# Patient Record
Sex: FEMALE | Race: BLACK OR AFRICAN AMERICAN | NOT HISPANIC OR LATINO | Employment: OTHER | URBAN - METROPOLITAN AREA
[De-identification: names, ages, dates, MRNs, and addresses within clinical notes are randomized per-mention and may not be internally consistent; named-entity substitution may affect disease eponyms.]

---

## 2017-03-03 ENCOUNTER — OFFICE VISIT (OUTPATIENT)
Dept: INTERNAL MEDICINE | Facility: CLINIC | Age: 72
End: 2017-03-03
Attending: FAMILY MEDICINE
Payer: MEDICARE

## 2017-03-03 ENCOUNTER — LAB VISIT (OUTPATIENT)
Dept: LAB | Facility: OTHER | Age: 72
End: 2017-03-03
Attending: FAMILY MEDICINE
Payer: MEDICARE

## 2017-03-03 VITALS
SYSTOLIC BLOOD PRESSURE: 140 MMHG | DIASTOLIC BLOOD PRESSURE: 92 MMHG | OXYGEN SATURATION: 97 % | HEART RATE: 81 BPM | WEIGHT: 160.25 LBS | HEIGHT: 63 IN | BODY MASS INDEX: 28.39 KG/M2

## 2017-03-03 DIAGNOSIS — G89.29 CHRONIC RIGHT HIP PAIN: ICD-10-CM

## 2017-03-03 DIAGNOSIS — E11.9 TYPE 2 DIABETES MELLITUS WITHOUT COMPLICATION, WITH LONG-TERM CURRENT USE OF INSULIN: ICD-10-CM

## 2017-03-03 DIAGNOSIS — Z79.4 TYPE 2 DIABETES MELLITUS WITHOUT COMPLICATION, WITH LONG-TERM CURRENT USE OF INSULIN: ICD-10-CM

## 2017-03-03 DIAGNOSIS — R05.9 COUGH: ICD-10-CM

## 2017-03-03 DIAGNOSIS — I10 HYPERTENSION, ESSENTIAL: ICD-10-CM

## 2017-03-03 DIAGNOSIS — Z00.00 PREVENTATIVE HEALTH CARE: Primary | ICD-10-CM

## 2017-03-03 DIAGNOSIS — M25.551 CHRONIC RIGHT HIP PAIN: ICD-10-CM

## 2017-03-03 DIAGNOSIS — Z00.00 PREVENTATIVE HEALTH CARE: ICD-10-CM

## 2017-03-03 DIAGNOSIS — Z12.11 SCREEN FOR COLON CANCER: ICD-10-CM

## 2017-03-03 LAB
ALBUMIN SERPL BCP-MCNC: 3.3 G/DL
ALP SERPL-CCNC: 79 U/L
ALT SERPL W/O P-5'-P-CCNC: 14 U/L
ANION GAP SERPL CALC-SCNC: 7 MMOL/L
AST SERPL-CCNC: 16 U/L
BASOPHILS # BLD AUTO: 0.02 K/UL
BASOPHILS NFR BLD: 0.5 %
BILIRUB SERPL-MCNC: 0.6 MG/DL
BUN SERPL-MCNC: 18 MG/DL
CALCIUM SERPL-MCNC: 9.2 MG/DL
CHLORIDE SERPL-SCNC: 110 MMOL/L
CHOLEST/HDLC SERPL: 3.1 {RATIO}
CO2 SERPL-SCNC: 23 MMOL/L
CREAT SERPL-MCNC: 0.9 MG/DL
DIFFERENTIAL METHOD: ABNORMAL
EOSINOPHIL # BLD AUTO: 0.3 K/UL
EOSINOPHIL NFR BLD: 8.6 %
ERYTHROCYTE [DISTWIDTH] IN BLOOD BY AUTOMATED COUNT: 14.5 %
EST. GFR  (AFRICAN AMERICAN): >60 ML/MIN/1.73 M^2
EST. GFR  (NON AFRICAN AMERICAN): >60 ML/MIN/1.73 M^2
GLUCOSE SERPL-MCNC: 169 MG/DL
HCT VFR BLD AUTO: 35.4 %
HDL/CHOLESTEROL RATIO: 31.9 %
HDLC SERPL-MCNC: 141 MG/DL
HDLC SERPL-MCNC: 45 MG/DL
HGB BLD-MCNC: 11.4 G/DL
LDLC SERPL CALC-MCNC: 80.8 MG/DL
LYMPHOCYTES # BLD AUTO: 1.5 K/UL
LYMPHOCYTES NFR BLD: 39 %
MCH RBC QN AUTO: 28.7 PG
MCHC RBC AUTO-ENTMCNC: 32.2 %
MCV RBC AUTO: 89 FL
MONOCYTES # BLD AUTO: 0.3 K/UL
MONOCYTES NFR BLD: 8.4 %
NEUTROPHILS # BLD AUTO: 1.6 K/UL
NEUTROPHILS NFR BLD: 43 %
NONHDLC SERPL-MCNC: 96 MG/DL
PLATELET # BLD AUTO: 206 K/UL
PMV BLD AUTO: 9.9 FL
POTASSIUM SERPL-SCNC: 4.9 MMOL/L
PROT SERPL-MCNC: 6.5 G/DL
RBC # BLD AUTO: 3.97 M/UL
SODIUM SERPL-SCNC: 140 MMOL/L
TRIGL SERPL-MCNC: 76 MG/DL
TSH SERPL DL<=0.005 MIU/L-ACNC: 0.49 UIU/ML
WBC # BLD AUTO: 3.82 K/UL

## 2017-03-03 PROCEDURE — 80061 LIPID PANEL: CPT

## 2017-03-03 PROCEDURE — 84443 ASSAY THYROID STIM HORMONE: CPT

## 2017-03-03 PROCEDURE — 36415 COLL VENOUS BLD VENIPUNCTURE: CPT

## 2017-03-03 PROCEDURE — 3080F DIAST BP >= 90 MM HG: CPT | Mod: S$GLB,,, | Performed by: FAMILY MEDICINE

## 2017-03-03 PROCEDURE — 83036 HEMOGLOBIN GLYCOSYLATED A1C: CPT

## 2017-03-03 PROCEDURE — 80053 COMPREHEN METABOLIC PANEL: CPT

## 2017-03-03 PROCEDURE — 99999 PR PBB SHADOW E&M-NEW PATIENT-LVL III: CPT | Mod: PBBFAC,,, | Performed by: FAMILY MEDICINE

## 2017-03-03 PROCEDURE — 85025 COMPLETE CBC W/AUTO DIFF WBC: CPT

## 2017-03-03 PROCEDURE — 99387 INIT PM E/M NEW PAT 65+ YRS: CPT | Mod: S$GLB,,, | Performed by: FAMILY MEDICINE

## 2017-03-03 PROCEDURE — 3077F SYST BP >= 140 MM HG: CPT | Mod: S$GLB,,, | Performed by: FAMILY MEDICINE

## 2017-03-03 RX ORDER — INSULIN GLARGINE 100 [IU]/ML
INJECTION, SOLUTION SUBCUTANEOUS
COMMUNITY
End: 2019-12-13

## 2017-03-03 RX ORDER — LISINOPRIL 20 MG/1
20 TABLET ORAL DAILY
COMMUNITY
End: 2017-08-14 | Stop reason: SDUPTHER

## 2017-03-03 RX ORDER — BENZONATATE 200 MG/1
200 CAPSULE ORAL 2 TIMES DAILY PRN
Qty: 20 CAPSULE | Refills: 0 | Status: SHIPPED | OUTPATIENT
Start: 2017-03-03 | End: 2017-03-10

## 2017-03-03 RX ORDER — CARVEDILOL 12.5 MG/1
12.5 TABLET ORAL 2 TIMES DAILY WITH MEALS
COMMUNITY
End: 2017-08-14 | Stop reason: SDUPTHER

## 2017-03-03 RX ORDER — METFORMIN HYDROCHLORIDE 1000 MG/1
1000 TABLET, FILM COATED, EXTENDED RELEASE ORAL
COMMUNITY
End: 2017-08-14 | Stop reason: SDUPTHER

## 2017-03-03 RX ORDER — GLIPIZIDE 10 MG/1
10 TABLET ORAL
COMMUNITY
End: 2017-08-14 | Stop reason: SDUPTHER

## 2017-03-03 RX ORDER — HYDROCODONE BITARTRATE AND ACETAMINOPHEN 5; 325 MG/1; MG/1
TABLET ORAL
Refills: 0 | COMMUNITY
Start: 2016-12-16 | End: 2017-03-24

## 2017-03-03 NOTE — PROGRESS NOTES
"CHIEF COMPLAINT: Establish a primary care physician    HISTORY OF PRESENT ILLNESS: The patient is a 72 year-old BF.  The patient is  to another patient of mine.  She has no specific complaints today other than chronic right hip pain.  The patient wishes to establish a primary care physician.  The patient would also like to get some basic blood work done.    She has had a left knee replacement.    The patient has a history of diabetes.  Recent blood sugars have been less than 120.  There have been no episodes of hypoglycemia.    The patient has a history of stable hypertension on current medications.  Patient denies chest pain or shortness of breath today.    REVIEW OF SYSTEMS:  GENERAL: No fever, chills, fatigability or weight loss.  SKIN: No rashes, itching or changes in color or texture of skin.  HEAD: No headaches or recent head trauma.  EYES: Visual acuity fine. No photophobia, ocular pain or diplopia.  EARS: Denies ear pain, discharge or vertigo.  NOSE: No loss of smell, no epistaxis or postnasal drip.  MOUTH & THROAT: No hoarseness or change in voice. No excessive gum bleeding.  NODES: Denies swollen glands.  CHEST: Denies HULL, cyanosis, wheezing and sputum production.  CARDIOVASCULAR: Denies chest pain, PND, orthopnea or reduced exercise tolerance.  ABDOMEN: Appetite fine. No weight loss. Denies diarrhea, abdominal pain, hematemesis or blood in stool.  URINARY: No flank pain, dysuria or hematuria.  PERIPHERAL VASCULAR: No claudication or cyanosis.  MUSCULOSKELETAL: No joint stiffness or swelling. Denies back pain.Except as noted above.  NEUROLOGIC: No history of seizures, paralysis, alteration of gait or coordination.    SOCIAL HISTORY: The patient does not smoke.  The patient consumes alcohol socially.  The patient is happily  to another patient of mine.    PHYSICAL EXAMINATION:   Blood pressure (!) 140/92, pulse 81, height 5' 3" (1.6 m), weight 72.7 kg (160 lb 4.4 oz), SpO2 97 " %.    APPEARANCE: Well nourished, well developed, in no acute distress.    HEAD: Normocephalic, atraumatic.  EYES: PERRL. EOMI.  Conjunctivae without injection and  anicteric  EARS: TM's intact. Light reflex normal. No retraction or perforation.    NOSE: Mucosa pink. Airway clear.  MOUTH & THROAT: No tonsillar enlargement. No pharyngeal erythema or exudate. No stridor.  NECK: Supple.   NODES: No cervical, axillary or inguinal lymph node enlargement.  CHEST: Lungs clear to auscultation.  No retractions are noted.  No rales or rhonchi are present.  CARDIOVASCULAR: Normal S1, S2. No rubs, murmurs or gallops.  ABDOMEN: Bowel sounds normal. Not distended. Soft. No tenderness or masses.  No ascites is noted.  MUSCULOSKELETAL:  There is no clubbing, cyanosis, or edema of the extremities x4.  There is full range of motion of the lumbar spine.  There is full range of motion of the extremities x4.  There is no deformity noted.    NEUROLOGIC:       Normal speech development.      Hearing normal.      Normal gait.      Motor and sensory exams grossly normal.      DTR's normal.  PSYCHIATRIC: Patient is alert and oriented x3.  Thought processes are all normal.  There is no homicidality.  There is no suicidality.  There is no evidence of psychosis.    LABORATORY/RADIOLOGY:   Chart reviewed.  We will update blood work today.    ASSESSMENT:   Normal physical exam in an apparently healthy individual  Type 2 diabetes on insulin   Hypertension  Chronic right hip pain    PLAN:  We will follow-up blood work which we expect to be normal.  She needs a colonoscopy   We will refer her to orthopedics     Return to clinic in 3 months.

## 2017-03-03 NOTE — MR AVS SNAPSHOT
[facility]  [address]  [place] [address]  Phone: [phone]  Fax: [phone]                  Keely Pizarro   3/3/2017 11:20 AM   Office Visit    Description:  Female : 1945   Provider:  Albin Cazares MD   Department:  [facility]           Reason for Visit     Annual Exam     Shortness of Breath           Diagnoses this Visit        Comments    Preventative health care    -  Primary     Type 2 diabetes mellitus without complication, with long-term current use of insulin         Hypertension, essential         Chronic right hip pain         Cough         Screen for colon cancer                To Do List           Future Appointments        Provider Department Dept Phone    3/3/2017 12:30 PM LAB, [facility] [phone]    3/24/2017 9:00 AM Taco Basilio MD [facility] [phone]      Goals (5 Years of Data)     None       These Medications        Disp Refills Start End    benzonatate (TESSALON) 200 MG capsule 20 capsule 0 3/3/2017 3/10/2017    Take 1 capsule (200 mg total) by mouth 2 (two) times daily as needed for Cough. - Oral    Pharmacy: [facility] [address] [place] [address] [facility] [place] Ph #: [phone]         [facility]     [facility] Nurse Care Line -  Assistance  Registered nurses in the [facility] provide clinical advisement, health education, appointment booking, and other advisory services.  Call for this free service at [phone].             Medications           START taking these NEW medications        Refills    benzonatate (TESSALON) 200 MG capsule 0    Sig: Take 1 capsule (200 mg total) by mouth 2 (two) times daily as needed for Cough.    Class: Normal    Route: Oral           Verify that the below list of medications is an accurate representation of the medications you are currently  "taking.  If none reported, the list may be blank. If incorrect, please contact your healthcare provider. Carry this list with you in case of emergency.           Current Medications     carvedilol (COREG) 12.5 MG tablet Take 12.5 mg by mouth 2 (two) times daily with meals.    glipiZIDE (GLUCOTROL) 10 MG tablet Take 10 mg by mouth 2 (two) times daily before meals.    insulin glargine (LANTUS SOLOSTAR) 100 unit/mL (3 mL) InPn pen Inject into the skin. Pt is taking 32 units twice daily    lisinopril (PRINIVIL,ZESTRIL) 20 MG tablet Take 20 mg by mouth once daily.    metformin (GLUMETZA) 1000 MG (MOD) 24 hr tablet Take 1,000 mg by mouth daily with breakfast.    benzonatate (TESSALON) 200 MG capsule Take 1 capsule (200 mg total) by mouth 2 (two) times daily as needed for Cough.    hydrocodone-acetaminophen 5-325mg (NORCO) 5-325 mg per tablet TK 1 T PO Q 4 TO 6 H PRN P RELIEF           Clinical Reference Information           Your Vitals Were     BP Pulse Height Weight SpO2 BMI    140/92 81 5' 3" (1.6 m) 72.7 kg (160 lb 4.4 oz) 97% 28.39 kg/m2      Blood Pressure          Most Recent Value    BP  (!)  140/92      Allergies as of 3/3/2017     No Known Allergies      Immunizations Administered on Date of Encounter - 3/3/2017     None      Orders Placed During Today's Visit      Normal Orders This Visit    Ambulatory Referral to Orthopedics     Case request GI: COLONOSCOPY     Future Labs/Procedures Expected by Expires    CBC auto differential  3/3/2017 6/1/2017    Comprehensive metabolic panel  3/3/2017 5/2/2017    Hemoglobin A1c  3/3/2017 6/1/2017    Lipid panel  3/3/2017 5/2/2017    TSH  3/3/2017 6/1/2017      [facility] Sign-Up     Activating your [facility] account is as easy as 1-2-3!     1) Visit [email or web address], select Sign Up Now, enter this activation code and your date of birth, then select Next.  [email or web address]  Expires: 4/17/2017 12:21 PM      2) Create a username and password to use when you visit [facility] in " the future and select a security question in case you lose your password and select Next.    3) Enter your e-mail address and click Sign Up!    Additional Information  If you have questions, please e-mail [email or web address] or call [phone] to talk to our [facility] staff. Remember, [facility] is NOT to be used for urgent needs. For medical emergencies, dial 911.         Language Assistance Services     ATTENTION: Language assistance services are available, free of charge. Please call [phone].      ATENCIÓN: Si habla español, tiene a germain disposición servicios gratuitos de asistencia lingüística. Llame al [phone].     CHÚ Ý: N?u b?n nói Ti?ng Vi?t, có các d?ch v? h? tr? ngôn ng? mi?n phí dành cho b?n. G?i s? [phone].         [facility] complies with applicable Federal civil rights laws and does not discriminate on the basis of race, color, national origin, age, disability, or sex.

## 2017-03-06 LAB
ESTIMATED AVG GLUCOSE: 163 MG/DL
HBA1C MFR BLD HPLC: 7.3 %

## 2017-03-08 ENCOUNTER — TELEPHONE (OUTPATIENT)
Dept: INTERNAL MEDICINE | Facility: CLINIC | Age: 72
End: 2017-03-08

## 2017-03-08 ENCOUNTER — TELEPHONE (OUTPATIENT)
Dept: ENDOSCOPY | Facility: HOSPITAL | Age: 72
End: 2017-03-08

## 2017-03-08 DIAGNOSIS — Z12.11 SPECIAL SCREENING FOR MALIGNANT NEOPLASMS, COLON: Primary | ICD-10-CM

## 2017-03-08 RX ORDER — POLYETHYLENE GLYCOL 3350, SODIUM SULFATE ANHYDROUS, SODIUM BICARBONATE, SODIUM CHLORIDE, POTASSIUM CHLORIDE 236; 22.74; 6.74; 5.86; 2.97 G/4L; G/4L; G/4L; G/4L; G/4L
4 POWDER, FOR SOLUTION ORAL ONCE
Qty: 4000 ML | Refills: 0 | Status: SHIPPED | OUTPATIENT
Start: 2017-03-08 | End: 2017-03-08

## 2017-03-08 NOTE — TELEPHONE ENCOUNTER
----- Message from Albin Cazares MD sent at 3/7/2017 10:50 AM CST -----  Blood work looks very good.  A1c is 7.3.  No changes in medications.  Followup as scheduled in 6 months.

## 2017-03-10 DIAGNOSIS — Z12.31 OTHER SCREENING MAMMOGRAM: ICD-10-CM

## 2017-03-13 DIAGNOSIS — E11.9 TYPE 2 DIABETES MELLITUS WITHOUT COMPLICATION: ICD-10-CM

## 2017-03-24 ENCOUNTER — HOSPITAL ENCOUNTER (OUTPATIENT)
Dept: RADIOLOGY | Facility: HOSPITAL | Age: 72
Discharge: HOME | End: 2017-03-24
Attending: ORTHOPAEDIC SURGERY
Payer: MEDICARE

## 2017-03-24 ENCOUNTER — OFFICE VISIT (OUTPATIENT)
Dept: ORTHOPEDICS | Facility: CLINIC | Age: 72
End: 2017-03-24
Payer: MEDICARE

## 2017-03-24 VITALS — BODY MASS INDEX: 27.33 KG/M2 | WEIGHT: 160.06 LBS | HEIGHT: 64 IN

## 2017-03-24 DIAGNOSIS — M25.551 BILATERAL HIP PAIN: ICD-10-CM

## 2017-03-24 DIAGNOSIS — M54.50 ACUTE BILATERAL LOW BACK PAIN WITHOUT SCIATICA: Primary | ICD-10-CM

## 2017-03-24 DIAGNOSIS — M25.552 BILATERAL HIP PAIN: ICD-10-CM

## 2017-03-24 PROCEDURE — 1159F MED LIST DOCD IN RCRD: CPT | Mod: S$GLB,,, | Performed by: ORTHOPAEDIC SURGERY

## 2017-03-24 PROCEDURE — 1125F AMNT PAIN NOTED PAIN PRSNT: CPT | Mod: S$GLB,,, | Performed by: ORTHOPAEDIC SURGERY

## 2017-03-24 PROCEDURE — 1157F ADVNC CARE PLAN IN RCRD: CPT | Mod: S$GLB,,, | Performed by: ORTHOPAEDIC SURGERY

## 2017-03-24 PROCEDURE — 73521 X-RAY EXAM HIPS BI 2 VIEWS: CPT | Mod: 26,,, | Performed by: RADIOLOGY

## 2017-03-24 PROCEDURE — 72100 X-RAY EXAM L-S SPINE 2/3 VWS: CPT | Mod: TC

## 2017-03-24 PROCEDURE — 99999 PR PBB SHADOW E&M-EST. PATIENT-LVL III: CPT | Mod: PBBFAC,,, | Performed by: ORTHOPAEDIC SURGERY

## 2017-03-24 PROCEDURE — 1160F RVW MEDS BY RX/DR IN RCRD: CPT | Mod: S$GLB,,, | Performed by: ORTHOPAEDIC SURGERY

## 2017-03-24 PROCEDURE — 72100 X-RAY EXAM L-S SPINE 2/3 VWS: CPT | Mod: 26,,, | Performed by: RADIOLOGY

## 2017-03-24 PROCEDURE — 73521 X-RAY EXAM HIPS BI 2 VIEWS: CPT | Mod: TC

## 2017-03-24 PROCEDURE — 99203 OFFICE O/P NEW LOW 30 MIN: CPT | Mod: S$GLB,,, | Performed by: ORTHOPAEDIC SURGERY

## 2017-03-24 RX ORDER — NAPROXEN 500 MG/1
500 TABLET ORAL 2 TIMES DAILY WITH MEALS
Qty: 60 TABLET | Refills: 2 | Status: SHIPPED | OUTPATIENT
Start: 2017-03-24 | End: 2019-10-08

## 2017-03-24 RX ORDER — POLYETHYLENE GLYCOL-3350 AND ELECTROLYTES 236; 6.74; 5.86; 2.97; 22.74 G/274.31G; G/274.31G; G/274.31G; G/274.31G; G/274.31G
POWDER, FOR SOLUTION ORAL
COMMUNITY
Start: 2017-03-23 | End: 2017-03-24

## 2017-03-24 NOTE — PROGRESS NOTES
CC:bilateral low back pain      HPI:   Keely Pizarro is a pleasant 72 y.o. patient with h/o IDDM, L TKA who reports to clinic with R>L hip pain. Today the patient rates pain at a 6/10 on visual analog scale.  Left hip pain has been present for 1 year and worsening.  Denies trauma. Pain is located posterior.  Does not radiate.  Walking, standing, sitting makes pain worse.  Has not tried NSAIDs, PT, or injections for pain.     Affecting ADLs and exercising.        Review of Systems   Constitution: Negative. Negative for chills, fever and night sweats.   HENT: Negative for congestion and headaches.    Eyes: Negative for blurred vision, left vision loss and right vision loss.   Cardiovascular: Negative for chest pain and syncope.   Respiratory: Negative for cough and shortness of breath.    Endocrine: Negative for polydipsia, polyphagia and polyuria.   Hematologic/Lymphatic: Negative for bleeding problem. Does not bruise/bleed easily.   Skin: Negative for dry skin, itching and rash.   Musculoskeletal: Negative for falls. bilateral hip pain and  muscle weakness.   Gastrointestinal: Negative for abdominal pain and bowel incontinence.   Genitourinary: Negative for bladder incontinence and nocturia.   Neurological: Negative for disturbances in coordination, loss of balance and seizures.   Psychiatric/Behavioral: Negative for depression. The patient does not have insomnia.    Allergic/Immunologic: Negative for hives and persistent infections.     PAST MEDICAL HISTORY:   Past Medical History:   Diagnosis Date    CVA (cerebral vascular accident) 2011    Diabetes mellitus     Hypertension     Uterine cancer 2012     PAST SURGICAL HISTORY:   Past Surgical History:   Procedure Laterality Date    HYSTERECTOMY      TOTAL KNEE ARTHROPLASTY Left 2012     FAMILY HISTORY: History reviewed. No pertinent family history.  SOCIAL HISTORY:   Social History     Social History    Marital status:      Spouse name: N/A     "Number of children: N/A    Years of education: N/A     Occupational History    Not on file.     Social History Main Topics    Smoking status: Former Smoker     Types: Cigarettes    Smokeless tobacco: Not on file    Alcohol use No    Drug use: Not on file    Sexual activity: Yes     Partners: Male     Other Topics Concern    Not on file     Social History Narrative       MEDICATIONS:   Current Outpatient Prescriptions:     carvedilol (COREG) 12.5 MG tablet, Take 12.5 mg by mouth 2 (two) times daily with meals., Disp: , Rfl:     glipiZIDE (GLUCOTROL) 10 MG tablet, Take 10 mg by mouth 2 (two) times daily before meals., Disp: , Rfl:     insulin glargine (LANTUS SOLOSTAR) 100 unit/mL (3 mL) InPn pen, Inject into the skin. Pt is taking 32 units twice daily, Disp: , Rfl:     lisinopril (PRINIVIL,ZESTRIL) 20 MG tablet, Take 20 mg by mouth once daily., Disp: , Rfl:     metformin (GLUMETZA) 1000 MG (MOD) 24 hr tablet, Take 1,000 mg by mouth daily with breakfast., Disp: , Rfl:     naproxen (NAPROSYN) 500 MG tablet, Take 1 tablet (500 mg total) by mouth 2 (two) times daily with meals., Disp: 60 tablet, Rfl: 2    ranitidine (ZANTAC) 300 MG tablet, Take 1 tablet (300 mg total) by mouth every evening., Disp: 30 tablet, Rfl: 3  ALLERGIES: Review of patient's allergies indicates:  No Known Allergies    VITAL SIGNS: Ht 5' 4" (1.626 m)  Wt 72.6 kg (160 lb 0.9 oz)  BMI 27.47 kg/m2       PHYSICAL EXAM / HIP  PHYSICAL EXAMINATION  General:  The patient is alert and oriented x 3.  Mood is pleasant.  Observation of ears, eyes and nose reveal no gross abnormalities.  HEENT: NCAT, sclera nonicteric  Lungs: Respirations are equal and unlabored.    Bilateral HIP EXAMINATION     OBSERVATION / INSPECTION  Gait:   Nonantalgic   Alignment:  Neutral   Scars:   L knee vertical midline incision well healed  Muscle atrophy: None   Effusion:  None   Warmth:  None   Discoloration:   None   Leg lengths:   Equal   Pelvis:   Level "     TENDERNESS / CREPITUS (T/C):      T / C  Trochanteric bursa   - / -  Piriformis    - / -  SI joint    - / -  Psoas tendon   - / -  Rectus insertion  - / -  Adductor insertion  - / -  Pubic symphysis  - / -    ROM: (* = pain)    Flexion:    100 degrees  External rotation: 40 degrees *  Internal rotation: 30 degrees  Abduction:  45 degrees  Adduction:   20 degrees    SPECIAL TESTS:  Pain w/ forced internal rotation (FADIR): -  Pain w/ forced external rotation (JAY JAY): +  JAY JAY asymmetry:     -  Circumduction test:    -  Log roll:      Negative   Snapping hip (internal):   Negative   Sit-up pain:     Negative     EXTREMITY NEURO-VASCULAR EXAMINATION:   Sensation:  Grossly intact to light touch all dermatomal regions.   Motor Function:  Fully intact motor function at hip, knee, foot and ankle     Vascular status:  DP and PT pulses 2+, brisk capillary refill, symmetric.    Skin: intact, compartments soft.    OTHER FINDINGS:      XRAYS:  Xrays AP Pelvis, Lateral hip ordered and images reviewed by me and my interpretation is as follows:       No fracture, dislocation or other bony pathology     A/P  R low back pain pain    Plan:  Pt with tenderness at insertion of back extensors.  This is likely due to inflammation of the muscle insertions.  Discussed treatment with patient which includes strengthening with PT and ibuprofen scheduled.  Patient understands and agrees with plan.  Will f/u as needed.

## 2017-03-24 NOTE — LETTER
March 24, 2017      Albin Cazares MD  [address]  [facility] [address]  [facility] [address]           [facility]  [address]  [facility] [address]  Phone: [phone]          Patient: Keely Pizarro   MR Number: [id number]   YOB: 1945   Date of Visit: 3/24/2017       Dear Dr. Albin Cazares:    Thank you for referring Keely Pizarro to me for evaluation. Attached you will find relevant portions of my assessment and plan of care.    If you have questions, please do not hesitate to call me. I look forward to following Keely Pizarro along with you.    Sincerely,    Taco Basilio MD    Enclosure  CC:  No Recipients    If you would like to receive this communication electronically, please contact [email or web address] or [phone] to request more information on [employer] Link access.    For providers and/or their staff who would like to refer a patient to [facility], please contact us through our one-stop-shop provider referral line, [facility], at [phone].    If you feel you have received this communication in error or would no longer like to receive these types of communications, please e-mail [email or web address]

## 2017-03-31 ENCOUNTER — ANESTHESIA EVENT (OUTPATIENT)
Dept: ENDOSCOPY | Facility: HOSPITAL | Age: 72
End: 2017-03-31
Payer: MEDICARE

## 2017-03-31 ENCOUNTER — ANESTHESIA (OUTPATIENT)
Dept: ENDOSCOPY | Facility: HOSPITAL | Age: 72
End: 2017-03-31
Payer: MEDICARE

## 2017-03-31 ENCOUNTER — HOSPITAL ENCOUNTER (OUTPATIENT)
Facility: HOSPITAL | Age: 72
Discharge: HOME | End: 2017-03-31
Attending: COLON & RECTAL SURGERY | Admitting: COLON & RECTAL SURGERY
Payer: MEDICARE

## 2017-03-31 ENCOUNTER — SURGERY (OUTPATIENT)
Age: 72
End: 2017-03-31

## 2017-03-31 VITALS
HEART RATE: 76 BPM | TEMPERATURE: 99 F | RESPIRATION RATE: 23 BRPM | BODY MASS INDEX: 27.31 KG/M2 | SYSTOLIC BLOOD PRESSURE: 178 MMHG | OXYGEN SATURATION: 98 % | RESPIRATION RATE: 12 BRPM | DIASTOLIC BLOOD PRESSURE: 87 MMHG | WEIGHT: 160 LBS | HEIGHT: 64 IN

## 2017-03-31 DIAGNOSIS — Z12.11 SPECIAL SCREENING FOR MALIGNANT NEOPLASMS, COLON: ICD-10-CM

## 2017-03-31 LAB
GLUCOSE SERPL-MCNC: 149 MG/DL (ref 70–110)
POCT GLUCOSE: 149 MG/DL (ref 70–110)

## 2017-03-31 PROCEDURE — 63600175 PHARM REV CODE 636 W HCPCS: Performed by: NURSE ANESTHETIST, CERTIFIED REGISTERED

## 2017-03-31 PROCEDURE — D9220A PRA ANESTHESIA: Mod: PT,CRNA,, | Performed by: NURSE ANESTHETIST, CERTIFIED REGISTERED

## 2017-03-31 PROCEDURE — 25000003 PHARM REV CODE 250: Performed by: NURSE ANESTHETIST, CERTIFIED REGISTERED

## 2017-03-31 PROCEDURE — 37000008 HC ANESTHESIA 1ST 15 MINUTES: Performed by: COLON & RECTAL SURGERY

## 2017-03-31 PROCEDURE — 88305 TISSUE EXAM BY PATHOLOGIST: CPT | Performed by: PATHOLOGY

## 2017-03-31 PROCEDURE — 25000003 PHARM REV CODE 250: Performed by: COLON & RECTAL SURGERY

## 2017-03-31 PROCEDURE — 45380 COLONOSCOPY AND BIOPSY: CPT | Mod: PT,,, | Performed by: COLON & RECTAL SURGERY

## 2017-03-31 PROCEDURE — D9220A PRA ANESTHESIA: Mod: PT,ANES,, | Performed by: ANESTHESIOLOGY

## 2017-03-31 PROCEDURE — 45380 COLONOSCOPY AND BIOPSY: CPT | Performed by: COLON & RECTAL SURGERY

## 2017-03-31 PROCEDURE — 27201012 HC FORCEPS, HOT/COLD, DISP: Performed by: COLON & RECTAL SURGERY

## 2017-03-31 PROCEDURE — 37000009 HC ANESTHESIA EA ADD 15 MINS: Performed by: COLON & RECTAL SURGERY

## 2017-03-31 RX ORDER — PROPOFOL 10 MG/ML
VIAL (ML) INTRAVENOUS CONTINUOUS PRN
Status: DISCONTINUED | OUTPATIENT
Start: 2017-03-31 | End: 2017-03-31

## 2017-03-31 RX ORDER — DEXTROSE MONOHYDRATE AND SODIUM CHLORIDE 5; .45 G/100ML; G/100ML
INJECTION, SOLUTION INTRAVENOUS CONTINUOUS
Status: DISCONTINUED | OUTPATIENT
Start: 2017-03-31 | End: 2017-03-31 | Stop reason: HOSPADM

## 2017-03-31 RX ORDER — PROPOFOL 10 MG/ML
VIAL (ML) INTRAVENOUS
Status: DISCONTINUED | OUTPATIENT
Start: 2017-03-31 | End: 2017-03-31

## 2017-03-31 RX ORDER — LIDOCAINE HCL/PF 100 MG/5ML
SYRINGE (ML) INTRAVENOUS
Status: DISCONTINUED | OUTPATIENT
Start: 2017-03-31 | End: 2017-03-31

## 2017-03-31 RX ORDER — LABETALOL HYDROCHLORIDE 5 MG/ML
INJECTION, SOLUTION INTRAVENOUS
Status: DISCONTINUED | OUTPATIENT
Start: 2017-03-31 | End: 2017-03-31

## 2017-03-31 RX ADMIN — PROPOFOL 50 MG: 10 INJECTION, EMULSION INTRAVENOUS at 09:03

## 2017-03-31 RX ADMIN — DEXTROSE AND SODIUM CHLORIDE: 5; .45 INJECTION, SOLUTION INTRAVENOUS at 09:03

## 2017-03-31 RX ADMIN — LIDOCAINE HYDROCHLORIDE 50 MG: 20 INJECTION, SOLUTION INTRAVENOUS at 09:03

## 2017-03-31 RX ADMIN — PROPOFOL 150 MCG/KG/MIN: 10 INJECTION, EMULSION INTRAVENOUS at 09:03

## 2017-03-31 RX ADMIN — LABETALOL HYDROCHLORIDE 5 MG: 5 INJECTION, SOLUTION INTRAVENOUS at 09:03

## 2017-03-31 NOTE — ANESTHESIA PREPROCEDURE EVALUATION
03/31/2017  Keely Pizarro is a 72 y.o., female.    [facility] Anesthesia Evaluation    I have reviewed the Patient Summary Reports.    I have reviewed the Nursing Notes.   I have reviewed the Medications.     Review of Systems  Anesthesia Hx:  No problems with previous Anesthesia  History of prior surgery of interest to airway management or planning: Denies Family Hx of Anesthesia complications.   Denies Personal Hx of Anesthesia complications.   EENT/Dental:EENT/Dental Normal   Cardiovascular:   Exercise tolerance: good Hypertension Denies MI.    Denies Angina.    Pulmonary:  Pulmonary Normal    Hepatic/GI:  Hepatic/GI Normal  Denies GERD.    Neurological:   CVA, no residual symptoms CVA 2011 which pt states was around time of uterine cancer, no residual deficits. On ASA which she has held x 2-3 days   Endocrine:   Diabetes, type 2        Physical Exam  General:  Well nourished    Airway/Jaw/Neck:  Airway Findings: Mouth Opening: Normal Tongue: Normal  General Airway Assessment: Adult  Mallampati: II  TM Distance: Normal, at least 6 cm      Dental:  Dental Findings: In tact   Chest/Lungs:  Chest/Lungs Findings: Normal Respiratory Rate     Heart/Vascular:  Heart Findings: Rate: Normal        Mental Status:  Mental Status Findings:  Alert and Oriented         Anesthesia Plan  Type of Anesthesia, risks & benefits discussed:  Anesthesia Type:  general, MAC  Patient's Preference:   Intra-op Monitoring Plan:   Intra-op Monitoring Plan Comments:   Post Op Pain Control Plan:   Post Op Pain Control Plan Comments:   Induction:   IV  Beta Blocker:  Patient is not currently on a Beta-Blocker (No further documentation required).       Informed Consent: Patient understands risks and agrees with Anesthesia plan.  Questions answered. Anesthesia consent signed with patient.  ASA Score: 2     Day of Surgery Review of History &  Physical:    H&P update referred to the surgeon.         Ready For Surgery From Anesthesia Perspective.

## 2017-03-31 NOTE — DISCHARGE INSTRUCTIONS
Diverticulosis    Diverticulosis means that small pouches have formed in the wall of your large intestine (colon). Most often, this problem causes no symptoms and is common as people age. But the pouches in the colon are at risk of becoming infected. When this happens, the condition is called diverticulitis. Although most people with diverticulosis never develop diverticulitis, it is still not uncommon. Rectal bleeding can also occur and in less common situations, a type of colon inflammation called colitis.  While most people do not have symptoms, some people with diverticulosis may have:  · Abdominal cramps and pain  · Bloating  · Constipation  · Change in bowel habits  Causes  The exact cause of diverticulosis (and diverticulitis) has not been proved, but a few things are associated with the condition:  · Low-fiber diet  · Constipation  · Lack of exercise  Your healthcare provider will talk with you about how to manage your condition. Diet changes may be all that are needed to help control diverticulosis and prevent progression to diverticulitis. If you develop diverticulitis, you will likely need other treatments.  Home care  You may be told to take fiber supplements daily. Fiber adds bulk to the stool so that it passes through the colon more easily. Stool softeners may be recommended. You may also be given medications for pain relief. Be sure to take all medications as directed.  In the past, people were told to avoid corn, nuts, and seeds. This is no longer necessary.  Follow these guidelines when caring for yourself at home:  · Eat unprocessed foods that are high in fiber. Whole grains, fruits, and vegetables are good choices.  · Drink 6 to 8 glasses of water every day unless your healthcare provider has you limit how much fluid you should have.  · Watch for changes in your bowel movements. Tell your provider if you notice any changes.  · Begin an exercise program. Ask your provider how to get started.  Generally, walking is the best.  · Get plenty of rest and sleep.  Follow-up care  Follow up with your healthcare provider, or as advised. Regular visits may be needed to check on your health. Sometimes special procedures such as colonoscopy, are needed after an episode of diverticulitis or blooding. Be sure to keep all your appointments.  If a stool sample was taken, or cultures were done, you should be told if they are positive, or if your treatment needs to be changed. You can call as directed for the results.  If X-rays were done, a radiologist will look at them. You will be told if there is a change in your treatment.  If antibiotics were prescribed, be sure to finish them all.  When to seek medical advice  Call your healthcare provider right away if any of these occur:  · Fever of 100.4°F (38°C) or higher, or as directed by your healthcare provider  · Severe cramps in the lower left side of the abdomen or pain that is getting worse  · Tenderness in the lower left side of the abdomen or worsening pain throughout the abdomen  · Diarrhea or constipation that doesn't get better within 24 hours  · Nausea and vomiting  · Bleeding from the rectum  Call 911  Call emergency services if any of the following occur:  · Trouble breathing  · Confusion  · Very drowsy or trouble awakening  · Fainting or loss of consciousness  · Rapid heart rate  · Chest pain  Date Last Reviewed: 12/30/2015 © 2000-2016 [employer]. [address] [place] [address]. All rights reserved. This information is not intended as a substitute for professional medical care. Always follow your healthcare professional's instructions.        Understanding Colon and Rectal Polyps    The colon (also called the large intestine) is a muscular tube that forms the last part of the digestive tract. It absorbs water and stores food waste. The colon is about 4 to 6 feet long. The rectum is the last 6 inches of the colon. The colon and rectum  have a smooth lining composed of millions of cells. Changes in these cells can lead to growths in the colon that can become cancerous and should be removed. Multiple tests are available to screen for colon cancer, but the colonoscopy is the most recommended test. During colonoscopy, these polyps can be removed. How often you need this test depends on many things including your condition, your family history, symptoms, and what the findings were at the previous colonoscopy.   When the colon lining changes  Changes that happen in the cells that line the colon or rectum can lead to growths called polyps. Over a period of years, polyps can turn cancerous. Removing polyps early may prevent cancer from ever forming.  Polyps  Polyps are fleshy clumps of tissue that form on the lining of the colon or rectum. Small polyps are usually benign (not cancerous). However, over time, cells in a polyp can change and become cancerous. Certain types of polyps known as adenomatous polyps are premalignant. The risk for invasive cancer increases with the size of the polyp and certain cell and gene features. This means that they can become cancerous if they're not removed. Hyperplastic polyps are benign. They can grow quite large and not turn cancerous.   Cancer  Almost all colorectal cancers start when polyp cells begin growing abnormally. As a cancerous tumor grows, it may involve more and more of the colon or rectum. In time, cancer can also grow beyond the colon or rectum and spread to nearby organs or to glands called lymph nodes. The cells can also travel to other parts of the body. This is known as metastasis. The earlier a cancerous tumor is removed, the better the chance of preventing its spread.    Date Last Reviewed: 8/1/2016  © 9164-5464 [employer]. [address] [place] [address], [place] [address]. All rights reserved. This information is not intended as a substitute for professional medical care. Always follow your  healthcare professional's instructions.

## 2017-03-31 NOTE — ANESTHESIA RELEASE NOTE
Anesthesia Release from PACU Note    Patient name: Keely Pizarro    Procedure(s): Procedure(s) (LRB):  COLONOSCOPY (N/A)    Anesthesia type: general    Post pain: adequate analgesia    Post assessment: no apparent complications    Last vitals:   Vitals:    03/31/17 1030   BP: (!) 178/87   Pulse: 76   Resp: 12   Temp: 37.1 °C (98.7 °F)       Post vital signs: stable    Level of consciousness: alert     Nausea/Vomiting: no nausea/no vomiting    Complications: none    Airway Patency:  patent    Respiratory: unassisted    Cardiovascular: stable and blood pressure at baseline    Hydration: euvolemic

## 2017-03-31 NOTE — H&P
Endoscopy H&P    Procedure : Colonoscopy      asymptomatic screening exam, family history of colon cancer (brother 70) and personal history of colon polyps      Past Medical History:   Diagnosis Date    CVA (cerebral vascular accident) 2011    Diabetes mellitus     Hypertension     Uterine cancer 2012     Sedation Problems: NO  Family History   Problem Relation Age of Onset    Cancer Brother 67     colon     Fam Hx of Sedation Problems: NO  Social History     Social History    Marital status:      Spouse name: N/A    Number of children: N/A    Years of education: N/A     Occupational History    Not on file.     Social History Main Topics    Smoking status: Former Smoker     Types: Cigarettes    Smokeless tobacco: Not on file    Alcohol use No    Drug use: Not on file    Sexual activity: Yes     Partners: Male     Other Topics Concern    Not on file     Social History Narrative       Review of Systems - Negative     Respiratory ROS: no cough, shortness of breath, or wheezing  Cardiovascular ROS: no chest pain or dyspnea on exertion  Gastrointestinal ROS: no abdominal pain, change in bowel habits, or black or bloody stools  Musculoskeletal ROS: negative  Neurological ROS: no TIA or stroke symptoms        Physical Exam:  General: no distress  Head: normocephalic  Airway:  normal oropharynx, airway normal  Neck: supple, symmetrical, trachea midline  Lungs:  clear to auscultation bilaterally and normal respiratory effort  Heart: regular rate and rhythm, S1, S2 normal, no murmur, rub or gallop  Abdomen: soft, non-tender non-distented; bowel sounds normal; no masses,  no organomegaly  Extremities: no cyanosis or edema, or clubbing       Deep Sedation: Mallampati Score per anesthesia     SedationPlan :Gen     ASA : II

## 2017-03-31 NOTE — IP AVS SNAPSHOT
[facility]  [address]  [facility] [address]  Phone: [phone]           Patient Discharge Instructions   Our goal is to set you up for success. This packet includes information on your condition, medications, and your home care.  It will help you care for yourself to prevent having to return to the hospital.     Please ask your nurse if you have any questions.      There are many details to remember when preparing to leave the hospital. Here is what you will need to do:    1. Take your medicine. If you are prescribed medications, review your Medication List on the following pages. You may have new medications to  at the pharmacy and others that you'll need to stop taking. Review the instructions for how and when to take your medications. Talk with your doctor or nurses if you are unsure of what to do.     2. Go to your follow-up appointments. Specific follow-up information is listed in the following pages. Your may be contacted by a nurse or clinical provider about future appointments. Be sure we have all of the phone numbers to reach you. Please contact your provider's office if you are unable to make an appointment.     3. Watch for warning signs. Your doctor or nurse will give you detailed warning signs to watch for and when to call for assistance. These instructions may also include educational information about your condition. If you experience any of warning signs to your health, call your doctor.           [facility]  Unless otherwise directed by your provider, please   contact [facility], our nurse care line   that is available for 24/7 assistance.     [phone] (toll-free)     Registered nurses in the [facility]   provide: appointment scheduling, clinical advisement, health education, and other advisory services.                  ** Verify the list of medication(s) below is accurate and up to date. Carry this with you in case of  emergency. If your medications have changed, please notify your healthcare provider.             Medication List      CONTINUE taking these medications        Additional Info                      carvedilol 12.5 MG tablet   Commonly known as:  COREG   Refills:  0   Dose:  12.5 mg    Instructions:  Take 12.5 mg by mouth 2 (two) times daily with meals.     Begin Date    AM    Noon    PM    Bedtime       glipiZIDE 10 MG tablet   Commonly known as:  GLUCOTROL   Refills:  0   Dose:  10 mg    Instructions:  Take 10 mg by mouth 2 (two) times daily before meals.     Begin Date    AM    Noon    PM    Bedtime       insulin glargine 100 unit/mL (3 mL) Inpn pen   Commonly known as:  LANTUS SOLOSTAR   Refills:  0    Instructions:  Inject into the skin. Pt is taking 32 units twice daily     Begin Date    AM    Noon    PM    Bedtime       lisinopril 20 MG tablet   Commonly known as:  PRINIVIL,ZESTRIL   Refills:  0   Dose:  20 mg    Instructions:  Take 20 mg by mouth once daily.     Begin Date    AM    Noon    PM    Bedtime       metformin 1000 MG (MOD) 24 hr tablet   Commonly known as:  GLUMETZA   Refills:  0   Dose:  1000 mg    Instructions:  Take 1,000 mg by mouth daily with breakfast.     Begin Date    AM    Noon    PM    Bedtime       naproxen 500 MG tablet   Commonly known as:  NAPROSYN   Quantity:  60 tablet   Refills:  2   Dose:  500 mg    Instructions:  Take 1 tablet (500 mg total) by mouth 2 (two) times daily with meals.     Begin Date    AM    Noon    PM    Bedtime         STOP taking these medications     ranitidine 300 MG tablet   Commonly known as:  ZANTAC                  Please bring to all follow up appointments:    1. A copy of your discharge instructions.  2. All medicines you are currently taking in their original bottles.  3. Identification and insurance card.    Please arrive 15 minutes ahead of scheduled appointment time.    Please call 24 hours in advance if you must reschedule your appointment and/or  time.        Your Scheduled Appointments     Jun 23, 2017  9:00 AM CDT   Established Patient Visit with MD Tacho Lott - Orthoped[facility] ([facility] )    [address]  [facility] [address] [phone]                Discharge Instructions     Future Orders    Diet general     Questions:    Total calories:      Fat restriction, if any:      Protein restriction, if any:      Na restriction, if any:      Fluid restriction:      Additional restrictions:          Discharge Instructions         Diverticulosis    Diverticulosis means that small pouches have formed in the wall of your large intestine (colon). Most often, this problem causes no symptoms and is common as people age. But the pouches in the colon are at risk of becoming infected. When this happens, the condition is called diverticulitis. Although most people with diverticulosis never develop diverticulitis, it is still not uncommon. Rectal bleeding can also occur and in less common situations, a type of colon inflammation called colitis.  While most people do not have symptoms, some people with diverticulosis may have:  · Abdominal cramps and pain  · Bloating  · Constipation  · Change in bowel habits  Causes  The exact cause of diverticulosis (and diverticulitis) has not been proved, but a few things are associated with the condition:  · Low-fiber diet  · Constipation  · Lack of exercise  Your healthcare provider will talk with you about how to manage your condition. Diet changes may be all that are needed to help control diverticulosis and prevent progression to diverticulitis. If you develop diverticulitis, you will likely need other treatments.  Home care  You may be told to take fiber supplements daily. Fiber adds bulk to the stool so that it passes through the colon more easily. Stool softeners may be recommended. You may also be given medications for pain relief. Be sure to take all medications as directed.  In the  past, people were told to avoid corn, nuts, and seeds. This is no longer necessary.  Follow these guidelines when caring for yourself at home:  · Eat unprocessed foods that are high in fiber. Whole grains, fruits, and vegetables are good choices.  · Drink 6 to 8 glasses of water every day unless your healthcare provider has you limit how much fluid you should have.  · Watch for changes in your bowel movements. Tell your provider if you notice any changes.  · Begin an exercise program. Ask your provider how to get started. Generally, walking is the best.  · Get plenty of rest and sleep.  Follow-up care  Follow up with your healthcare provider, or as advised. Regular visits may be needed to check on your health. Sometimes special procedures such as colonoscopy, are needed after an episode of diverticulitis or blooding. Be sure to keep all your appointments.  If a stool sample was taken, or cultures were done, you should be told if they are positive, or if your treatment needs to be changed. You can call as directed for the results.  If X-rays were done, a radiologist will look at them. You will be told if there is a change in your treatment.  If antibiotics were prescribed, be sure to finish them all.  When to seek medical advice  Call your healthcare provider right away if any of these occur:  · Fever of 100.4°F (38°C) or higher, or as directed by your healthcare provider  · Severe cramps in the lower left side of the abdomen or pain that is getting worse  · Tenderness in the lower left side of the abdomen or worsening pain throughout the abdomen  · Diarrhea or constipation that doesn't get better within 24 hours  · Nausea and vomiting  · Bleeding from the rectum  Call 911  Call emergency services if any of the following occur:  · Trouble breathing  · Confusion  · Very drowsy or trouble awakening  · Fainting or loss of consciousness  · Rapid heart rate  · Chest pain  Date Last Reviewed: 12/30/2015  © 1488-5328 [employer]. [address] [place] [address], [place] [address]. All rights reserved. This information is not intended as a substitute for professional medical care. Always follow your healthcare professional's instructions.        Understanding Colon and Rectal Polyps    The colon (also called the large intestine) is a muscular tube that forms the last part of the digestive tract. It absorbs water and stores food waste. The colon is about 4 to 6 feet long. The rectum is the last 6 inches of the colon. The colon and rectum have a smooth lining composed of millions of cells. Changes in these cells can lead to growths in the colon that can become cancerous and should be removed. Multiple tests are available to screen for colon cancer, but the colonoscopy is the most recommended test. During colonoscopy, these polyps can be removed. How often you need this test depends on many things including your condition, your family history, symptoms, and what the findings were at the previous colonoscopy.   When the colon lining changes  Changes that happen in the cells that line the colon or rectum can lead to growths called polyps. Over a period of years, polyps can turn cancerous. Removing polyps early may prevent cancer from ever forming.  Polyps  Polyps are fleshy clumps of tissue that form on the lining of the colon or rectum. Small polyps are usually benign (not cancerous). However, over time, cells in a polyp can change and become cancerous. Certain types of polyps known as adenomatous polyps are premalignant. The risk for invasive cancer increases with the size of the polyp and certain cell and gene features. This means that they can become cancerous if they're not removed. Hyperplastic polyps are benign. They can grow quite large and not turn cancerous.   Cancer  Almost all colorectal cancers start when polyp cells begin growing abnormally. As a cancerous tumor grows, it may involve more and more of the colon or  "rectum. In time, cancer can also grow beyond the colon or rectum and spread to nearby organs or to glands called lymph nodes. The cells can also travel to other parts of the body. This is known as metastasis. The earlier a cancerous tumor is removed, the better the chance of preventing its spread.    Date Last Reviewed: 8/1/2016  © 2977-7204 [employer]. [address] [place] [address]. All rights reserved. This information is not intended as a substitute for professional medical care. Always follow your healthcare professional's instructions.            Admission Information     Date & Time Provider Department CSN    3/31/2017  9:04 AM Chip Pak MD [facility] [id number]      Care Providers     Provider Role Specialty Primary office phone    Chip Pak MD Attending Provider Colon and Rectal Surgery [phone]    Chip Pak MD Surgeon  Colon and Rectal Surgery [phone]      Your Vitals Were     BP Pulse Temp Resp Height Weight    179/91 72 98.7 °F (37.1 °C) 12 5' 4" (1.626 m) 72.6 kg (160 lb)    SpO2 BMI             98% 27.46 kg/m2         Recent Lab Values        3/3/2017                          12:32 PM           A1C 7.3 (H)           Comment for A1C at 12:32 PM on 3/3/2017:  According to ADA guidelines, hemoglobin A1C <7.0% represents  optimal control in non-pregnant diabetic patients.  Different  metrics may apply to specific populations.   Standards of Medical Care in Diabetes - 2016.  For the purpose of screening for the presence of diabetes:  <5.7%     Consistent with the absence of diabetes  5.7-6.4%  Consistent with increasing risk for diabetes   (prediabetes)  >or=6.5%  Consistent with diabetes  Currently no consensus exists for use of hemoglobin A1C  for diagnosis of diabetes for children.        Pending Labs     Order Current Status    Specimen to Pathology - Surgery Collected (03/31/17 1008)      Allergies as of 3/31/2017     No " Known Allergies      Advance Directives     An advance directive is a document which, in the event you are no longer able to make decisions for yourself, tells your healthcare team what kind of treatment you do or do not want to receive, or who you would like to make those decisions for you.  If you do not currently have an advance directive, [facility] encourages you to create one.  For more information call:  [phone] ([phone]), [phone] ([phone]),  or log on to [email or web address].        Smoking Cessation     If you would like to quit smoking:   You may be eligible for free services if you are a [place] resident and started smoking cigarettes before September 1, 1988.  Call the Smoking Cessation Trust (SCT) toll free at (728) 791-5627 or (900) 195-0451.   Call 7-083-QUIT-NOW if you do not meet the above criteria.   Contact us via email: [email or web address]   View our website for more information: [email or web address]        Language Assistance Services     ATTENTION: Language assistance services are available, free of charge. Please call [phone].      ATENCIÓN: Si habla español, tiene a germain disposición servicios gratuitos de asistencia lingüística. Llame al [phone].     CHÚ Ý: N?u b?n nói Ti?ng Vi?t, có các d?ch v? h? tr? ngôn ng? mi?n phí dành cho b?n. G?i s? [phone].        [facility] Sign-Up     Activating your [facility] account is as easy as 1-2-3!     1) Visit [email or web address], select Sign Up Now, enter this activation code and your date of birth, then select Next.  [email or web address]  Expires: 4/17/2017  1:21 PM      2) Create a username and password to use when you visit [facility] in the future and select a security question in case you lose your password and select Next.    3) Enter your e-mail address and click Sign Up!    Additional Information  If you have questions, please e-mail [email or web address] or call [phone] to talk to our [facility]  staff. Remember, [facility] is NOT to be used for urgent needs. For medical emergencies, dial 911.          [facility] complies with applicable Federal civil rights laws and does not discriminate on the basis of race, color, national origin, age, disability, or sex.

## 2017-03-31 NOTE — TRANSFER OF CARE
"Anesthesia Transfer of Care Note    Patient: Keely Pizarro    Procedure(s) Performed: Procedure(s) (LRB):  COLONOSCOPY (N/A)    Patient location: PACU    Anesthesia Type: general    Transport from OR: Transported from OR on room air with adequate spontaneous ventilation    Post pain: adequate analgesia    Post assessment: no apparent anesthetic complications    Post vital signs: stable    Level of consciousness: awake, alert and oriented    Nausea/Vomiting: no nausea/vomiting    Complications: none          Last vitals:   Visit Vitals    BP (!) 179/91    Pulse 72    Temp 37.1 °C (98.7 °F)    Resp 12    Ht 5' 4" (1.626 m)    Wt 72.6 kg (160 lb)    SpO2 98%    Breastfeeding No    BMI 27.46 kg/m2     "

## 2017-03-31 NOTE — ANESTHESIA POSTPROCEDURE EVALUATION
"Anesthesia Post Evaluation    Patient: Keely Pizarro    Procedure(s) Performed: Procedure(s) (LRB):  COLONOSCOPY (N/A)    Final Anesthesia Type: general  Patient location during evaluation: PACU  Patient participation: Yes- Able to Participate  Level of consciousness: awake and alert  Post-procedure vital signs: reviewed and stable  Pain management: adequate  Airway patency: patent  PONV status at discharge: No PONV  Anesthetic complications: no      Cardiovascular status: blood pressure returned to baseline  Respiratory status: unassisted, room air and spontaneous ventilation  Hydration status: euvolemic  Follow-up not needed.        Visit Vitals    BP (!) 178/87 (BP Location: Left arm, Patient Position: Sitting, BP Method: Automatic)    Pulse 76    Temp 37.1 °C (98.7 °F) (Oral)    Resp 12    Ht 5' 4" (1.626 m)    Wt 72.6 kg (160 lb)    SpO2 98%    Breastfeeding No    BMI 27.46 kg/m2       Pain/Kaushal Score: Pain Assessment Performed: Yes (3/31/2017 10:30 AM)  Presence of Pain: denies (3/31/2017 10:30 AM)  Pain Rating Prior to Med Admin: 0 (3/31/2017 10:30 AM)  Pain Rating Post Med Admin: 0 (3/31/2017 10:30 AM)  Kaushal Score: 10 (3/31/2017 10:30 AM)      "

## 2017-04-07 ENCOUNTER — TELEPHONE (OUTPATIENT)
Dept: ENDOSCOPY | Facility: HOSPITAL | Age: 72
End: 2017-04-07

## 2017-05-25 ENCOUNTER — OFFICE VISIT (OUTPATIENT)
Dept: INTERNAL MEDICINE | Facility: CLINIC | Age: 72
End: 2017-05-25
Attending: FAMILY MEDICINE
Payer: MEDICARE

## 2017-05-25 ENCOUNTER — TELEPHONE (OUTPATIENT)
Dept: INTERNAL MEDICINE | Facility: CLINIC | Age: 72
End: 2017-05-25

## 2017-05-25 ENCOUNTER — HOSPITAL ENCOUNTER (OUTPATIENT)
Dept: RADIOLOGY | Facility: OTHER | Age: 72
Discharge: HOME | End: 2017-05-25
Attending: FAMILY MEDICINE
Payer: MEDICARE

## 2017-05-25 VITALS
WEIGHT: 153 LBS | TEMPERATURE: 98 F | HEIGHT: 64 IN | BODY MASS INDEX: 26.12 KG/M2 | DIASTOLIC BLOOD PRESSURE: 50 MMHG | HEART RATE: 86 BPM | SYSTOLIC BLOOD PRESSURE: 100 MMHG

## 2017-05-25 DIAGNOSIS — R93.89 ABNORMAL CHEST X-RAY: ICD-10-CM

## 2017-05-25 DIAGNOSIS — E11.9 TYPE 2 DIABETES MELLITUS WITHOUT COMPLICATION, WITHOUT LONG-TERM CURRENT USE OF INSULIN: ICD-10-CM

## 2017-05-25 DIAGNOSIS — R63.4 UNINTENTIONAL WEIGHT LOSS OF 7.5% BODY WEIGHT OR LESS WITHIN 3 MONTHS: ICD-10-CM

## 2017-05-25 DIAGNOSIS — I10 HYPERTENSION, ESSENTIAL: ICD-10-CM

## 2017-05-25 DIAGNOSIS — R59.0 CERVICAL LYMPHADENOPATHY: ICD-10-CM

## 2017-05-25 DIAGNOSIS — J90 PLEURAL EFFUSION ON LEFT: Primary | ICD-10-CM

## 2017-05-25 PROCEDURE — 1125F AMNT PAIN NOTED PAIN PRSNT: CPT | Mod: S$GLB,,, | Performed by: FAMILY MEDICINE

## 2017-05-25 PROCEDURE — 99215 OFFICE O/P EST HI 40 MIN: CPT | Mod: S$GLB,,, | Performed by: FAMILY MEDICINE

## 2017-05-25 PROCEDURE — 71020 XR CHEST PA AND LATERAL: CPT | Mod: 26,,, | Performed by: RADIOLOGY

## 2017-05-25 PROCEDURE — 99999 PR PBB SHADOW E&M-EST. PATIENT-LVL IV: CPT | Mod: PBBFAC,,, | Performed by: FAMILY MEDICINE

## 2017-05-25 PROCEDURE — 1159F MED LIST DOCD IN RCRD: CPT | Mod: S$GLB,,, | Performed by: FAMILY MEDICINE

## 2017-05-25 PROCEDURE — 4010F ACE/ARB THERAPY RXD/TAKEN: CPT | Mod: S$GLB,,, | Performed by: FAMILY MEDICINE

## 2017-05-25 PROCEDURE — 3045F PR MOST RECENT HEMOGLOBIN A1C LEVEL 7.0-9.0%: CPT | Mod: S$GLB,,, | Performed by: FAMILY MEDICINE

## 2017-05-25 PROCEDURE — 71020 XR CHEST PA AND LATERAL: CPT | Mod: TC

## 2017-05-25 NOTE — MEDICAL/APP STUDENT
Subjective:       Patient ID: Keely Pizarro is a 72 y.o. female.    Chief Complaint: Headache; Facial Pain (right side/possible infection); and Lymphadenopathy    HPI Ms. Pizarro presents today for pain behind her R ear and painful/swollen sub-mandibular swelling. She had a few teeth pulled in December and says that she experienced these same symptoms after, but they went away. The swelling and pain came back about two weeks ago and has been progressively worsening. She admits to associated headaches. She admits to fevers at home, but is afebrile today.      Review of Systems   Constitutional: Positive for fever.   Musculoskeletal: Positive for neck pain.   Neurological: Positive for headaches.   Hematological: Positive for adenopathy.   Psychiatric/Behavioral: Negative.        Objective:      Physical Exam    Assessment:       No diagnosis found.    Plan:

## 2017-05-25 NOTE — PROGRESS NOTES
CHIEF COMPLAINT: Cervical lymphadenopathy and unintentional weight loss     HISTORY OF PRESENT ILLNESS: The patient is a 72 year-old BF.  The patient is  to another patient of mine.  She has about a 2 week history of bilateral submandibular lymphadenopathy.  She also has substantial right posterior auricular lymphadenopathy.  She's lost about 10 pounds over the past 3 months.  The patient denies hemoptysis or productive cough.  No known exposure to tuberculosis.  Recent A1c was quite good at 7.3.    She has had a left knee replacement.    The patient has a history of diabetes.  Recent blood sugars have been less than 120.  There have been no episodes of hypoglycemia.    The patient has a history of stable hypertension on current medications.  Patient denies chest pain or shortness of breath today.    REVIEW OF SYSTEMS:  GENERAL: No fever, chills, fatigability or weight loss.  SKIN: No rashes, itching or changes in color or texture of skin.  HEAD: No headaches or recent head trauma.  EYES: Visual acuity fine. No photophobia, ocular pain or diplopia.  EARS: Denies ear pain, discharge or vertigo.  NOSE: No loss of smell, no epistaxis or postnasal drip.  MOUTH & THROAT: No hoarseness or change in voice. No excessive gum bleeding.  NODES: Denies swollen glands.  CHEST: Denies HULL, cyanosis, wheezing and sputum production.  CARDIOVASCULAR: Denies chest pain, PND, orthopnea or reduced exercise tolerance.  ABDOMEN: Appetite fine. No weight loss. Denies diarrhea, abdominal pain, hematemesis or blood in stool.  URINARY: No flank pain, dysuria or hematuria.  PERIPHERAL VASCULAR: No claudication or cyanosis.  MUSCULOSKELETAL: No joint stiffness or swelling. Denies back pain.Except as noted above.  NEUROLOGIC: No history of seizures, paralysis, alteration of gait or coordination.    SOCIAL HISTORY: The patient does not smoke.  The patient consumes alcohol socially.  The patient is happily  to another patient of  "mine.    PHYSICAL EXAMINATION:   Blood pressure (!) 100/50, pulse 86, temperature 97.8 °F (36.6 °C), height 5' 4" (1.626 m), weight 69.4 kg (153 lb).    APPEARANCE: Well nourished, well developed, in no acute distress.    HEAD: Normocephalic, atraumatic.  EYES: PERRL. EOMI.  Conjunctivae without injection and  anicteric  EARS: TM's intact. Light reflex normal. No retraction or perforation.    NOSE: Mucosa pink. Airway clear.  MOUTH & THROAT: No tonsillar enlargement. No pharyngeal erythema or exudate. No stridor.  NECK: Supple.   NODES: There is a substantial amount of bilateral submandibular cervical and posterior reticular on the right lymphadenopathy.  No obvious axillary or inguinal lymph node enlargement.  CHEST: Lungs clear to auscultation.  No retractions are noted.  No rales or rhonchi are present.  CARDIOVASCULAR: Normal S1, S2. No rubs, murmurs or gallops.  ABDOMEN: Bowel sounds normal. Not distended. Soft. No tenderness or masses.  No ascites is noted.  MUSCULOSKELETAL:  There is no clubbing, cyanosis, or edema of the extremities x4.  There is full range of motion of the lumbar spine.  There is full range of motion of the extremities x4.  There is no deformity noted.    NEUROLOGIC:       Normal speech development.      Hearing normal.      Normal gait.      Motor and sensory exams grossly normal.      DTR's normal.  PSYCHIATRIC: Patient is alert and oriented x3.  Thought processes are all normal.  There is no homicidality.  There is no suicidality.  There is no evidence of psychosis.    LABORATORY/RADIOLOGY:   Chart reviewed.  We will update blood work today.    ASSESSMENT:   Recent acute onset submandibular and posterior auricular lymphadenopathy in the setting of markedly abnormal chest x-ray with large left-sided pleural effusion  Type 2 diabetes on insulin   Hypertension  Chronic right hip pain    PLAN:  We arranged for her to be seen by ENT for lymph node biopsy within 24 hours.  I am suspicious that " she has a malignant pulmonary mass in the left lower lobe which is both causing and obscured by the large pleural effusion.  She will need to be seen by pulmonary as soon as possible.  I all ready ordered a CT of the chest.  Overall I feel it is highly likely that all of the complaints listed today are due to a previously undiagnosed malignancy.  Her recent colonoscopy  was unremarkable     Return to clinic after evaluation by ENT and pulmonary.

## 2017-05-25 NOTE — TELEPHONE ENCOUNTER
----- Message from Albin Cazares MD sent at 5/25/2017  1:51 PM CDT -----  Blood work looks good.  Chest x-ray shows some fluid on the left side of the chest.  Pulmonary consult ordered.  Chest CT ordered.  Please send a copy of chest x-ray and blood work to Dr Andrade

## 2017-05-29 ENCOUNTER — HOSPITAL ENCOUNTER (OUTPATIENT)
Dept: RADIOLOGY | Facility: OTHER | Age: 72
Discharge: HOME | End: 2017-05-29
Attending: FAMILY MEDICINE
Payer: MEDICARE

## 2017-05-29 ENCOUNTER — TELEPHONE (OUTPATIENT)
Dept: INTERNAL MEDICINE | Facility: CLINIC | Age: 72
End: 2017-05-29

## 2017-05-29 DIAGNOSIS — R93.89 ABNORMAL CHEST X-RAY: ICD-10-CM

## 2017-05-29 DIAGNOSIS — R63.4 UNINTENTIONAL WEIGHT LOSS OF 7.5% BODY WEIGHT OR LESS WITHIN 3 MONTHS: ICD-10-CM

## 2017-05-29 DIAGNOSIS — R59.0 CERVICAL LYMPHADENOPATHY: ICD-10-CM

## 2017-05-29 DIAGNOSIS — J90 PLEURAL EFFUSION ON LEFT: ICD-10-CM

## 2017-05-29 PROCEDURE — 25500020 PHARM REV CODE 255: Performed by: FAMILY MEDICINE

## 2017-05-29 PROCEDURE — 71260 CT THORAX DX C+: CPT | Mod: TC

## 2017-05-29 PROCEDURE — 71260 CT THORAX DX C+: CPT | Mod: 26,,, | Performed by: RADIOLOGY

## 2017-05-29 RX ADMIN — IOHEXOL 75 ML: 350 INJECTION, SOLUTION INTRAVENOUS at 09:05

## 2017-05-29 NOTE — TELEPHONE ENCOUNTER
Per Dr. Cazares; we will wait to give pt results once Dr. Andrade send report from Friday visit. CF

## 2017-05-29 NOTE — TELEPHONE ENCOUNTER
----- Message from Albin Cazares MD sent at 5/29/2017 10:36 AM CDT -----  CT confirms fluid around lung and swollen nodes.  We need to find out what Dr Andrade did and any pathology results.  Dr Rubio do you need to see her sooner?

## 2017-06-15 ENCOUNTER — HOSPITAL ENCOUNTER (OUTPATIENT)
Dept: RADIOLOGY | Facility: HOSPITAL | Age: 72
Discharge: HOME | End: 2017-06-15
Attending: INTERNAL MEDICINE
Payer: MEDICARE

## 2017-06-15 ENCOUNTER — OFFICE VISIT (OUTPATIENT)
Dept: PULMONOLOGY | Facility: CLINIC | Age: 72
End: 2017-06-15
Payer: MEDICARE

## 2017-06-15 VITALS
HEART RATE: 89 BPM | WEIGHT: 153 LBS | HEIGHT: 64 IN | BODY MASS INDEX: 26.12 KG/M2 | OXYGEN SATURATION: 95 % | SYSTOLIC BLOOD PRESSURE: 160 MMHG | DIASTOLIC BLOOD PRESSURE: 100 MMHG

## 2017-06-15 DIAGNOSIS — J90 PLEURAL EFFUSION: ICD-10-CM

## 2017-06-15 DIAGNOSIS — R59.1 LYMPHADENOPATHY: ICD-10-CM

## 2017-06-15 DIAGNOSIS — J90 PLEURAL EFFUSION: Primary | ICD-10-CM

## 2017-06-15 PROCEDURE — 1126F AMNT PAIN NOTED NONE PRSNT: CPT | Mod: S$GLB,,, | Performed by: INTERNAL MEDICINE

## 2017-06-15 PROCEDURE — 99999 PR PBB SHADOW E&M-EST. PATIENT-LVL III: CPT | Mod: PBBFAC,,, | Performed by: INTERNAL MEDICINE

## 2017-06-15 PROCEDURE — 71020 XR CHEST PA AND LATERAL: CPT | Mod: TC

## 2017-06-15 PROCEDURE — 99204 OFFICE O/P NEW MOD 45 MIN: CPT | Mod: S$GLB,,, | Performed by: INTERNAL MEDICINE

## 2017-06-15 PROCEDURE — 71020 XR CHEST PA AND LATERAL: CPT | Mod: 26,,, | Performed by: RADIOLOGY

## 2017-06-15 PROCEDURE — 1159F MED LIST DOCD IN RCRD: CPT | Mod: S$GLB,,, | Performed by: INTERNAL MEDICINE

## 2017-06-15 NOTE — PROGRESS NOTES
Subjective:       Patient ID: Keely Pizarro is a 72 y.o. female.    Chief Complaint: Pleural Effusion    HPI   Keely Pizarro 72 y.o. female    has a past medical history of CVA (cerebral vascular accident) (2011); Diabetes mellitus; Hypertension; and Uterine cancer (2012).    has a past surgical history that includes Total knee arthroplasty (Left, 2012); Hysterectomy; and Colonoscopy (N/A, 3/31/2017).   reports that she has quit smoking. Her smoking use included Cigarettes. She does not have any smokeless tobacco history on file. She reports that she does not drink alcohol.  Referred by: Dr. Albin Chaves*  Who had concerns including Pleural Effusion.  The patient's last visit with me was on Visit date not found.      Had tooth pulled, and then lymph nodes enlarged- saw ENT, and started on abx  Dr Andrade- [phone]  Not feeling bad  No fever chills, ns, wt changes, nausea, vomiting, diarrhea, constipation, chest pain, tightness, pressure  +wt loss after tooth pulled  Saw [facility], 5/29 and abx for last couple of weeks  Done with abx- 5/26- started on Augmentin bid  No sob, no other issues  DM, htn, uterine ca  Quit tobacco 2010- 55years x .5 pack  No history of lung disease  Brother with asthma      Review of Systems   All other systems reviewed and are negative.      Objective:      Physical Exam   Constitutional: She is oriented to person, place, and time. She appears well-developed and well-nourished. She appears not cachectic. No distress. She is not obese.   HENT:   Head: Normocephalic.   Nose: Nose normal. No mucosal edema.   Mouth/Throat: Normal dentition. No oropharyngeal exudate.   Neck: Normal range of motion. Neck supple. No tracheal deviation present.   Cardiovascular: Normal rate, regular rhythm, normal heart sounds and intact distal pulses.  Exam reveals no gallop and no friction rub.    No murmur heard.  Pulmonary/Chest: Normal expansion, symmetric chest wall expansion and effort normal. No  stridor. She has decreased breath sounds. Chest wall is dull to percussion.   Abdominal: Soft. Bowel sounds are normal.   Musculoskeletal: Normal range of motion. She exhibits no edema, tenderness or deformity.   Lymphadenopathy: No supraclavicular adenopathy is present.     She has no cervical adenopathy.   Neurological: She is alert and oriented to person, place, and time. No cranial nerve deficit. Gait normal.   Skin: Skin is warm and dry. No rash noted. She is not diaphoretic. No cyanosis or erythema. No pallor. Nails show no clubbing.   Psychiatric: She has a normal mood and affect. Her behavior is normal. Judgment and thought content normal.     Personal Diagnostic Review    No flowsheet data found.      Assessment:       No diagnosis found.    Outpatient Encounter Prescriptions as of 6/15/2017   Medication Sig Dispense Refill    carvedilol (COREG) 12.5 MG tablet Take 12.5 mg by mouth 2 (two) times daily with meals.      glipiZIDE (GLUCOTROL) 10 MG tablet Take 10 mg by mouth 2 (two) times daily before meals.      insulin glargine (LANTUS SOLOSTAR) 100 unit/mL (3 mL) InPn pen Inject into the skin. Pt is taking 32 units twice daily      lisinopril (PRINIVIL,ZESTRIL) 20 MG tablet Take 20 mg by mouth once daily.      metformin (GLUMETZA) 1000 MG (MOD) 24 hr tablet Take 1,000 mg by mouth daily with breakfast.      naproxen (NAPROSYN) 500 MG tablet Take 1 tablet (500 mg total) by mouth 2 (two) times daily with meals. 60 tablet 2     No facility-administered encounter medications on file as of 6/15/2017.      No orders of the defined types were placed in this encounter.    Plan:           I personally reviewed the      1. CXR   2. CXR report   3. CT chest   4. CT chest report     Assessment:  Keely was seen today for pleural effusion.    Diagnoses and all orders for this visit:    Pleural effusion  -     X-Ray Chest PA And Lateral; Future        Plan:  Repeat cxr today, thoracentesis if effusion larger or  unchanged    No Follow-up on file.    There are no Patient Instructions on file for this visit.      There is no immunization history on file for this patient.

## 2017-06-15 NOTE — LETTER
Camille 15, 2017      Albin Cazares MD  [address]  [facility] [address]  [facility] [address]           [facility] - Pulmonary Services  [address]  [facility] [address]  Phone: [phone]          Patient: Keely Pizarro   MR Number: [id number]   YOB: 1945   Date of Visit: 6/15/2017       Dear Dr. Albin Cazares:    Thank you for referring Keely Pizarro to me for evaluation. Attached you will find relevant portions of my assessment and plan of care.    If you have questions, please do not hesitate to call me. I look forward to following Keely Pizarro along with you.    Sincerely,    Jamie Rubio MD    Enclosure  CC:  No Recipients    If you would like to receive this communication electronically, please contact [email or web address] or [phone] to request more information on [employer] Link access.    For providers and/or their staff who would like to refer a patient to [facility], please contact us through our one-stop-shop provider referral line, [facility], at [phone].    If you feel you have received this communication in error or would no longer like to receive these types of communications, please e-mail [email or web address]

## 2017-06-16 ENCOUNTER — HOSPITAL ENCOUNTER (OUTPATIENT)
Dept: RADIOLOGY | Facility: HOSPITAL | Age: 72
Discharge: HOME | End: 2017-06-16
Attending: INTERNAL MEDICINE
Payer: MEDICARE

## 2017-06-16 ENCOUNTER — OFFICE VISIT (OUTPATIENT)
Dept: PULMONOLOGY | Facility: CLINIC | Age: 72
End: 2017-06-16
Payer: MEDICARE

## 2017-06-16 VITALS
BODY MASS INDEX: 27.64 KG/M2 | DIASTOLIC BLOOD PRESSURE: 98 MMHG | HEIGHT: 63 IN | OXYGEN SATURATION: 96 % | SYSTOLIC BLOOD PRESSURE: 152 MMHG | WEIGHT: 156 LBS | HEART RATE: 95 BPM

## 2017-06-16 DIAGNOSIS — R07.9 CHEST PAIN, UNSPECIFIED TYPE: ICD-10-CM

## 2017-06-16 DIAGNOSIS — J90 PLEURAL EFFUSION: ICD-10-CM

## 2017-06-16 DIAGNOSIS — R59.1 LYMPHADENOPATHY: ICD-10-CM

## 2017-06-16 DIAGNOSIS — J90 PLEURAL EFFUSION: Primary | ICD-10-CM

## 2017-06-16 LAB
ALBUMIN FLD-MCNC: 2.3 G/DL
AMYLASE, BODY FLUID: 85 U/L
APPEARANCE FLD: NORMAL
BODY FLD TYPE: NORMAL
BODY FLUID SOURCE AMYLASE: NORMAL
BODY FLUID SOURCE, LDH: NORMAL
COLOR FLD: YELLOW
GLUCOSE FLD-MCNC: 156 MG/DL
GRAM STN SPEC: NORMAL
GRAM STN SPEC: NORMAL
LDH FLD L TO P-CCNC: 83 U/L
LYMPHOCYTES NFR FLD MANUAL: 86 %
MESOTHL CELL NFR FLD MANUAL: 2 %
MONOS+MACROS NFR FLD MANUAL: 8 %
NEUTROPHILS NFR FLD MANUAL: 4 %
PROT FLD-MCNC: 4 G/DL
SPECIMEN SOURCE: NORMAL
WBC # FLD: 1488 /CU MM

## 2017-06-16 PROCEDURE — 32556 INSERT CATH PLEURA W/O IMAGE: CPT | Mod: S$GLB,,, | Performed by: INTERNAL MEDICINE

## 2017-06-16 PROCEDURE — 71250 CT THORAX DX C-: CPT | Mod: 26,,, | Performed by: RADIOLOGY

## 2017-06-16 PROCEDURE — 83986 ASSAY PH BODY FLUID NOS: CPT

## 2017-06-16 PROCEDURE — 71250 CT THORAX DX C-: CPT | Mod: TC

## 2017-06-16 PROCEDURE — 83615 LACTATE (LD) (LDH) ENZYME: CPT

## 2017-06-16 PROCEDURE — 88184 FLOWCYTOMETRY/ TC 1 MARKER: CPT | Performed by: PATHOLOGY

## 2017-06-16 PROCEDURE — 84157 ASSAY OF PROTEIN OTHER: CPT

## 2017-06-16 PROCEDURE — 84311 SPECTROPHOTOMETRY: CPT

## 2017-06-16 PROCEDURE — 88112 CYTOPATH CELL ENHANCE TECH: CPT | Mod: 26,,, | Performed by: PATHOLOGY

## 2017-06-16 PROCEDURE — 82150 ASSAY OF AMYLASE: CPT

## 2017-06-16 PROCEDURE — 88189 FLOWCYTOMETRY/READ 16 & >: CPT | Mod: ,,, | Performed by: PATHOLOGY

## 2017-06-16 PROCEDURE — 87102 FUNGUS ISOLATION CULTURE: CPT

## 2017-06-16 PROCEDURE — 99213 OFFICE O/P EST LOW 20 MIN: CPT | Mod: 25,S$GLB,, | Performed by: INTERNAL MEDICINE

## 2017-06-16 PROCEDURE — 87116 MYCOBACTERIA CULTURE: CPT

## 2017-06-16 PROCEDURE — 88305 TISSUE EXAM BY PATHOLOGIST: CPT | Performed by: PATHOLOGY

## 2017-06-16 PROCEDURE — 87205 SMEAR GRAM STAIN: CPT

## 2017-06-16 PROCEDURE — 1126F AMNT PAIN NOTED NONE PRSNT: CPT | Mod: S$GLB,,, | Performed by: INTERNAL MEDICINE

## 2017-06-16 PROCEDURE — 88185 FLOWCYTOMETRY/TC ADD-ON: CPT | Performed by: PATHOLOGY

## 2017-06-16 PROCEDURE — 87070 CULTURE OTHR SPECIMN AEROBIC: CPT

## 2017-06-16 PROCEDURE — 99999 PR PBB SHADOW E&M-EST. PATIENT-LVL III: CPT | Mod: PBBFAC,,, | Performed by: INTERNAL MEDICINE

## 2017-06-16 PROCEDURE — 87015 SPECIMEN INFECT AGNT CONCNTJ: CPT

## 2017-06-16 PROCEDURE — 88305 TISSUE EXAM BY PATHOLOGIST: CPT | Mod: 26,,, | Performed by: PATHOLOGY

## 2017-06-16 PROCEDURE — 1159F MED LIST DOCD IN RCRD: CPT | Mod: S$GLB,,, | Performed by: INTERNAL MEDICINE

## 2017-06-16 PROCEDURE — 82042 OTHER SOURCE ALBUMIN QUAN EA: CPT

## 2017-06-16 PROCEDURE — 82945 GLUCOSE OTHER FLUID: CPT

## 2017-06-16 PROCEDURE — 89051 BODY FLUID CELL COUNT: CPT

## 2017-06-16 PROCEDURE — 84478 ASSAY OF TRIGLYCERIDES: CPT

## 2017-06-16 NOTE — PROGRESS NOTES
Subjective:       Patient ID: Keely Pizarro is a 72 y.o. female.    Chief Complaint: Pleural Effusion    HPI  Review of Systems    Objective:      Physical Exam  Personal Diagnostic Review    No flowsheet data found.      Assessment:       1. Pleural effusion        Outpatient Encounter Prescriptions as of 6/16/2017   Medication Sig Dispense Refill    carvedilol (COREG) 12.5 MG tablet Take 12.5 mg by mouth 2 (two) times daily with meals.      glipiZIDE (GLUCOTROL) 10 MG tablet Take 10 mg by mouth 2 (two) times daily before meals.      insulin glargine (LANTUS SOLOSTAR) 100 unit/mL (3 mL) InPn pen Inject into the skin. Pt is taking 32 units twice daily      lisinopril (PRINIVIL,ZESTRIL) 20 MG tablet Take 20 mg by mouth once daily.      metformin (GLUMETZA) 1000 MG (MOD) 24 hr tablet Take 1,000 mg by mouth daily with breakfast.      naproxen (NAPROSYN) 500 MG tablet Take 1 tablet (500 mg total) by mouth 2 (two) times daily with meals. 60 tablet 2     No facility-administered encounter medications on file as of 6/16/2017.      Orders Placed This Encounter   Procedures    Cholesterol, Body Fluid (Reference Lab) Pleural Fluid, Left     Standing Status:   Future     Standing Expiration Date:   8/15/2018     Order Specific Question:   Specimen Source     Answer:   Pleural Fluid, Left    CBC auto differential     Standing Status:   Future     Standing Expiration Date:   6/16/2018    Comprehensive metabolic panel     Standing Status:   Future     Standing Expiration Date:   6/16/2018    LACTATE DEHYDROGENASE     Standing Status:   Future     Standing Expiration Date:   8/15/2018     Plan:              [facility] - Pulmonary Services  Thoracentesis  Procedure Note    SUMMARY     Date of Procedure: 06/16/2017     Procedure: Thoracentesis    Indications: pleural effusion    Pre-Operative Diagnosis: pleural effusion    Post-Operative Diagnosis: same    Anesthesia: local    Technical Procedures Used:  thoracentesis    Description of the Findings of the Procedure:     Consent: Informed consent was obtained. Risks of the procedure were discussed including: infection, bleeding, pain, pneumothorax.    Under sterile conditions the patient was positioned. Chlorhexadine solution and sterile drapes were utilized. 10 cc 1% plain lidocaine was used to anesthetize between the rib space after localized under ultrasound. Fluid was obtained after catheter inserted without  difficulty and suction applied with minimal blood loss.  A dressing was applied to the wound and wound care instructions were provided.     1100 ml of cloudy pleural fluid was obtained. A sample was sent to Pathology for cytogenetics, flow, and cell counts, as well as for infection analysis.    Plan:    A follow up chest x-ray was ordered. No  Tylenol 650 mg. for pain.    Significant Surgical Tasks Conducted by the Assistant(s), if Applicable:    Complications: None; patient tolerated the procedure well.    Estimated Blood Loss (EBL): None    Attestation: I performed the procedure.

## 2017-06-18 LAB
CHOLEST FLD-MCNC: 66 MG/DL
SPECIMEN SOURCE: 518 MG/DL
SPECIMEN SOURCE: NORMAL
SPECIMEN SOURCE: NORMAL

## 2017-06-19 DIAGNOSIS — R59.1 LYMPHADENOPATHY: Primary | ICD-10-CM

## 2017-06-19 LAB
BACTERIA SPEC AEROBE CULT: NO GROWTH
PATH INTERP FLD-IMP: NORMAL

## 2017-06-20 ENCOUNTER — TELEPHONE (OUTPATIENT)
Dept: PULMONOLOGY | Facility: CLINIC | Age: 72
End: 2017-06-20

## 2017-06-20 LAB
PH FLD: NORMAL [PH]
SPECIMEN SOURCE: NORMAL

## 2017-06-20 NOTE — TELEPHONE ENCOUNTER
Spoke with Gay in the send out lab. She stated the PD had to be cancelled due to stability issues. Sample only stable for 24 hours.

## 2017-06-21 LAB
FLOW CYTOMETRY ANTIBODIES ANALYZED - FLUID: NORMAL
FLOW CYTOMETRY COMMENT - FLUID: NORMAL
FLOW CYTOMETRY INTERPRETATION - FLUID: NORMAL
FLUID TYPE: NORMAL

## 2017-06-23 ENCOUNTER — TELEPHONE (OUTPATIENT)
Dept: CRITICAL CARE MEDICINE | Facility: HOSPITAL | Age: 72
End: 2017-06-23

## 2017-06-23 NOTE — TELEPHONE ENCOUNTER
Called patient.  results reviewed. Questions answered.  Patient will come in on Tuesday am at 0830 for possible path to bx cervical ln

## 2017-06-27 ENCOUNTER — OFFICE VISIT (OUTPATIENT)
Dept: PULMONOLOGY | Facility: CLINIC | Age: 72
End: 2017-06-27
Payer: MEDICARE

## 2017-06-27 VITALS
SYSTOLIC BLOOD PRESSURE: 150 MMHG | DIASTOLIC BLOOD PRESSURE: 90 MMHG | HEART RATE: 91 BPM | BODY MASS INDEX: 27.11 KG/M2 | HEIGHT: 63 IN | WEIGHT: 153 LBS | OXYGEN SATURATION: 98 %

## 2017-06-27 DIAGNOSIS — J94.0 CHYLOTHORAX, UNSPECIFIED LATERALITY: ICD-10-CM

## 2017-06-27 DIAGNOSIS — J90 PLEURAL EFFUSION: ICD-10-CM

## 2017-06-27 DIAGNOSIS — R59.1 LYMPHADENOPATHY: Primary | ICD-10-CM

## 2017-06-27 PROCEDURE — 99999 PR PBB SHADOW E&M-EST. PATIENT-LVL III: CPT | Mod: PBBFAC,,, | Performed by: INTERNAL MEDICINE

## 2017-06-27 PROCEDURE — 10021 FNA BX W/O IMG GDN 1ST LES: CPT | Mod: ,,, | Performed by: PATHOLOGY

## 2017-06-27 PROCEDURE — 88189 FLOWCYTOMETRY/READ 16 & >: CPT | Mod: ,,, | Performed by: PATHOLOGY

## 2017-06-27 PROCEDURE — 88173 CYTOPATH EVAL FNA REPORT: CPT | Performed by: PATHOLOGY

## 2017-06-27 PROCEDURE — 88184 FLOWCYTOMETRY/ TC 1 MARKER: CPT | Performed by: PATHOLOGY

## 2017-06-27 PROCEDURE — 88185 FLOWCYTOMETRY/TC ADD-ON: CPT | Performed by: PATHOLOGY

## 2017-06-27 PROCEDURE — 99213 OFFICE O/P EST LOW 20 MIN: CPT | Mod: S$GLB,,, | Performed by: INTERNAL MEDICINE

## 2017-06-27 PROCEDURE — 1159F MED LIST DOCD IN RCRD: CPT | Mod: S$GLB,,, | Performed by: INTERNAL MEDICINE

## 2017-06-27 NOTE — PROCEDURES
The patient was examined and there was a palpable masses, right neck, 1.0-1.5 cm.    The patient was explained the nature of the procedure and risks, and a consent form was signed. At timeout was performed at 10am.    The skin was prepared with alcohol, and fine needle aspirate was performed. 3 passes were performed. Material was obtained with onsite evaluation; specimen also sent for flow cytometry. Results will follow in a separate report. The patient tolerated the procedure well.

## 2017-06-28 PROBLEM — J94.0 CHYLOTHORAX: Status: ACTIVE | Noted: 2017-06-16

## 2017-06-28 PROBLEM — R59.1 LYMPHADENOPATHY: Status: ACTIVE | Noted: 2017-06-28

## 2017-06-28 NOTE — PROGRESS NOTES
Subjective:       Patient ID: Keely Pizarro is a 72 y.o. female.    Chief Complaint: Pleural Effusion    HPI   Keely Pizarro 72 y.o. female    has a past medical history of CVA (cerebral vascular accident) (2011); Diabetes mellitus; Hypertension; and Uterine cancer (2012).    has a past surgical history that includes Total knee arthroplasty (Left, 2012); Hysterectomy; and Colonoscopy (N/A, 3/31/2017).   reports that she has quit smoking. Her smoking use included Cigarettes. She does not have any smokeless tobacco history on file. She reports that she does not drink alcohol.  Referred by: No ref. provider found  Who had concerns including Pleural Effusion.  The patient's last visit with me was on 6/16/2017.    Here for lab result followup and possible biopsy. Doing ok after thoracentesis  No fever chills, ns, wt changes, nausea, vomiting, diarrhea, constipation, chest pain, tightness, pressure  3 lbs further wt loss  Review of Systems    Objective:      Physical Exam  Personal Diagnostic Review    No flowsheet data found.      Assessment:       1. Lymphadenopathy    2. Pleural effusion    3. Chylothorax, unspecified laterality        Outpatient Encounter Prescriptions as of 6/27/2017   Medication Sig Dispense Refill    carvedilol (COREG) 12.5 MG tablet Take 12.5 mg by mouth 2 (two) times daily with meals.      glipiZIDE (GLUCOTROL) 10 MG tablet Take 10 mg by mouth 2 (two) times daily before meals.      insulin glargine (LANTUS SOLOSTAR) 100 unit/mL (3 mL) InPn pen Inject into the skin. Pt is taking 32 units twice daily      lisinopril (PRINIVIL,ZESTRIL) 20 MG tablet Take 20 mg by mouth once daily.      metformin (GLUMETZA) 1000 MG (MOD) 24 hr tablet Take 1,000 mg by mouth daily with breakfast.      naproxen (NAPROSYN) 500 MG tablet Take 1 tablet (500 mg total) by mouth 2 (two) times daily with meals. 60 tablet 2     No facility-administered encounter medications on file as of 6/27/2017.      Orders  Placed This Encounter   Procedures    Leukemia/Lymphoma Screen - Lymph Node Has a separate specimen been submitted and ordered for surgical pathology? Yes (FNA smear only)     Lymph node biopsy site:->right neck  Has a separate specimen been submitted and ordered for  surgical pathology?->Yes  FNA smear only     Order Specific Question:   Lymph node biopsy site:     Answer:   right neck     Order Specific Question:   Clinical Diagnosis     Answer:   Unknown     Order Specific Question:   Has a separate specimen been submitted and ordered for surgical pathology?     Answer:   Yes     Comments:   FNA smear only     Plan:           Plan     Pathology to biopsy cervical ln   Call patient with results.

## 2017-06-29 LAB
FLOW CYTOMETRY ANTIBODIES ANALYZED - LYMPH NODE: NORMAL
FLOW CYTOMETRY COMMENT - LYMPH NODE: NORMAL
FLOW CYTOMETRY INTERPRETATION - LYMPH NODE: NORMAL

## 2017-07-03 ENCOUNTER — TELEPHONE (OUTPATIENT)
Dept: PULMONOLOGY | Facility: CLINIC | Age: 72
End: 2017-07-03

## 2017-07-03 NOTE — TELEPHONE ENCOUNTER
----- Message from Rosa Chacon sent at 7/3/2017 10:18 AM CDT -----  Contact: self  [phone]  Ramiro  -  Pt calling to speak with the Dr in regards to her test , pt stated that she is feeling dizzy and tired'  Thanks,

## 2017-07-05 ENCOUNTER — TELEPHONE (OUTPATIENT)
Dept: PULMONOLOGY | Facility: CLINIC | Age: 72
End: 2017-07-05

## 2017-07-05 DIAGNOSIS — J94.0 CHYLOTHORAX, UNSPECIFIED LATERALITY: ICD-10-CM

## 2017-07-05 DIAGNOSIS — R59.1 LAD (LYMPHADENOPATHY): Primary | ICD-10-CM

## 2017-07-05 NOTE — TELEPHONE ENCOUNTER
----- Message from Phuong Novoa sent at 7/5/2017  2:50 PM CDT -----  Contact: Self  Pt is calling to speak with Staff regarding she has been feeling a little light headed and fatigued.  Pt says someone was supposed to call her with the test results, but no one has done so yet.  She is requesting Staff contact her.    She can be reached at [phone].    Thank you.

## 2017-07-05 NOTE — TELEPHONE ENCOUNTER
----- Message from Rosa Chacon sent at 7/5/2017 10:04 AM CDT -----  Contact: self  [phone]  Ramiro Reinoso Pt  Calling to speak with the Dr in regards to getting her test result  Thanks,

## 2017-07-05 NOTE — TELEPHONE ENCOUNTER
Spoke with pt and advised message has been forwarded to Dr Rubio. Pt can be reached at [phone]

## 2017-07-06 NOTE — TELEPHONE ENCOUNTER
Called patient to discuss results. Will refer to surgery for lymph node excision. Also, cxr to evaluate/follow up.

## 2017-07-12 ENCOUNTER — OFFICE VISIT (OUTPATIENT)
Dept: SURGERY | Facility: CLINIC | Age: 72
End: 2017-07-12
Payer: MEDICARE

## 2017-07-12 ENCOUNTER — HOSPITAL ENCOUNTER (OUTPATIENT)
Dept: RADIOLOGY | Facility: HOSPITAL | Age: 72
Discharge: HOME | End: 2017-07-12
Attending: INTERNAL MEDICINE
Payer: MEDICARE

## 2017-07-12 ENCOUNTER — HOSPITAL ENCOUNTER (OUTPATIENT)
Dept: CARDIOLOGY | Facility: CLINIC | Age: 72
Discharge: HOME | End: 2017-07-12
Payer: MEDICARE

## 2017-07-12 VITALS
DIASTOLIC BLOOD PRESSURE: 93 MMHG | TEMPERATURE: 98 F | WEIGHT: 150.56 LBS | HEART RATE: 80 BPM | BODY MASS INDEX: 26.68 KG/M2 | SYSTOLIC BLOOD PRESSURE: 147 MMHG | HEIGHT: 63 IN

## 2017-07-12 DIAGNOSIS — J94.0 CHYLOTHORAX, UNSPECIFIED LATERALITY: ICD-10-CM

## 2017-07-12 DIAGNOSIS — R59.1 LYMPHADENOPATHY: Primary | ICD-10-CM

## 2017-07-12 DIAGNOSIS — R59.1 LYMPHADENOPATHY: ICD-10-CM

## 2017-07-12 PROCEDURE — 1159F MED LIST DOCD IN RCRD: CPT | Mod: S$GLB,,, | Performed by: SURGERY

## 2017-07-12 PROCEDURE — 99999 PR PBB SHADOW E&M-EST. PATIENT-LVL IV: CPT | Mod: PBBFAC,,, | Performed by: SURGERY

## 2017-07-12 PROCEDURE — 99203 OFFICE O/P NEW LOW 30 MIN: CPT | Mod: S$GLB,,, | Performed by: SURGERY

## 2017-07-12 PROCEDURE — 93000 ELECTROCARDIOGRAM COMPLETE: CPT | Mod: S$GLB,,, | Performed by: INTERNAL MEDICINE

## 2017-07-12 PROCEDURE — 71020 XR CHEST PA AND LATERAL: CPT | Mod: 26,,, | Performed by: RADIOLOGY

## 2017-07-12 PROCEDURE — 1125F AMNT PAIN NOTED PAIN PRSNT: CPT | Mod: S$GLB,,, | Performed by: SURGERY

## 2017-07-12 NOTE — LETTER
July 17, 2017      Jamie Rubio MD  [address] [facility] [address]           [facility] - General Surgery  [address]  [facility] [address]  Phone: [phone]          Patient: Keely Pizarro   MR Number: [id number]   YOB: 1945   Date of Visit: 7/12/2017       Dear Dr. Jamie Rubio:    Thank you for referring Keely Pizarro to me for evaluation. Attached you will find relevant portions of my assessment and plan of care.    If you have questions, please do not hesitate to call me. I look forward to following Keely Pizarro along with you.    Sincerely,    Paul Cazares MD    Enclosure  CC:  No Recipients    If you would like to receive this communication electronically, please contact [email or web address] or [phone] to request more information on [employer] Link access.    For providers and/or their staff who would like to refer a patient to [facility], please contact us through our one-stop-shop provider referral line, [facility], at [phone].    If you feel you have received this communication in error or would no longer like to receive these types of communications, please e-mail [email or web address]

## 2017-07-13 ENCOUNTER — TELEPHONE (OUTPATIENT)
Dept: PULMONOLOGY | Facility: CLINIC | Age: 72
End: 2017-07-13

## 2017-07-13 NOTE — TELEPHONE ENCOUNTER
----- Message from Rosa Chacon sent at 7/13/2017 12:23 PM CDT -----  Contact: self  [phone]  Ramiro   -   Patient is returning the nurse phone call in regards to her test result  Thanks,

## 2017-07-13 NOTE — TELEPHONE ENCOUNTER
Patient called back to ask, if it would be ok for her to go to [place] on 7/20/17 or does she need to come in to get the fluid off of her lungs?

## 2017-07-17 ENCOUNTER — HOSPITAL ENCOUNTER (OUTPATIENT)
Dept: RADIOLOGY | Facility: HOSPITAL | Age: 72
Discharge: HOME | End: 2017-07-17
Attending: INTERNAL MEDICINE
Payer: MEDICARE

## 2017-07-17 ENCOUNTER — OFFICE VISIT (OUTPATIENT)
Dept: PULMONOLOGY | Facility: CLINIC | Age: 72
End: 2017-07-17
Payer: MEDICARE

## 2017-07-17 VITALS
WEIGHT: 153.88 LBS | OXYGEN SATURATION: 98 % | BODY MASS INDEX: 27.27 KG/M2 | HEART RATE: 96 BPM | DIASTOLIC BLOOD PRESSURE: 93 MMHG | HEIGHT: 63 IN | SYSTOLIC BLOOD PRESSURE: 160 MMHG

## 2017-07-17 DIAGNOSIS — J90 PLEURAL EFFUSION: Primary | ICD-10-CM

## 2017-07-17 DIAGNOSIS — J90 PLEURAL EFFUSION: ICD-10-CM

## 2017-07-17 PROCEDURE — 88305 TISSUE EXAM BY PATHOLOGIST: CPT | Mod: 26,,, | Performed by: PATHOLOGY

## 2017-07-17 PROCEDURE — 88112 CYTOPATH CELL ENHANCE TECH: CPT | Mod: 26,,, | Performed by: PATHOLOGY

## 2017-07-17 PROCEDURE — 71020 XR CHEST PA AND LATERAL: CPT | Mod: 26,,, | Performed by: RADIOLOGY

## 2017-07-17 PROCEDURE — 1159F MED LIST DOCD IN RCRD: CPT | Mod: S$GLB,,, | Performed by: INTERNAL MEDICINE

## 2017-07-17 PROCEDURE — 99999 PR PBB SHADOW E&M-EST. PATIENT-LVL III: CPT | Mod: PBBFAC,,, | Performed by: INTERNAL MEDICINE

## 2017-07-17 PROCEDURE — 99214 OFFICE O/P EST MOD 30 MIN: CPT | Mod: 25,S$GLB,, | Performed by: INTERNAL MEDICINE

## 2017-07-17 PROCEDURE — 32554 ASPIRATE PLEURA W/O IMAGING: CPT | Mod: GC,S$GLB,, | Performed by: INTERNAL MEDICINE

## 2017-07-17 PROCEDURE — 71020 XR CHEST PA AND LATERAL: CPT | Mod: TC

## 2017-07-17 NOTE — PROGRESS NOTES
[facility] - Pulmonary Services  Thoracentesis  Procedure Note    SUMMARY     Date of Procedure: 07/17/2017     Procedure: Thoracentesis    Indications: Diagnostic & therapeutic    Pre-Operative Diagnosis: exudative pleural effusion    Post-Operative Diagnosis: same    Anesthesia: local    Technical Procedures Used: none    Description of the Findings of the Procedure: 1.4L of opaque pleural fluid    Consent: Informed consent was obtained. Risks of the procedure were discussed including: infection, bleeding, pain, pneumothorax.    Under sterile conditions the patient was positioned. Chlorhexadine solution and sterile drapes were utilized. 10 cc 1% plain lidocaine was used to anesthetize between the rib space after localized under ultrasound. Fluid was obtained after catheter inserted without  difficulty and suction applied with minimal blood loss.  A dressing was applied to the wound and wound care instructions were provided.     1400 ml of cloudy pleural fluid was obtained. A sample was sent to Pathology for cytogenetics, flow, and cell counts, as well as for infection analysis.    Plan:  Pleural fluid sent for cytology and flow cytometry.  A follow up chest x-ray was ordered. Yes  Tylenol 650 mg. for pain.    Significant Surgical Tasks Conducted by the Assistant(s), if Applicable:    Complications: None; patient tolerated the procedure well.    Estimated Blood Loss (EBL): None      Dr. Youssef supervised the entire procedure.    Jazmin Wen 07/17/2017

## 2017-07-17 NOTE — PROGRESS NOTES
History & Physical    SUBJECTIVE:     History of Present Illness:  Patient is a 72 y.o. female presents with right cervical adenopathy. Onset of symptoms was gradual starting 4 months ago with gradually worsening course since that time. Patient denies fever or chills. Symptoms are aggravated by activity.     Chief Complaint   Patient presents with    Consult     lymph node       Review of patient's allergies indicates:   Allergen Reactions    Tomato (solanum lycopersicum) Itching       Current Outpatient Prescriptions   Medication Sig Dispense Refill    carvedilol (COREG) 12.5 MG tablet Take 12.5 mg by mouth 2 (two) times daily with meals.      glipiZIDE (GLUCOTROL) 10 MG tablet Take 10 mg by mouth 2 (two) times daily before meals.      insulin glargine (LANTUS SOLOSTAR) 100 unit/mL (3 mL) InPn pen Inject into the skin. Pt is taking 32 units twice daily      lisinopril (PRINIVIL,ZESTRIL) 20 MG tablet Take 20 mg by mouth once daily.      metformin (GLUMETZA) 1000 MG (MOD) 24 hr tablet Take 1,000 mg by mouth daily with breakfast.      naproxen (NAPROSYN) 500 MG tablet Take 1 tablet (500 mg total) by mouth 2 (two) times daily with meals. 60 tablet 2     No current facility-administered medications for this visit.        Past Medical History:   Diagnosis Date    CVA (cerebral vascular accident) 2011    Diabetes mellitus     Hypertension     Uterine cancer 2012     Past Surgical History:   Procedure Laterality Date    COLONOSCOPY N/A 3/31/2017    Procedure: COLONOSCOPY;  Surgeon: Chip Pak MD;  Location: [address]);  Service: Endoscopy;  Laterality: N/A;    HYSTERECTOMY      TOTAL KNEE ARTHROPLASTY Left 2012     Family History   Problem Relation Age of Onset    Cancer Brother 67     colon     Social History   Substance Use Topics    Smoking status: Former Smoker     Types: Cigarettes    Smokeless tobacco: Not on file    Alcohol use No        Review of Systems:           Review of  "Systems  Anesthesia Hx:  No problems with previous Anesthesia   Hematology/Oncology:  Hematology Normal   Oncology Normal     EENT/Dental:EENT/Dental Normal   Cardiovascular:   Orthopnea    Renal/:  Renal/ Normal     Hepatic/GI:  Hepatic/GI Normal    Musculoskeletal:  Musculoskeletal Normal    Neurological:  Neurology Normal    Dermatological:  Skin Normal        OBJECTIVE:     Vital Signs (Most Recent)  Temp: 97.7 °F (36.5 °C) (07/12/17 0926)  Pulse: 80 (07/12/17 0926)  BP: (!) 147/93 (07/12/17 0926)  5' 3" (1.6 m)  68.3 kg (150 lb 9.2 oz)     Physical Exam:    Physical Exam  General:  Well nourished    Airway/Jaw/Neck:  AIRWAY FINDINGS: Normal Jaw/Neck Findings: Neck Findings:  Palpable right cervical lymph nodes     Eyes/Ears/Nose:  EYES/EARS/NOSE FINDINGS: Normal   Dental:  DENTAL FINDINGS: Normal   Chest/Lungs:  Chest/Lungs Findings: Decreased Breathe Sounds Left, Decreased Breathe Sounds Right, Expiratory Wheezes, Mod.     Heart/Vascular:  Heart Findings: Normal      Skin:  Skin Findings:     Mental Status:  Mental Status Findings: Normal        Laboratory  Pathology: Reviewed  previous pleural fluid cytology and FNA of lymph node   All negative.    Diagnostic Results:  none    ASSESSMENT/PLAN:     lymphadenopathy    PLAN:Plan     Cervical lymph node biopsy       "

## 2017-07-18 LAB — FUNGUS SPEC CULT: NORMAL

## 2017-07-25 DIAGNOSIS — R59.1 LYMPHADENOPATHY: ICD-10-CM

## 2017-07-25 RX ORDER — SODIUM CHLORIDE 9 MG/ML
INJECTION, SOLUTION INTRAVENOUS CONTINUOUS
Status: CANCELLED | OUTPATIENT
Start: 2017-07-25

## 2017-07-31 ENCOUNTER — TELEPHONE (OUTPATIENT)
Dept: SURGERY | Facility: CLINIC | Age: 72
End: 2017-07-31

## 2017-07-31 ENCOUNTER — ANESTHESIA EVENT (OUTPATIENT)
Dept: SURGERY | Facility: HOSPITAL | Age: 72
End: 2017-07-31
Payer: MEDICARE

## 2017-07-31 NOTE — PRE-PROCEDURE INSTRUCTIONS
Preop instructions: NPO after midnight or 8 hours prior to procedure time, shower instructions, directions, leave all valuables at home, medication instructions for PM prior & am of procedure explained. Patient stated an understanding.    Patient denies any side effects or issues with anesthesia or sedation.    AM blood sugars 100-110

## 2017-07-31 NOTE — ANESTHESIA PREPROCEDURE EVALUATION
07/31/2017  Pre-operative evaluation for Procedure(s) (LRB):  BIOPSY-LYMPH NODE Right Cervical (Right)    Keely Pizarro is a 72 y.o. woman with a past medical history of uterine cancer, DM2, HTN and CVA presenting for the above procedure.    Wt Readings from Last 1 Encounters:   07/17/17 1257 69.8 kg (153 lb 14.1 oz)       Patient Active Problem List   Diagnosis    Special screening for malignant neoplasms, colon    Diabetes mellitus    Chest pain     Chylothorax    Lymphadenopathy       Past Surgical History:   Procedure Laterality Date    COLONOSCOPY N/A 3/31/2017    Procedure: COLONOSCOPY;  Surgeon: Chip Pak MD;  Location: [facility] ([address] [place] [address]);  Service: Endoscopy;  Laterality: N/A;    HYSTERECTOMY      TOTAL KNEE ARTHROPLASTY Left 2012       Vital Signs Range (Last 24H):         CBC: No results for input(s): WBC, RBC, HGB, HCT, PLT, MCV, MCH, MCHC in the last 72 hours.    CMP: No results for input(s): NA, K, CL, CO2, BUN, CREATININE, GLU, MG, PHOS, CALCIUM, ALBUMIN, PROT, ALKPHOS, ALT, AST, BILITOT in the last 72 hours.    Diagnostic Studies:  EKG:  Normal sinus rhythm  Normal ECG  No previous ECGs available  Confirmed by CHIP HERNADEZ MD ([id number]) on 7/12/2017 12:10:30 PM    Anesthesia Evaluation    I have reviewed the Patient Summary Reports.     I have reviewed the Medications.     Review of Systems  Anesthesia Hx:  History of prior surgery of interest to airway management or planning: Previous anesthesia: MAC Denies Family Hx of Anesthesia complications.   Denies Personal Hx of Anesthesia complications.   Social:  Former Smoker    Hematology/Oncology:         -- Anemia: --  Cancer in past history:  Oncology Comments: uterine     EENT/Dental:EENT/Dental Normal   Cardiovascular:   Hypertension ECG has been reviewed.    Pulmonary:   Shortness of breath Left pleural effusion    Renal/:  Renal/ Normal     Hepatic/GI:  Hepatic/GI Normal  Denies GERD.    Musculoskeletal:  Musculoskeletal Normal    Neurological:   CVA, no residual symptoms Denies Seizures.    Endocrine:   Diabetes, type 2, using insulin    Dermatological:  Skin Normal    Psych:  Psychiatric Normal           Physical Exam  General:  Well nourished    Airway/Jaw/Neck:  Airway Findings: Mouth Opening: Normal Tongue: Normal  General Airway Assessment: Adult  Mallampati: IV  Improves to II with phonation.  TM Distance: Normal, at least 6 cm       Chest/Lungs:  Chest/Lungs Findings: Decreased Breathe Sounds Left     Heart/Vascular:  Heart Findings: Normal       Mental Status:  Mental Status Findings:  Cooperative, Alert and Oriented         Anesthesia Plan  Type of Anesthesia, risks & benefits discussed:  Anesthesia Type:  MAC, general  Patient's Preference:   Intra-op Monitoring Plan: standard ASA monitors  Intra-op Monitoring Plan Comments:   Post Op Pain Control Plan: multimodal analgesia  Post Op Pain Control Plan Comments:   Induction:   IV  Beta Blocker:  Patient is on a Beta-Blocker and has received one dose within the past 24 hours (No further documentation required).       Informed Consent: Patient understands risks and agrees with Anesthesia plan.  Questions answered. Anesthesia consent signed with patient.  ASA Score: 3     Day of Surgery Review of History & Physical:            Ready For Surgery From Anesthesia Perspective.

## 2017-08-01 ENCOUNTER — ANESTHESIA (OUTPATIENT)
Dept: SURGERY | Facility: HOSPITAL | Age: 72
End: 2017-08-01
Payer: MEDICARE

## 2017-08-01 ENCOUNTER — HOSPITAL ENCOUNTER (OUTPATIENT)
Facility: HOSPITAL | Age: 72
Discharge: HOME | End: 2017-08-01
Attending: SURGERY | Admitting: SURGERY
Payer: MEDICARE

## 2017-08-01 VITALS
HEART RATE: 70 BPM | TEMPERATURE: 98 F | RESPIRATION RATE: 16 BRPM | BODY MASS INDEX: 27.11 KG/M2 | OXYGEN SATURATION: 99 % | SYSTOLIC BLOOD PRESSURE: 177 MMHG | HEIGHT: 63 IN | WEIGHT: 153 LBS | DIASTOLIC BLOOD PRESSURE: 97 MMHG

## 2017-08-01 DIAGNOSIS — R59.1 LYMPHADENOPATHY: Primary | ICD-10-CM

## 2017-08-01 LAB
POCT GLUCOSE: 178 MG/DL (ref 70–110)
POCT GLUCOSE: 215 MG/DL (ref 70–110)

## 2017-08-01 PROCEDURE — 36000706: Performed by: SURGERY

## 2017-08-01 PROCEDURE — 88341 IMHCHEM/IMCYTCHM EA ADD ANTB: CPT | Mod: 26,,, | Performed by: PATHOLOGY

## 2017-08-01 PROCEDURE — 63600175 PHARM REV CODE 636 W HCPCS: Performed by: PHYSICIAN ASSISTANT

## 2017-08-01 PROCEDURE — 37000008 HC ANESTHESIA 1ST 15 MINUTES: Performed by: SURGERY

## 2017-08-01 PROCEDURE — 25000003 PHARM REV CODE 250: Performed by: SURGERY

## 2017-08-01 PROCEDURE — 88184 FLOWCYTOMETRY/ TC 1 MARKER: CPT | Performed by: PATHOLOGY

## 2017-08-01 PROCEDURE — 25000003 PHARM REV CODE 250: Performed by: STUDENT IN AN ORGANIZED HEALTH CARE EDUCATION/TRAINING PROGRAM

## 2017-08-01 PROCEDURE — 25000003 PHARM REV CODE 250: Performed by: PHYSICIAN ASSISTANT

## 2017-08-01 PROCEDURE — 82962 GLUCOSE BLOOD TEST: CPT | Performed by: SURGERY

## 2017-08-01 PROCEDURE — 88185 FLOWCYTOMETRY/TC ADD-ON: CPT | Performed by: PATHOLOGY

## 2017-08-01 PROCEDURE — D9220A PRA ANESTHESIA: Mod: ,,, | Performed by: ANESTHESIOLOGY

## 2017-08-01 PROCEDURE — 36000707: Performed by: SURGERY

## 2017-08-01 PROCEDURE — 88342 IMHCHEM/IMCYTCHM 1ST ANTB: CPT | Mod: 26,59,, | Performed by: PATHOLOGY

## 2017-08-01 PROCEDURE — 63600175 PHARM REV CODE 636 W HCPCS: Performed by: STUDENT IN AN ORGANIZED HEALTH CARE EDUCATION/TRAINING PROGRAM

## 2017-08-01 PROCEDURE — 88189 FLOWCYTOMETRY/READ 16 & >: CPT | Mod: ,,, | Performed by: PATHOLOGY

## 2017-08-01 PROCEDURE — 71000044 HC DOSC ROUTINE RECOVERY FIRST HOUR: Performed by: SURGERY

## 2017-08-01 PROCEDURE — 37000009 HC ANESTHESIA EA ADD 15 MINS: Performed by: SURGERY

## 2017-08-01 PROCEDURE — 88305 TISSUE EXAM BY PATHOLOGIST: CPT | Performed by: PATHOLOGY

## 2017-08-01 PROCEDURE — S0020 INJECTION, BUPIVICAINE HYDRO: HCPCS | Performed by: SURGERY

## 2017-08-01 PROCEDURE — 38500 BIOPSY/REMOVAL LYMPH NODES: CPT | Mod: ,,, | Performed by: SURGERY

## 2017-08-01 RX ORDER — ONDANSETRON 2 MG/ML
4 INJECTION INTRAMUSCULAR; INTRAVENOUS DAILY PRN
Status: DISCONTINUED | OUTPATIENT
Start: 2017-08-01 | End: 2017-08-01 | Stop reason: HOSPADM

## 2017-08-01 RX ORDER — SODIUM CHLORIDE 0.9 % (FLUSH) 0.9 %
3 SYRINGE (ML) INJECTION
Status: DISCONTINUED | OUTPATIENT
Start: 2017-08-01 | End: 2017-08-01 | Stop reason: HOSPADM

## 2017-08-01 RX ORDER — SODIUM CHLORIDE 0.9 % (FLUSH) 0.9 %
3 SYRINGE (ML) INJECTION
Status: CANCELLED | OUTPATIENT
Start: 2017-08-01

## 2017-08-01 RX ORDER — OXYCODONE AND ACETAMINOPHEN 5; 325 MG/1; MG/1
1 TABLET ORAL EVERY 4 HOURS PRN
Status: DISCONTINUED | OUTPATIENT
Start: 2017-08-01 | End: 2017-08-01 | Stop reason: HOSPADM

## 2017-08-01 RX ORDER — PROPOFOL 10 MG/ML
VIAL (ML) INTRAVENOUS CONTINUOUS PRN
Status: DISCONTINUED | OUTPATIENT
Start: 2017-08-01 | End: 2017-08-01

## 2017-08-01 RX ORDER — LIDOCAINE HYDROCHLORIDE 10 MG/ML
1 INJECTION, SOLUTION EPIDURAL; INFILTRATION; INTRACAUDAL; PERINEURAL ONCE
Status: COMPLETED | OUTPATIENT
Start: 2017-08-01 | End: 2017-08-01

## 2017-08-01 RX ORDER — SODIUM CHLORIDE 9 MG/ML
INJECTION, SOLUTION INTRAVENOUS CONTINUOUS
Status: DISCONTINUED | OUTPATIENT
Start: 2017-08-01 | End: 2017-08-01 | Stop reason: HOSPADM

## 2017-08-01 RX ORDER — BUPIVACAINE HYDROCHLORIDE 5 MG/ML
INJECTION, SOLUTION EPIDURAL; INTRACAUDAL
Status: DISCONTINUED | OUTPATIENT
Start: 2017-08-01 | End: 2017-08-01 | Stop reason: HOSPADM

## 2017-08-01 RX ORDER — MIDAZOLAM HYDROCHLORIDE 1 MG/ML
INJECTION, SOLUTION INTRAMUSCULAR; INTRAVENOUS
Status: DISCONTINUED | OUTPATIENT
Start: 2017-08-01 | End: 2017-08-01

## 2017-08-01 RX ORDER — ACETAMINOPHEN 10 MG/ML
INJECTION, SOLUTION INTRAVENOUS
Status: DISCONTINUED | OUTPATIENT
Start: 2017-08-01 | End: 2017-08-01

## 2017-08-01 RX ORDER — PROPOFOL 10 MG/ML
VIAL (ML) INTRAVENOUS
Status: DISCONTINUED | OUTPATIENT
Start: 2017-08-01 | End: 2017-08-01

## 2017-08-01 RX ADMIN — ACETAMINOPHEN 1000 MG: 10 INJECTION, SOLUTION INTRAVENOUS at 07:08

## 2017-08-01 RX ADMIN — PROPOFOL 20 MG: 10 INJECTION, EMULSION INTRAVENOUS at 07:08

## 2017-08-01 RX ADMIN — MIDAZOLAM HYDROCHLORIDE 2 MG: 1 INJECTION, SOLUTION INTRAMUSCULAR; INTRAVENOUS at 06:08

## 2017-08-01 RX ADMIN — SODIUM CHLORIDE: 0.9 INJECTION, SOLUTION INTRAVENOUS at 06:08

## 2017-08-01 RX ADMIN — Medication 2 G: at 07:08

## 2017-08-01 RX ADMIN — LIDOCAINE HYDROCHLORIDE 0.2 MG: 10 INJECTION, SOLUTION EPIDURAL; INFILTRATION; INTRACAUDAL; PERINEURAL at 06:08

## 2017-08-01 RX ADMIN — PROPOFOL 150 MCG/KG/MIN: 10 INJECTION, EMULSION INTRAVENOUS at 07:08

## 2017-08-01 RX ADMIN — PROPOFOL 10 MG: 10 INJECTION, EMULSION INTRAVENOUS at 07:08

## 2017-08-01 RX ADMIN — OXYCODONE HYDROCHLORIDE AND ACETAMINOPHEN 1 TABLET: 5; 325 TABLET ORAL at 08:08

## 2017-08-01 NOTE — H&P (VIEW-ONLY)
History & Physical    SUBJECTIVE:     History of Present Illness:  Patient is a 72 y.o. female presents with right cervical adenopathy. Onset of symptoms was gradual starting 4 months ago with gradually worsening course since that time. Patient denies fever or chills. Symptoms are aggravated by activity.     Chief Complaint   Patient presents with    Consult     lymph node       Review of patient's allergies indicates:   Allergen Reactions    Tomato (solanum lycopersicum) Itching       Current Outpatient Prescriptions   Medication Sig Dispense Refill    carvedilol (COREG) 12.5 MG tablet Take 12.5 mg by mouth 2 (two) times daily with meals.      glipiZIDE (GLUCOTROL) 10 MG tablet Take 10 mg by mouth 2 (two) times daily before meals.      insulin glargine (LANTUS SOLOSTAR) 100 unit/mL (3 mL) InPn pen Inject into the skin. Pt is taking 32 units twice daily      lisinopril (PRINIVIL,ZESTRIL) 20 MG tablet Take 20 mg by mouth once daily.      metformin (GLUMETZA) 1000 MG (MOD) 24 hr tablet Take 1,000 mg by mouth daily with breakfast.      naproxen (NAPROSYN) 500 MG tablet Take 1 tablet (500 mg total) by mouth 2 (two) times daily with meals. 60 tablet 2     No current facility-administered medications for this visit.        Past Medical History:   Diagnosis Date    CVA (cerebral vascular accident) 2011    Diabetes mellitus     Hypertension     Uterine cancer 2012     Past Surgical History:   Procedure Laterality Date    COLONOSCOPY N/A 3/31/2017    Procedure: COLONOSCOPY;  Surgeon: Chip Pak MD;  Location: [address]);  Service: Endoscopy;  Laterality: N/A;    HYSTERECTOMY      TOTAL KNEE ARTHROPLASTY Left 2012     Family History   Problem Relation Age of Onset    Cancer Brother 67     colon     Social History   Substance Use Topics    Smoking status: Former Smoker     Types: Cigarettes    Smokeless tobacco: Not on file    Alcohol use No        Review of Systems:           Review of  "Systems  Anesthesia Hx:  No problems with previous Anesthesia   Hematology/Oncology:  Hematology Normal   Oncology Normal     EENT/Dental:EENT/Dental Normal   Cardiovascular:   Orthopnea    Renal/:  Renal/ Normal     Hepatic/GI:  Hepatic/GI Normal    Musculoskeletal:  Musculoskeletal Normal    Neurological:  Neurology Normal    Dermatological:  Skin Normal        OBJECTIVE:     Vital Signs (Most Recent)  Temp: 97.7 °F (36.5 °C) (07/12/17 0926)  Pulse: 80 (07/12/17 0926)  BP: (!) 147/93 (07/12/17 0926)  5' 3" (1.6 m)  68.3 kg (150 lb 9.2 oz)     Physical Exam:    Physical Exam  General:  Well nourished    Airway/Jaw/Neck:  AIRWAY FINDINGS: Normal Jaw/Neck Findings: Neck Findings:  Palpable right cervical lymph nodes     Eyes/Ears/Nose:  EYES/EARS/NOSE FINDINGS: Normal   Dental:  DENTAL FINDINGS: Normal   Chest/Lungs:  Chest/Lungs Findings: Decreased Breathe Sounds Left, Decreased Breathe Sounds Right, Expiratory Wheezes, Mod.     Heart/Vascular:  Heart Findings: Normal      Skin:  Skin Findings:     Mental Status:  Mental Status Findings: Normal        Laboratory  Pathology: Reviewed  previous pleural fluid cytology and FNA of lymph node   All negative.    Diagnostic Results:  none    ASSESSMENT/PLAN:     lymphadenopathy    PLAN:Plan     Cervical lymph node biopsy       "

## 2017-08-01 NOTE — TRANSFER OF CARE
"Anesthesia Transfer of Care Note    Patient: Keely Warnertte    Procedure(s) Performed: Procedure(s) (LRB):  BIOPSY-LYMPH NODE Right Cervical (Right)    Patient location: [facility]    Anesthesia Type: MAC and general    Transport from OR: Transported from OR on 6-10 L/min O2 by face mask with adequate spontaneous ventilation    Post pain: adequate analgesia    Post assessment: no apparent anesthetic complications    Post vital signs: stable    Level of consciousness: awake    Nausea/Vomiting: no nausea/vomiting    Complications: none    Transfer of care protocol was followed      Last vitals:   Visit Vitals  BP (!) 179/87   Pulse 79   Temp 36.2 °C (97.2 °F) (Temporal)   Resp 18   Ht 5' 3" (1.6 m)   Wt 69.4 kg (153 lb)   SpO2 100%   Breastfeeding? No   BMI 27.10 kg/m²     "

## 2017-08-01 NOTE — ANESTHESIA RELEASE NOTE
Anesthesia Release from PACU note     Patient: Joanettramsey Moira  Procedure(s) Performed: Procedure(s) (LRB):  BIOPSY-LYMPH NODE Right Cervical (Right)  Anesthesia type: general  Post pain: Adequate analgesi  Post assessment: no apparent anesthetic complications, tolerated procedure well and no evidence of recall  Last Vitals:   Vitals:    08/01/17 0844   BP: (!) 177/97   Pulse: 70   Resp: 16   Temp: 36.4 °C (97.6 °F)   SpO2: 99%     Post vital signs: stable  Level of consciousness: awake, alert  and oriented  Nausea/Vomiting: no nausea/no vomiting  Complications: none  Airway Patency: patent  Respiratory: unassisted  Cardiovascular: stable and blood pressure at baseline  Hydration: euvolemic

## 2017-08-01 NOTE — ANESTHESIA POSTPROCEDURE EVALUATION
"Anesthesia Post Evaluation    Patient: Keely Pizarro    Procedure(s) Performed: Procedure(s) (LRB):  BIOPSY-LYMPH NODE Right Cervical (Right)    Final Anesthesia Type: general  Patient location during evaluation: PACU  Patient participation: Yes- Able to Participate  Level of consciousness: awake and alert  Post-procedure vital signs: reviewed and stable  Pain management: adequate  Airway patency: patent  PONV status at discharge: No PONV  Anesthetic complications: no      Cardiovascular status: blood pressure returned to baseline  Hydration status: euvolemic  Follow-up not needed.        Visit Vitals  BP (!) 177/97   Pulse 70   Temp 36.4 °C (97.6 °F) (Temporal)   Resp 16   Ht 5' 3" (1.6 m)   Wt 69.4 kg (153 lb)   SpO2 99%   Breastfeeding? No   BMI 27.10 kg/m²       Pain/Kaushal Score: Pain Assessment Performed: Yes (8/1/2017  8:46 AM)  Presence of Pain: complains of pain/discomfort (8/1/2017  8:46 AM)  Pain Rating Prior to Med Admin: 4 (8/1/2017  8:45 AM)  Kaushal Score: 10 (8/1/2017  8:46 AM)      "

## 2017-08-01 NOTE — INTERVAL H&P NOTE
The patient has been examined and the H&P has been reviewed:    I concur with the findings and no changes have occurred since H&P was written.    Anesthesia/Surgery risks, benefits and alternative options discussed and understood by patient/family.          Active Hospital Problems    Diagnosis  POA    Lymphadenopathy [R59.1]  Yes      Resolved Hospital Problems    Diagnosis Date Resolved POA   No resolved problems to display.

## 2017-08-01 NOTE — BRIEF OP NOTE
[facility]  Brief Operative Note     SUMMARY     Surgery Date: 8/1/2017     Surgeon(s) and Role:     * Paul Cazares MD - Primary     * Gayla Hollingsworth MD - Resident - Assisting    Pre-op Diagnosis:  Lymphadenopathy [R59.1]    Post-op Diagnosis:  Post-Op Diagnosis Codes:     * Lymphadenopathy [R59.1]    Procedure(s) (LRB):  BIOPSY-LYMPH NODE Right Cervical (Right)    Anesthesia: Local MAC    Description of the findings of the procedure: cervical lymph nodes excised from right neck and sent for pathology and flow cytometry    Estimated Blood Loss: 3 ml         Specimens:   Specimen (12h ago through future)    Start     Ordered    08/01/17 0805  Specimen to Pathology - Surgery  Once     Comments:  1) right cervical lymph node - sent fresh specimen...entered lymphoma screen order      08/01/17 0804          Discharge Note    SUMMARY     Admit Date: 8/1/2017    Discharge Date and Time:  08/01/2017 8:05 AM    Hospital Course (synopsis of major diagnoses, care, treatment, and services provided during the course of the hospital stay): Pt admitted for outpatient procedure, tolerated procedure well, no complications.  Pt discharged home in stable condition.      Final Diagnosis: Post-Op Diagnosis Codes:     * Lymphadenopathy [R59.1]    Disposition: Home or Self Care    Follow Up/Patient Instructions: follow up with Dr. Rubio  Follow up with Dr. Cazares as needed    Medications:  Reconciled Home Medications:   Current Discharge Medication List      CONTINUE these medications which have NOT CHANGED    Details   carvedilol (COREG) 12.5 MG tablet Take 12.5 mg by mouth 2 (two) times daily with meals.      glipiZIDE (GLUCOTROL) 10 MG tablet Take 10 mg by mouth 2 (two) times daily before meals.      insulin glargine (LANTUS SOLOSTAR) 100 unit/mL (3 mL) InPn pen Inject into the skin. Pt is taking 32 units twice daily      lisinopril (PRINIVIL,ZESTRIL) 20 MG tablet Take 20 mg by mouth once daily.       metformin (GLUMETZA) 1000 MG (MOD) 24 hr tablet Take 1,000 mg by mouth daily with breakfast.      naproxen (NAPROSYN) 500 MG tablet Take 1 tablet (500 mg total) by mouth 2 (two) times daily with meals.  Qty: 60 tablet, Refills: 2    Associated Diagnoses: Acute bilateral low back pain without sciatica             Discharge Procedure Orders  Diet general     Activity as tolerated     Call MD for:  temperature >100.4     Call MD for:  severe uncontrolled pain     Call MD for:  redness, tenderness, or signs of infection (pain, swelling, redness, odor or green/yellow discharge around incision site)     Call MD for:  persistent dizziness, light-headedness, or visual disturbances     Call MD for:  increased confusion or weakness     Remove dressing in 48 hours   Order Comments: Remove clear plastic dressing and gauze in 48 hours, the steristrips underneath will stay on for 2 weeks - don't try to take them off. You can shower in 2 days when you take the dressing off. You can shower with soap and water over the incisions but don't soak the incisions under water       Follow-up Information     Schedule an appointment as soon as possible for a visit with Paul Cazraes MD.    Specialty:  General Surgery  Why:  As needed  Contact information:  Cecilia TANESHA CHAVIRA  [facility] [id number] [phone]

## 2017-08-01 NOTE — PLAN OF CARE
Discharge instructions given, patient verbalized understanding. Consents in chart, Vitals stable, no complaints

## 2017-08-01 NOTE — OP NOTE
DATE OF PROCEDURE:  08/01/2017    PREOPERATIVE DIAGNOSIS:  Cervical adenopathy.    POSTOPERATIVE DIAGNOSIS:  Cervical adenopathy.    PROCEDURE:  Excisional biopsy, right cervical lymph node.    SURGEON:  Paul Cazares M.D.    ASSISTANT:  Gayla Hollingsworth M.D. (RES).    ANESTHESIA:  Intravenous sedation plus local 1% Xylocaine.    CLINICAL NOTE:  The patient is a 72-year-old female with cervical adenopathy.    PROCEDURE NOTE:  Following induction of adequate intravenous sedation, the   patient's right neck was prepped and draped with ChloraPrep.  We infiltrated 1%   Xylocaine and made a transverse incision over a palpable posterior cervical   lymph node.  We dissected down and dissected this node from its attachments to   the surrounding tissue and excised it.  Hemostasis was obtained along the way   with 3-0 ties and a 2-0 ligature.  We then irrigated and obtained final   hemostasis with cautery and then, closed the wound with subcutaneous Vicryl and   subcuticular Monocryl suture.  Sterile dressings were applied, and the procedure   was terminated with the patient tolerating it well.    ESTIMATED BLOOD LOSS:  5 mL.      MCT/WOLFGANG  dd: 08/01/2017 08:18:31 (CDT)  td: 08/01/2017 08:35:52 (CDT)  Doc ID   #[id number]  Job ID #[id number]    CC:

## 2017-08-14 ENCOUNTER — LAB VISIT (OUTPATIENT)
Dept: LAB | Facility: HOSPITAL | Age: 72
End: 2017-08-14
Attending: INTERNAL MEDICINE
Payer: MEDICARE

## 2017-08-14 ENCOUNTER — OFFICE VISIT (OUTPATIENT)
Dept: SURGERY | Facility: CLINIC | Age: 72
End: 2017-08-14
Payer: MEDICARE

## 2017-08-14 ENCOUNTER — OFFICE VISIT (OUTPATIENT)
Dept: HEMATOLOGY/ONCOLOGY | Facility: CLINIC | Age: 72
End: 2017-08-14
Payer: MEDICARE

## 2017-08-14 VITALS
HEART RATE: 84 BPM | DIASTOLIC BLOOD PRESSURE: 97 MMHG | TEMPERATURE: 98 F | BODY MASS INDEX: 26.52 KG/M2 | OXYGEN SATURATION: 97 % | SYSTOLIC BLOOD PRESSURE: 169 MMHG | WEIGHT: 149.69 LBS | RESPIRATION RATE: 18 BRPM

## 2017-08-14 VITALS
SYSTOLIC BLOOD PRESSURE: 169 MMHG | DIASTOLIC BLOOD PRESSURE: 95 MMHG | WEIGHT: 150 LBS | BODY MASS INDEX: 26.58 KG/M2 | HEART RATE: 81 BPM | TEMPERATURE: 98 F | HEIGHT: 63 IN

## 2017-08-14 DIAGNOSIS — C82.98 FOLLICULAR LYMPHOMA OF LYMPH NODES OF MULTIPLE REGIONS, UNSPECIFIED FOLLICULAR LYMPHOMA TYPE: Primary | ICD-10-CM

## 2017-08-14 DIAGNOSIS — R59.9 ADENOPATHY: Primary | ICD-10-CM

## 2017-08-14 DIAGNOSIS — E11.9 TYPE 2 DIABETES MELLITUS WITHOUT COMPLICATION, WITHOUT LONG-TERM CURRENT USE OF INSULIN: Primary | ICD-10-CM

## 2017-08-14 DIAGNOSIS — C82.98 FOLLICULAR LYMPHOMA OF LYMPH NODES OF MULTIPLE REGIONS, UNSPECIFIED FOLLICULAR LYMPHOMA TYPE: ICD-10-CM

## 2017-08-14 LAB
ALBUMIN SERPL BCP-MCNC: 3.3 G/DL
ALP SERPL-CCNC: 82 U/L
ALT SERPL W/O P-5'-P-CCNC: 17 U/L
ANION GAP SERPL CALC-SCNC: 7 MMOL/L
AST SERPL-CCNC: 19 U/L
BASOPHILS # BLD AUTO: 0.02 K/UL
BASOPHILS NFR BLD: 0.5 %
BILIRUB SERPL-MCNC: 0.5 MG/DL
BUN SERPL-MCNC: 21 MG/DL
CALCIUM SERPL-MCNC: 9 MG/DL
CHLORIDE SERPL-SCNC: 110 MMOL/L
CO2 SERPL-SCNC: 20 MMOL/L
CREAT SERPL-MCNC: 0.8 MG/DL
DIFFERENTIAL METHOD: ABNORMAL
EOSINOPHIL # BLD AUTO: 0.2 K/UL
EOSINOPHIL NFR BLD: 5 %
ERYTHROCYTE [DISTWIDTH] IN BLOOD BY AUTOMATED COUNT: 14.7 %
EST. GFR  (AFRICAN AMERICAN): >60 ML/MIN/1.73 M^2
EST. GFR  (NON AFRICAN AMERICAN): >60 ML/MIN/1.73 M^2
GLUCOSE SERPL-MCNC: 122 MG/DL
HBV CORE AB SERPL QL IA: NEGATIVE
HBV SURFACE AB SER-ACNC: NEGATIVE M[IU]/ML
HBV SURFACE AG SERPL QL IA: NEGATIVE
HCT VFR BLD AUTO: 36.7 %
HGB BLD-MCNC: 12 G/DL
LYMPHOCYTES # BLD AUTO: 1.4 K/UL
LYMPHOCYTES NFR BLD: 36.3 %
MCH RBC QN AUTO: 28.1 PG
MCHC RBC AUTO-ENTMCNC: 32.7 G/DL
MCV RBC AUTO: 86 FL
MONOCYTES # BLD AUTO: 0.4 K/UL
MONOCYTES NFR BLD: 9.1 %
NEUTROPHILS # BLD AUTO: 1.9 K/UL
NEUTROPHILS NFR BLD: 48.6 %
PLATELET # BLD AUTO: 190 K/UL
PMV BLD AUTO: 9.2 FL
POTASSIUM SERPL-SCNC: 4.3 MMOL/L
PROT SERPL-MCNC: 6.6 G/DL
RBC # BLD AUTO: 4.27 M/UL
SODIUM SERPL-SCNC: 137 MMOL/L
WBC # BLD AUTO: 3.83 K/UL

## 2017-08-14 PROCEDURE — 1159F MED LIST DOCD IN RCRD: CPT | Mod: S$GLB,,, | Performed by: INTERNAL MEDICINE

## 2017-08-14 PROCEDURE — 80053 COMPREHEN METABOLIC PANEL: CPT

## 2017-08-14 PROCEDURE — 85025 COMPLETE CBC W/AUTO DIFF WBC: CPT

## 2017-08-14 PROCEDURE — 87340 HEPATITIS B SURFACE AG IA: CPT

## 2017-08-14 PROCEDURE — 99999 PR PBB SHADOW E&M-EST. PATIENT-LVL III: CPT | Mod: PBBFAC,,, | Performed by: INTERNAL MEDICINE

## 2017-08-14 PROCEDURE — 3080F DIAST BP >= 90 MM HG: CPT | Mod: S$GLB,,, | Performed by: INTERNAL MEDICINE

## 2017-08-14 PROCEDURE — 99999 PR PBB SHADOW E&M-EST. PATIENT-LVL III: CPT | Mod: PBBFAC,,, | Performed by: SURGERY

## 2017-08-14 PROCEDURE — 99215 OFFICE O/P EST HI 40 MIN: CPT | Mod: S$GLB,,, | Performed by: INTERNAL MEDICINE

## 2017-08-14 PROCEDURE — 1126F AMNT PAIN NOTED NONE PRSNT: CPT | Mod: S$GLB,,, | Performed by: INTERNAL MEDICINE

## 2017-08-14 PROCEDURE — 86706 HEP B SURFACE ANTIBODY: CPT

## 2017-08-14 PROCEDURE — 86704 HEP B CORE ANTIBODY TOTAL: CPT

## 2017-08-14 PROCEDURE — 3008F BODY MASS INDEX DOCD: CPT | Mod: S$GLB,,, | Performed by: INTERNAL MEDICINE

## 2017-08-14 PROCEDURE — 99024 POSTOP FOLLOW-UP VISIT: CPT | Mod: S$GLB,,, | Performed by: SURGERY

## 2017-08-14 PROCEDURE — 36415 COLL VENOUS BLD VENIPUNCTURE: CPT

## 2017-08-14 PROCEDURE — 3077F SYST BP >= 140 MM HG: CPT | Mod: S$GLB,,, | Performed by: INTERNAL MEDICINE

## 2017-08-14 RX ORDER — GLIPIZIDE 10 MG/1
10 TABLET ORAL
Qty: 90 TABLET | Refills: 2 | Status: SHIPPED | OUTPATIENT
Start: 2017-08-14 | End: 2017-09-07 | Stop reason: SDUPTHER

## 2017-08-14 RX ORDER — LISINOPRIL 20 MG/1
20 TABLET ORAL DAILY
Qty: 90 TABLET | Refills: 2 | Status: SHIPPED | OUTPATIENT
Start: 2017-08-14 | End: 2017-12-22 | Stop reason: SDUPTHER

## 2017-08-14 RX ORDER — CARVEDILOL 12.5 MG/1
12.5 TABLET ORAL 2 TIMES DAILY WITH MEALS
Qty: 90 TABLET | Refills: 2 | Status: SHIPPED | OUTPATIENT
Start: 2017-08-14 | End: 2017-09-07 | Stop reason: SDUPTHER

## 2017-08-14 RX ORDER — METFORMIN HYDROCHLORIDE 1000 MG/1
1000 TABLET, FILM COATED, EXTENDED RELEASE ORAL
Qty: 90 TABLET | Refills: 2 | Status: SHIPPED | OUTPATIENT
Start: 2017-08-14 | End: 2017-09-07 | Stop reason: SDUPTHER

## 2017-08-14 NOTE — PROGRESS NOTES
Subjective:       Patient ID: Keely Pizarro is a 72 y.o. female.    Chief Complaint: new patient and Lymphoma    Keely presents with her  for evaluation of newly diagnosed grade 1-2 follicular lymphoma diagnosed by excisional biopsy of a right cervical lymph node 8/1/2017.  Pathologic diagnosis was received by [facility].  Keely reports feeling fatigue since March 2017. At the time she had an infected tooth and developed cervical lymphadenopathy. She started loosing weight, total quantity to date is unknown. Appetite remains normal and she denies night sweats. She then developed shortness of breath worse with activity and left lower lung pleural effusion was noted. The received thoracentesis twice with negative pleural fluid flow cytometry. Cytology was positive for lymphocytes, but was not diagnostic for follicular lymphoma. CT imaging demonstrates diffuse, bilateral submandibular, cervical, axillary, supraclavicular, and intrathoracic lymphadenopathy.    Keely has a history of uterine cancer in 2012 treated with radiation and chemotherapy. She had a port-a-cath at that time for treatment. She has insulin dependent diabetes mellitus with poorly controlled blood sugar, reporting both high and lows. She has hypertension, and blood pressure was elevated at intake today.     HPI  Review of Systems   Constitutional: Negative.    HENT: Negative.    Eyes: Negative.    Respiratory: Negative.    Cardiovascular: Negative.    Gastrointestinal: Negative.    Endocrine: Negative.    Genitourinary: Negative.    Musculoskeletal: Negative.    Skin: Negative.    Allergic/Immunologic: Negative for environmental allergies, food allergies and immunocompromised state.   Neurological: Negative.    Hematological: Negative for adenopathy. Does not bruise/bleed easily.   Psychiatric/Behavioral: Negative.        Objective:      Physical Exam   Constitutional: She is oriented to person, place, and time. She appears  well-developed and well-nourished.   HENT:   Head: Normocephalic and atraumatic.   Right Ear: External ear normal.   Left Ear: External ear normal.   Nose: Nose normal.   Mouth/Throat: Oropharynx is clear and moist. No oropharyngeal exudate.   Eyes: Conjunctivae and EOM are normal. Pupils are equal, round, and reactive to light. No scleral icterus.   Neck: Normal range of motion. Neck supple. No JVD present. No tracheal deviation present. No thyromegaly present.   Cardiovascular: Normal rate, regular rhythm, normal heart sounds and intact distal pulses.    No murmur heard.  Pulmonary/Chest: Effort normal. No respiratory distress. She has decreased breath sounds in the left lower field.   Abdominal: Soft. Bowel sounds are normal. She exhibits no distension and no mass. There is no hepatosplenomegaly. There is no tenderness. There is no rebound and no guarding.   Musculoskeletal: Normal range of motion. She exhibits no edema.   Lymphadenopathy:     She has cervical adenopathy.     She has no axillary adenopathy.        Right: Supraclavicular adenopathy present. No inguinal adenopathy present.        Left: Supraclavicular adenopathy present. No inguinal adenopathy present.   Neurological: She is alert and oriented to person, place, and time. No cranial nerve deficit.   Skin: Skin is warm and dry. No rash noted. No cyanosis. Nails show no clubbing.   Psychiatric: She has a normal mood and affect. Her behavior is normal. Judgment and thought content normal.   Nursing note and vitals reviewed.      Assessment:       1. Follicular lymphoma of lymph nodes of multiple regions, unspecified follicular lymphoma type        Plan:       Newly diagnosed grade 1 follicular lymphoma.   Complete staging unknown due to incomplete imaging evaluation.  Behavior of lymphoma with fatigue and pleural effusion is unusual- will complete staging with PET/CT to also assess for any site of suspected high grade transformation  CBC is relatively  normal and marrow biopsy is not needed at this time  Will collect Hepatitis B screening for upcoming Rituxan therapy  Discussed regimen of Bendamustine Rituxan therapy with patient including Rituxan infusion reaction, risk of hepatitis reactivation, nausea, fatigue. Noted that she should not loose her hair.   Discussed difference in cancer versus therapy fatigue.   We discussed goal of remission with low grade lymphoma with disease control for years.    Lab evaluation today for HBV panel  PET imaging for staging.    Return visit after above tests for chemotherapy consent.

## 2017-08-14 NOTE — LETTER
August 14, 2017      Paul Cazares MD  [address]  [facility] [address]           [facility]  [address]  [facility] [address]  Phone: [phone]          Patient: Keely Pizarro   MR Number: [id number]   YOB: 1945   Date of Visit: 8/14/2017       Dear Dr. Paul Cazares:    Thank you for referring Keely Pizarro to me for evaluation. Attached you will find relevant portions of my assessment and plan of care.    If you have questions, please do not hesitate to call me. I look forward to following Keely Pizarro along with you.    Sincerely,    Jazmin Rodríguez MD    Enclosure  CC:  No Recipients    If you would like to receive this communication electronically, please contact [email or web address] or [phone] to request more information on [employer] access.    For providers and/or their staff who would like to refer a patient to [facility], please contact us through our one-stop-shop provider referral line, [facility], at [phone].    If you feel you have received this communication in error or would no longer like to receive these types of communications, please e-mail [email or web address]

## 2017-08-14 NOTE — TELEPHONE ENCOUNTER
----- Message from Zulema Howe sent at 8/14/2017 11:24 AM CDT -----  Contact: Self / [phone]  ..Refill request.  lisinopril (PRINIVIL,ZESTRIL) 20 MG tablet, metformin (GLUMETZA) 1000 MG (MOD) 24 hr tablet, carvedilol (COREG) 12.5 MG tablet, glipiZIDE (GLUCOTROL) 10 MG tablet    ..  [facility] [address] [place] [address] AT [facility] F[address]  [place]  [address] [place] [id number]  Phone: [phone] Fax: [phone]

## 2017-08-15 NOTE — PROGRESS NOTES
Doing well after cervical lymph node biopsy.   Incision healed.    Discussed path results with patient as well as need for Oncology referral    Will obtain.

## 2017-08-18 ENCOUNTER — HOSPITAL ENCOUNTER (OUTPATIENT)
Dept: RADIOLOGY | Facility: HOSPITAL | Age: 72
Discharge: HOME | End: 2017-08-18
Attending: INTERNAL MEDICINE
Payer: MEDICARE

## 2017-08-18 VITALS — BODY MASS INDEX: 26.63 KG/M2 | HEIGHT: 63 IN | WEIGHT: 150.31 LBS

## 2017-08-18 DIAGNOSIS — C82.98 FOLLICULAR LYMPHOMA OF LYMPH NODES OF MULTIPLE REGIONS, UNSPECIFIED FOLLICULAR LYMPHOMA TYPE: ICD-10-CM

## 2017-08-18 LAB
ACID FAST MOD KINY STN SPEC: NORMAL
MYCOBACTERIUM SPEC QL CULT: NORMAL

## 2017-08-18 PROCEDURE — A9552 F18 FDG: HCPCS

## 2017-08-18 PROCEDURE — 78815 PET IMAGE W/CT SKULL-THIGH: CPT | Mod: 26,PI,, | Performed by: RADIOLOGY

## 2017-09-01 ENCOUNTER — OFFICE VISIT (OUTPATIENT)
Dept: HEMATOLOGY/ONCOLOGY | Facility: CLINIC | Age: 72
End: 2017-09-01
Payer: MEDICARE

## 2017-09-01 VITALS
DIASTOLIC BLOOD PRESSURE: 86 MMHG | HEART RATE: 77 BPM | SYSTOLIC BLOOD PRESSURE: 152 MMHG | BODY MASS INDEX: 26.32 KG/M2 | HEIGHT: 63 IN | TEMPERATURE: 98 F | WEIGHT: 148.56 LBS

## 2017-09-01 DIAGNOSIS — C82.98 FOLLICULAR LYMPHOMA OF LYMPH NODES OF MULTIPLE REGIONS, UNSPECIFIED FOLLICULAR LYMPHOMA TYPE: Primary | ICD-10-CM

## 2017-09-01 DIAGNOSIS — C82.08 FOLLICULAR LYMPHOMA GRADE I, LYMPH NODES OF MULTIPLE SITES: ICD-10-CM

## 2017-09-01 PROCEDURE — 1159F MED LIST DOCD IN RCRD: CPT | Mod: S$GLB,,, | Performed by: INTERNAL MEDICINE

## 2017-09-01 PROCEDURE — 99999 PR PBB SHADOW E&M-EST. PATIENT-LVL III: CPT | Mod: PBBFAC,,, | Performed by: INTERNAL MEDICINE

## 2017-09-01 PROCEDURE — 3008F BODY MASS INDEX DOCD: CPT | Mod: S$GLB,,, | Performed by: INTERNAL MEDICINE

## 2017-09-01 PROCEDURE — 3079F DIAST BP 80-89 MM HG: CPT | Mod: S$GLB,,, | Performed by: INTERNAL MEDICINE

## 2017-09-01 PROCEDURE — 3077F SYST BP >= 140 MM HG: CPT | Mod: S$GLB,,, | Performed by: INTERNAL MEDICINE

## 2017-09-01 PROCEDURE — 99215 OFFICE O/P EST HI 40 MIN: CPT | Mod: S$GLB,,, | Performed by: INTERNAL MEDICINE

## 2017-09-01 PROCEDURE — 1125F AMNT PAIN NOTED PAIN PRSNT: CPT | Mod: S$GLB,,, | Performed by: INTERNAL MEDICINE

## 2017-09-01 RX ORDER — HEPARIN 100 UNIT/ML
500 SYRINGE INTRAVENOUS
Status: CANCELLED | OUTPATIENT
Start: 2017-09-19

## 2017-09-01 RX ORDER — ACETAMINOPHEN 325 MG/1
650 TABLET ORAL
Status: CANCELLED | OUTPATIENT
Start: 2017-09-06

## 2017-09-01 RX ORDER — SODIUM CHLORIDE 0.9 % (FLUSH) 0.9 %
10 SYRINGE (ML) INJECTION
Status: CANCELLED | OUTPATIENT
Start: 2017-09-19

## 2017-09-01 RX ORDER — SODIUM CHLORIDE 0.9 % (FLUSH) 0.9 %
10 SYRINGE (ML) INJECTION
Status: CANCELLED | OUTPATIENT
Start: 2017-09-06

## 2017-09-01 RX ORDER — FAMOTIDINE 10 MG/ML
20 INJECTION INTRAVENOUS
Status: CANCELLED | OUTPATIENT
Start: 2017-09-06

## 2017-09-01 RX ORDER — MEPERIDINE HYDROCHLORIDE 50 MG/ML
25 INJECTION INTRAMUSCULAR; INTRAVENOUS; SUBCUTANEOUS
Status: CANCELLED | OUTPATIENT
Start: 2017-09-06

## 2017-09-01 RX ORDER — HEPARIN 100 UNIT/ML
500 SYRINGE INTRAVENOUS
Status: CANCELLED | OUTPATIENT
Start: 2017-09-06

## 2017-09-01 NOTE — Clinical Note
First cycle of BR chemotherapy next week Return visit 2 weeks after first therapy with CBC and CMP Return visit for BR chemotherapy with CBC and CMP every 4 weeks

## 2017-09-01 NOTE — PROGRESS NOTES
Subjective:       Patient ID: Keely Pizarro is a 72 y.o. female.    Chief Complaint: Pain (back)    Keely presents with her  for evaluation of newly diagnosed grade 1-2 follicular lymphoma diagnosed by excisional biopsy of a right cervical lymph node 8/1/2017.  Pathologic diagnosis was received by [facility].  Keely reports feeling fatigue since March 2017. At the time she had an infected tooth and developed cervical lymphadenopathy. She started loosing weight, total quantity to date is unknown. Appetite remains normal and she denies night sweats. She then developed shortness of breath worse with activity and left lower lung pleural effusion was noted. The received thoracentesis twice with negative pleural fluid flow cytometry. Cytology was positive for lymphocytes, but was not diagnostic for follicular lymphoma. CT imaging demonstrates diffuse, bilateral submandibular, cervical, axillary, supraclavicular, and intrathoracic lymphadenopathy.    Keely has a history of uterine cancer in 2012 treated with radiation and chemotherapy. She had a port-a-cath at that time for treatment. She has insulin dependent diabetes mellitus with poorly controlled blood sugar, reporting both high and lows. She has hypertension, and blood pressure was elevated at intake today.     Follow-up Visit 9/1/17  PET imaging shows stage 4 follicular lymphoma, large left side pleural effusion    HPI  Review of Systems   Constitutional: Negative.    HENT: Negative.    Eyes: Negative.    Respiratory: Negative.    Cardiovascular: Negative.    Gastrointestinal: Negative.    Endocrine: Negative.    Genitourinary: Negative.    Musculoskeletal: Negative.    Skin: Negative.    Allergic/Immunologic: Negative for environmental allergies, food allergies and immunocompromised state.   Neurological: Negative.    Hematological: Negative for adenopathy. Does not bruise/bleed easily.   Psychiatric/Behavioral: Negative.        Objective:       Physical Exam   Constitutional: She is oriented to person, place, and time. She appears well-developed and well-nourished.   HENT:   Head: Normocephalic and atraumatic.   Right Ear: External ear normal.   Left Ear: External ear normal.   Nose: Nose normal.   Mouth/Throat: Oropharynx is clear and moist. No oropharyngeal exudate.   Eyes: Conjunctivae and EOM are normal. Pupils are equal, round, and reactive to light. No scleral icterus.   Neck: Normal range of motion. Neck supple. No JVD present. No tracheal deviation present. No thyromegaly present.   Cardiovascular: Normal rate, regular rhythm, normal heart sounds and intact distal pulses.    No murmur heard.  Pulmonary/Chest: Effort normal. No respiratory distress. She has decreased breath sounds in the left lower field.   Abdominal: Soft. Bowel sounds are normal. She exhibits no distension and no mass. There is no hepatosplenomegaly. There is no tenderness. There is no rebound and no guarding.   Musculoskeletal: Normal range of motion. She exhibits no edema.   Lymphadenopathy:     She has cervical adenopathy.     She has no axillary adenopathy.        Right: Supraclavicular adenopathy present. No inguinal adenopathy present.        Left: Supraclavicular adenopathy present. No inguinal adenopathy present.   Neurological: She is alert and oriented to person, place, and time. No cranial nerve deficit.   Skin: Skin is warm and dry. No rash noted. No cyanosis. Nails show no clubbing.   Psychiatric: She has a normal mood and affect. Her behavior is normal. Judgment and thought content normal.   Nursing note and vitals reviewed.      Assessment:       1. Follicular lymphoma of lymph nodes of multiple regions, unspecified follicular lymphoma type    2. Follicular lymphoma grade i, lymph nodes of multiple sites        Plan:       Newly diagnosed stage IV grade 1 follicular lymphoma.   No sites concerning for high grade transformation  HBV testing is negative.  Discussed  side effects of Bendamustine and Rituxan chemotherapy including infusion reaction, nausea, hair loss, mucositis, fatigue, and vital organ injury.   Chemotherapy consent completed.    First cycle of BR chemotherapy next week  Return visit 2 weeks after first therapy with CBC and CMP  Return visit for BR chemotherapy with CBC and CMP every 4 weeks

## 2017-09-07 ENCOUNTER — TELEPHONE (OUTPATIENT)
Dept: HEMATOLOGY/ONCOLOGY | Facility: CLINIC | Age: 72
End: 2017-09-07

## 2017-09-07 DIAGNOSIS — E11.9 TYPE 2 DIABETES MELLITUS WITHOUT COMPLICATION, WITHOUT LONG-TERM CURRENT USE OF INSULIN: ICD-10-CM

## 2017-09-07 RX ORDER — METFORMIN HYDROCHLORIDE 1000 MG/1
1000 TABLET, FILM COATED, EXTENDED RELEASE ORAL
Qty: 90 TABLET | Refills: 0 | Status: SHIPPED | OUTPATIENT
Start: 2017-09-07 | End: 2017-09-20 | Stop reason: CLARIF

## 2017-09-07 RX ORDER — CARVEDILOL 12.5 MG/1
12.5 TABLET ORAL 2 TIMES DAILY WITH MEALS
Qty: 90 TABLET | Refills: 0 | Status: SHIPPED | OUTPATIENT
Start: 2017-09-07 | End: 2017-12-22 | Stop reason: SDUPTHER

## 2017-09-07 RX ORDER — GLIPIZIDE 10 MG/1
10 TABLET ORAL
Qty: 90 TABLET | Refills: 0 | Status: SHIPPED | OUTPATIENT
Start: 2017-09-07 | End: 2018-03-14 | Stop reason: SDUPTHER

## 2017-09-07 NOTE — TELEPHONE ENCOUNTER
----- Message from Ashlegih Jalloh sent at 9/7/2017  9:51 AM CDT -----  Contact: Self  Good morning,    Pt would like a call back regarding when her scheduling is for her chemotherapy. Pt stated she was informed she would begin Wednesday or Thursday of this week and haven't heard back.    Pt can be reached at [phone].    Thank you!

## 2017-09-11 ENCOUNTER — TELEPHONE (OUTPATIENT)
Dept: HEMATOLOGY/ONCOLOGY | Facility: CLINIC | Age: 72
End: 2017-09-11

## 2017-09-11 NOTE — TELEPHONE ENCOUNTER
Returned call to patient to. Explained to her that we do not have the auth back yet for her chemo treatment. I called pre-service and left voicemail to call me back with status.

## 2017-09-11 NOTE — TELEPHONE ENCOUNTER
----- Message from Tania Lynn sent at 9/11/2017  9:42 AM CDT -----  Contact: self  Keely would like to speak with a nurse as soon as possible regarding treatment.  Pt is stating that she called Friday twice regarding treatment and no one still has not contacted the patient back regarding her starting chemo.  Please contact pt with some further information on her starting chemo treatment.  Pt is also stating that she has been in a lot of pain and the lesions on her neck is hurting.    Keely can be reached back at [phone]    Thank you

## 2017-09-12 ENCOUNTER — TELEPHONE (OUTPATIENT)
Dept: HEMATOLOGY/ONCOLOGY | Facility: CLINIC | Age: 72
End: 2017-09-12

## 2017-09-12 NOTE — TELEPHONE ENCOUNTER
----- Message from Almita Hampton sent at 9/12/2017 11:11 AM CDT -----  Contact: Pt  Pt contact [phone]    Pt states she been calling since last week and have yet to hear anything..... she was told by her Insurance that she  was approved  to have Chemo    Pt is requesting an update today    Please inform pt

## 2017-09-12 NOTE — TELEPHONE ENCOUNTER
Returned call to patient to inform her that we do not have approval for chemo yet from her insurance company.     I also called and spoke with [email or web address]. She said that they will call to check status to follow up.

## 2017-09-13 ENCOUNTER — TELEPHONE (OUTPATIENT)
Dept: HEMATOLOGY/ONCOLOGY | Facility: CLINIC | Age: 72
End: 2017-09-13

## 2017-09-13 ENCOUNTER — DOCUMENTATION ONLY (OUTPATIENT)
Dept: HEMATOLOGY/ONCOLOGY | Facility: CLINIC | Age: 72
End: 2017-09-13

## 2017-09-13 NOTE — TELEPHONE ENCOUNTER
This is pending with the Insurance company.  A note yesterday on the case states:    Inquired via IM (need additional risk factors for neutropenia-- regimen is a low risk chemo regimem-- 09/12/2017 @ 1200pm CT); spoke with Tika Stoddard (stated that  She will ask Dr Rodríguez and get back with me)

## 2017-09-13 NOTE — PROGRESS NOTES
IM communication with Pre-service dept:    Shira Hutchison 11:52 AM  Good morning.. I am attempting to get approval for PHUONG FRAIRE [[id number]] ... the chemotherapy regimen is low risk for neutropenia.. the insurance co  will probably not approve the Neulasta. Are there any additional riomsk factors the .patient have (I will include them in the request for the medication)   any additional risk factors the .patient have (I will include them in the request for the medication)   Tika Stoddard 11:53 AM  okay thanks  ________________________________    Shira Hutchison 11:55 AM  do you guys want me to submit the request for the neulasta? if so, please list additional risk factors the patient have for neutropenia so that I can include them min the request  Tika Stoddard 11:59 AM  I'll have to get that from Dr Rodríguez.  Shira Hutchison 12:00 PM  ok, thanks  ________________________________      Tika Stoddard 11:34 AM  I spoke with Dr Rodríguez about Ms Fraire MRN [id number].   We are not exactly sure of what you are needing from her?    Shira Hutchison 11:36 AM  wait on it for now... if the insurance request a peer to peer revew, I will notify you. I was trying to get ahead of them.  Thanks for checking on that for me.    Tika Stoddard 11:36 AM  She does have neulasta ordered because of the chemo. It will put her at high risk for chemo induced neutropenia.    Shira Hutchison 11:36 AM  ok, I will notify you if the insurance co. question the order further.    Tika Stoddard 11:37 AM  Okay thanks. So it is being worked on? You don't need anything from us for now?    Shira Hutchison 11:38 AM  nothing right now... the chemotherapy in approved.. they are working on trying to approve the neulasta.. one I hear back from them I will send you a message

## 2017-09-13 NOTE — TELEPHONE ENCOUNTER
----- Message from Richie Gamboa sent at 9/13/2017  1:58 PM CDT -----  Contact: PT   Pt would like a call back regarding approval status for chemotherapy     Pt can be reached at  [phone]

## 2017-09-13 NOTE — TELEPHONE ENCOUNTER
Returned call to patient. Let her know that we are still awaiting approval for Neulasta from her insurance and updated her on the case.

## 2017-09-14 ENCOUNTER — TELEPHONE (OUTPATIENT)
Dept: HEMATOLOGY/ONCOLOGY | Facility: CLINIC | Age: 72
End: 2017-09-14

## 2017-09-14 NOTE — TELEPHONE ENCOUNTER
----- Message from Octavia Bush sent at 9/14/2017 10:02 AM CDT -----  Contact: pt       ----- Message -----  From: Christiano Escalante  Sent: 9/14/2017   9:55 AM  To: Ursula AGGARWAL Staff    Pt will like office to know she has been approved by [facility] and she received all of her medication besides Neulasta     Pharmacy states code might be incorrect     Pt Contact::[phone]

## 2017-09-14 NOTE — TELEPHONE ENCOUNTER
Returned call to patient. She said that she spoke with her insurance co. And they said that they did not get a request for auth for neulasta.    I will reach out to pre-service department again to check status.

## 2017-09-15 DIAGNOSIS — E11.9 TYPE 2 DIABETES MELLITUS WITHOUT COMPLICATION: ICD-10-CM

## 2017-09-18 ENCOUNTER — INFUSION (OUTPATIENT)
Dept: INFUSION THERAPY | Facility: HOSPITAL | Age: 72
End: 2017-09-18
Attending: INTERNAL MEDICINE
Payer: MEDICARE

## 2017-09-18 VITALS
WEIGHT: 146.38 LBS | HEIGHT: 63 IN | BODY MASS INDEX: 25.94 KG/M2 | DIASTOLIC BLOOD PRESSURE: 79 MMHG | RESPIRATION RATE: 18 BRPM | HEART RATE: 76 BPM | TEMPERATURE: 98 F | SYSTOLIC BLOOD PRESSURE: 142 MMHG

## 2017-09-18 DIAGNOSIS — C82.08 FOLLICULAR LYMPHOMA GRADE I, LYMPH NODES OF MULTIPLE SITES: Primary | ICD-10-CM

## 2017-09-18 PROCEDURE — 96415 CHEMO IV INFUSION ADDL HR: CPT

## 2017-09-18 PROCEDURE — 63600175 PHARM REV CODE 636 W HCPCS: Performed by: INTERNAL MEDICINE

## 2017-09-18 PROCEDURE — 96367 TX/PROPH/DG ADDL SEQ IV INF: CPT

## 2017-09-18 PROCEDURE — 96413 CHEMO IV INFUSION 1 HR: CPT

## 2017-09-18 PROCEDURE — 96411 CHEMO IV PUSH ADDL DRUG: CPT

## 2017-09-18 PROCEDURE — S0028 INJECTION, FAMOTIDINE, 20 MG: HCPCS | Performed by: INTERNAL MEDICINE

## 2017-09-18 PROCEDURE — 25000003 PHARM REV CODE 250: Performed by: INTERNAL MEDICINE

## 2017-09-18 PROCEDURE — 96375 TX/PRO/DX INJ NEW DRUG ADDON: CPT

## 2017-09-18 RX ORDER — MEPERIDINE HYDROCHLORIDE 50 MG/ML
25 INJECTION INTRAMUSCULAR; INTRAVENOUS; SUBCUTANEOUS
Status: DISCONTINUED | OUTPATIENT
Start: 2017-09-18 | End: 2017-09-18 | Stop reason: HOSPADM

## 2017-09-18 RX ORDER — SODIUM CHLORIDE 0.9 % (FLUSH) 0.9 %
10 SYRINGE (ML) INJECTION
Status: DISCONTINUED | OUTPATIENT
Start: 2017-09-18 | End: 2017-09-18 | Stop reason: HOSPADM

## 2017-09-18 RX ORDER — HEPARIN 100 UNIT/ML
500 SYRINGE INTRAVENOUS
Status: DISCONTINUED | OUTPATIENT
Start: 2017-09-18 | End: 2017-09-18 | Stop reason: HOSPADM

## 2017-09-18 RX ORDER — FAMOTIDINE 10 MG/ML
20 INJECTION INTRAVENOUS
Status: COMPLETED | OUTPATIENT
Start: 2017-09-18 | End: 2017-09-18

## 2017-09-18 RX ORDER — ACETAMINOPHEN 325 MG/1
650 TABLET ORAL
Status: COMPLETED | OUTPATIENT
Start: 2017-09-18 | End: 2017-09-18

## 2017-09-18 RX ORDER — DIPHENHYDRAMINE HYDROCHLORIDE 50 MG/ML
25 INJECTION INTRAMUSCULAR; INTRAVENOUS
Status: COMPLETED | OUTPATIENT
Start: 2017-09-18 | End: 2017-09-18

## 2017-09-18 RX ADMIN — DEXAMETHASONE SODIUM PHOSPHATE: 4 INJECTION, SOLUTION INTRAMUSCULAR; INTRAVENOUS at 12:09

## 2017-09-18 RX ADMIN — HYDROCORTISONE SODIUM SUCCINATE 100 MG: 100 INJECTION, POWDER, FOR SOLUTION INTRAMUSCULAR; INTRAVENOUS at 01:09

## 2017-09-18 RX ADMIN — DIPHENHYDRAMINE HYDROCHLORIDE 25 MG: 50 INJECTION INTRAMUSCULAR; INTRAVENOUS at 01:09

## 2017-09-18 RX ADMIN — DIPHENHYDRAMINE HYDROCHLORIDE 50 MG: 50 INJECTION, SOLUTION INTRAMUSCULAR; INTRAVENOUS at 12:09

## 2017-09-18 RX ADMIN — BENDAMUSTINE HYDROCHLORIDE 155 MG: 25 INJECTION, SOLUTION INTRAVENOUS at 05:09

## 2017-09-18 RX ADMIN — ACETAMINOPHEN 650 MG: 325 TABLET ORAL at 12:09

## 2017-09-18 RX ADMIN — FAMOTIDINE 20 MG: 10 INJECTION INTRAVENOUS at 12:09

## 2017-09-18 RX ADMIN — RITUXIMAB 650 MG: 10 INJECTION, SOLUTION INTRAVENOUS at 12:09

## 2017-09-18 NOTE — PLAN OF CARE
Problem: Chemotherapy Effects (Adult)  Goal: Signs and Symptoms of Listed Potential Problems Will be Absent, Minimized or Managed (Chemotherapy Effects)  Signs and symptoms of listed potential problems will be absent, minimized or managed by discharge/transition of care (reference Chemotherapy Effects (Adult) CPG).  Outcome: Ongoing (interventions implemented as appropriate)  Pt here for D1C1 Rituxan/Bendeka. VSS. PIV to left FA with blood return noted. Consent/labs/meds/allergies reviewed.  [facility] handouts given to patient and new chemo education binder given. Pt educated on new drugs/side effects/ nutrition. Pt verbalized understanding. Will continue to monitor patient.

## 2017-09-18 NOTE — PLAN OF CARE
Problem: Patient Care Overview  Goal: Plan of Care Review  Outcome: Ongoing (interventions implemented as appropriate)  Pt was able to tolerate treatment after rechallenging rituxan. No more s/s of adverse reaction. Pt received Bendeka without complications. VSS. PIV left in left FA for return tomorrow 9/19. Pt informed to call MD with any questions/concerns. AVS given to patient.

## 2017-09-18 NOTE — NURSING
Dr. Rodríguez ordered to rechallenge Rituxan. Pt tolerated so far at 200cc/hr. Will continue with Rituxan/Bendeka until completed. Pt educated to let me know if she experiences any more symptoms. Will continue to monitor.

## 2017-09-18 NOTE — NURSING
Pt received about 30cc of Rituxan when she voiced that she was having some SOB and burning in her throat. Rituxan stopped and VSS. O2 WNL. 100mg solu-cortef and 25mg benadryl given. Placed pt on 2L NC. Pt complained of lower back pain. MD notified and waiting for a call back. Will stay stopped until talk with Dr. Rodríguez.

## 2017-09-19 ENCOUNTER — INFUSION (OUTPATIENT)
Dept: INFUSION THERAPY | Facility: HOSPITAL | Age: 72
End: 2017-09-19
Attending: INTERNAL MEDICINE
Payer: MEDICARE

## 2017-09-19 VITALS
DIASTOLIC BLOOD PRESSURE: 82 MMHG | SYSTOLIC BLOOD PRESSURE: 146 MMHG | TEMPERATURE: 98 F | RESPIRATION RATE: 16 BRPM | HEART RATE: 62 BPM

## 2017-09-19 DIAGNOSIS — C82.08 FOLLICULAR LYMPHOMA GRADE I, LYMPH NODES OF MULTIPLE SITES: Primary | ICD-10-CM

## 2017-09-19 PROCEDURE — 63600175 PHARM REV CODE 636 W HCPCS: Performed by: INTERNAL MEDICINE

## 2017-09-19 PROCEDURE — 25000003 PHARM REV CODE 250: Performed by: INTERNAL MEDICINE

## 2017-09-19 PROCEDURE — 96409 CHEMO IV PUSH SNGL DRUG: CPT

## 2017-09-19 RX ADMIN — SODIUM CHLORIDE: 9 INJECTION, SOLUTION INTRAVENOUS at 12:09

## 2017-09-19 RX ADMIN — BENDAMUSTINE HYDROCHLORIDE 155 MG: 25 INJECTION, SOLUTION INTRAVENOUS at 01:09

## 2017-09-19 NOTE — PLAN OF CARE
Problem: Chemotherapy Effects (Adult)  Goal: Signs and Symptoms of Listed Potential Problems Will be Absent, Minimized or Managed (Chemotherapy Effects)  Signs and symptoms of listed potential problems will be absent, minimized or managed by discharge/transition of care (reference Chemotherapy Effects (Adult) CPG).   Outcome: Ongoing (interventions implemented as appropriate)  Patient here for D2 Bendeka after receiving Rituxan on D1.  Assessment complete and labs reviewed.  No events overnight.  VSS.  PIV in place upon arrival. Chair reclined and blanket offered.  No needs expressed at this time.

## 2017-09-19 NOTE — PLAN OF CARE
Problem: Patient Care Overview  Goal: Plan of Care Review  Outcome: Ongoing (interventions implemented as appropriate)  Patient tolerated treatment well.  No reaction suspected.  VSS.  No questions or concerns.  Will RTC tomorrow for Neulasta.  AVS given to patient.  Patient ambulated off unit unassisted.

## 2017-09-20 ENCOUNTER — TELEPHONE (OUTPATIENT)
Dept: INTERNAL MEDICINE | Facility: CLINIC | Age: 72
End: 2017-09-20

## 2017-09-20 ENCOUNTER — TELEPHONE (OUTPATIENT)
Dept: HEMATOLOGY/ONCOLOGY | Facility: CLINIC | Age: 72
End: 2017-09-20

## 2017-09-20 ENCOUNTER — INFUSION (OUTPATIENT)
Dept: INFUSION THERAPY | Facility: HOSPITAL | Age: 72
End: 2017-09-20
Attending: INTERNAL MEDICINE
Payer: MEDICARE

## 2017-09-20 DIAGNOSIS — C82.08 FOLLICULAR LYMPHOMA GRADE I, LYMPH NODES OF MULTIPLE SITES: Primary | ICD-10-CM

## 2017-09-20 PROCEDURE — 63600175 PHARM REV CODE 636 W HCPCS: Performed by: INTERNAL MEDICINE

## 2017-09-20 PROCEDURE — 96372 THER/PROPH/DIAG INJ SC/IM: CPT

## 2017-09-20 RX ORDER — METFORMIN HYDROCHLORIDE 500 MG/1
1000 TABLET, EXTENDED RELEASE ORAL
Qty: 180 TABLET | Refills: 3 | Status: SHIPPED | OUTPATIENT
Start: 2017-09-20 | End: 2018-09-20

## 2017-09-20 RX ADMIN — PEGFILGRASTIM 6 MG: 6 INJECTION SUBCUTANEOUS at 09:09

## 2017-09-20 NOTE — TELEPHONE ENCOUNTER
Pt informed of request of pharmacy and it has been sent to PCP for advice. Patient has no further questions or concerns.

## 2017-09-20 NOTE — TELEPHONE ENCOUNTER
Returned call. Scheduled return appointment because she was not on our schedule for her return visit.

## 2017-09-20 NOTE — NURSING
Patient here for injection of neulasta-patient denies any side effects from chemo-teaching done on use and side effects of neulasta-patient expressed understanding-tolerated injection well.

## 2017-09-20 NOTE — TELEPHONE ENCOUNTER
Pharmacy rep states  glumetza is not a covered medication for the pt. Rep states Metformin 500mg ER tabs Or 750mg ER tab, is covered. These medications are covered in generic brands. Please sign if pended med is appropriate.Please advise/authorize?

## 2017-09-20 NOTE — TELEPHONE ENCOUNTER
----- Message from Erik Lizarraga sent at 9/20/2017 12:17 PM CDT -----  Contact: pt   Pt would like to be called back regarding speaking with nurse.       Pt can be reached at [phone]

## 2017-09-20 NOTE — TELEPHONE ENCOUNTER
----- Message from Rayshawn Dudley sent at 9/20/2017 10:33 AM CDT -----  Contact: Keely Pizarro  x_  1st Request  _  2nd Request  _  3rd Request        Who: Keely Pizarro    Why: Patient states that [facility] needs to know what strength of metformin patient needs. Please call back to follow up.    What Number to Call Back: [phone]    When to Expect a call back: (With in 24 hours)

## 2017-09-20 NOTE — TELEPHONE ENCOUNTER
----- Message from Octavia Allison sent at 9/20/2017 12:11 PM CDT -----  Contact: Adina with [facility]  _x  1st Request  _  2nd Request  _  3rd Request        Who: Adina with [facility]    Why: She states she would like to speak with Cecelia in regards to the patient's prescriptions.     What Number to Call Back: [phone]    When to Expect a call back: (Before the end of the day)   -- if call after 3:00 call back will be tomorrow.

## 2017-09-27 ENCOUNTER — OFFICE VISIT (OUTPATIENT)
Dept: INTERNAL MEDICINE | Facility: CLINIC | Age: 72
End: 2017-09-27
Attending: FAMILY MEDICINE
Payer: MEDICARE

## 2017-09-27 ENCOUNTER — TELEPHONE (OUTPATIENT)
Dept: HEMATOLOGY/ONCOLOGY | Facility: CLINIC | Age: 72
End: 2017-09-27

## 2017-09-27 VITALS
HEART RATE: 76 BPM | SYSTOLIC BLOOD PRESSURE: 188 MMHG | OXYGEN SATURATION: 96 % | BODY MASS INDEX: 25.58 KG/M2 | HEIGHT: 63 IN | WEIGHT: 144.38 LBS | DIASTOLIC BLOOD PRESSURE: 104 MMHG

## 2017-09-27 DIAGNOSIS — E11.9 TYPE 2 DIABETES MELLITUS WITHOUT COMPLICATION, WITHOUT LONG-TERM CURRENT USE OF INSULIN: ICD-10-CM

## 2017-09-27 DIAGNOSIS — R11.0 CHEMOTHERAPY-INDUCED NAUSEA: Primary | ICD-10-CM

## 2017-09-27 DIAGNOSIS — T45.1X5A CHEMOTHERAPY-INDUCED NAUSEA: Primary | ICD-10-CM

## 2017-09-27 DIAGNOSIS — C82.08 FOLLICULAR LYMPHOMA GRADE I, LYMPH NODES OF MULTIPLE SITES: ICD-10-CM

## 2017-09-27 DIAGNOSIS — R63.4 UNINTENTIONAL WEIGHT LOSS OF 7.5% BODY WEIGHT OR LESS WITHIN 3 MONTHS: ICD-10-CM

## 2017-09-27 DIAGNOSIS — I10 HYPERTENSION, ESSENTIAL: ICD-10-CM

## 2017-09-27 PROCEDURE — 99214 OFFICE O/P EST MOD 30 MIN: CPT | Mod: S$GLB,,, | Performed by: FAMILY MEDICINE

## 2017-09-27 PROCEDURE — 3077F SYST BP >= 140 MM HG: CPT | Mod: S$GLB,,, | Performed by: FAMILY MEDICINE

## 2017-09-27 PROCEDURE — 1125F AMNT PAIN NOTED PAIN PRSNT: CPT | Mod: S$GLB,,, | Performed by: FAMILY MEDICINE

## 2017-09-27 PROCEDURE — 3008F BODY MASS INDEX DOCD: CPT | Mod: S$GLB,,, | Performed by: FAMILY MEDICINE

## 2017-09-27 PROCEDURE — 3080F DIAST BP >= 90 MM HG: CPT | Mod: S$GLB,,, | Performed by: FAMILY MEDICINE

## 2017-09-27 PROCEDURE — 99999 PR PBB SHADOW E&M-EST. PATIENT-LVL III: CPT | Mod: PBBFAC,,, | Performed by: FAMILY MEDICINE

## 2017-09-27 PROCEDURE — 1159F MED LIST DOCD IN RCRD: CPT | Mod: S$GLB,,, | Performed by: FAMILY MEDICINE

## 2017-09-27 RX ORDER — PROMETHAZINE HYDROCHLORIDE 25 MG/1
25 TABLET ORAL EVERY 6 HOURS PRN
Qty: 30 TABLET | Refills: 0 | Status: SHIPPED | OUTPATIENT
Start: 2017-09-27 | End: 2017-10-27

## 2017-09-27 RX ORDER — ISOPROPYL ALCOHOL 0.75 G/1
SWAB TOPICAL
COMMUNITY
Start: 2017-09-25

## 2017-09-27 RX ORDER — BLOOD-GLUCOSE METER
EACH MISCELLANEOUS
COMMUNITY
Start: 2017-09-25 | End: 2017-11-28 | Stop reason: SDUPTHER

## 2017-09-27 RX ORDER — GLUCOSAM/CHON-MSM1/C/MANG/BOSW 500-416.6
TABLET ORAL
COMMUNITY
Start: 2017-09-25 | End: 2019-12-13

## 2017-09-27 RX ORDER — ONDANSETRON HYDROCHLORIDE 8 MG/1
8 TABLET, FILM COATED ORAL EVERY 8 HOURS PRN
Qty: 30 TABLET | Refills: 0 | Status: SHIPPED | OUTPATIENT
Start: 2017-09-27 | End: 2020-02-03

## 2017-09-27 RX ORDER — CALCIUM CITRATE/VITAMIN D3 200MG-6.25
TABLET ORAL
COMMUNITY
Start: 2017-09-25 | End: 2019-12-13

## 2017-09-27 NOTE — TELEPHONE ENCOUNTER
Patient having boating and pain in her stomach. Denies diarrhea or vomit. Patient instructed to see PCP.

## 2017-09-27 NOTE — TELEPHONE ENCOUNTER
----- Message from Loni Martell sent at 9/27/2017  8:05 AM CDT -----  Contact: Pt [phone]  Pt called to speak to the nurse regarding her care and to request a work in appt today with the provider due to stomach pains that causing her to be unable to sleep. Pt would like a call back this morning asap.    Pt can be reached at [phone].    Thanks

## 2017-09-27 NOTE — PROGRESS NOTES
"CHIEF COMPLAINT: Nausea due to chemotherapy    HISTORY OF PRESENT ILLNESS: The patient is a 72 year-old BF.  The patient is  to another patient of mine.  She has ab recently been diagnosed with follicular lymphoma.  She's lost about 15 pounds over the past 3 months.  The patient has been nauseous recently.  She has not had any emesis.     She is out of her long-acting insulin.    She has had a left knee replacement.    The patient has a history of diabetes.  Recent blood sugars have been less than 120.  There have been no episodes of hypoglycemia.    The patient has a history of stable hypertension on current medications.  Patient denies chest pain or shortness of breath today.    REVIEW OF SYSTEMS:  GENERAL: No fever, chills, fatigability or weight loss.  SKIN: No rashes, itching or changes in color or texture of skin.  HEAD: No headaches or recent head trauma.  EYES: Visual acuity fine. No photophobia, ocular pain or diplopia.  EARS: Denies ear pain, discharge or vertigo.  NOSE: No loss of smell, no epistaxis or postnasal drip.  MOUTH & THROAT: No hoarseness or change in voice. No excessive gum bleeding.  NODES: Denies swollen glands.  CHEST: Denies HULL, cyanosis, wheezing and sputum production.  CARDIOVASCULAR: Denies chest pain, PND, orthopnea or reduced exercise tolerance.  ABDOMEN:  Denies diarrhea, abdominal pain, hematemesis or blood in stool.  URINARY: No flank pain, dysuria or hematuria.  PERIPHERAL VASCULAR: No claudication or cyanosis.  MUSCULOSKELETAL: No joint stiffness or swelling. Denies back pain.Except as noted above.  NEUROLOGIC: No history of seizures, paralysis, alteration of gait or coordination.    SOCIAL HISTORY: The patient does not smoke.  The patient consumes alcohol socially.  The patient is happily  to another patient of mine.    PHYSICAL EXAMINATION:   Blood pressure (!) 188/104, pulse 76, height 5' 3" (1.6 m), weight 65.5 kg (144 lb 6.4 oz), SpO2 96 %.    APPEARANCE: Well " nourished, well developed, in no acute distress.    HEAD: Normocephalic, atraumatic.  EYES: PERRL. EOMI.  Conjunctivae without injection and  anicteric  EARS: TM's intact. Light reflex normal. No retraction or perforation.    NOSE: Mucosa pink. Airway clear.  MOUTH & THROAT: No tonsillar enlargement. No pharyngeal erythema or exudate. No stridor.  NECK: Supple.   NODES: There is a substantial amount of bilateral submandibular cervical and posterior reticular on the right lymphadenopathy.  No obvious axillary or inguinal lymph node enlargement.  CHEST: Lungs clear to auscultation.  No retractions are noted.  No rales or rhonchi are present.  CARDIOVASCULAR: Normal S1, S2. No rubs, murmurs or gallops.  ABDOMEN: Bowel sounds normal. Not distended. Soft. No tenderness or masses.  No ascites is noted.  MUSCULOSKELETAL:  There is no clubbing, cyanosis, or edema of the extremities x4.  There is full range of motion of the lumbar spine.  There is full range of motion of the extremities x4.  There is no deformity noted.    NEUROLOGIC:       Normal speech development.      Hearing normal.      Normal gait.      Motor and sensory exams grossly normal.      DTR's normal.  PSYCHIATRIC: Patient is alert and oriented x3.  Thought processes are all normal.  There is no homicidality.  There is no suicidality.  There is no evidence of psychosis.    LABORATORY/RADIOLOGY:   Chart reviewed.  We will update blood work today.    ASSESSMENT:   Nausea due to chemotherapy   Follicular lymphoma   Type 2 diabetes on insulin   Hypertension  Chronic right hip pain    PLAN:  Phenergan and Zofran   She will hold off on insulin at this time.   She can resume it when she has had improvement in the nausea.    Return to clinic after evaluation by ENT and pulmonary.

## 2017-10-02 ENCOUNTER — OFFICE VISIT (OUTPATIENT)
Dept: HEMATOLOGY/ONCOLOGY | Facility: CLINIC | Age: 72
End: 2017-10-02
Payer: MEDICARE

## 2017-10-02 ENCOUNTER — LAB VISIT (OUTPATIENT)
Dept: LAB | Facility: HOSPITAL | Age: 72
End: 2017-10-02
Payer: MEDICARE

## 2017-10-02 VITALS
TEMPERATURE: 98 F | HEART RATE: 66 BPM | WEIGHT: 146.81 LBS | DIASTOLIC BLOOD PRESSURE: 89 MMHG | BODY MASS INDEX: 26.01 KG/M2 | HEIGHT: 63 IN | SYSTOLIC BLOOD PRESSURE: 158 MMHG

## 2017-10-02 DIAGNOSIS — C82.08 FOLLICULAR LYMPHOMA GRADE I, LYMPH NODES OF MULTIPLE SITES: Primary | ICD-10-CM

## 2017-10-02 DIAGNOSIS — C82.98 FOLLICULAR LYMPHOMA OF LYMPH NODES OF MULTIPLE REGIONS, UNSPECIFIED FOLLICULAR LYMPHOMA TYPE: ICD-10-CM

## 2017-10-02 LAB
ALBUMIN SERPL BCP-MCNC: 2.8 G/DL
ALP SERPL-CCNC: 133 U/L
ALT SERPL W/O P-5'-P-CCNC: 25 U/L
ANION GAP SERPL CALC-SCNC: 5 MMOL/L
AST SERPL-CCNC: 30 U/L
BASOPHILS # BLD AUTO: 0.02 K/UL
BASOPHILS NFR BLD: 0.2 %
BILIRUB SERPL-MCNC: 0.6 MG/DL
BUN SERPL-MCNC: 9 MG/DL
CALCIUM SERPL-MCNC: 8.6 MG/DL
CHLORIDE SERPL-SCNC: 111 MMOL/L
CO2 SERPL-SCNC: 25 MMOL/L
CREAT SERPL-MCNC: 0.9 MG/DL
DIFFERENTIAL METHOD: ABNORMAL
EOSINOPHIL # BLD AUTO: 0.1 K/UL
EOSINOPHIL NFR BLD: 0.6 %
ERYTHROCYTE [DISTWIDTH] IN BLOOD BY AUTOMATED COUNT: 13.9 %
EST. GFR  (AFRICAN AMERICAN): >60 ML/MIN/1.73 M^2
EST. GFR  (NON AFRICAN AMERICAN): >60 ML/MIN/1.73 M^2
GLUCOSE SERPL-MCNC: 118 MG/DL
HCT VFR BLD AUTO: 32.7 %
HGB BLD-MCNC: 10.5 G/DL
LYMPHOCYTES # BLD AUTO: 1.1 K/UL
LYMPHOCYTES NFR BLD: 9 %
MCH RBC QN AUTO: 28.2 PG
MCHC RBC AUTO-ENTMCNC: 32.1 G/DL
MCV RBC AUTO: 88 FL
MONOCYTES # BLD AUTO: 0.6 K/UL
MONOCYTES NFR BLD: 4.6 %
NEUTROPHILS # BLD AUTO: 10.5 K/UL
NEUTROPHILS NFR BLD: 85.2 %
PLATELET # BLD AUTO: 191 K/UL
PMV BLD AUTO: 9.9 FL
POTASSIUM SERPL-SCNC: 3.3 MMOL/L
PROT SERPL-MCNC: 5.7 G/DL
RBC # BLD AUTO: 3.73 M/UL
SODIUM SERPL-SCNC: 141 MMOL/L
WBC # BLD AUTO: 12.34 K/UL

## 2017-10-02 PROCEDURE — 99215 OFFICE O/P EST HI 40 MIN: CPT | Mod: S$GLB,,, | Performed by: INTERNAL MEDICINE

## 2017-10-02 PROCEDURE — 1159F MED LIST DOCD IN RCRD: CPT | Mod: S$GLB,,, | Performed by: INTERNAL MEDICINE

## 2017-10-02 PROCEDURE — 85025 COMPLETE CBC W/AUTO DIFF WBC: CPT

## 2017-10-02 PROCEDURE — 80053 COMPREHEN METABOLIC PANEL: CPT

## 2017-10-02 PROCEDURE — 99999 PR PBB SHADOW E&M-EST. PATIENT-LVL III: CPT | Mod: PBBFAC,,, | Performed by: INTERNAL MEDICINE

## 2017-10-02 PROCEDURE — 1125F AMNT PAIN NOTED PAIN PRSNT: CPT | Mod: S$GLB,,, | Performed by: INTERNAL MEDICINE

## 2017-10-02 PROCEDURE — 3079F DIAST BP 80-89 MM HG: CPT | Mod: S$GLB,,, | Performed by: INTERNAL MEDICINE

## 2017-10-02 PROCEDURE — 3077F SYST BP >= 140 MM HG: CPT | Mod: S$GLB,,, | Performed by: INTERNAL MEDICINE

## 2017-10-02 PROCEDURE — 3008F BODY MASS INDEX DOCD: CPT | Mod: S$GLB,,, | Performed by: INTERNAL MEDICINE

## 2017-10-02 PROCEDURE — 36415 COLL VENOUS BLD VENIPUNCTURE: CPT

## 2017-10-02 RX ORDER — SODIUM CHLORIDE 0.9 % (FLUSH) 0.9 %
10 SYRINGE (ML) INJECTION
Status: CANCELLED | OUTPATIENT
Start: 2017-10-24

## 2017-10-02 RX ORDER — HEPARIN 100 UNIT/ML
500 SYRINGE INTRAVENOUS
Status: CANCELLED | OUTPATIENT
Start: 2017-10-24

## 2017-10-02 RX ORDER — MEPERIDINE HYDROCHLORIDE 50 MG/ML
25 INJECTION INTRAMUSCULAR; INTRAVENOUS; SUBCUTANEOUS
Status: CANCELLED | OUTPATIENT
Start: 2017-10-23

## 2017-10-02 RX ORDER — SODIUM CHLORIDE 0.9 % (FLUSH) 0.9 %
10 SYRINGE (ML) INJECTION
Status: CANCELLED | OUTPATIENT
Start: 2017-10-23

## 2017-10-02 RX ORDER — ACETAMINOPHEN 325 MG/1
650 TABLET ORAL
Status: CANCELLED | OUTPATIENT
Start: 2017-10-23

## 2017-10-02 RX ORDER — FAMOTIDINE 10 MG/ML
20 INJECTION INTRAVENOUS
Status: CANCELLED | OUTPATIENT
Start: 2017-10-23

## 2017-10-02 RX ORDER — HEPARIN 100 UNIT/ML
500 SYRINGE INTRAVENOUS
Status: CANCELLED | OUTPATIENT
Start: 2017-10-23

## 2017-10-02 NOTE — Clinical Note
Second cycle of BR chemotherapy 10/23 Return visit for third cycle BR chemotherapy with CBC and CMP 11/20

## 2017-10-02 NOTE — PROGRESS NOTES
Subjective:       Patient ID: Keely Pizarro is a 72 y.o. female.    Chief Complaint: Follow-up and Lymphoma    Keely presents with her  for evaluation of newly diagnosed grade 1-2 follicular lymphoma diagnosed by excisional biopsy of a right cervical lymph node 8/1/2017.  Pathologic diagnosis was received by [facility].  Keely reports feeling fatigue since March 2017. At the time she had an infected tooth and developed cervical lymphadenopathy. She started loosing weight, total quantity to date is unknown. Appetite remains normal and she denies night sweats. She then developed shortness of breath worse with activity and left lower lung pleural effusion was noted. The received thoracentesis twice with negative pleural fluid flow cytometry. Cytology was positive for lymphocytes, but was not diagnostic for follicular lymphoma. CT imaging demonstrates diffuse, bilateral submandibular, cervical, axillary, supraclavicular, and intrathoracic lymphadenopathy.    Keely has a history of uterine cancer in 2012 treated with radiation and chemotherapy. She had a port-a-cath at that time for treatment. She has insulin dependent diabetes mellitus with poorly controlled blood sugar, reporting both high and lows. She has hypertension, and blood pressure was elevated at intake today.     Follow-up Visit 9/1/17  PET imaging shows stage 4 follicular lymphoma, large left side pleural effusion    Follow-up 10/2/17  Completed first cycle of BR chemotherapy.  Some nausea, responds to Zofran, phenergan makes her feel worse  Back and leg pain likely due to neulasta injection  Lung aeration improved on auscultation    Pain       Review of Systems   Constitutional: Negative.    HENT: Negative.    Eyes: Negative.    Respiratory: Negative.    Cardiovascular: Negative.    Gastrointestinal: Negative.    Endocrine: Negative.    Genitourinary: Negative.    Musculoskeletal: Negative.    Skin: Negative.    Allergic/Immunologic:  Negative for environmental allergies, food allergies and immunocompromised state.   Neurological: Negative.    Hematological: Negative for adenopathy. Does not bruise/bleed easily.   Psychiatric/Behavioral: Negative.        Objective:      Physical Exam   Constitutional: She is oriented to person, place, and time. She appears well-developed and well-nourished.   HENT:   Head: Normocephalic and atraumatic.   Right Ear: External ear normal.   Left Ear: External ear normal.   Nose: Nose normal.   Mouth/Throat: Oropharynx is clear and moist. No oropharyngeal exudate.   Eyes: Conjunctivae and EOM are normal. Pupils are equal, round, and reactive to light. No scleral icterus.   Neck: Normal range of motion. Neck supple. No JVD present. No tracheal deviation present. No thyromegaly present.   Cardiovascular: Normal rate, regular rhythm, normal heart sounds and intact distal pulses.    No murmur heard.  Pulmonary/Chest: Effort normal. No respiratory distress. She has decreased breath sounds in the left lower field.   Abdominal: Soft. Bowel sounds are normal. She exhibits no distension and no mass. There is no hepatosplenomegaly. There is no tenderness. There is no rebound and no guarding.   Musculoskeletal: Normal range of motion. She exhibits no edema.   Lymphadenopathy:     She has cervical adenopathy.     She has no axillary adenopathy.        Right: Supraclavicular adenopathy present. No inguinal adenopathy present.        Left: Supraclavicular adenopathy present. No inguinal adenopathy present.   Neurological: She is alert and oriented to person, place, and time. No cranial nerve deficit.   Skin: Skin is warm and dry. No rash noted. No cyanosis. Nails show no clubbing.   Psychiatric: She has a normal mood and affect. Her behavior is normal. Judgment and thought content normal.   Nursing note and vitals reviewed.      Assessment:       1. Follicular lymphoma grade i, lymph nodes of multiple sites        Plan:        Newly diagnosed stage IV grade 1 follicular lymphoma.   No sites concerning for high grade transformation  HBV testing is negative.  Discussed side effects of Bendamustine and Rituxan chemotherapy including infusion reaction, nausea, hair loss, mucositis, fatigue, and vital organ injury.   Chemotherapy consent completed.    Second cycle of BR chemotherapy 10/23  Return visit for BR chemotherapy with CBC and CMP 11/20

## 2017-10-23 ENCOUNTER — INFUSION (OUTPATIENT)
Dept: INFUSION THERAPY | Facility: HOSPITAL | Age: 72
End: 2017-10-23
Attending: INTERNAL MEDICINE
Payer: MEDICARE

## 2017-10-23 VITALS
TEMPERATURE: 98 F | HEART RATE: 65 BPM | DIASTOLIC BLOOD PRESSURE: 75 MMHG | BODY MASS INDEX: 26.01 KG/M2 | WEIGHT: 146.81 LBS | HEIGHT: 63 IN | SYSTOLIC BLOOD PRESSURE: 159 MMHG | RESPIRATION RATE: 16 BRPM

## 2017-10-23 DIAGNOSIS — C82.08 FOLLICULAR LYMPHOMA GRADE I, LYMPH NODES OF MULTIPLE SITES: Primary | ICD-10-CM

## 2017-10-23 PROCEDURE — 63600175 PHARM REV CODE 636 W HCPCS: Performed by: INTERNAL MEDICINE

## 2017-10-23 PROCEDURE — 96411 CHEMO IV PUSH ADDL DRUG: CPT

## 2017-10-23 PROCEDURE — 96413 CHEMO IV INFUSION 1 HR: CPT

## 2017-10-23 PROCEDURE — 96367 TX/PROPH/DG ADDL SEQ IV INF: CPT

## 2017-10-23 PROCEDURE — 25000003 PHARM REV CODE 250: Performed by: INTERNAL MEDICINE

## 2017-10-23 PROCEDURE — 96375 TX/PRO/DX INJ NEW DRUG ADDON: CPT

## 2017-10-23 PROCEDURE — S0028 INJECTION, FAMOTIDINE, 20 MG: HCPCS | Performed by: INTERNAL MEDICINE

## 2017-10-23 PROCEDURE — 96415 CHEMO IV INFUSION ADDL HR: CPT

## 2017-10-23 RX ORDER — ACETAMINOPHEN 325 MG/1
650 TABLET ORAL
Status: COMPLETED | OUTPATIENT
Start: 2017-10-23 | End: 2017-10-23

## 2017-10-23 RX ORDER — MEPERIDINE HYDROCHLORIDE 50 MG/ML
25 INJECTION INTRAMUSCULAR; INTRAVENOUS; SUBCUTANEOUS
Status: DISCONTINUED | OUTPATIENT
Start: 2017-10-23 | End: 2017-10-23 | Stop reason: HOSPADM

## 2017-10-23 RX ORDER — HEPARIN 100 UNIT/ML
500 SYRINGE INTRAVENOUS
Status: DISCONTINUED | OUTPATIENT
Start: 2017-10-23 | End: 2017-10-23 | Stop reason: HOSPADM

## 2017-10-23 RX ORDER — SODIUM CHLORIDE 0.9 % (FLUSH) 0.9 %
10 SYRINGE (ML) INJECTION
Status: DISCONTINUED | OUTPATIENT
Start: 2017-10-23 | End: 2017-10-23 | Stop reason: HOSPADM

## 2017-10-23 RX ORDER — FAMOTIDINE 10 MG/ML
20 INJECTION INTRAVENOUS
Status: COMPLETED | OUTPATIENT
Start: 2017-10-23 | End: 2017-10-23

## 2017-10-23 RX ADMIN — RITUXIMAB 650 MG: 10 INJECTION, SOLUTION INTRAVENOUS at 09:10

## 2017-10-23 RX ADMIN — BENDAMUSTINE HYDROCHLORIDE 155 MG: 25 INJECTION, SOLUTION INTRAVENOUS at 12:10

## 2017-10-23 RX ADMIN — ACETAMINOPHEN 650 MG: 325 TABLET ORAL at 09:10

## 2017-10-23 RX ADMIN — DIPHENHYDRAMINE HYDROCHLORIDE 50 MG: 50 INJECTION, SOLUTION INTRAMUSCULAR; INTRAVENOUS at 09:10

## 2017-10-23 RX ADMIN — DEXAMETHASONE SODIUM PHOSPHATE: 4 INJECTION, SOLUTION INTRAMUSCULAR; INTRAVENOUS at 12:10

## 2017-10-23 RX ADMIN — FAMOTIDINE 20 MG: 10 INJECTION INTRAVENOUS at 09:10

## 2017-10-23 NOTE — PLAN OF CARE
Problem: Patient Care Overview  Goal: Plan of Care Review  Outcome: Ongoing (interventions implemented as appropriate)  Pt tolerated D1C2 rituxan/bendeka without adverse effects. VSS. Pt states wanting to keep in PIV for chemo in morning. Good blood return noted. Flushed & capped.  Provided AVS & verbalized understanding of RTC date. DC with family ambulating independently.

## 2017-10-23 NOTE — PLAN OF CARE
Problem: Chemotherapy Effects (Adult)  Goal: Signs and Symptoms of Listed Potential Problems Will be Absent, Minimized or Managed (Chemotherapy Effects)  Signs and symptoms of listed potential problems will be absent, minimized or managed by discharge/transition of care (reference Chemotherapy Effects (Adult) CPG).   Outcome: Ongoing (interventions implemented as appropriate)  Labs , hx, and medications reviewed. Assessment completed. Discussed plan of care with patient. Reviewed methods to increase calories & protein. Provided written dietary instructions & placed RD consult.  Patient in agreement. VSS.  Chair reclined and warm blanket and snack offered. Will cont to monitor

## 2017-10-24 ENCOUNTER — INFUSION (OUTPATIENT)
Dept: INFUSION THERAPY | Facility: HOSPITAL | Age: 72
End: 2017-10-24
Attending: INTERNAL MEDICINE
Payer: MEDICARE

## 2017-10-24 ENCOUNTER — CLINICAL SUPPORT (OUTPATIENT)
Dept: HEMATOLOGY/ONCOLOGY | Facility: CLINIC | Age: 72
End: 2017-10-24
Payer: MEDICARE

## 2017-10-24 VITALS
HEIGHT: 63 IN | DIASTOLIC BLOOD PRESSURE: 88 MMHG | RESPIRATION RATE: 18 BRPM | SYSTOLIC BLOOD PRESSURE: 176 MMHG | WEIGHT: 142.19 LBS | TEMPERATURE: 98 F | BODY MASS INDEX: 25.2 KG/M2 | HEART RATE: 71 BPM

## 2017-10-24 DIAGNOSIS — C82.08 GRADE 1 FOLLICULAR LYMPHOMA OF LYMPH NODES OF MULTIPLE REGIONS: ICD-10-CM

## 2017-10-24 DIAGNOSIS — C82.08 FOLLICULAR LYMPHOMA GRADE I, LYMPH NODES OF MULTIPLE SITES: Primary | ICD-10-CM

## 2017-10-24 DIAGNOSIS — Z79.4 TYPE 2 DIABETES MELLITUS WITH COMPLICATION, WITH LONG-TERM CURRENT USE OF INSULIN: ICD-10-CM

## 2017-10-24 DIAGNOSIS — Z71.3 NUTRITIONAL COUNSELING: Primary | ICD-10-CM

## 2017-10-24 DIAGNOSIS — E11.8 TYPE 2 DIABETES MELLITUS WITH COMPLICATION, WITH LONG-TERM CURRENT USE OF INSULIN: ICD-10-CM

## 2017-10-24 PROCEDURE — 63600175 PHARM REV CODE 636 W HCPCS: Performed by: INTERNAL MEDICINE

## 2017-10-24 PROCEDURE — 99999 PR PBB SHADOW E&M-EST. PATIENT-LVL II: CPT | Mod: PBBFAC,,,

## 2017-10-24 PROCEDURE — 25000003 PHARM REV CODE 250: Performed by: INTERNAL MEDICINE

## 2017-10-24 PROCEDURE — 97802 MEDICAL NUTRITION INDIV IN: CPT | Mod: S$GLB,,, | Performed by: DIETITIAN, REGISTERED

## 2017-10-24 PROCEDURE — 96409 CHEMO IV PUSH SNGL DRUG: CPT

## 2017-10-24 RX ORDER — SODIUM CHLORIDE 0.9 % (FLUSH) 0.9 %
10 SYRINGE (ML) INJECTION
Status: DISCONTINUED | OUTPATIENT
Start: 2017-10-24 | End: 2017-10-24 | Stop reason: HOSPADM

## 2017-10-24 RX ORDER — HEPARIN 100 UNIT/ML
500 SYRINGE INTRAVENOUS
Status: DISCONTINUED | OUTPATIENT
Start: 2017-10-24 | End: 2017-10-24 | Stop reason: HOSPADM

## 2017-10-24 RX ADMIN — BENDAMUSTINE HYDROCHLORIDE 155 MG: 25 INJECTION, SOLUTION INTRAVENOUS at 09:10

## 2017-10-24 RX ADMIN — SODIUM CHLORIDE: 9 INJECTION, SOLUTION INTRAVENOUS at 09:10

## 2017-10-24 NOTE — PLAN OF CARE
Problem: Patient Care Overview  Goal: Plan of Care Review  Outcome: Ongoing (interventions implemented as appropriate)  Pt received Bendeka with no complications. Pt hypertensive, and pt stated she would take her prescribed BP meds when she got home. Pt instructed to call MD with any problems. Pt discharged home.

## 2017-10-25 ENCOUNTER — INFUSION (OUTPATIENT)
Dept: INFUSION THERAPY | Facility: HOSPITAL | Age: 72
End: 2017-10-25
Attending: INTERNAL MEDICINE
Payer: MEDICARE

## 2017-10-25 VITALS — WEIGHT: 141.13 LBS | HEIGHT: 63 IN | BODY MASS INDEX: 25.01 KG/M2

## 2017-10-25 DIAGNOSIS — C82.08 FOLLICULAR LYMPHOMA GRADE I, LYMPH NODES OF MULTIPLE SITES: Primary | ICD-10-CM

## 2017-10-25 PROCEDURE — 96372 THER/PROPH/DIAG INJ SC/IM: CPT

## 2017-10-25 PROCEDURE — 63600175 PHARM REV CODE 636 W HCPCS: Performed by: INTERNAL MEDICINE

## 2017-10-25 RX ADMIN — PEGFILGRASTIM 6 MG: 6 INJECTION SUBCUTANEOUS at 10:10

## 2017-10-25 NOTE — NURSING
Pt arrived for neulasta following chemo yesterday.  Pt tolerated injection SQ to abd.  Discharged to home with  and appt calendar.

## 2017-11-20 ENCOUNTER — OFFICE VISIT (OUTPATIENT)
Dept: HEMATOLOGY/ONCOLOGY | Facility: CLINIC | Age: 72
End: 2017-11-20
Payer: MEDICARE

## 2017-11-20 ENCOUNTER — INFUSION (OUTPATIENT)
Dept: INFUSION THERAPY | Facility: HOSPITAL | Age: 72
End: 2017-11-20
Attending: INTERNAL MEDICINE
Payer: MEDICARE

## 2017-11-20 VITALS
DIASTOLIC BLOOD PRESSURE: 89 MMHG | TEMPERATURE: 98 F | HEART RATE: 80 BPM | SYSTOLIC BLOOD PRESSURE: 173 MMHG | RESPIRATION RATE: 18 BRPM

## 2017-11-20 VITALS
OXYGEN SATURATION: 98 % | TEMPERATURE: 98 F | HEART RATE: 96 BPM | BODY MASS INDEX: 24.37 KG/M2 | DIASTOLIC BLOOD PRESSURE: 106 MMHG | WEIGHT: 137.56 LBS | SYSTOLIC BLOOD PRESSURE: 218 MMHG | RESPIRATION RATE: 18 BRPM

## 2017-11-20 DIAGNOSIS — E11.8 TYPE 2 DIABETES MELLITUS WITH COMPLICATION, WITH LONG-TERM CURRENT USE OF INSULIN: ICD-10-CM

## 2017-11-20 DIAGNOSIS — I10 ESSENTIAL HYPERTENSION: ICD-10-CM

## 2017-11-20 DIAGNOSIS — C82.08 FOLLICULAR LYMPHOMA GRADE I, LYMPH NODES OF MULTIPLE SITES: Primary | ICD-10-CM

## 2017-11-20 DIAGNOSIS — Z79.4 TYPE 2 DIABETES MELLITUS WITH COMPLICATION, WITH LONG-TERM CURRENT USE OF INSULIN: ICD-10-CM

## 2017-11-20 DIAGNOSIS — M54.9 BILATERAL BACK PAIN, UNSPECIFIED BACK LOCATION, UNSPECIFIED CHRONICITY: ICD-10-CM

## 2017-11-20 PROCEDURE — 96413 CHEMO IV INFUSION 1 HR: CPT

## 2017-11-20 PROCEDURE — 99214 OFFICE O/P EST MOD 30 MIN: CPT | Mod: S$GLB,,, | Performed by: INTERNAL MEDICINE

## 2017-11-20 PROCEDURE — S0028 INJECTION, FAMOTIDINE, 20 MG: HCPCS | Performed by: INTERNAL MEDICINE

## 2017-11-20 PROCEDURE — 96367 TX/PROPH/DG ADDL SEQ IV INF: CPT

## 2017-11-20 PROCEDURE — 63600175 PHARM REV CODE 636 W HCPCS: Performed by: INTERNAL MEDICINE

## 2017-11-20 PROCEDURE — 96375 TX/PRO/DX INJ NEW DRUG ADDON: CPT

## 2017-11-20 PROCEDURE — 25000003 PHARM REV CODE 250: Performed by: INTERNAL MEDICINE

## 2017-11-20 PROCEDURE — 96411 CHEMO IV PUSH ADDL DRUG: CPT

## 2017-11-20 PROCEDURE — 96415 CHEMO IV INFUSION ADDL HR: CPT

## 2017-11-20 PROCEDURE — 99999 PR PBB SHADOW E&M-EST. PATIENT-LVL III: CPT | Mod: PBBFAC,,, | Performed by: INTERNAL MEDICINE

## 2017-11-20 RX ORDER — HYDRALAZINE HYDROCHLORIDE 20 MG/ML
10 INJECTION INTRAMUSCULAR; INTRAVENOUS EVERY 6 HOURS PRN
Status: CANCELLED
Start: 2017-11-20

## 2017-11-20 RX ORDER — ACETAMINOPHEN 325 MG/1
650 TABLET ORAL
Status: DISCONTINUED | OUTPATIENT
Start: 2017-11-20 | End: 2017-11-20 | Stop reason: HOSPADM

## 2017-11-20 RX ORDER — MEPERIDINE HYDROCHLORIDE 50 MG/ML
25 INJECTION INTRAMUSCULAR; INTRAVENOUS; SUBCUTANEOUS
Status: CANCELLED | OUTPATIENT
Start: 2017-11-20

## 2017-11-20 RX ORDER — HEPARIN 100 UNIT/ML
500 SYRINGE INTRAVENOUS
Status: CANCELLED | OUTPATIENT
Start: 2017-11-21

## 2017-11-20 RX ORDER — HEPARIN 100 UNIT/ML
500 SYRINGE INTRAVENOUS
Status: CANCELLED | OUTPATIENT
Start: 2017-11-20

## 2017-11-20 RX ORDER — FAMOTIDINE 10 MG/ML
20 INJECTION INTRAVENOUS
Status: COMPLETED | OUTPATIENT
Start: 2017-11-20 | End: 2017-11-20

## 2017-11-20 RX ORDER — SODIUM CHLORIDE 0.9 % (FLUSH) 0.9 %
10 SYRINGE (ML) INJECTION
Status: CANCELLED | OUTPATIENT
Start: 2017-11-21

## 2017-11-20 RX ORDER — ACETAMINOPHEN 325 MG/1
650 TABLET ORAL
Status: CANCELLED | OUTPATIENT
Start: 2017-11-20

## 2017-11-20 RX ORDER — CLONIDINE HYDROCHLORIDE 0.1 MG/1
0.2 TABLET ORAL
Status: COMPLETED | OUTPATIENT
Start: 2017-11-20 | End: 2017-11-20

## 2017-11-20 RX ORDER — CLONIDINE HYDROCHLORIDE 0.1 MG/1
0.1 TABLET ORAL
Status: COMPLETED | OUTPATIENT
Start: 2017-11-20 | End: 2017-11-20

## 2017-11-20 RX ORDER — SODIUM CHLORIDE 0.9 % (FLUSH) 0.9 %
10 SYRINGE (ML) INJECTION
Status: CANCELLED | OUTPATIENT
Start: 2017-11-20

## 2017-11-20 RX ORDER — FAMOTIDINE 10 MG/ML
20 INJECTION INTRAVENOUS
Status: CANCELLED | OUTPATIENT
Start: 2017-11-20

## 2017-11-20 RX ADMIN — DEXAMETHASONE SODIUM PHOSPHATE: 4 INJECTION, SOLUTION INTRAMUSCULAR; INTRAVENOUS at 10:11

## 2017-11-20 RX ADMIN — CLONIDINE HYDROCHLORIDE 0.2 MG: 0.1 TABLET ORAL at 09:11

## 2017-11-20 RX ADMIN — RITUXIMAB 625 MG: 10 INJECTION, SOLUTION INTRAVENOUS at 11:11

## 2017-11-20 RX ADMIN — FAMOTIDINE 20 MG: 10 INJECTION INTRAVENOUS at 11:11

## 2017-11-20 RX ADMIN — CLONIDINE HYDROCHLORIDE 0.1 MG: 0.1 TABLET ORAL at 02:11

## 2017-11-20 RX ADMIN — DIPHENHYDRAMINE HYDROCHLORIDE 50 MG: 50 INJECTION, SOLUTION INTRAMUSCULAR; INTRAVENOUS at 11:11

## 2017-11-20 RX ADMIN — BENDAMUSTINE HYDROCHLORIDE 150 MG: 25 INJECTION, SOLUTION INTRAVENOUS at 02:11

## 2017-11-20 NOTE — NURSING
0915-- Pt arrived with elevated blood pressure from md kwong.  Md ordered Hydralazine. md notified of unable to give in clinic due to no monitoring capabilities for cardiac.  Awaiting futher orders for p. O. Blood pressure medication.

## 2017-11-20 NOTE — NURSING
"[id number]--Notified Dr Escalante's nurse of pt's blood pressure is at 191/90. Asked if okay to proceed with Bendeka. Awaiting call back, Rachel reports "I will let him know as soon as he comes out of the room." request Rachel to call me at [phone] with his response.   "

## 2017-11-20 NOTE — Clinical Note
Please schedule for return in 4 weeks (12/18) with Dr. Rodríguez. She will need chemo appt same day for Cycle 4 of R-bendamustine. Labs before visit please (CBC, CMP).

## 2017-11-20 NOTE — PROGRESS NOTES
Subjective:       Patient ID: Keely Pizarro is a 72 y.o. female.    Chief Complaint: Pain (upper back)    Keely presents with her  for evaluation of newly diagnosed grade 1-2 follicular lymphoma diagnosed by excisional biopsy of a right cervical lymph node 8/1/2017.  Pathologic diagnosis was received by [facility].  Keely reports feeling fatigue since March 2017. At the time she had an infected tooth and developed cervical lymphadenopathy. She started loosing weight, total quantity to date is unknown. Appetite remains normal and she denies night sweats. She then developed shortness of breath worse with activity and left lower lung pleural effusion was noted. The received thoracentesis twice with negative pleural fluid flow cytometry. Cytology was positive for lymphocytes, but was not diagnostic for follicular lymphoma. CT imaging demonstrates diffuse, bilateral submandibular, cervical, axillary, supraclavicular, and intrathoracic lymphadenopathy.    Keely has a history of uterine cancer in 2012 treated with radiation and chemotherapy. She had a port-a-cath at that time for treatment. She has insulin dependent diabetes mellitus with poorly controlled blood sugar, reporting both high and lows. She has hypertension, and blood pressure was elevated at intake today.     Follow-up Visit 9/1/17  PET imaging shows stage 4 follicular lymphoma, large left side pleural effusion    Follow-up 10/2/17  Completed first cycle of BR chemotherapy.  Some nausea, responds to Zofran, phenergan makes her feel worse  Back and leg pain likely due to neulasta injection  Lung aeration improved on auscultation    Follow-up 11/20/2017    Ms. Pizarro is feeling well overall. She has lost three pounds since her last visit and does not understand why. She states she has good appetite and is eating a lot. She denies any inteval fevers, chills, nausea/vomiting. No constipation or diarrhea. She denies new lymphadenopathy. SHe  is reporting back pain in the bilateral CVA area which came back a few days ago after receding following the first couple of cycles of chemotherapy. She has markedly elevated blood pressure this morning, which she blames on some confusion about the check-in process that she says angered her at the time they were taking her blood pressure.     Pain       Review of Systems   Constitutional: Negative.    HENT: Negative.    Eyes: Negative.    Respiratory: Negative.    Cardiovascular: Negative.    Gastrointestinal: Negative.    Endocrine: Negative.    Genitourinary: Negative.    Musculoskeletal: Negative.    Skin: Negative.    Allergic/Immunologic: Negative for environmental allergies, food allergies and immunocompromised state.   Neurological: Negative.    Hematological: Negative for adenopathy. Does not bruise/bleed easily.   Psychiatric/Behavioral: Negative.        Objective:      Physical Exam   Constitutional: She is oriented to person, place, and time. She appears well-developed and well-nourished.   HENT:   Head: Normocephalic and atraumatic.   Right Ear: External ear normal.   Left Ear: External ear normal.   Nose: Nose normal.   Mouth/Throat: Oropharynx is clear and moist. No oropharyngeal exudate.   Eyes: Conjunctivae and EOM are normal. Pupils are equal, round, and reactive to light. No scleral icterus.   Neck: Normal range of motion. Neck supple. No JVD present. No tracheal deviation present. No thyromegaly present.   Cardiovascular: Normal rate, regular rhythm, normal heart sounds and intact distal pulses.    No murmur heard.  Pulmonary/Chest: Effort normal and breath sounds normal. No respiratory distress.   Abdominal: Soft. Bowel sounds are normal. She exhibits no distension and no mass. There is no hepatosplenomegaly. There is no tenderness. There is no rebound and no guarding.   Musculoskeletal: Normal range of motion. She exhibits no edema.   Lymphadenopathy:     She has cervical adenopathy.        Left  cervical: Posterior cervical adenopathy present.     She has no axillary adenopathy.        Right: No inguinal adenopathy present.        Left: No inguinal adenopathy present.   Firm, small, posterior cervical lymph node is palpated. No other lymphadenopathy palpated at this visit.    Neurological: She is alert and oriented to person, place, and time. No cranial nerve deficit.   Skin: Skin is warm and dry. No rash noted. No cyanosis. Nails show no clubbing.   Psychiatric: She has a normal mood and affect. Her behavior is normal. Judgment and thought content normal.   Nursing note and vitals reviewed.      Assessment:       1. Follicular lymphoma grade i, lymph nodes of multiple sites    2. Essential hypertension    3. Type 2 diabetes mellitus with complication, with long-term current use of insulin    4. Bilateral back pain, unspecified back location, unspecified chronicity        Plan:       1. Follicular lymphoma    Ms. Pizarro presents for Cycle 3 of R-bendamustine chemotherapy today. She has tolerated the first two cycles well. We will give Cycle 3 today with pegfilgrastim support.     2. Hypertension - markedly elevated. She is asymptomatic (though has back pain). We will administer IV hydralazine during chemotherapy to control blood pressure. She may need additional antihypertensive management (addition of another drug)    3. DM-II - Blood glucose well-controlled today. We will monitor.     4. Back pain - Cause uncertain. Initially, this was likely related to significant lymphoma burden. Her lymphoma appears to be improving on clinical findings, but her back pain began a few days ago. We will attempt to manage with naproxen (which she already takes). If it persists, we will pursue dedicated workup.     Follow-up in 4 weeks with Dr. Rodríguez and for cycle 4 of R-bendamustine. CBC, CMP prior to visit.     Henrry Escalante MD

## 2017-11-21 ENCOUNTER — INFUSION (OUTPATIENT)
Dept: INFUSION THERAPY | Facility: HOSPITAL | Age: 72
End: 2017-11-21
Attending: INTERNAL MEDICINE
Payer: MEDICARE

## 2017-11-21 VITALS
DIASTOLIC BLOOD PRESSURE: 85 MMHG | RESPIRATION RATE: 18 BRPM | SYSTOLIC BLOOD PRESSURE: 181 MMHG | TEMPERATURE: 98 F | HEART RATE: 72 BPM

## 2017-11-21 DIAGNOSIS — C82.08 FOLLICULAR LYMPHOMA GRADE I, LYMPH NODES OF MULTIPLE SITES: Primary | ICD-10-CM

## 2017-11-21 PROCEDURE — 63600175 PHARM REV CODE 636 W HCPCS: Performed by: INTERNAL MEDICINE

## 2017-11-21 PROCEDURE — 25000003 PHARM REV CODE 250: Performed by: INTERNAL MEDICINE

## 2017-11-21 PROCEDURE — 96409 CHEMO IV PUSH SNGL DRUG: CPT

## 2017-11-21 RX ORDER — HEPARIN 100 UNIT/ML
500 SYRINGE INTRAVENOUS
Status: DISCONTINUED | OUTPATIENT
Start: 2017-11-21 | End: 2017-11-21 | Stop reason: HOSPADM

## 2017-11-21 RX ORDER — SODIUM CHLORIDE 0.9 % (FLUSH) 0.9 %
10 SYRINGE (ML) INJECTION
Status: DISCONTINUED | OUTPATIENT
Start: 2017-11-21 | End: 2017-11-21 | Stop reason: HOSPADM

## 2017-11-21 RX ADMIN — SODIUM CHLORIDE: 9 INJECTION, SOLUTION INTRAVENOUS at 09:11

## 2017-11-21 RX ADMIN — BENDAMUSTINE HYDROCHLORIDE 150 MG: 25 INJECTION, SOLUTION INTRAVENOUS at 09:11

## 2017-11-21 NOTE — PLAN OF CARE
Problem: Patient Care Overview  Goal: Plan of Care Review  Outcome: Ongoing (interventions implemented as appropriate)  Pt tolerated Bendeka. Pt hypertensive, and states she is following up with her PCP due to recent HTN. Pt instructed to call MD with any problems. Pt discharged home with  at side.

## 2017-11-22 ENCOUNTER — INFUSION (OUTPATIENT)
Dept: INFUSION THERAPY | Facility: HOSPITAL | Age: 72
End: 2017-11-22
Attending: INTERNAL MEDICINE
Payer: MEDICARE

## 2017-11-22 DIAGNOSIS — C82.08 FOLLICULAR LYMPHOMA GRADE I, LYMPH NODES OF MULTIPLE SITES: Primary | ICD-10-CM

## 2017-11-22 PROCEDURE — 96372 THER/PROPH/DIAG INJ SC/IM: CPT

## 2017-11-22 PROCEDURE — 63600175 PHARM REV CODE 636 W HCPCS: Performed by: INTERNAL MEDICINE

## 2017-11-22 RX ADMIN — PEGFILGRASTIM 6 MG: 6 INJECTION SUBCUTANEOUS at 10:11

## 2017-11-22 NOTE — NURSING
Pt arrived for neulasta following chemo yesterday.  Pt tolerated injection to abd.  Discharged to home.

## 2017-11-28 RX ORDER — BLOOD-GLUCOSE METER
EACH MISCELLANEOUS
Qty: 1 EACH | Refills: 0 | Status: SHIPPED | OUTPATIENT
Start: 2017-11-28 | End: 2019-12-13

## 2017-12-18 ENCOUNTER — OFFICE VISIT (OUTPATIENT)
Dept: HEMATOLOGY/ONCOLOGY | Facility: CLINIC | Age: 72
End: 2017-12-18
Payer: MEDICARE

## 2017-12-18 ENCOUNTER — INFUSION (OUTPATIENT)
Dept: INFUSION THERAPY | Facility: HOSPITAL | Age: 72
End: 2017-12-18
Attending: INTERNAL MEDICINE
Payer: MEDICARE

## 2017-12-18 ENCOUNTER — TELEPHONE (OUTPATIENT)
Dept: INTERNAL MEDICINE | Facility: CLINIC | Age: 72
End: 2017-12-18

## 2017-12-18 VITALS
OXYGEN SATURATION: 100 % | HEART RATE: 71 BPM | DIASTOLIC BLOOD PRESSURE: 105 MMHG | TEMPERATURE: 98 F | SYSTOLIC BLOOD PRESSURE: 215 MMHG | WEIGHT: 138.88 LBS | HEIGHT: 63 IN | RESPIRATION RATE: 16 BRPM | BODY MASS INDEX: 24.61 KG/M2

## 2017-12-18 VITALS
DIASTOLIC BLOOD PRESSURE: 88 MMHG | SYSTOLIC BLOOD PRESSURE: 190 MMHG | TEMPERATURE: 98 F | HEART RATE: 69 BPM | RESPIRATION RATE: 18 BRPM

## 2017-12-18 DIAGNOSIS — C82.08 FOLLICULAR LYMPHOMA GRADE I, LYMPH NODES OF MULTIPLE SITES: Primary | ICD-10-CM

## 2017-12-18 DIAGNOSIS — I10 ESSENTIAL HYPERTENSION: ICD-10-CM

## 2017-12-18 DIAGNOSIS — Z79.4 TYPE 2 DIABETES MELLITUS WITH COMPLICATION, WITH LONG-TERM CURRENT USE OF INSULIN: ICD-10-CM

## 2017-12-18 DIAGNOSIS — E11.8 TYPE 2 DIABETES MELLITUS WITH COMPLICATION, WITH LONG-TERM CURRENT USE OF INSULIN: ICD-10-CM

## 2017-12-18 PROCEDURE — S0028 INJECTION, FAMOTIDINE, 20 MG: HCPCS | Performed by: INTERNAL MEDICINE

## 2017-12-18 PROCEDURE — 96413 CHEMO IV INFUSION 1 HR: CPT

## 2017-12-18 PROCEDURE — 99215 OFFICE O/P EST HI 40 MIN: CPT | Mod: 25,S$GLB,, | Performed by: INTERNAL MEDICINE

## 2017-12-18 PROCEDURE — 96411 CHEMO IV PUSH ADDL DRUG: CPT

## 2017-12-18 PROCEDURE — 96415 CHEMO IV INFUSION ADDL HR: CPT

## 2017-12-18 PROCEDURE — 96367 TX/PROPH/DG ADDL SEQ IV INF: CPT

## 2017-12-18 PROCEDURE — 99499 UNLISTED E&M SERVICE: CPT | Mod: S$GLB,,, | Performed by: INTERNAL MEDICINE

## 2017-12-18 PROCEDURE — 63600175 PHARM REV CODE 636 W HCPCS: Performed by: INTERNAL MEDICINE

## 2017-12-18 PROCEDURE — 96375 TX/PRO/DX INJ NEW DRUG ADDON: CPT

## 2017-12-18 PROCEDURE — 25000003 PHARM REV CODE 250: Performed by: INTERNAL MEDICINE

## 2017-12-18 PROCEDURE — 99999 PR PBB SHADOW E&M-EST. PATIENT-LVL III: CPT | Mod: PBBFAC,,, | Performed by: INTERNAL MEDICINE

## 2017-12-18 RX ORDER — HYDRALAZINE HYDROCHLORIDE 20 MG/ML
10 INJECTION INTRAMUSCULAR; INTRAVENOUS EVERY 6 HOURS PRN
Status: DISCONTINUED | OUTPATIENT
Start: 2017-12-18 | End: 2017-12-18

## 2017-12-18 RX ORDER — HYDRALAZINE HYDROCHLORIDE 20 MG/ML
10 INJECTION INTRAMUSCULAR; INTRAVENOUS EVERY 6 HOURS PRN
Status: CANCELLED
Start: 2017-12-18

## 2017-12-18 RX ORDER — ACETAMINOPHEN 325 MG/1
650 TABLET ORAL
Status: COMPLETED | OUTPATIENT
Start: 2017-12-18 | End: 2017-12-18

## 2017-12-18 RX ORDER — HEPARIN 100 UNIT/ML
500 SYRINGE INTRAVENOUS
Status: DISCONTINUED | OUTPATIENT
Start: 2017-12-18 | End: 2017-12-18 | Stop reason: HOSPADM

## 2017-12-18 RX ORDER — HYDRALAZINE HYDROCHLORIDE 10 MG/1
10 TABLET, FILM COATED ORAL EVERY 6 HOURS PRN
Status: DISCONTINUED | OUTPATIENT
Start: 2017-12-18 | End: 2017-12-18 | Stop reason: HOSPADM

## 2017-12-18 RX ORDER — SODIUM CHLORIDE 0.9 % (FLUSH) 0.9 %
10 SYRINGE (ML) INJECTION
Status: DISCONTINUED | OUTPATIENT
Start: 2017-12-18 | End: 2017-12-18 | Stop reason: HOSPADM

## 2017-12-18 RX ORDER — FAMOTIDINE 10 MG/ML
20 INJECTION INTRAVENOUS
Status: COMPLETED | OUTPATIENT
Start: 2017-12-18 | End: 2017-12-18

## 2017-12-18 RX ORDER — CLONIDINE HYDROCHLORIDE 0.1 MG/1
0.1 TABLET ORAL
Status: COMPLETED | OUTPATIENT
Start: 2017-12-18 | End: 2017-12-18

## 2017-12-18 RX ORDER — MEPERIDINE HYDROCHLORIDE 50 MG/ML
25 INJECTION INTRAMUSCULAR; INTRAVENOUS; SUBCUTANEOUS
Status: DISCONTINUED | OUTPATIENT
Start: 2017-12-18 | End: 2017-12-18 | Stop reason: HOSPADM

## 2017-12-18 RX ADMIN — CLONIDINE HYDROCHLORIDE 0.1 MG: 0.1 TABLET ORAL at 09:12

## 2017-12-18 RX ADMIN — DIPHENHYDRAMINE HYDROCHLORIDE 50 MG: 50 INJECTION INTRAMUSCULAR; INTRAVENOUS at 09:12

## 2017-12-18 RX ADMIN — BENDAMUSTINE HYDROCHLORIDE 155 MG: 25 INJECTION, SOLUTION INTRAVENOUS at 12:12

## 2017-12-18 RX ADMIN — RITUXIMAB 650 MG: 10 INJECTION, SOLUTION INTRAVENOUS at 10:12

## 2017-12-18 RX ADMIN — ACETAMINOPHEN 650 MG: 325 TABLET ORAL at 09:12

## 2017-12-18 RX ADMIN — DEXAMETHASONE SODIUM PHOSPHATE: 4 INJECTION, SOLUTION INTRAMUSCULAR; INTRAVENOUS at 09:12

## 2017-12-18 RX ADMIN — FAMOTIDINE 20 MG: 10 INJECTION INTRAVENOUS at 09:12

## 2017-12-18 NOTE — PLAN OF CARE
Problem: Patient Care Overview  Goal: Plan of Care Review  Outcome: Ongoing (interventions implemented as appropriate)  Pt. Tolerated infusion. VSS. PIV flushed, AVS given. Pt. Strongly encouraged to call PCP today and make an appt for ASAP regarding b/p med changes. Remained without symptoms of HTN throughout todays treatment. No questions at this time.

## 2017-12-18 NOTE — PLAN OF CARE
Problem: Chemotherapy Effects (Adult)  Goal: Signs and Symptoms of Listed Potential Problems Will be Absent, Minimized or Managed (Chemotherapy Effects)  Signs and symptoms of listed potential problems will be absent, minimized or managed by discharge/transition of care (reference Chemotherapy Effects (Adult) CPG).   Outcome: Ongoing (interventions implemented as appropriate)  Pt. Here today for Ritxuan/bendeka. Pt BP is markedly elevated today. Last infusion BP was high and climbed. Dr. Rodríguez ordered hydralazine, per infusion guidelines, cannot administer due to lack of cardiac monitoring. Pt is asymptomatic for HTN. Reports that she took home meds today and is going to PCP after erica to adjust them. Per Dr. Rodríguez, Give Clonidine, start treatment when systolic is <200. Per Dr. Rodríguez, do no hold treatment for climbing b/p unless pt develops symptoms. Reported this to pt. Pt verbalizes understanding and knows what to report. No questions at this time. PIV started, infusing without difficulty. Warm blanket and snacks offered.

## 2017-12-18 NOTE — TELEPHONE ENCOUNTER
----- Message from Lux Martin sent at 12/18/2017  1:58 PM CST -----  Contact: Pt  X_ 1st Request  _ 2nd Request  _ 3rd Request    Who: PHUONG FRAIRE [[id number]]    Why: Patient would like to speak with staff in regards to changing her blood pressure medication. States it is not working and her pressure is still high. Please advise    What Number to Call Back: [phone]    When to Expect a call back: (Before the end of the day)  -- if call after 3:00 call back will be tomorrow.

## 2017-12-18 NOTE — PROGRESS NOTES
Subjective:       Patient ID: Keely Pizarro is a 72 y.o. female.    Chief Complaint: No chief complaint on file.    Keely presents with her  for evaluation of newly diagnosed grade 1-2 follicular lymphoma diagnosed by excisional biopsy of a right cervical lymph node 8/1/2017.  Pathologic diagnosis was received by [facility].  Keely reports feeling fatigue since March 2017. At the time she had an infected tooth and developed cervical lymphadenopathy. She started loosing weight, total quantity to date is unknown. Appetite remains normal and she denies night sweats. She then developed shortness of breath worse with activity and left lower lung pleural effusion was noted. The received thoracentesis twice with negative pleural fluid flow cytometry. Cytology was positive for lymphocytes, but was not diagnostic for follicular lymphoma. CT imaging demonstrates diffuse, bilateral submandibular, cervical, axillary, supraclavicular, and intrathoracic lymphadenopathy.    Keely has a history of uterine cancer in 2012 treated with radiation and chemotherapy. She had a port-a-cath at that time for treatment. She has insulin dependent diabetes mellitus with poorly controlled blood sugar, reporting both high and lows. She has hypertension, and blood pressure was elevated at intake today.     Follow-up Visit 9/1/17  PET imaging shows stage 4 follicular lymphoma, large left side pleural effusion    Follow-up 10/2/17  Completed first cycle of BR chemotherapy.  Some nausea, responds to Zofran, phenergan makes her feel worse  Back and leg pain likely due to neulasta injection  Lung aeration improved on auscultation    Follow-up 11/20/2017    Ms. Pizarro is feeling well overall. She has lost three pounds since her last visit and does not understand why. She states she has good appetite and is eating a lot. She denies any inteval fevers, chills, nausea/vomiting. No constipation or diarrhea. She denies new  lymphadenopathy. SHe is reporting back pain in the bilateral CVA area which came back a few days ago after receding following the first couple of cycles of chemotherapy. She has markedly elevated blood pressure this morning, which she blames on some confusion about the check-in process that she says angered her at the time they were taking her blood pressure.     Follow-up 12/18/17  Keely presents today with her  for follow-up. Overall doing well. No nausea or fevers. Energy good- sometimes feels short of breath. Weight is overall stable. Eating well.  Blood pressure elevated 2 visits now. Instructed to call PCP today- during infusion to get instructions for medication adjustment.    Pain       Review of Systems   Constitutional: Negative.    HENT: Negative.    Eyes: Negative.    Respiratory: Negative.    Cardiovascular: Negative.    Gastrointestinal: Negative.    Endocrine: Negative.    Genitourinary: Negative.    Musculoskeletal: Negative.    Skin: Negative.    Allergic/Immunologic: Negative for environmental allergies, food allergies and immunocompromised state.   Neurological: Negative.    Hematological: Negative for adenopathy. Does not bruise/bleed easily.   Psychiatric/Behavioral: Negative.        Objective:      Physical Exam   Constitutional: She is oriented to person, place, and time. She appears well-developed and well-nourished.   HENT:   Head: Normocephalic and atraumatic.   Right Ear: External ear normal.   Left Ear: External ear normal.   Nose: Nose normal.   Mouth/Throat: Oropharynx is clear and moist. No oropharyngeal exudate.   Eyes: Conjunctivae and EOM are normal. Pupils are equal, round, and reactive to light. No scleral icterus.   Neck: Normal range of motion. Neck supple. No JVD present. No tracheal deviation present. No thyromegaly present.   Cardiovascular: Normal rate, regular rhythm, normal heart sounds and intact distal pulses.    No murmur heard.  Pulmonary/Chest: Effort normal  and breath sounds normal. No respiratory distress.   Abdominal: Soft. Bowel sounds are normal. She exhibits no distension and no mass. There is no hepatosplenomegaly. There is no tenderness. There is no rebound and no guarding.   Musculoskeletal: Normal range of motion. She exhibits no edema.   Lymphadenopathy:     She has cervical adenopathy.        Left cervical: Posterior cervical adenopathy present.     She has no axillary adenopathy.        Right: No inguinal adenopathy present.        Left: No inguinal adenopathy present.   Firm, small, posterior cervical lymph node is palpated. No other lymphadenopathy palpated at this visit.    Neurological: She is alert and oriented to person, place, and time. No cranial nerve deficit.   Skin: Skin is warm and dry. No rash noted. No cyanosis. Nails show no clubbing.   Psychiatric: She has a normal mood and affect. Her behavior is normal. Judgment and thought content normal.   Nursing note and vitals reviewed.      Assessment:       No diagnosis found.    Plan:       1. Follicular lymphoma    Ms. Pizarro presents for Cycle 4 of R-bendamustine chemotherapy today. She has tolerated the first three cycles well. We will give Cycle 4 today with pegfilgrastim support.     2. Hypertension - markedly elevated. She is asymptomatic. We will administer IV hydralazine during chemotherapy to control blood pressure. She may need additional antihypertensive management (addition of another drug)- instructed her to call PCP now.    3. DM-II - Blood glucose well-controlled today. We will monitor.       Follow-up in 4 weeks with Dr. Rodríguez and for cycle 5 of R-bendamustine. CBC, CMP prior to visit.     Jazmin Rodríguez MD

## 2017-12-18 NOTE — Clinical Note
Follow-up in 4 weeks with Dr. Rodríguez and for cycle 5 of R-bendamustine. CBC, CMP prior to visit.

## 2017-12-18 NOTE — PROGRESS NOTES
Patient, Keely Pizarro (MRN #[id number]), presented with a recent Platelet count less than 150 K/uL consistent with the definition of thrombocytopenia (ICD10 - D69.6).    Platelets   Date Value Ref Range Status   12/18/2017 117 (L) 150 - 350 K/uL Final     The patient's thrombocytopenia was monitored, evaluated, addressed and/or treated. This addendum to the medical record is made on 12/18/2017.

## 2017-12-19 ENCOUNTER — INFUSION (OUTPATIENT)
Dept: INFUSION THERAPY | Facility: HOSPITAL | Age: 72
End: 2017-12-19
Attending: INTERNAL MEDICINE
Payer: MEDICARE

## 2017-12-19 VITALS — DIASTOLIC BLOOD PRESSURE: 87 MMHG | RESPIRATION RATE: 18 BRPM | SYSTOLIC BLOOD PRESSURE: 185 MMHG | HEART RATE: 67 BPM

## 2017-12-19 DIAGNOSIS — C82.08 FOLLICULAR LYMPHOMA GRADE I, LYMPH NODES OF MULTIPLE SITES: Primary | ICD-10-CM

## 2017-12-19 PROCEDURE — 25000003 PHARM REV CODE 250: Performed by: INTERNAL MEDICINE

## 2017-12-19 PROCEDURE — A4216 STERILE WATER/SALINE, 10 ML: HCPCS | Performed by: INTERNAL MEDICINE

## 2017-12-19 PROCEDURE — 96409 CHEMO IV PUSH SNGL DRUG: CPT

## 2017-12-19 PROCEDURE — 63600175 PHARM REV CODE 636 W HCPCS: Performed by: INTERNAL MEDICINE

## 2017-12-19 RX ORDER — CLONIDINE HYDROCHLORIDE 0.1 MG/1
0.2 TABLET ORAL ONCE
Status: COMPLETED | OUTPATIENT
Start: 2017-12-19 | End: 2017-12-19

## 2017-12-19 RX ORDER — HEPARIN 100 UNIT/ML
500 SYRINGE INTRAVENOUS
Status: DISCONTINUED | OUTPATIENT
Start: 2017-12-19 | End: 2017-12-19 | Stop reason: HOSPADM

## 2017-12-19 RX ORDER — SODIUM CHLORIDE 0.9 % (FLUSH) 0.9 %
10 SYRINGE (ML) INJECTION
Status: DISCONTINUED | OUTPATIENT
Start: 2017-12-19 | End: 2017-12-19 | Stop reason: HOSPADM

## 2017-12-19 RX ADMIN — CLONIDINE HYDROCHLORIDE 0.2 MG: 0.1 TABLET ORAL at 11:12

## 2017-12-19 RX ADMIN — BENDAMUSTINE HYDROCHLORIDE 155 MG: 25 INJECTION, SOLUTION INTRAVENOUS at 12:12

## 2017-12-19 RX ADMIN — SODIUM CHLORIDE, PRESERVATIVE FREE 10 ML: 5 INJECTION INTRAVENOUS at 12:12

## 2017-12-19 NOTE — PLAN OF CARE
Problem: Chemotherapy Effects (Adult)  Goal: Signs and Symptoms of Listed Potential Problems Will be Absent, Minimized or Managed (Chemotherapy Effects)  Signs and symptoms of listed potential problems will be absent, minimized or managed by discharge/transition of care (reference Chemotherapy Effects (Adult) CPG).   Outcome: Ongoing (interventions implemented as appropriate)  Pt here for D2C4 Bendamustine. BP elevated- Dr. Rodríguez notified and ordered 0.2 clonidine PO. Will continue to monitor BP. Other VSS. Consent/labs/meds/allergies reviewed. PIV to right wrist with blood return noted. All questions answered. Will continue to monitor.

## 2017-12-19 NOTE — PLAN OF CARE
Problem: Patient Care Overview  Goal: Plan of Care Review  Outcome: Ongoing (interventions implemented as appropriate)  Pt tolerated tx without complications. BP down to 185/85- asymptomatic. No s/s of reaction. AVS given to patient.

## 2017-12-20 ENCOUNTER — INFUSION (OUTPATIENT)
Dept: INFUSION THERAPY | Facility: HOSPITAL | Age: 72
End: 2017-12-20
Attending: INTERNAL MEDICINE
Payer: MEDICARE

## 2017-12-20 DIAGNOSIS — C82.08 FOLLICULAR LYMPHOMA GRADE I, LYMPH NODES OF MULTIPLE SITES: Primary | ICD-10-CM

## 2017-12-20 PROCEDURE — 63600175 PHARM REV CODE 636 W HCPCS: Performed by: INTERNAL MEDICINE

## 2017-12-20 PROCEDURE — 96372 THER/PROPH/DIAG INJ SC/IM: CPT

## 2017-12-20 RX ADMIN — PEGFILGRASTIM 6 MG: 6 INJECTION SUBCUTANEOUS at 01:12

## 2017-12-20 NOTE — NURSING
Patient here for neulasta injection-denies any side effects from chemo except for fatigue-reviewed side effects of neulasta-tolerated injection well.

## 2017-12-21 DIAGNOSIS — Z79.4 TYPE 2 DIABETES MELLITUS WITHOUT COMPLICATION, WITH LONG-TERM CURRENT USE OF INSULIN: Primary | ICD-10-CM

## 2017-12-21 DIAGNOSIS — E11.9 TYPE 2 DIABETES MELLITUS WITHOUT COMPLICATION, WITH LONG-TERM CURRENT USE OF INSULIN: Primary | ICD-10-CM

## 2017-12-22 ENCOUNTER — OFFICE VISIT (OUTPATIENT)
Dept: INTERNAL MEDICINE | Facility: CLINIC | Age: 72
End: 2017-12-22
Attending: FAMILY MEDICINE
Payer: MEDICARE

## 2017-12-22 VITALS
HEART RATE: 83 BPM | SYSTOLIC BLOOD PRESSURE: 210 MMHG | HEIGHT: 63 IN | WEIGHT: 141.75 LBS | DIASTOLIC BLOOD PRESSURE: 92 MMHG | OXYGEN SATURATION: 97 % | BODY MASS INDEX: 25.12 KG/M2

## 2017-12-22 DIAGNOSIS — E11.9 TYPE 2 DIABETES MELLITUS WITHOUT COMPLICATION, WITHOUT LONG-TERM CURRENT USE OF INSULIN: ICD-10-CM

## 2017-12-22 DIAGNOSIS — C82.08 FOLLICULAR LYMPHOMA GRADE I, LYMPH NODES OF MULTIPLE SITES: ICD-10-CM

## 2017-12-22 DIAGNOSIS — I10 HYPERTENSION, ESSENTIAL: Primary | ICD-10-CM

## 2017-12-22 DIAGNOSIS — E11.9 TYPE 2 DIABETES MELLITUS WITHOUT COMPLICATION, WITH LONG-TERM CURRENT USE OF INSULIN: ICD-10-CM

## 2017-12-22 DIAGNOSIS — N95.9 POSTMENOPAUSAL SYMPTOMS: ICD-10-CM

## 2017-12-22 DIAGNOSIS — Z79.4 TYPE 2 DIABETES MELLITUS WITHOUT COMPLICATION, WITH LONG-TERM CURRENT USE OF INSULIN: ICD-10-CM

## 2017-12-22 DIAGNOSIS — Z12.31 SCREENING MAMMOGRAM, ENCOUNTER FOR: ICD-10-CM

## 2017-12-22 DIAGNOSIS — Z11.59 NEED FOR HEPATITIS C SCREENING TEST: ICD-10-CM

## 2017-12-22 PROCEDURE — 99214 OFFICE O/P EST MOD 30 MIN: CPT | Mod: S$GLB,,, | Performed by: FAMILY MEDICINE

## 2017-12-22 PROCEDURE — 99999 PR PBB SHADOW E&M-EST. PATIENT-LVL IV: CPT | Mod: PBBFAC,,, | Performed by: FAMILY MEDICINE

## 2017-12-22 RX ORDER — LISINOPRIL 40 MG/1
40 TABLET ORAL DAILY
Qty: 90 TABLET | Refills: 1 | Status: SHIPPED | OUTPATIENT
Start: 2017-12-22 | End: 2018-07-12

## 2017-12-22 RX ORDER — CARVEDILOL 25 MG/1
25 TABLET ORAL 2 TIMES DAILY WITH MEALS
Qty: 180 TABLET | Refills: 1 | Status: SHIPPED | OUTPATIENT
Start: 2017-12-22 | End: 2020-02-03

## 2017-12-22 NOTE — PROGRESS NOTES
"        [place]   2017 1:30 PM   Office Visit   MRN: [id number]    Department:  [facility]   Dept Phone:  [phone]    Description:  Male : 1963   Provider:  Lynnette GALVIN M.D.           Reason for Visit     Dysuria burning when urinating, flow goes out to the side when urinating      Allergies as of 2017     No Known Allergies      You were diagnosed with     Urinary tract infection symptoms   [444524]       Symptoms involving urinary system   [1015514]       Left elbow pain   [667851]       Hyperkeratosis   [758953]         Vital Signs     Blood Pressure Pulse Temperature Height Weight Body Mass Index    150/94 mmHg 73 36.6 °C (97.9 °F) 1.727 m (5' 7.99\") 86.637 kg (191 lb) 29.05 kg/m2    Oxygen Saturation Smoking Status                94% Never Smoker           Basic Information     Date Of Birth Sex Race Ethnicity Preferred Language    1963 Male White Unknown English      Your appointments     Dec 21, 2017  3:00 PM   Established Patient with Lynnette GALVIN M.D.   [facility] (--)    [address] [address] [id number] [phone]           You will be receiving a confirmation call a few days before your appointment from our automated call confirmation system.              Problem List              ICD-10-CM Priority Class Noted - Resolved    Constipation K59.00   Unknown - Present    Pure hypercholesterolemia E78.00   2009 - Present    Elevated BP [id number]   2009 - Present    Mass of finger R22.30   2013 - Present    Post-traumatic osteoarthritis of right ankle M19.171   2015 - Present    Reflux esophagitis K21.0   10/22/2015 - Present    Symptoms involving urinary system R39.9   2017 - Present    Left elbow pain M25.522   2017 - Present    Hyperkeratosis L85.9   2017 - Present      Health Maintenance        Date Due Completion Dates    IMM DTaP/Tdap/Td Vaccine (1 - Tdap) 9/3/1984 ---    COLONOSCOPY " "CHIEF COMPLAINT:  Elevated BP and lymphoma on CTX    HISTORY OF PRESENT ILLNESS: The patient is a 72 year-old BF.  The patient is  to another patient of mine.  She was recently been diagnosed with follicular lymphoma.  She's lost about 15 pounds over the past 3 months.  The patient has been nauseous recently.  She has not had any emesis. Doing well on CTX.    She is out of her long-acting insulin.      She has had a left knee replacement.    The patient has a history of diabetes.  Recent blood sugars have been less than 120.  There have been no episodes of hypoglycemia.    The patient has a history of hypertension on current medications.  Patient denies chest pain or shortness of breath today.  BP way up today.    REVIEW OF SYSTEMS:  GENERAL: No fever, chills, fatigability or weight loss.  SKIN: No rashes, itching or changes in color or texture of skin.  HEAD: No headaches or recent head trauma.  EYES: Visual acuity fine. No photophobia, ocular pain or diplopia.  EARS: Denies ear pain, discharge or vertigo.  NOSE: No loss of smell, no epistaxis or postnasal drip.  MOUTH & THROAT: No hoarseness or change in voice. No excessive gum bleeding.  NODES: Denies swollen glands.  CHEST: Denies HULL, cyanosis, wheezing and sputum production.  CARDIOVASCULAR: Denies chest pain, PND, orthopnea or reduced exercise tolerance.  ABDOMEN:  Denies diarrhea, abdominal pain, hematemesis or blood in stool.  URINARY: No flank pain, dysuria or hematuria.  PERIPHERAL VASCULAR: No claudication or cyanosis.  MUSCULOSKELETAL: No joint stiffness or swelling. Denies back pain.Except as noted above.  NEUROLOGIC: No history of seizures, paralysis, alteration of gait or coordination.    SOCIAL HISTORY: The patient does not smoke.  The patient consumes alcohol socially.  The patient is happily  to another patient of mine.    PHYSICAL EXAMINATION:   Blood pressure (!) 210/92, pulse 83, height 5' 3" (1.6 m), weight 64.3 kg (141 lb 12.1 " 9/3/2015 ---            Current Immunizations     No immunizations on file.      Below and/or attached are the medications your provider expects you to take. Review all of your home medications and newly ordered medications with your provider and/or pharmacist. Follow medication instructions as directed by your provider and/or pharmacist. Please keep your medication list with you and share with your provider. Update the information when medications are discontinued, doses are changed, or new medications (including over-the-counter products) are added; and carry medication information at all times in the event of emergency situations     Allergies:  No Known Allergies          Medications  Valid as of: June 21, 2017 -  2:21 PM    Generic Name Brand Name Tablet Size Instructions for use    Multiple Vitamins-Minerals   Take 1 Tab by mouth every day.        .                 Medicines prescribed today were sent to:     [facility] [place] [id number] [address]    Phone: [phone] Fax: [phone]    Open 24 Hours?: No      Medication refill instructions:       If your prescription bottle indicates you have medication refills left, it is not necessary to call your provider’s office. Please contact your pharmacy and they will refill your medication.    If your prescription bottle indicates you do not have any refills left, you may request refills at any time through one of the following ways: The online [facility] system (except Urgent Care), by calling your provider’s office, or by asking your pharmacy to contact your provider’s office with a refill request. Medication refills are processed only during regular business hours and may not be available until the next business day. Your provider may request additional information or to have a follow-up visit with you prior to refilling your medication.   *Please Note: Medication refills are assigned a new Rx number when  oz), SpO2 97 %.    APPEARANCE: Well nourished, well developed, in no acute distress.    HEAD: Normocephalic, atraumatic.  EYES: PERRL. EOMI.  Conjunctivae without injection and  anicteric  EARS: TM's intact. Light reflex normal. No retraction or perforation.    NOSE: Mucosa pink. Airway clear.  MOUTH & THROAT: No tonsillar enlargement. No pharyngeal erythema or exudate. No stridor.  NECK: Supple.   NODES: There is a substantial amount of bilateral submandibular cervical and posterior reticular on the right lymphadenopathy.  No obvious axillary or inguinal lymph node enlargement.  CHEST: Lungs clear to auscultation.  No retractions are noted.  No rales or rhonchi are present.  CARDIOVASCULAR: Normal S1, S2. No rubs, murmurs or gallops.  ABDOMEN: Bowel sounds normal. Not distended. Soft. No tenderness or masses.  No ascites is noted.  MUSCULOSKELETAL:  There is no clubbing, cyanosis, or edema of the extremities x4.  There is full range of motion of the lumbar spine.  There is full range of motion of the extremities x4.  There is no deformity noted.    NEUROLOGIC:       Normal speech development.      Hearing normal.      Normal gait.      Motor and sensory exams grossly normal.      DTR's normal.  PSYCHIATRIC: Patient is alert and oriented x3.  Thought processes are all normal.  There is no homicidality.  There is no suicidality.  There is no evidence of psychosis.    LABORATORY/RADIOLOGY:   Chart reviewed.      ASSESSMENT:   Hypertension, not well controlled  Nausea due to chemotherapy   Follicular lymphoma   Type 2 diabetes on insulin   Chronic right hip pain    PLAN:  We will increase carvedilol 25 mg  We will increase lisinopril to 40 mg  Follow-up in one month for blood pressure evaluation   refilled electronically. Your pharmacy may indicate that no refills were authorized even though a new prescription for the same medication is available at the pharmacy. Please request the medicine by name with the pharmacy before contacting your provider for a refill.        Your To Do List     Future Labs/Procedures Complete By Expires    CHLAMYDIA/GC PCR URINE OR SWAB  As directed 6/21/2018    URINALYSIS  As directed 6/21/2018      Referral     A referral request has been sent to our patient care coordination department. Please allow 3-5 business days for us to process this request and contact you either by phone or mail. If you do not hear from us by the 5th business day, please call us at [phone].        Other Notes About Your Plan     LABS: 10/12/09 201/65/43/145/  Glucose: 92           MyChart Access Code: Activation code not generated  Current interclickhart Status: Active

## 2018-01-15 ENCOUNTER — INFUSION (OUTPATIENT)
Dept: INFUSION THERAPY | Facility: HOSPITAL | Age: 73
End: 2018-01-15
Attending: INTERNAL MEDICINE
Payer: MEDICARE

## 2018-01-15 ENCOUNTER — OFFICE VISIT (OUTPATIENT)
Dept: HEMATOLOGY/ONCOLOGY | Facility: CLINIC | Age: 73
End: 2018-01-15
Payer: MEDICARE

## 2018-01-15 VITALS
SYSTOLIC BLOOD PRESSURE: 181 MMHG | HEART RATE: 84 BPM | TEMPERATURE: 98 F | RESPIRATION RATE: 16 BRPM | DIASTOLIC BLOOD PRESSURE: 88 MMHG

## 2018-01-15 VITALS
HEART RATE: 86 BPM | SYSTOLIC BLOOD PRESSURE: 168 MMHG | DIASTOLIC BLOOD PRESSURE: 97 MMHG | OXYGEN SATURATION: 98 % | BODY MASS INDEX: 25.04 KG/M2 | HEIGHT: 63 IN | RESPIRATION RATE: 16 BRPM | WEIGHT: 141.31 LBS | TEMPERATURE: 98 F

## 2018-01-15 DIAGNOSIS — C82.08 FOLLICULAR LYMPHOMA GRADE I, LYMPH NODES OF MULTIPLE SITES: Primary | ICD-10-CM

## 2018-01-15 DIAGNOSIS — Z79.4 TYPE 2 DIABETES MELLITUS WITH COMPLICATION, WITH LONG-TERM CURRENT USE OF INSULIN: ICD-10-CM

## 2018-01-15 DIAGNOSIS — I10 ESSENTIAL HYPERTENSION: ICD-10-CM

## 2018-01-15 DIAGNOSIS — E11.8 TYPE 2 DIABETES MELLITUS WITH COMPLICATION, WITH LONG-TERM CURRENT USE OF INSULIN: ICD-10-CM

## 2018-01-15 PROCEDURE — 96415 CHEMO IV INFUSION ADDL HR: CPT

## 2018-01-15 PROCEDURE — 96411 CHEMO IV PUSH ADDL DRUG: CPT

## 2018-01-15 PROCEDURE — 99215 OFFICE O/P EST HI 40 MIN: CPT | Mod: S$GLB,,, | Performed by: INTERNAL MEDICINE

## 2018-01-15 PROCEDURE — 96367 TX/PROPH/DG ADDL SEQ IV INF: CPT

## 2018-01-15 PROCEDURE — 96375 TX/PRO/DX INJ NEW DRUG ADDON: CPT

## 2018-01-15 PROCEDURE — 63600175 PHARM REV CODE 636 W HCPCS: Mod: JG | Performed by: INTERNAL MEDICINE

## 2018-01-15 PROCEDURE — S0028 INJECTION, FAMOTIDINE, 20 MG: HCPCS | Performed by: INTERNAL MEDICINE

## 2018-01-15 PROCEDURE — 99999 PR PBB SHADOW E&M-EST. PATIENT-LVL III: CPT | Mod: PBBFAC,,, | Performed by: INTERNAL MEDICINE

## 2018-01-15 PROCEDURE — 96413 CHEMO IV INFUSION 1 HR: CPT

## 2018-01-15 PROCEDURE — 25000003 PHARM REV CODE 250: Performed by: INTERNAL MEDICINE

## 2018-01-15 PROCEDURE — A4216 STERILE WATER/SALINE, 10 ML: HCPCS | Performed by: INTERNAL MEDICINE

## 2018-01-15 RX ORDER — ACETAMINOPHEN 325 MG/1
650 TABLET ORAL
Status: CANCELLED | OUTPATIENT
Start: 2018-01-15

## 2018-01-15 RX ORDER — ACETAMINOPHEN 325 MG/1
650 TABLET ORAL
Status: COMPLETED | OUTPATIENT
Start: 2018-01-15 | End: 2018-01-15

## 2018-01-15 RX ORDER — SODIUM CHLORIDE 0.9 % (FLUSH) 0.9 %
10 SYRINGE (ML) INJECTION
Status: CANCELLED | OUTPATIENT
Start: 2018-01-15

## 2018-01-15 RX ORDER — FAMOTIDINE 10 MG/ML
20 INJECTION INTRAVENOUS
Status: CANCELLED | OUTPATIENT
Start: 2018-01-15

## 2018-01-15 RX ORDER — MEPERIDINE HYDROCHLORIDE 50 MG/ML
25 INJECTION INTRAMUSCULAR; INTRAVENOUS; SUBCUTANEOUS
Status: DISCONTINUED | OUTPATIENT
Start: 2018-01-15 | End: 2018-01-15 | Stop reason: HOSPADM

## 2018-01-15 RX ORDER — HEPARIN 100 UNIT/ML
500 SYRINGE INTRAVENOUS
Status: DISCONTINUED | OUTPATIENT
Start: 2018-01-15 | End: 2018-01-15 | Stop reason: HOSPADM

## 2018-01-15 RX ORDER — SODIUM CHLORIDE 0.9 % (FLUSH) 0.9 %
10 SYRINGE (ML) INJECTION
Status: CANCELLED | OUTPATIENT
Start: 2018-01-16

## 2018-01-15 RX ORDER — HEPARIN 100 UNIT/ML
500 SYRINGE INTRAVENOUS
Status: CANCELLED | OUTPATIENT
Start: 2018-01-16

## 2018-01-15 RX ORDER — FAMOTIDINE 10 MG/ML
20 INJECTION INTRAVENOUS
Status: COMPLETED | OUTPATIENT
Start: 2018-01-15 | End: 2018-01-15

## 2018-01-15 RX ORDER — MEPERIDINE HYDROCHLORIDE 50 MG/ML
25 INJECTION INTRAMUSCULAR; INTRAVENOUS; SUBCUTANEOUS
Status: CANCELLED | OUTPATIENT
Start: 2018-01-15

## 2018-01-15 RX ORDER — SODIUM CHLORIDE 0.9 % (FLUSH) 0.9 %
10 SYRINGE (ML) INJECTION
Status: DISCONTINUED | OUTPATIENT
Start: 2018-01-15 | End: 2018-01-15 | Stop reason: HOSPADM

## 2018-01-15 RX ORDER — HEPARIN 100 UNIT/ML
500 SYRINGE INTRAVENOUS
Status: CANCELLED | OUTPATIENT
Start: 2018-01-15

## 2018-01-15 RX ADMIN — DIPHENHYDRAMINE HYDROCHLORIDE 50 MG: 50 INJECTION, SOLUTION INTRAMUSCULAR; INTRAVENOUS at 10:01

## 2018-01-15 RX ADMIN — SODIUM CHLORIDE, PRESERVATIVE FREE 10 ML: 5 INJECTION INTRAVENOUS at 01:01

## 2018-01-15 RX ADMIN — RITUXIMAB 650 MG: 10 INJECTION, SOLUTION INTRAVENOUS at 11:01

## 2018-01-15 RX ADMIN — BENDAMUSTINE HYDROCHLORIDE 155 MG: 25 INJECTION, SOLUTION INTRAVENOUS at 01:01

## 2018-01-15 RX ADMIN — SODIUM CHLORIDE: 0.9 INJECTION, SOLUTION INTRAVENOUS at 09:01

## 2018-01-15 RX ADMIN — ACETAMINOPHEN 650 MG: 325 TABLET ORAL at 09:01

## 2018-01-15 RX ADMIN — FAMOTIDINE 20 MG: 10 INJECTION INTRAVENOUS at 10:01

## 2018-01-15 RX ADMIN — DEXAMETHASONE SODIUM PHOSPHATE: 4 INJECTION, SOLUTION INTRAMUSCULAR; INTRAVENOUS at 09:01

## 2018-01-15 NOTE — PROGRESS NOTES
Subjective:       Patient ID: Keely Pizarro is a 72 y.o. female.    Chief Complaint: Pain (Lower back pain ) and Lymphoma    Keely presents with her  for evaluation of newly diagnosed grade 1-2 follicular lymphoma diagnosed by excisional biopsy of a right cervical lymph node 8/1/2017.  Pathologic diagnosis was received by [facility].  Keely reports feeling fatigue since March 2017. At the time she had an infected tooth and developed cervical lymphadenopathy. She started loosing weight, total quantity to date is unknown. Appetite remains normal and she denies night sweats. She then developed shortness of breath worse with activity and left lower lung pleural effusion was noted. The received thoracentesis twice with negative pleural fluid flow cytometry. Cytology was positive for lymphocytes, but was not diagnostic for follicular lymphoma. CT imaging demonstrates diffuse, bilateral submandibular, cervical, axillary, supraclavicular, and intrathoracic lymphadenopathy.    Keely has a history of uterine cancer in 2012 treated with radiation and chemotherapy. She had a port-a-cath at that time for treatment. She has insulin dependent diabetes mellitus with poorly controlled blood sugar, reporting both high and lows. She has hypertension, and blood pressure was elevated at intake today.     Follow-up Visit 9/1/17  PET imaging shows stage 4 follicular lymphoma, large left side pleural effusion    Follow-up 10/2/17  Completed first cycle of BR chemotherapy.  Some nausea, responds to Zofran, phenergan makes her feel worse  Back and leg pain likely due to neulasta injection  Lung aeration improved on auscultation    Follow-up 11/20/2017    Ms. Pizarro is feeling well overall. She has lost three pounds since her last visit and does not understand why. She states she has good appetite and is eating a lot. She denies any inteval fevers, chills, nausea/vomiting. No constipation or diarrhea. She denies new  lymphadenopathy. SHe is reporting back pain in the bilateral CVA area which came back a few days ago after receding following the first couple of cycles of chemotherapy. She has markedly elevated blood pressure this morning, which she blames on some confusion about the check-in process that she says angered her at the time they were taking her blood pressure.     Follow-up 12/18/17  Keely presents today with her  for follow-up. Overall doing well. No nausea or fevers. Energy good- sometimes feels short of breath. Weight is overall stable. Eating well.  Blood pressure elevated 2 visits now. Instructed to call PCP today- during infusion to get instructions for medication adjustment.    Follow-up Visit 1/15/18  Evaluation prior to cycle 5/6 of BR for follicular lymphoma. Clinically improved still. Complaints of occassional shortness of breath. Pain at right flank not reproducible on examination and described as a soreness per patient. CBC and CMP are stable. Now has home BP cuff and blood pressure is improved today.    Pain       Review of Systems   Constitutional: Negative.    HENT: Negative.    Eyes: Negative.    Respiratory: Negative.    Cardiovascular: Negative.    Gastrointestinal: Negative.    Endocrine: Negative.    Genitourinary: Negative.    Musculoskeletal: Negative.    Skin: Negative.    Allergic/Immunologic: Negative for environmental allergies, food allergies and immunocompromised state.   Neurological: Negative.    Hematological: Negative for adenopathy. Does not bruise/bleed easily.   Psychiatric/Behavioral: Negative.        Objective:      Physical Exam   Constitutional: She is oriented to person, place, and time. She appears well-developed and well-nourished.   HENT:   Head: Normocephalic and atraumatic.   Right Ear: External ear normal.   Left Ear: External ear normal.   Nose: Nose normal.   Mouth/Throat: Oropharynx is clear and moist. No oropharyngeal exudate.   Eyes: Conjunctivae and EOM  are normal. Pupils are equal, round, and reactive to light. No scleral icterus.   Neck: Normal range of motion. Neck supple. No JVD present. No tracheal deviation present. No thyromegaly present.   Cardiovascular: Normal rate, regular rhythm, normal heart sounds and intact distal pulses.    No murmur heard.  Pulmonary/Chest: Effort normal and breath sounds normal. No respiratory distress.   Abdominal: Soft. Bowel sounds are normal. She exhibits no distension and no mass. There is no hepatosplenomegaly. There is no tenderness. There is no rebound and no guarding.   Musculoskeletal: Normal range of motion. She exhibits no edema.   Lymphadenopathy:     She has no cervical adenopathy.     She has no axillary adenopathy.        Right: No inguinal adenopathy present.        Left: No inguinal adenopathy present.       Neurological: She is alert and oriented to person, place, and time. No cranial nerve deficit.   Skin: Skin is warm and dry. No rash noted. No cyanosis. Nails show no clubbing.   Psychiatric: She has a normal mood and affect. Her behavior is normal. Judgment and thought content normal.   Nursing note and vitals reviewed.      Assessment:       1. Follicular lymphoma grade i, lymph nodes of multiple sites    2. Type 2 diabetes mellitus with complication, with long-term current use of insulin    3. Essential hypertension        Plan:       1. Follicular lymphoma    Ms. Pizarro presents for Cycle 4 of R-bendamustine chemotherapy today. She has tolerated the first three cycles well. We will give Cycle 5 today with pegfilgrastim support.     2. Hypertension - improved from last visit. She is asymptomatic. Continue to follow/monitor with PCP.    3. DM-II - Blood glucose well-controlled today. We will monitor.       Follow-up in 4 weeks with Dr. Rodríguez and for cycle 5 of R-bendamustine. CBC, CMP prior to visit.     Jazmin Rodríguez MD

## 2018-01-15 NOTE — PLAN OF CARE
Problem: Patient Care Overview  Goal: Plan of Care Review  Outcome: Ongoing (interventions implemented as appropriate)  Tolerated treatment well.  Advised to call MD for any problems or concerns.  AVS given.  RTC on tomorrow.  NAD upon discharge. Will need to be scheduled for day 3 Neuramezta per Dr. Rodríguez.

## 2018-01-15 NOTE — PLAN OF CARE
Problem: Chemotherapy Effects (Adult)  Goal: Signs and Symptoms of Listed Potential Problems Will be Absent, Minimized or Managed (Chemotherapy Effects)  Signs and symptoms of listed potential problems will be absent, minimized or managed by discharge/transition of care (reference Chemotherapy Effects (Adult) CPG).   Outcome: Ongoing (interventions implemented as appropriate)  Here for Rituxan/Bendamustine.  Comfort measures provided.

## 2018-01-16 ENCOUNTER — INFUSION (OUTPATIENT)
Dept: INFUSION THERAPY | Facility: HOSPITAL | Age: 73
End: 2018-01-16
Attending: INTERNAL MEDICINE
Payer: MEDICARE

## 2018-01-16 VITALS
TEMPERATURE: 98 F | HEART RATE: 86 BPM | DIASTOLIC BLOOD PRESSURE: 88 MMHG | RESPIRATION RATE: 18 BRPM | SYSTOLIC BLOOD PRESSURE: 184 MMHG

## 2018-01-16 DIAGNOSIS — C82.08 FOLLICULAR LYMPHOMA GRADE I, LYMPH NODES OF MULTIPLE SITES: Primary | ICD-10-CM

## 2018-01-16 PROCEDURE — 25000003 PHARM REV CODE 250: Performed by: INTERNAL MEDICINE

## 2018-01-16 PROCEDURE — 63600175 PHARM REV CODE 636 W HCPCS: Mod: TB | Performed by: INTERNAL MEDICINE

## 2018-01-16 PROCEDURE — 96409 CHEMO IV PUSH SNGL DRUG: CPT

## 2018-01-16 RX ORDER — SODIUM CHLORIDE 0.9 % (FLUSH) 0.9 %
10 SYRINGE (ML) INJECTION
Status: DISCONTINUED | OUTPATIENT
Start: 2018-01-16 | End: 2018-01-16 | Stop reason: HOSPADM

## 2018-01-16 RX ORDER — HEPARIN 100 UNIT/ML
500 SYRINGE INTRAVENOUS
Status: DISCONTINUED | OUTPATIENT
Start: 2018-01-16 | End: 2018-01-16 | Stop reason: HOSPADM

## 2018-01-16 RX ADMIN — BENDAMUSTINE HYDROCHLORIDE 155 MG: 25 INJECTION, SOLUTION INTRAVENOUS at 11:01

## 2018-01-16 NOTE — PROGRESS NOTES
Patient, Keely Pizarro (MRN #[id number]), presented with a recent Platelet count less than 150 K/uL consistent with the definition of thrombocytopenia (ICD10 - D69.6).    Platelets   Date Value Ref Range Status   01/15/2018 143 (L) 150 - 350 K/uL Final     The patient's thrombocytopenia was monitored, evaluated, addressed and/or treated. This addendum to the medical record is made on 01/16/2018.

## 2018-01-16 NOTE — PLAN OF CARE
Problem: Patient Care Overview  Goal: Plan of Care Review  Outcome: Ongoing (interventions implemented as appropriate)  Pt tolerated bendeka infusion without issue, avs given, pt to rtc 1/17/18 for neulasta injection, no distress noted upon d/c to home

## 2018-01-16 NOTE — PLAN OF CARE
Problem: Chemotherapy Effects (Adult)  Goal: Signs and Symptoms of Listed Potential Problems Will be Absent, Minimized or Managed (Chemotherapy Effects)  Signs and symptoms of listed potential problems will be absent, minimized or managed by discharge/transition of care (reference Chemotherapy Effects (Adult) CPG).   Outcome: Ongoing (interventions implemented as appropriate)  Pt here for bendeka Day 2 infusion, labs, hx, meds, allergies reviewed, no complaints or concerns at this time, #24 to right hand from 1/15/18 d/c, pain on flushing and swelling. Will restart iv. Reclined in chair, continue to monitor

## 2018-01-19 ENCOUNTER — INFUSION (OUTPATIENT)
Dept: INFUSION THERAPY | Facility: HOSPITAL | Age: 73
End: 2018-01-19
Attending: INTERNAL MEDICINE
Payer: MEDICARE

## 2018-01-19 DIAGNOSIS — C82.08 FOLLICULAR LYMPHOMA GRADE I, LYMPH NODES OF MULTIPLE SITES: Primary | ICD-10-CM

## 2018-01-19 PROCEDURE — 96372 THER/PROPH/DIAG INJ SC/IM: CPT

## 2018-01-19 PROCEDURE — 63600175 PHARM REV CODE 636 W HCPCS: Mod: JG | Performed by: INTERNAL MEDICINE

## 2018-01-19 RX ADMIN — PEGFILGRASTIM 6 MG: 6 INJECTION SUBCUTANEOUS at 10:01

## 2018-01-19 NOTE — NURSING
Pt arrived for neulasta following chemo on Tuesday.  Pt was not able to come due to the weather.  Pt tolerated injection SQ to the abd.  Discharged to home with AVS and appt calendar.

## 2018-01-25 ENCOUNTER — LAB VISIT (OUTPATIENT)
Dept: LAB | Facility: OTHER | Age: 73
End: 2018-01-25
Attending: FAMILY MEDICINE
Payer: MEDICARE

## 2018-01-25 ENCOUNTER — OFFICE VISIT (OUTPATIENT)
Dept: INTERNAL MEDICINE | Facility: CLINIC | Age: 73
End: 2018-01-25
Attending: FAMILY MEDICINE
Payer: MEDICARE

## 2018-01-25 VITALS
HEART RATE: 96 BPM | BODY MASS INDEX: 22.77 KG/M2 | HEIGHT: 63 IN | TEMPERATURE: 99 F | OXYGEN SATURATION: 96 % | DIASTOLIC BLOOD PRESSURE: 106 MMHG | WEIGHT: 128.5 LBS | SYSTOLIC BLOOD PRESSURE: 180 MMHG

## 2018-01-25 DIAGNOSIS — Z11.59 NEED FOR HEPATITIS C SCREENING TEST: ICD-10-CM

## 2018-01-25 DIAGNOSIS — I10 HYPERTENSION, ESSENTIAL: ICD-10-CM

## 2018-01-25 DIAGNOSIS — J06.9 VIRAL UPPER RESPIRATORY TRACT INFECTION: Primary | ICD-10-CM

## 2018-01-25 DIAGNOSIS — E11.9 TYPE 2 DIABETES MELLITUS WITHOUT COMPLICATION, WITH LONG-TERM CURRENT USE OF INSULIN: ICD-10-CM

## 2018-01-25 DIAGNOSIS — Z79.4 TYPE 2 DIABETES MELLITUS WITHOUT COMPLICATION, WITH LONG-TERM CURRENT USE OF INSULIN: ICD-10-CM

## 2018-01-25 DIAGNOSIS — Z12.31 SCREENING MAMMOGRAM, ENCOUNTER FOR: ICD-10-CM

## 2018-01-25 LAB
ESTIMATED AVG GLUCOSE: 123 MG/DL
HBA1C MFR BLD HPLC: 5.9 %

## 2018-01-25 PROCEDURE — 36415 COLL VENOUS BLD VENIPUNCTURE: CPT

## 2018-01-25 PROCEDURE — 99214 OFFICE O/P EST MOD 30 MIN: CPT | Mod: S$GLB,,, | Performed by: FAMILY MEDICINE

## 2018-01-25 PROCEDURE — 83036 HEMOGLOBIN GLYCOSYLATED A1C: CPT

## 2018-01-25 PROCEDURE — 99999 PR PBB SHADOW E&M-EST. PATIENT-LVL III: CPT | Mod: PBBFAC,,, | Performed by: FAMILY MEDICINE

## 2018-01-25 RX ORDER — AMLODIPINE BESYLATE 10 MG/1
10 TABLET ORAL DAILY
Qty: 30 TABLET | Refills: 11 | Status: SHIPPED | OUTPATIENT
Start: 2018-01-25 | End: 2018-03-26 | Stop reason: SDUPTHER

## 2018-01-25 RX ORDER — PREDNISONE 10 MG/1
10 TABLET ORAL 2 TIMES DAILY
Qty: 14 TABLET | Refills: 0 | Status: SHIPPED | OUTPATIENT
Start: 2018-01-25 | End: 2020-02-03

## 2018-01-25 RX ORDER — ALBUTEROL SULFATE 90 UG/1
2 AEROSOL, METERED RESPIRATORY (INHALATION) EVERY 6 HOURS PRN
Qty: 1 EACH | Refills: 11 | Status: SHIPPED | OUTPATIENT
Start: 2018-01-25 | End: 2020-02-03

## 2018-01-25 RX ORDER — CODEINE PHOSPHATE AND GUAIFENESIN 10; 100 MG/5ML; MG/5ML
5 SOLUTION ORAL 3 TIMES DAILY PRN
Qty: 180 ML | Refills: 0 | Status: SHIPPED | OUTPATIENT
Start: 2018-01-25 | End: 2018-02-04

## 2018-01-25 NOTE — PROGRESS NOTES
CHIEF COMPLAINT:  Elevated BP, flu symptoms and lymphoma on CTX    HISTORY OF PRESENT ILLNESS: The patient is a 72 year-old BF.  The patient is  to another patient of mine.  She was recently been diagnosed with follicular lymphoma.  She's lost about 15 pounds over the past 3 months.  The patient has been nauseous recently.  She has not had any emesis. Doing well on CTX.    About a week ago she developed the acute onset of fevers chills cough wheezing and muscle aches.  Her flu test is negative today but I suspect she did have the flu.  It seems to be worsening her chronic respiratory problems.    She is out of her long-acting insulin.      She has had a left knee replacement.    The patient has a history of diabetes.  Recent blood sugars have been less than 120.  There have been no episodes of hypoglycemia.    The patient has a history of hypertension on current medications.  Patient denies chest pain or shortness of breath today.  BP way up today.    REVIEW OF SYSTEMS:  GENERAL: No fever, chills, fatigability or weight loss.  SKIN: No rashes, itching or changes in color or texture of skin.  HEAD: No headaches or recent head trauma.  EYES: Visual acuity fine. No photophobia, ocular pain or diplopia.  EARS: Denies ear pain, discharge or vertigo.  NOSE: No loss of smell, no epistaxis or postnasal drip.  MOUTH & THROAT: No hoarseness or change in voice. No excessive gum bleeding.  NODES: Denies swollen glands.  CHEST: Denies HULL, cyanosis, wheezing and sputum production.  CARDIOVASCULAR: Denies chest pain, PND, orthopnea or reduced exercise tolerance.  ABDOMEN:  Denies diarrhea, abdominal pain, hematemesis or blood in stool.  URINARY: No flank pain, dysuria or hematuria.  PERIPHERAL VASCULAR: No claudication or cyanosis.  MUSCULOSKELETAL: No joint stiffness or swelling. Denies back pain.Except as noted above.  NEUROLOGIC: No history of seizures, paralysis, alteration of gait or coordination.    SOCIAL HISTORY:  "The patient does not smoke.  The patient consumes alcohol socially.  The patient is happily  to another patient of mine.    PHYSICAL EXAMINATION:   Blood pressure (!) 180/106, pulse 96, temperature 98.8 °F (37.1 °C), height 5' 3" (1.6 m), weight 58.3 kg (128 lb 8.5 oz), SpO2 96 %.    APPEARANCE: Well nourished, well developed, in no acute distress.    HEAD: Normocephalic, atraumatic.  EYES: PERRL. EOMI.  Conjunctivae without injection and  anicteric  EARS: TM's intact. Light reflex normal. No retraction or perforation.    NOSE: Mucosa pink. Airway clear.  MOUTH & THROAT: No tonsillar enlargement. No pharyngeal erythema or exudate. No stridor.  NECK: Supple.   NODES: There is a substantial amount of bilateral submandibular cervical and posterior reticular on the right lymphadenopathy.  No obvious axillary or inguinal lymph node enlargement.  CHEST: Lungs clear to auscultation.  No retractions are noted.  No rales or rhonchi are present.  CARDIOVASCULAR: Normal S1, S2. No rubs, murmurs or gallops.  ABDOMEN: Bowel sounds normal. Not distended. Soft. No tenderness or masses.  No ascites is noted.  MUSCULOSKELETAL:  There is no clubbing, cyanosis, or edema of the extremities x4.  There is full range of motion of the lumbar spine.  There is full range of motion of the extremities x4.  There is no deformity noted.    NEUROLOGIC:       Normal speech development.      Hearing normal.      Normal gait.      Motor and sensory exams grossly normal.      DTR's normal.  PSYCHIATRIC: Patient is alert and oriented x3.  Thought processes are all normal.  There is no homicidality.  There is no suicidality.  There is no evidence of psychosis.    LABORATORY/RADIOLOGY:   Chart reviewed.      ASSESSMENT:   Viral syndrome most consistent with influenza  Cough  Hypertension, not well controlled  Nausea due to chemotherapy   Follicular lymphoma   Type 2 diabetes on insulin   Chronic right hip pain    PLAN:  Prednisone and " Robitussin-AC and albuterol inhaler  Add amlodipine  Carvedilol 25 mg  Lisinopril  40 mg  Follow-up in one month for blood pressure evaluation

## 2018-01-29 ENCOUNTER — TELEPHONE (OUTPATIENT)
Dept: INTERNAL MEDICINE | Facility: CLINIC | Age: 73
End: 2018-01-29

## 2018-01-29 NOTE — TELEPHONE ENCOUNTER
----- Message from Albin Cazares MD sent at 1/29/2018 12:12 PM CST -----  A1c is better than ever.  No changes in medications.  Followup as scheduled.

## 2018-02-12 ENCOUNTER — LAB VISIT (OUTPATIENT)
Dept: LAB | Facility: HOSPITAL | Age: 73
End: 2018-02-12
Attending: INTERNAL MEDICINE
Payer: MEDICARE

## 2018-02-12 ENCOUNTER — INFUSION (OUTPATIENT)
Dept: INFUSION THERAPY | Facility: HOSPITAL | Age: 73
End: 2018-02-12
Attending: INTERNAL MEDICINE
Payer: MEDICARE

## 2018-02-12 ENCOUNTER — OFFICE VISIT (OUTPATIENT)
Dept: HEMATOLOGY/ONCOLOGY | Facility: CLINIC | Age: 73
End: 2018-02-12
Payer: MEDICARE

## 2018-02-12 VITALS
DIASTOLIC BLOOD PRESSURE: 94 MMHG | TEMPERATURE: 98 F | HEART RATE: 91 BPM | SYSTOLIC BLOOD PRESSURE: 182 MMHG | RESPIRATION RATE: 18 BRPM

## 2018-02-12 VITALS
OXYGEN SATURATION: 97 % | RESPIRATION RATE: 16 BRPM | SYSTOLIC BLOOD PRESSURE: 184 MMHG | TEMPERATURE: 98 F | BODY MASS INDEX: 22.81 KG/M2 | DIASTOLIC BLOOD PRESSURE: 87 MMHG | HEART RATE: 112 BPM | WEIGHT: 128.75 LBS | HEIGHT: 63 IN

## 2018-02-12 DIAGNOSIS — C82.08 FOLLICULAR LYMPHOMA GRADE I, LYMPH NODES OF MULTIPLE SITES: Primary | ICD-10-CM

## 2018-02-12 DIAGNOSIS — C82.98 FOLLICULAR LYMPHOMA OF LYMPH NODES OF MULTIPLE REGIONS, UNSPECIFIED FOLLICULAR LYMPHOMA TYPE: ICD-10-CM

## 2018-02-12 DIAGNOSIS — C82.18 GRADE 2 FOLLICULAR LYMPHOMA OF LYMPH NODES OF MULTIPLE REGIONS: ICD-10-CM

## 2018-02-12 LAB
ALBUMIN SERPL BCP-MCNC: 2.8 G/DL
ALP SERPL-CCNC: 81 U/L
ALT SERPL W/O P-5'-P-CCNC: 12 U/L
ANION GAP SERPL CALC-SCNC: 8 MMOL/L
ANISOCYTOSIS BLD QL SMEAR: SLIGHT
AST SERPL-CCNC: 20 U/L
BASOPHILS # BLD AUTO: ABNORMAL K/UL
BASOPHILS NFR BLD: 0 %
BILIRUB SERPL-MCNC: 0.4 MG/DL
BUN SERPL-MCNC: 19 MG/DL
CALCIUM SERPL-MCNC: 9 MG/DL
CHLORIDE SERPL-SCNC: 106 MMOL/L
CO2 SERPL-SCNC: 25 MMOL/L
CREAT SERPL-MCNC: 0.7 MG/DL
DIFFERENTIAL METHOD: ABNORMAL
EOSINOPHIL # BLD AUTO: ABNORMAL K/UL
EOSINOPHIL NFR BLD: 3 %
ERYTHROCYTE [DISTWIDTH] IN BLOOD BY AUTOMATED COUNT: 13.7 %
EST. GFR  (AFRICAN AMERICAN): >60 ML/MIN/1.73 M^2
EST. GFR  (NON AFRICAN AMERICAN): >60 ML/MIN/1.73 M^2
GLUCOSE SERPL-MCNC: 174 MG/DL
HCT VFR BLD AUTO: 30.4 %
HGB BLD-MCNC: 9.7 G/DL
IMM GRANULOCYTES # BLD AUTO: ABNORMAL K/UL
IMM GRANULOCYTES NFR BLD AUTO: ABNORMAL %
LYMPHOCYTES # BLD AUTO: ABNORMAL K/UL
LYMPHOCYTES NFR BLD: 25 %
MCH RBC QN AUTO: 29 PG
MCHC RBC AUTO-ENTMCNC: 31.9 G/DL
MCV RBC AUTO: 91 FL
METAMYELOCYTES NFR BLD MANUAL: 3 %
MONOCYTES # BLD AUTO: ABNORMAL K/UL
MONOCYTES NFR BLD: 5 %
NEUTROPHILS NFR BLD: 61 %
NEUTS BAND NFR BLD MANUAL: 3 %
NRBC BLD-RTO: 0 /100 WBC
OVALOCYTES BLD QL SMEAR: ABNORMAL
PLATELET # BLD AUTO: 152 K/UL
PLATELET BLD QL SMEAR: ABNORMAL
PMV BLD AUTO: 10.3 FL
POTASSIUM SERPL-SCNC: 3.2 MMOL/L
PROT SERPL-MCNC: 5.5 G/DL
RBC # BLD AUTO: 3.34 M/UL
SODIUM SERPL-SCNC: 139 MMOL/L
WBC # BLD AUTO: 4.97 K/UL

## 2018-02-12 PROCEDURE — 25000003 PHARM REV CODE 250: Performed by: INTERNAL MEDICINE

## 2018-02-12 PROCEDURE — 96375 TX/PRO/DX INJ NEW DRUG ADDON: CPT

## 2018-02-12 PROCEDURE — 96411 CHEMO IV PUSH ADDL DRUG: CPT

## 2018-02-12 PROCEDURE — 85027 COMPLETE CBC AUTOMATED: CPT

## 2018-02-12 PROCEDURE — 63600175 PHARM REV CODE 636 W HCPCS: Performed by: INTERNAL MEDICINE

## 2018-02-12 PROCEDURE — 1126F AMNT PAIN NOTED NONE PRSNT: CPT | Mod: S$GLB,,, | Performed by: INTERNAL MEDICINE

## 2018-02-12 PROCEDURE — 96415 CHEMO IV INFUSION ADDL HR: CPT

## 2018-02-12 PROCEDURE — 99215 OFFICE O/P EST HI 40 MIN: CPT | Mod: GC,S$GLB,, | Performed by: INTERNAL MEDICINE

## 2018-02-12 PROCEDURE — 96367 TX/PROPH/DG ADDL SEQ IV INF: CPT

## 2018-02-12 PROCEDURE — S0028 INJECTION, FAMOTIDINE, 20 MG: HCPCS | Performed by: INTERNAL MEDICINE

## 2018-02-12 PROCEDURE — 36415 COLL VENOUS BLD VENIPUNCTURE: CPT

## 2018-02-12 PROCEDURE — 85007 BL SMEAR W/DIFF WBC COUNT: CPT

## 2018-02-12 PROCEDURE — 1159F MED LIST DOCD IN RCRD: CPT | Mod: S$GLB,,, | Performed by: INTERNAL MEDICINE

## 2018-02-12 PROCEDURE — 3008F BODY MASS INDEX DOCD: CPT | Mod: S$GLB,,, | Performed by: INTERNAL MEDICINE

## 2018-02-12 PROCEDURE — 96413 CHEMO IV INFUSION 1 HR: CPT

## 2018-02-12 PROCEDURE — 80053 COMPREHEN METABOLIC PANEL: CPT

## 2018-02-12 PROCEDURE — 99999 PR PBB SHADOW E&M-EST. PATIENT-LVL V: CPT | Mod: PBBFAC,,, | Performed by: INTERNAL MEDICINE

## 2018-02-12 RX ORDER — FAMOTIDINE 10 MG/ML
20 INJECTION INTRAVENOUS
Status: COMPLETED | OUTPATIENT
Start: 2018-02-12 | End: 2018-02-12

## 2018-02-12 RX ORDER — ACETAMINOPHEN 325 MG/1
650 TABLET ORAL
Status: COMPLETED | OUTPATIENT
Start: 2018-02-12 | End: 2018-02-12

## 2018-02-12 RX ORDER — SODIUM CHLORIDE 0.9 % (FLUSH) 0.9 %
10 SYRINGE (ML) INJECTION
Status: CANCELLED | OUTPATIENT
Start: 2018-02-13

## 2018-02-12 RX ORDER — SODIUM CHLORIDE 0.9 % (FLUSH) 0.9 %
10 SYRINGE (ML) INJECTION
Status: DISCONTINUED | OUTPATIENT
Start: 2018-02-12 | End: 2018-02-12 | Stop reason: HOSPADM

## 2018-02-12 RX ORDER — HEPARIN 100 UNIT/ML
500 SYRINGE INTRAVENOUS
Status: DISCONTINUED | OUTPATIENT
Start: 2018-02-12 | End: 2018-02-12 | Stop reason: HOSPADM

## 2018-02-12 RX ORDER — MEPERIDINE HYDROCHLORIDE 50 MG/ML
25 INJECTION INTRAMUSCULAR; INTRAVENOUS; SUBCUTANEOUS
Status: DISCONTINUED | OUTPATIENT
Start: 2018-02-12 | End: 2018-02-12 | Stop reason: HOSPADM

## 2018-02-12 RX ORDER — HEPARIN 100 UNIT/ML
500 SYRINGE INTRAVENOUS
Status: CANCELLED | OUTPATIENT
Start: 2018-02-13

## 2018-02-12 RX ADMIN — DIPHENHYDRAMINE HYDROCHLORIDE 50 MG: 50 INJECTION, SOLUTION INTRAMUSCULAR; INTRAVENOUS at 11:02

## 2018-02-12 RX ADMIN — ACETAMINOPHEN 650 MG: 325 TABLET, FILM COATED ORAL at 11:02

## 2018-02-12 RX ADMIN — FAMOTIDINE 20 MG: 10 INJECTION INTRAVENOUS at 11:02

## 2018-02-12 RX ADMIN — BENDAMUSTINE HYDROCHLORIDE 145 MG: 25 INJECTION, SOLUTION INTRAVENOUS at 02:02

## 2018-02-12 RX ADMIN — RITUXIMAB 605 MG: 10 INJECTION, SOLUTION INTRAVENOUS at 11:02

## 2018-02-12 RX ADMIN — SODIUM CHLORIDE: 0.9 INJECTION, SOLUTION INTRAVENOUS at 10:02

## 2018-02-12 RX ADMIN — DEXAMETHASONE SODIUM PHOSPHATE: 4 INJECTION, SOLUTION INTRAMUSCULAR; INTRAVENOUS at 11:02

## 2018-02-12 NOTE — PROGRESS NOTES
Subjective:       Patient ID: Keely Pizarro is a 72 y.o. female.    Chief Complaint: Pain (Lower back pain)    Keely presents with her  for evaluation of newly diagnosed grade 1-2 follicular lymphoma diagnosed by excisional biopsy of a right cervical lymph node 8/1/2017.  Pathologic diagnosis was received by [facility].  Keely reports feeling fatigue since March 2017. At the time she had an infected tooth and developed cervical lymphadenopathy. She started loosing weight, total quantity to date is unknown. Appetite remains normal and she denies night sweats. She then developed shortness of breath worse with activity and left lower lung pleural effusion was noted. The received thoracentesis twice with negative pleural fluid flow cytometry. Cytology was positive for lymphocytes, but was not diagnostic for follicular lymphoma. CT imaging demonstrates diffuse, bilateral submandibular, cervical, axillary, supraclavicular, and intrathoracic lymphadenopathy.     Keely has a history of uterine cancer in 2012 treated with radiation and chemotherapy. She had a port-a-cath at that time for treatment. She has insulin dependent diabetes mellitus with poorly controlled blood sugar, reporting both high and lows. She has hypertension, and blood pressure was elevated at intake today.      Follow-up Visit 9/1/17  PET imaging shows stage 4 follicular lymphoma, large left side pleural effusion     Follow-up 10/2/17  Completed first cycle of BR chemotherapy.  Some nausea, responds to Zofran, phenergan makes her feel worse  Back and leg pain likely due to neulasta injection  Lung aeration improved on auscultation     Follow-up 11/20/2017     Ms. Pizarro is feeling well overall. She has lost three pounds since her last visit and does not understand why. She states she has good appetite and is eating a lot. She denies any inteval fevers, chills, nausea/vomiting. No constipation or diarrhea. She denies new  lymphadenopathy. SHe is reporting back pain in the bilateral CVA area which came back a few days ago after receding following the first couple of cycles of chemotherapy. She has markedly elevated blood pressure this morning, which she blames on some confusion about the check-in process that she says angered her at the time they were taking her blood pressure.      Follow-up 12/18/17  Keely presents today with her  for follow-up. Overall doing well. No nausea or fevers. Energy good- sometimes feels short of breath. Weight is overall stable. Eating well.  Blood pressure elevated 2 visits now. Instructed to call PCP today- during infusion to get instructions for medication adjustment.     Follow-up Visit 1/15/18  Evaluation prior to cycle 5/6 of BR for follicular lymphoma. Clinically improved still. Complaints of occassional shortness of breath. Pain at right flank not reproducible on examination and described as a soreness per patient. CBC and CMP are stable. Now has home BP cuff and blood pressure is improved today.    Follow up visit 2/12/18  Mrs. Pizarro presents for last cycle 6/6 of BR. She had a URI a few weeks ago which she has now recovered from. She complain of weight loss during that episode but has since regained her appetite. Denies SOB, n/v/d. No lymphadenopathy. Recently had amlodipine added to BP regimen.       Review of Systems   Constitutional: Negative for fatigue and fever.   HENT: Negative for congestion and trouble swallowing.    Eyes: Negative for photophobia and visual disturbance.   Respiratory: Negative for cough and shortness of breath.    Cardiovascular: Negative for chest pain and leg swelling.   Gastrointestinal: Negative for abdominal distention and abdominal pain.   Genitourinary: Negative for dysuria and hematuria.   Musculoskeletal: Negative for myalgias and neck pain.   Skin: Negative for color change and pallor.   Neurological: Negative for light-headedness and headaches.    Hematological: Negative for adenopathy. Does not bruise/bleed easily.   Psychiatric/Behavioral: Negative for agitation.       Objective:      Physical Exam   Constitutional: She is oriented to person, place, and time. She appears well-developed and well-nourished.   HENT:   Mouth/Throat: Oropharynx is clear and moist. No oropharyngeal exudate.   Eyes: Pupils are equal, round, and reactive to light.   Cardiovascular: Normal rate and regular rhythm.    Pulmonary/Chest: Effort normal and breath sounds normal.   Abdominal: Soft. Bowel sounds are normal.   Lymphadenopathy:     She has no cervical adenopathy.   Neurological: She is alert and oriented to person, place, and time.   Skin: Skin is warm.   Psychiatric: She has a normal mood and affect. Her behavior is normal.       Assessment:       1. Grade 2 follicular lymphoma of lymph nodes of multiple regions        Plan:   Proceed with cycle 6/6 of BR. Day two will have to be scheduled Wednesday as tomorrow is [employer] Gras day. Day 3 neulasta Thursday. Will obtain post treatment PET scan in ~ 4 weeks and have patient RTC to discuss results.     Yohana Lobato MD   Pager [phone]

## 2018-02-12 NOTE — PLAN OF CARE
Problem: Chemotherapy Effects (Adult)  Goal: Signs and Symptoms of Listed Potential Problems Will be Absent, Minimized or Managed (Chemotherapy Effects)  Signs and symptoms of listed potential problems will be absent, minimized or managed by discharge/transition of care (reference Chemotherapy Effects (Adult) CPG).   Outcome: Ongoing (interventions implemented as appropriate)  Pt here for [place], Rituxan infusion, accompanied by spouse, c/o feeling tired r/t flu virus several weeks prior, reports tolerating treatment well; pt reports taking oral BP meds today prior to arrival; reviewed treatment plan today and pt agrees to proceed

## 2018-02-12 NOTE — PLAN OF CARE
Problem: Patient Care Overview  Goal: Plan of Care Review  Outcome: Ongoing (interventions implemented as appropriate)  Infusion completed, pt tolerated well; pt instructed to contact MD who manages pt's BP and discuss med regimen; instructed to remain well hydrated; instructed to contact MD for any needs or concerns; pt declined printed AVS, verbalized understanding of all discussed and when to report next

## 2018-02-14 ENCOUNTER — INFUSION (OUTPATIENT)
Dept: INFUSION THERAPY | Facility: HOSPITAL | Age: 73
End: 2018-02-14
Attending: INTERNAL MEDICINE
Payer: MEDICARE

## 2018-02-14 VITALS
DIASTOLIC BLOOD PRESSURE: 82 MMHG | SYSTOLIC BLOOD PRESSURE: 177 MMHG | HEART RATE: 100 BPM | RESPIRATION RATE: 18 BRPM | TEMPERATURE: 98 F

## 2018-02-14 DIAGNOSIS — C82.08 FOLLICULAR LYMPHOMA GRADE I, LYMPH NODES OF MULTIPLE SITES: Primary | ICD-10-CM

## 2018-02-14 PROCEDURE — 96409 CHEMO IV PUSH SNGL DRUG: CPT

## 2018-02-14 PROCEDURE — 25000003 PHARM REV CODE 250: Performed by: INTERNAL MEDICINE

## 2018-02-14 PROCEDURE — 63600175 PHARM REV CODE 636 W HCPCS: Mod: TB | Performed by: INTERNAL MEDICINE

## 2018-02-14 RX ORDER — HEPARIN 100 UNIT/ML
500 SYRINGE INTRAVENOUS
Status: DISCONTINUED | OUTPATIENT
Start: 2018-02-14 | End: 2018-02-14 | Stop reason: HOSPADM

## 2018-02-14 RX ORDER — SODIUM CHLORIDE 0.9 % (FLUSH) 0.9 %
10 SYRINGE (ML) INJECTION
Status: DISCONTINUED | OUTPATIENT
Start: 2018-02-14 | End: 2018-02-14 | Stop reason: HOSPADM

## 2018-02-14 RX ADMIN — BENDAMUSTINE HYDROCHLORIDE 145 MG: 25 INJECTION, SOLUTION INTRAVENOUS at 04:02

## 2018-02-14 NOTE — PLAN OF CARE
Problem: Patient Care Overview  Goal: Plan of Care Review  Outcome: Ongoing (interventions implemented as appropriate)  Pt tolerated bendeka infusion without issue, rtc 2/15/18 for neulasta injection, no distress noted upon d/c to home

## 2018-02-14 NOTE — PLAN OF CARE
Problem: Chemotherapy Effects (Adult)  Goal: Signs and Symptoms of Listed Potential Problems Will be Absent, Minimized or Managed (Chemotherapy Effects)  Signs and symptoms of listed potential problems will be absent, minimized or managed by discharge/transition of care (reference Chemotherapy Effects (Adult) CPG).   Outcome: Ongoing (interventions implemented as appropriate)  Pt here for bendeka infusion day 2,labs, hx, meds, allergies reviewed. Pt with no complaints or concerns at this time, reclined in chair continue to monitor

## 2018-02-15 ENCOUNTER — INFUSION (OUTPATIENT)
Dept: INFUSION THERAPY | Facility: HOSPITAL | Age: 73
End: 2018-02-15
Attending: INTERNAL MEDICINE
Payer: MEDICARE

## 2018-02-15 DIAGNOSIS — C82.08 FOLLICULAR LYMPHOMA GRADE I, LYMPH NODES OF MULTIPLE SITES: Primary | ICD-10-CM

## 2018-02-15 PROCEDURE — 96372 THER/PROPH/DIAG INJ SC/IM: CPT

## 2018-02-15 PROCEDURE — 63600175 PHARM REV CODE 636 W HCPCS: Mod: JG | Performed by: INTERNAL MEDICINE

## 2018-02-15 RX ADMIN — PEGFILGRASTIM 6 MG: 6 INJECTION SUBCUTANEOUS at 11:02

## 2018-03-12 ENCOUNTER — HOSPITAL ENCOUNTER (OUTPATIENT)
Dept: RADIOLOGY | Facility: HOSPITAL | Age: 73
Discharge: HOME | End: 2018-03-12
Attending: INTERNAL MEDICINE
Payer: MEDICARE

## 2018-03-12 DIAGNOSIS — C82.18 GRADE 2 FOLLICULAR LYMPHOMA OF LYMPH NODES OF MULTIPLE REGIONS: ICD-10-CM

## 2018-03-12 LAB — POCT GLUCOSE: 100 MG/DL (ref 70–110)

## 2018-03-12 PROCEDURE — A9552 F18 FDG: HCPCS

## 2018-03-12 PROCEDURE — 78815 PET IMAGE W/CT SKULL-THIGH: CPT | Mod: TC

## 2018-03-12 PROCEDURE — 78815 PET IMAGE W/CT SKULL-THIGH: CPT | Mod: 26,PS,, | Performed by: RADIOLOGY

## 2018-03-14 DIAGNOSIS — E11.9 TYPE 2 DIABETES MELLITUS WITHOUT COMPLICATION, WITHOUT LONG-TERM CURRENT USE OF INSULIN: ICD-10-CM

## 2018-03-14 RX ORDER — GLIPIZIDE 10 MG/1
TABLET ORAL
Qty: 90 TABLET | Refills: 0 | Status: SHIPPED | OUTPATIENT
Start: 2018-03-14 | End: 2018-04-16 | Stop reason: SDUPTHER

## 2018-03-15 ENCOUNTER — OFFICE VISIT (OUTPATIENT)
Dept: HEMATOLOGY/ONCOLOGY | Facility: CLINIC | Age: 73
End: 2018-03-15
Payer: MEDICARE

## 2018-03-15 VITALS
TEMPERATURE: 98 F | RESPIRATION RATE: 16 BRPM | HEART RATE: 105 BPM | SYSTOLIC BLOOD PRESSURE: 170 MMHG | BODY MASS INDEX: 21.36 KG/M2 | WEIGHT: 120.56 LBS | OXYGEN SATURATION: 98 % | HEIGHT: 63 IN | DIASTOLIC BLOOD PRESSURE: 77 MMHG

## 2018-03-15 DIAGNOSIS — E11.8 TYPE 2 DIABETES MELLITUS WITH COMPLICATION, WITH LONG-TERM CURRENT USE OF INSULIN: ICD-10-CM

## 2018-03-15 DIAGNOSIS — I10 ESSENTIAL HYPERTENSION: ICD-10-CM

## 2018-03-15 DIAGNOSIS — C82.18 GRADE 2 FOLLICULAR LYMPHOMA OF LYMPH NODES OF MULTIPLE REGIONS: Primary | ICD-10-CM

## 2018-03-15 DIAGNOSIS — Z79.4 TYPE 2 DIABETES MELLITUS WITH COMPLICATION, WITH LONG-TERM CURRENT USE OF INSULIN: ICD-10-CM

## 2018-03-15 PROCEDURE — 99215 OFFICE O/P EST HI 40 MIN: CPT | Mod: S$GLB,,, | Performed by: INTERNAL MEDICINE

## 2018-03-15 PROCEDURE — 3044F HG A1C LEVEL LT 7.0%: CPT | Mod: CPTII,S$GLB,, | Performed by: INTERNAL MEDICINE

## 2018-03-15 PROCEDURE — 3077F SYST BP >= 140 MM HG: CPT | Mod: CPTII,S$GLB,, | Performed by: INTERNAL MEDICINE

## 2018-03-15 PROCEDURE — 99999 PR PBB SHADOW E&M-EST. PATIENT-LVL III: CPT | Mod: PBBFAC,,, | Performed by: INTERNAL MEDICINE

## 2018-03-15 PROCEDURE — 3078F DIAST BP <80 MM HG: CPT | Mod: CPTII,S$GLB,, | Performed by: INTERNAL MEDICINE

## 2018-03-15 NOTE — PROGRESS NOTES
Subjective:       Patient ID: Keely Pizarro is a 73 y.o. female.    Chief Complaint: Lymphoma    Keely presents with her  for evaluation of newly diagnosed grade 1-2 follicular lymphoma diagnosed by excisional biopsy of a right cervical lymph node 8/1/2017.  Pathologic diagnosis was received by [facility].  Keely reports feeling fatigue since March 2017. At the time she had an infected tooth and developed cervical lymphadenopathy. She started loosing weight, total quantity to date is unknown. Appetite remains normal and she denies night sweats. She then developed shortness of breath worse with activity and left lower lung pleural effusion was noted. The received thoracentesis twice with negative pleural fluid flow cytometry. Cytology was positive for lymphocytes, but was not diagnostic for follicular lymphoma. CT imaging demonstrates diffuse, bilateral submandibular, cervical, axillary, supraclavicular, and intrathoracic lymphadenopathy.     Keely has a history of uterine cancer in 2012 treated with radiation and chemotherapy. She had a port-a-cath at that time for treatment. She has insulin dependent diabetes mellitus with poorly controlled blood sugar, reporting both high and lows. She has hypertension, and blood pressure was elevated at intake today.      Follow-up Visit 9/1/17  PET imaging shows stage 4 follicular lymphoma, large left side pleural effusion     Follow-up 10/2/17  Completed first cycle of BR chemotherapy.  Some nausea, responds to Zofran, phenergan makes her feel worse  Back and leg pain likely due to neulasta injection  Lung aeration improved on auscultation     Follow-up 11/20/2017     Ms. Pizarro is feeling well overall. She has lost three pounds since her last visit and does not understand why. She states she has good appetite and is eating a lot. She denies any inteval fevers, chills, nausea/vomiting. No constipation or diarrhea. She denies new lymphadenopathy. SHe is  reporting back pain in the bilateral CVA area which came back a few days ago after receding following the first couple of cycles of chemotherapy. She has markedly elevated blood pressure this morning, which she blames on some confusion about the check-in process that she says angered her at the time they were taking her blood pressure.      Follow-up 12/18/17  Keely presents today with her  for follow-up. Overall doing well. No nausea or fevers. Energy good- sometimes feels short of breath. Weight is overall stable. Eating well.  Blood pressure elevated 2 visits now. Instructed to call PCP today- during infusion to get instructions for medication adjustment.     Follow-up Visit 1/15/18  Evaluation prior to cycle 5/6 of BR for follicular lymphoma. Clinically improved still. Complaints of occassional shortness of breath. Pain at right flank not reproducible on examination and described as a soreness per patient. CBC and CMP are stable. Now has home BP cuff and blood pressure is improved today.    Follow up visit 2/12/18  Mrs. Pizarro presents for last cycle 6/6 of BR. She had a URI a few weeks ago which she has now recovered from. She complain of weight loss during that episode but has since regained her appetite. Denies SOB, n/v/d. No lymphadenopathy. Recently had amlodipine added to BP regimen.     Follow-up visit 3/15/18  Return visit to review results of post-treatment staging scan that shows complete remission after 6 cycles of BR chemotherapy. She continues to have weight loss- 7lbs since last visit with waxing/waning appetite.       Review of Systems   Constitutional: Negative for fatigue and fever.   HENT: Negative for congestion and trouble swallowing.    Eyes: Negative for photophobia and visual disturbance.   Respiratory: Negative for cough and shortness of breath.    Cardiovascular: Negative for chest pain and leg swelling.   Gastrointestinal: Negative for abdominal distention and abdominal  pain.   Genitourinary: Negative for dysuria and hematuria.   Musculoskeletal: Negative for myalgias and neck pain.   Skin: Negative for color change and pallor.   Neurological: Negative for light-headedness and headaches.   Hematological: Negative for adenopathy. Does not bruise/bleed easily.   Psychiatric/Behavioral: Negative for agitation.       Objective:      Physical Exam   Constitutional: She is oriented to person, place, and time. She appears well-developed and well-nourished.   HENT:   Mouth/Throat: Oropharynx is clear and moist. No oropharyngeal exudate.   Eyes: Pupils are equal, round, and reactive to light.   Cardiovascular: Normal rate and regular rhythm.    Pulmonary/Chest: Effort normal and breath sounds normal.   Abdominal: Soft. Bowel sounds are normal.   Lymphadenopathy:     She has no cervical adenopathy.   Neurological: She is alert and oriented to person, place, and time.   Skin: Skin is warm.   Psychiatric: She has a normal mood and affect. Her behavior is normal.       Assessment:       1. Grade 2 follicular lymphoma of lymph nodes of multiple regions    2. Type 2 diabetes mellitus with complication, with long-term current use of insulin    3. Essential hypertension        Plan:   Completed 6 cycles of BR chemotherapy for follicular lymphoma achieving a complete response on end of therapy PET.  Pleural effusion is improving.   Recommend increased physical activity to improve appetite.  Return visit in 3 months with CBC, CMP and LDH

## 2018-03-26 DIAGNOSIS — I10 HYPERTENSION, ESSENTIAL: ICD-10-CM

## 2018-03-26 RX ORDER — AMLODIPINE BESYLATE 10 MG/1
10 TABLET ORAL DAILY
Qty: 90 TABLET | Refills: 3 | Status: SHIPPED | OUTPATIENT
Start: 2018-03-26 | End: 2019-04-04 | Stop reason: SDUPTHER

## 2018-03-26 NOTE — TELEPHONE ENCOUNTER
----- Message from Abhinav Max sent at 3/26/2018  3:08 PM CDT -----  Contact: patient  x_ 1st Request   _ 2nd Request   _ 3rd Request     Who: PHUONG FRAIRE [[id number]]    Why: patient called stated she changed pharmacy to [facility] and is requesting refills for Rx amLODIPine (NORVASC) 10 MG tablet be sent to [facility] [address] [phone]      What Number to Call Back: [phone]    When to Expect a call back: (Before the end of the day)   -- if call after 3:00 call back will be tomorrow.

## 2018-04-06 DIAGNOSIS — E11.9 TYPE 2 DIABETES MELLITUS WITHOUT COMPLICATION: ICD-10-CM

## 2018-04-16 DIAGNOSIS — E11.9 TYPE 2 DIABETES MELLITUS WITHOUT COMPLICATION, WITHOUT LONG-TERM CURRENT USE OF INSULIN: ICD-10-CM

## 2018-04-17 RX ORDER — GLIPIZIDE 10 MG/1
TABLET ORAL
Qty: 90 TABLET | Refills: 0 | Status: SHIPPED | OUTPATIENT
Start: 2018-04-17 | End: 2018-07-12 | Stop reason: SDUPTHER

## 2018-04-20 DIAGNOSIS — E11.9 TYPE 2 DIABETES MELLITUS WITHOUT COMPLICATION: ICD-10-CM

## 2018-05-02 ENCOUNTER — TELEPHONE (OUTPATIENT)
Dept: HEMATOLOGY/ONCOLOGY | Facility: CLINIC | Age: 73
End: 2018-05-02

## 2018-05-02 NOTE — TELEPHONE ENCOUNTER
----- Message from Landy Allen sent at 5/2/2018 12:17 PM CDT -----  Contact: Pt  Pt called to reschedule appt on 6/11 and would like a early appt for sometime in May  Callback#[phone]  Thank You  HIEN Allen

## 2018-05-02 NOTE — TELEPHONE ENCOUNTER
Patient needed to change appt date as she will not be in town.  Was able to reschedule her in July.

## 2018-07-12 ENCOUNTER — HOSPITAL ENCOUNTER (OUTPATIENT)
Dept: RADIOLOGY | Facility: HOSPITAL | Age: 73
Discharge: HOME | End: 2018-07-12
Attending: NURSE PRACTITIONER
Payer: MEDICARE

## 2018-07-12 ENCOUNTER — OFFICE VISIT (OUTPATIENT)
Dept: HEMATOLOGY/ONCOLOGY | Facility: CLINIC | Age: 73
End: 2018-07-12
Payer: MEDICARE

## 2018-07-12 VITALS
RESPIRATION RATE: 17 BRPM | OXYGEN SATURATION: 100 % | DIASTOLIC BLOOD PRESSURE: 83 MMHG | WEIGHT: 132.69 LBS | SYSTOLIC BLOOD PRESSURE: 176 MMHG | HEART RATE: 95 BPM | HEIGHT: 63 IN | TEMPERATURE: 98 F | BODY MASS INDEX: 23.51 KG/M2

## 2018-07-12 DIAGNOSIS — R07.89 CHEST HEAVINESS: ICD-10-CM

## 2018-07-12 DIAGNOSIS — E11.8 TYPE 2 DIABETES MELLITUS WITH COMPLICATION, WITH LONG-TERM CURRENT USE OF INSULIN: ICD-10-CM

## 2018-07-12 DIAGNOSIS — R93.89 ABNORMAL CHEST X-RAY: ICD-10-CM

## 2018-07-12 DIAGNOSIS — D72.819 LEUKOPENIA, UNSPECIFIED TYPE: ICD-10-CM

## 2018-07-12 DIAGNOSIS — T45.1X5A ANEMIA ASSOCIATED WITH CHEMOTHERAPY: ICD-10-CM

## 2018-07-12 DIAGNOSIS — D64.81 ANEMIA ASSOCIATED WITH CHEMOTHERAPY: ICD-10-CM

## 2018-07-12 DIAGNOSIS — N18.30 CKD (CHRONIC KIDNEY DISEASE) STAGE 3, GFR 30-59 ML/MIN: ICD-10-CM

## 2018-07-12 DIAGNOSIS — Z79.4 TYPE 2 DIABETES MELLITUS WITH COMPLICATION, WITH LONG-TERM CURRENT USE OF INSULIN: ICD-10-CM

## 2018-07-12 DIAGNOSIS — C82.08 FOLLICULAR LYMPHOMA GRADE I, LYMPH NODES OF MULTIPLE SITES: Primary | ICD-10-CM

## 2018-07-12 DIAGNOSIS — E11.9 TYPE 2 DIABETES MELLITUS WITHOUT COMPLICATION, WITHOUT LONG-TERM CURRENT USE OF INSULIN: ICD-10-CM

## 2018-07-12 DIAGNOSIS — I10 ESSENTIAL HYPERTENSION: ICD-10-CM

## 2018-07-12 PROBLEM — Z12.11 SPECIAL SCREENING FOR MALIGNANT NEOPLASMS, COLON: Status: RESOLVED | Noted: 2017-03-31 | Resolved: 2018-07-12

## 2018-07-12 PROCEDURE — 3077F SYST BP >= 140 MM HG: CPT | Mod: CPTII,S$GLB,, | Performed by: NURSE PRACTITIONER

## 2018-07-12 PROCEDURE — 71046 X-RAY EXAM CHEST 2 VIEWS: CPT | Mod: TC

## 2018-07-12 PROCEDURE — 3079F DIAST BP 80-89 MM HG: CPT | Mod: CPTII,S$GLB,, | Performed by: NURSE PRACTITIONER

## 2018-07-12 PROCEDURE — 99999 PR PBB SHADOW E&M-EST. PATIENT-LVL III: CPT | Mod: PBBFAC,,, | Performed by: NURSE PRACTITIONER

## 2018-07-12 PROCEDURE — 3044F HG A1C LEVEL LT 7.0%: CPT | Mod: CPTII,S$GLB,, | Performed by: NURSE PRACTITIONER

## 2018-07-12 PROCEDURE — 71046 X-RAY EXAM CHEST 2 VIEWS: CPT | Mod: 26,,, | Performed by: RADIOLOGY

## 2018-07-12 PROCEDURE — 99215 OFFICE O/P EST HI 40 MIN: CPT | Mod: S$GLB,,, | Performed by: NURSE PRACTITIONER

## 2018-07-12 RX ORDER — GLIPIZIDE 10 MG/1
TABLET ORAL
Qty: 90 TABLET | Refills: 0 | Status: SHIPPED | OUTPATIENT
Start: 2018-07-12 | End: 2018-08-15 | Stop reason: SDUPTHER

## 2018-07-12 RX ORDER — METFORMIN HYDROCHLORIDE 1000 MG/1
1000 TABLET, FILM COATED, EXTENDED RELEASE ORAL
COMMUNITY
End: 2020-02-03

## 2018-07-12 NOTE — PROGRESS NOTES
"Subjective:       Patient ID: Keely Pizarro is a 73 y.o. female.    Chief Complaint: Follow-up    Onc Hx:  Keely presents with her  for evaluation of newly diagnosed grade 1-2 follicular lymphoma diagnosed by excisional biopsy of a right cervical lymph node 8/1/2017.  Pathologic diagnosis was received by [facility].  Keely reports feeling fatigue since March 2017. At the time she had an infected tooth and developed cervical lymphadenopathy. She started loosing weight, total quantity to date is unknown. Appetite remains normal and she denies night sweats. She then developed shortness of breath worse with activity and left lower lung pleural effusion was noted. The received thoracentesis twice with negative pleural fluid flow cytometry. Cytology was positive for lymphocytes, but was not diagnostic for follicular lymphoma. CT imaging demonstrates diffuse, bilateral submandibular, cervical, axillary, supraclavicular, and intrathoracic lymphadenopathy.     Keely has a history of uterine cancer in 2012 treated with radiation and chemotherapy. She had a port-a-cath at that time for treatment. She has insulin dependent diabetes mellitus with poorly controlled blood sugar, reporting both high and lows. She has hypertension, and blood pressure was elevated at intake today.      Follow-up Visit 9/1/17  PET imaging shows stage 4 follicular lymphoma, large left side pleural effusion     Follow-up visit 3/15/18  Return visit to review results of post-treatment staging scan that shows complete remission after 6 cycles of BR chemotherapy. She continues to have weight loss- 7lbs since last visit with waxing/waning appetite.     Today: Pt reports to clinic s/p 6 cycles BR for follicular lymphoma with CR seen on PET 3/12/18. Pt feeling well. Pt denies fevers, chills, SOB, cough, chest pain, palpitations, bleeding, fatigue, lymphadenopathy, N/V/D/C. She does reports some "chest heaviness x2 months". Denies chest " pain or cough. She has a history of pleural effusions.       Review of Systems   Constitutional: Negative for fatigue and fever.   HENT: Negative for congestion and trouble swallowing.    Eyes: Negative for photophobia and visual disturbance.   Respiratory: Negative for cough and shortness of breath.  Positive for chest heaviness.  Cardiovascular: Negative for chest pain and leg swelling.   Gastrointestinal: Negative for abdominal distention and abdominal pain.   Genitourinary: Negative for dysuria and hematuria.   Musculoskeletal: Negative for myalgias and neck pain.   Skin: Negative for color change and pallor.   Neurological: Negative for light-headedness and headaches.   Hematological: Negative for adenopathy. Does not bruise/bleed easily.   Psychiatric/Behavioral: Negative for agitation.       Objective:      Physical Exam   Constitutional: She is oriented to person, place, and time. She appears well-developed and well-nourished.   HENT:   Mouth/Throat: Oropharynx is clear and moist. No oropharyngeal exudate.   Eyes: Pupils are equal, round, and reactive to light.   Cardiovascular: Normal rate and regular rhythm.    Pulmonary/Chest: LLL decreased lung sounds. Good aeration and clear lung sounds to all other lobes.   Abdominal: Soft. Bowel sounds are normal.   Lymphadenopathy:     She has no cervical adenopathy.   Neurological: She is alert and oriented to person, place, and time.   Skin: Skin is warm.   Psychiatric: She has a normal mood and affect. Her behavior is normal.       Assessment:       1. Follicular lymphoma grade i, lymph nodes of multiple sites    2. Essential hypertension    3. Type 2 diabetes mellitus with complication, with long-term current use of insulin    4. Chest heaviness    5. Abnormal chest x-ray    6. CKD (chronic kidney disease) stage 3, GFR 30-59 ml/min    7. Leukopenia, unspecified type    8. Anemia associated with chemotherapy        Plan:     [place]  Completed 6 cycles of BR  chemotherapy for follicular lymphoma achieving a complete response on end of therapy PET.   LDH WNL. Anemia and leukopenia today on CBC. No indication for transfusions.   Discuss rituxan maintenance at next visit    HTN  - BP elevated; return to PCP for management  - pt states lisinopril was stopped by PCP when he started amlodipine.     DM2  - hyergylcemic; return to PCP for management    CKD  - likely secondary to uncontrolled HTN/DM2, meds, chemo  - hold off on IVF today due to elevated BP and concern for pleural effusion    Chest Heaviness  - hx of pleural effusions  - obtain chest xray - 7/12/18: Unusual appearance of the chest radiograph in this patient with a past history of lymphoma causing extensive lymph node enlargement.  Many of those nodes have regressed in the interim.  However there is indirect evidence of disease in the posterior mediastinum or in the medial aspect of the right lower lobe abutting the posterior mediastinal margin, and volume loss in the left lung causing inferior displacement of the left mainstem bronchus.  Clinical considerations will determine if chest CT is warranted.  If chest CT is contemplated, contrast enhancement might be helpful.  - ct chest with contrast-  7/13/18: 1. History lymphoma.  No significant lymphadenopathy identified. 2. Mild left pleural effusion is improved.3. Bands of opacity in left upper and right lower lobes could represent scar or plate atelectasis, negative on prior PET exam.  - continue to monitor at clinic [facility] and with PCP      F/u with marcos next week on 19th with hepb labs, cbc, cmp, ldh to discuss rituxan maintenance / discuss CT results  - Pt educated to follow up with PCP and make appt. She states she will do this.     Plan of care discussed with collaborating physician, Dr. Marcos Gruber, DNP, FNP  Hematology/Bone Marrow Transplant

## 2018-07-12 NOTE — Clinical Note
Please schedule pt for chest ct before next appt. Have pt follow up with dr. Rodríguez on 7/19 at 1pm (ok to double book per Dr. Rodríguez). Labs prior- CBC, CMP, LDH, hepB labs (x3).

## 2018-07-13 ENCOUNTER — HOSPITAL ENCOUNTER (OUTPATIENT)
Dept: RADIOLOGY | Facility: HOSPITAL | Age: 73
Discharge: HOME | End: 2018-07-13
Attending: NURSE PRACTITIONER
Payer: MEDICARE

## 2018-07-13 DIAGNOSIS — E11.9 TYPE 2 DIABETES MELLITUS WITHOUT COMPLICATION: ICD-10-CM

## 2018-07-13 DIAGNOSIS — R93.89 ABNORMAL CHEST X-RAY: ICD-10-CM

## 2018-07-13 DIAGNOSIS — C82.08 FOLLICULAR LYMPHOMA GRADE I, LYMPH NODES OF MULTIPLE SITES: ICD-10-CM

## 2018-07-13 PROCEDURE — 71260 CT THORAX DX C+: CPT | Mod: TC

## 2018-07-13 PROCEDURE — 71260 CT THORAX DX C+: CPT | Mod: 26,,, | Performed by: RADIOLOGY

## 2018-07-13 PROCEDURE — 25500020 PHARM REV CODE 255: Performed by: NURSE PRACTITIONER

## 2018-07-13 RX ORDER — HEPARIN 100 UNIT/ML
500 SYRINGE INTRAVENOUS
Status: CANCELLED | OUTPATIENT
Start: 2018-07-19

## 2018-07-13 RX ORDER — MEPERIDINE HYDROCHLORIDE 50 MG/ML
25 INJECTION INTRAMUSCULAR; INTRAVENOUS; SUBCUTANEOUS
Status: CANCELLED | OUTPATIENT
Start: 2018-07-19

## 2018-07-13 RX ORDER — SODIUM CHLORIDE 0.9 % (FLUSH) 0.9 %
10 SYRINGE (ML) INJECTION
Status: CANCELLED | OUTPATIENT
Start: 2018-07-19

## 2018-07-13 RX ORDER — FAMOTIDINE 10 MG/ML
20 INJECTION INTRAVENOUS
Status: CANCELLED | OUTPATIENT
Start: 2018-07-19

## 2018-07-13 RX ORDER — ACETAMINOPHEN 325 MG/1
650 TABLET ORAL
Status: CANCELLED | OUTPATIENT
Start: 2018-07-19

## 2018-07-13 RX ADMIN — IOHEXOL 75 ML: 350 INJECTION, SOLUTION INTRAVENOUS at 11:07

## 2018-07-14 PROBLEM — D64.81 ANEMIA ASSOCIATED WITH CHEMOTHERAPY: Status: ACTIVE | Noted: 2018-07-14

## 2018-07-14 PROBLEM — T45.1X5A ANEMIA ASSOCIATED WITH CHEMOTHERAPY: Status: ACTIVE | Noted: 2018-07-14

## 2018-07-14 PROBLEM — D72.819 LEUKOPENIA: Status: ACTIVE | Noted: 2018-07-14

## 2018-07-14 PROBLEM — N18.30 CKD (CHRONIC KIDNEY DISEASE) STAGE 3, GFR 30-59 ML/MIN: Status: ACTIVE | Noted: 2018-07-14

## 2018-07-19 ENCOUNTER — LAB VISIT (OUTPATIENT)
Dept: LAB | Facility: HOSPITAL | Age: 73
End: 2018-07-19
Attending: INTERNAL MEDICINE
Payer: MEDICARE

## 2018-07-19 ENCOUNTER — OFFICE VISIT (OUTPATIENT)
Dept: HEMATOLOGY/ONCOLOGY | Facility: CLINIC | Age: 73
End: 2018-07-19
Payer: MEDICARE

## 2018-07-19 VITALS
TEMPERATURE: 98 F | WEIGHT: 128.06 LBS | HEART RATE: 97 BPM | OXYGEN SATURATION: 100 % | SYSTOLIC BLOOD PRESSURE: 162 MMHG | DIASTOLIC BLOOD PRESSURE: 85 MMHG | HEIGHT: 63 IN | BODY MASS INDEX: 22.69 KG/M2

## 2018-07-19 DIAGNOSIS — C82.08 FOLLICULAR LYMPHOMA GRADE I, LYMPH NODES OF MULTIPLE SITES: Primary | ICD-10-CM

## 2018-07-19 DIAGNOSIS — C82.08 FOLLICULAR LYMPHOMA GRADE I, LYMPH NODES OF MULTIPLE SITES: ICD-10-CM

## 2018-07-19 DIAGNOSIS — I10 ESSENTIAL HYPERTENSION: ICD-10-CM

## 2018-07-19 DIAGNOSIS — N18.30 CKD (CHRONIC KIDNEY DISEASE) STAGE 3, GFR 30-59 ML/MIN: ICD-10-CM

## 2018-07-19 DIAGNOSIS — Z79.4 TYPE 2 DIABETES MELLITUS WITH COMPLICATION, WITH LONG-TERM CURRENT USE OF INSULIN: ICD-10-CM

## 2018-07-19 DIAGNOSIS — E11.8 TYPE 2 DIABETES MELLITUS WITH COMPLICATION, WITH LONG-TERM CURRENT USE OF INSULIN: ICD-10-CM

## 2018-07-19 LAB
ALBUMIN SERPL BCP-MCNC: 3.5 G/DL
ALP SERPL-CCNC: 68 U/L
ALT SERPL W/O P-5'-P-CCNC: 24 U/L
ANION GAP SERPL CALC-SCNC: 9 MMOL/L
AST SERPL-CCNC: 23 U/L
BASOPHILS # BLD AUTO: 0.03 K/UL
BASOPHILS NFR BLD: 1.1 %
BILIRUB SERPL-MCNC: 0.4 MG/DL
BUN SERPL-MCNC: 41 MG/DL
CALCIUM SERPL-MCNC: 10.2 MG/DL
CHLORIDE SERPL-SCNC: 108 MMOL/L
CO2 SERPL-SCNC: 21 MMOL/L
CREAT SERPL-MCNC: 1.1 MG/DL
DIFFERENTIAL METHOD: ABNORMAL
EOSINOPHIL # BLD AUTO: 0.1 K/UL
EOSINOPHIL NFR BLD: 3.9 %
ERYTHROCYTE [DISTWIDTH] IN BLOOD BY AUTOMATED COUNT: 13.3 %
EST. GFR  (AFRICAN AMERICAN): 58.4 ML/MIN/1.73 M^2
EST. GFR  (NON AFRICAN AMERICAN): 50.6 ML/MIN/1.73 M^2
GLUCOSE SERPL-MCNC: 162 MG/DL
HBV CORE AB SERPL QL IA: NEGATIVE
HBV SURFACE AB SER-ACNC: NEGATIVE M[IU]/ML
HBV SURFACE AG SERPL QL IA: NEGATIVE
HCT VFR BLD AUTO: 35.5 %
HGB BLD-MCNC: 11.3 G/DL
IMM GRANULOCYTES # BLD AUTO: 0.08 K/UL
IMM GRANULOCYTES NFR BLD AUTO: 2.8 %
LDH SERPL L TO P-CCNC: 223 U/L
LYMPHOCYTES # BLD AUTO: 1.3 K/UL
LYMPHOCYTES NFR BLD: 44.5 %
MCH RBC QN AUTO: 30.1 PG
MCHC RBC AUTO-ENTMCNC: 31.8 G/DL
MCV RBC AUTO: 95 FL
MONOCYTES # BLD AUTO: 0.6 K/UL
MONOCYTES NFR BLD: 20.5 %
NEUTROPHILS # BLD AUTO: 0.8 K/UL
NEUTROPHILS NFR BLD: 27.2 %
NRBC BLD-RTO: 0 /100 WBC
PLATELET # BLD AUTO: 207 K/UL
PMV BLD AUTO: 10.2 FL
POTASSIUM SERPL-SCNC: 4.9 MMOL/L
PROT SERPL-MCNC: 6.3 G/DL
RBC # BLD AUTO: 3.75 M/UL
SODIUM SERPL-SCNC: 138 MMOL/L
WBC # BLD AUTO: 2.83 K/UL

## 2018-07-19 PROCEDURE — 87340 HEPATITIS B SURFACE AG IA: CPT

## 2018-07-19 PROCEDURE — 99215 OFFICE O/P EST HI 40 MIN: CPT | Mod: S$GLB,,, | Performed by: INTERNAL MEDICINE

## 2018-07-19 PROCEDURE — 3044F HG A1C LEVEL LT 7.0%: CPT | Mod: CPTII,S$GLB,, | Performed by: INTERNAL MEDICINE

## 2018-07-19 PROCEDURE — 99999 PR PBB SHADOW E&M-EST. PATIENT-LVL III: CPT | Mod: PBBFAC,,, | Performed by: INTERNAL MEDICINE

## 2018-07-19 PROCEDURE — 83615 LACTATE (LD) (LDH) ENZYME: CPT

## 2018-07-19 PROCEDURE — 86704 HEP B CORE ANTIBODY TOTAL: CPT

## 2018-07-19 PROCEDURE — 3077F SYST BP >= 140 MM HG: CPT | Mod: CPTII,S$GLB,, | Performed by: INTERNAL MEDICINE

## 2018-07-19 PROCEDURE — 80053 COMPREHEN METABOLIC PANEL: CPT

## 2018-07-19 PROCEDURE — 36415 COLL VENOUS BLD VENIPUNCTURE: CPT

## 2018-07-19 PROCEDURE — 86706 HEP B SURFACE ANTIBODY: CPT

## 2018-07-19 PROCEDURE — 3079F DIAST BP 80-89 MM HG: CPT | Mod: CPTII,S$GLB,, | Performed by: INTERNAL MEDICINE

## 2018-07-19 PROCEDURE — 85025 COMPLETE CBC W/AUTO DIFF WBC: CPT

## 2018-07-19 NOTE — Clinical Note
Return ASAP for rituxan- patient would like hycella Return every 2 months for Rituxan Return visit every 4 months with CBC, CMP and LDH

## 2018-07-20 RX ORDER — HEPARIN 100 UNIT/ML
500 SYRINGE INTRAVENOUS
Status: CANCELLED | OUTPATIENT
Start: 2018-07-27

## 2018-07-20 RX ORDER — ACETAMINOPHEN 325 MG/1
650 TABLET ORAL
Status: CANCELLED | OUTPATIENT
Start: 2018-07-27

## 2018-07-20 RX ORDER — MEPERIDINE HYDROCHLORIDE 25 MG/ML
25 INJECTION INTRAMUSCULAR; INTRAVENOUS; SUBCUTANEOUS
Status: CANCELLED | OUTPATIENT
Start: 2018-07-27

## 2018-07-20 RX ORDER — SODIUM CHLORIDE 0.9 % (FLUSH) 0.9 %
10 SYRINGE (ML) INJECTION
Status: CANCELLED | OUTPATIENT
Start: 2018-07-27

## 2018-07-20 RX ORDER — FAMOTIDINE 10 MG/ML
20 INJECTION INTRAVENOUS
Status: CANCELLED | OUTPATIENT
Start: 2018-07-27

## 2018-07-20 NOTE — PROGRESS NOTES
Subjective:       Patient ID: Keely Pizarro is a 73 y.o. female.    Chief Complaint: Follicular lymphoma grade i, lymph nodes of multiple sites    Onc Hx:  Keely presents with her  for evaluation of newly diagnosed grade 1-2 follicular lymphoma diagnosed by excisional biopsy of a right cervical lymph node 8/1/2017.  Pathologic diagnosis was received by [facility].  Keely reports feeling fatigue since March 2017. At the time she had an infected tooth and developed cervical lymphadenopathy. She started loosing weight, total quantity to date is unknown. Appetite remains normal and she denies night sweats. She then developed shortness of breath worse with activity and left lower lung pleural effusion was noted. The received thoracentesis twice with negative pleural fluid flow cytometry. Cytology was positive for lymphocytes, but was not diagnostic for follicular lymphoma. CT imaging demonstrates diffuse, bilateral submandibular, cervical, axillary, supraclavicular, and intrathoracic lymphadenopathy.     Keely has a history of uterine cancer in 2012 treated with radiation and chemotherapy. She had a port-a-cath at that time for treatment. She has insulin dependent diabetes mellitus with poorly controlled blood sugar, reporting both high and lows. She has hypertension, and blood pressure was elevated at intake today.      Follow-up Visit 9/1/17  PET imaging shows stage 4 follicular lymphoma, large left side pleural effusion     Follow-up visit 3/15/18  Return visit to review results of post-treatment staging scan that shows complete remission after 6 cycles of BR chemotherapy. She continues to have weight loss- 7lbs since last visit with waxing/waning appetite.     Today: Pt reports to clinic s/p 6 cycles BR for follicular lymphoma with CR seen on PET 3/12/18. Pt states prior symptoms of chest heaviness has resolved. She attributes to her  smoking in the house. CT of chest only showed  atelectasis. No symptoms on evaluation today. We discussed role of maintenance Rituxan.      Review of Systems   Constitutional: Negative for fatigue and fever.   HENT: Negative for congestion and trouble swallowing.    Eyes: Negative for photophobia and visual disturbance.   Respiratory: Negative for cough and shortness of breath.  Cardiovascular: Negative for chest pain and leg swelling.   Gastrointestinal: Negative for abdominal distention and abdominal pain.   Genitourinary: Negative for dysuria and hematuria.   Musculoskeletal: Negative for myalgias and neck pain.   Skin: Negative for color change and pallor.   Neurological: Negative for light-headedness and headaches.   Hematological: Negative for adenopathy. Does not bruise/bleed easily.   Psychiatric/Behavioral: Negative for agitation.       Objective:      Physical Exam   Constitutional: She is oriented to person, place, and time. She appears well-developed and well-nourished.   HENT:   Mouth/Throat: Oropharynx is clear and moist. No oropharyngeal exudate.   Eyes: Pupils are equal, round, and reactive to light.   Cardiovascular: Normal rate and regular rhythm.    Pulmonary/Chest: Good aeration and clear lung sounds to all  lobes.   Abdominal: Soft. Bowel sounds are normal.   Lymphadenopathy:     She has no cervical adenopathy.   Neurological: She is alert and oriented to person, place, and time.   Skin: Skin is warm.   Psychiatric: She has a normal mood and affect. Her behavior is normal.       Assessment:       1. Follicular lymphoma grade i, lymph nodes of multiple sites    2. Type 2 diabetes mellitus with complication, with long-term current use of insulin    3. Essential hypertension    4. CKD (chronic kidney disease) stage 3, GFR 30-59 ml/min        Plan:     [place]  Completed 6 cycles of BR chemotherapy for follicular lymphoma achieving a complete response on end of therapy PET.   LDH WNL. Anemia and leukopenia today on CBC. No indication for  transfusions.   Start maintenance Rituxan once every 2 months for 2 years.    HTN  - BP elevated; return to PCP for management  - pt states lisinopril was stopped by PCP when he started amlodipine.     DM2  - hyergylcemic; return to PCP for management    CKD  - likely secondary to uncontrolled HTN/DM2, meds, chemo    Return ASAP for rituxan- patient would like hycella  Return visit every 2 months after for Rituxan  Return visit every 4 months with CBC, CMP and LDH

## 2018-07-26 ENCOUNTER — INFUSION (OUTPATIENT)
Dept: INFUSION THERAPY | Facility: HOSPITAL | Age: 73
End: 2018-07-26
Attending: INTERNAL MEDICINE
Payer: MEDICARE

## 2018-07-26 VITALS
HEART RATE: 80 BPM | WEIGHT: 128.06 LBS | BODY MASS INDEX: 22.69 KG/M2 | RESPIRATION RATE: 18 BRPM | TEMPERATURE: 99 F | SYSTOLIC BLOOD PRESSURE: 119 MMHG | HEIGHT: 63 IN | DIASTOLIC BLOOD PRESSURE: 76 MMHG

## 2018-07-26 DIAGNOSIS — C82.08 FOLLICULAR LYMPHOMA GRADE I, LYMPH NODES OF MULTIPLE SITES: Primary | ICD-10-CM

## 2018-07-26 PROCEDURE — S0028 INJECTION, FAMOTIDINE, 20 MG: HCPCS | Performed by: INTERNAL MEDICINE

## 2018-07-26 PROCEDURE — 96367 TX/PROPH/DG ADDL SEQ IV INF: CPT

## 2018-07-26 PROCEDURE — C9467 INJ RITUXIMAB HYALURONIDASE: HCPCS | Mod: TB | Performed by: INTERNAL MEDICINE

## 2018-07-26 PROCEDURE — 96401 CHEMO ANTI-NEOPL SQ/IM: CPT

## 2018-07-26 PROCEDURE — 25000003 PHARM REV CODE 250: Performed by: INTERNAL MEDICINE

## 2018-07-26 PROCEDURE — 96365 THER/PROPH/DIAG IV INF INIT: CPT

## 2018-07-26 PROCEDURE — 63600175 PHARM REV CODE 636 W HCPCS: Performed by: INTERNAL MEDICINE

## 2018-07-26 PROCEDURE — 96375 TX/PRO/DX INJ NEW DRUG ADDON: CPT

## 2018-07-26 RX ORDER — MEPERIDINE HYDROCHLORIDE 50 MG/ML
25 INJECTION INTRAMUSCULAR; INTRAVENOUS; SUBCUTANEOUS
Status: DISCONTINUED | OUTPATIENT
Start: 2018-07-26 | End: 2018-07-26 | Stop reason: HOSPADM

## 2018-07-26 RX ORDER — HEPARIN 100 UNIT/ML
500 SYRINGE INTRAVENOUS
Status: DISCONTINUED | OUTPATIENT
Start: 2018-07-26 | End: 2018-07-26 | Stop reason: HOSPADM

## 2018-07-26 RX ORDER — FAMOTIDINE 10 MG/ML
20 INJECTION INTRAVENOUS
Status: COMPLETED | OUTPATIENT
Start: 2018-07-26 | End: 2018-07-26

## 2018-07-26 RX ORDER — SODIUM CHLORIDE 0.9 % (FLUSH) 0.9 %
10 SYRINGE (ML) INJECTION
Status: DISCONTINUED | OUTPATIENT
Start: 2018-07-26 | End: 2018-07-26 | Stop reason: HOSPADM

## 2018-07-26 RX ORDER — ACETAMINOPHEN 325 MG/1
650 TABLET ORAL
Status: COMPLETED | OUTPATIENT
Start: 2018-07-26 | End: 2018-07-26

## 2018-07-26 RX ADMIN — FAMOTIDINE 20 MG: 10 INJECTION, SOLUTION INTRAVENOUS at 12:07

## 2018-07-26 RX ADMIN — RITUXIMAB AND HYALURONIDASE 1400 MG: 120; 2000 INJECTION, SOLUTION SUBCUTANEOUS at 12:07

## 2018-07-26 RX ADMIN — ACETAMINOPHEN 650 MG: 325 TABLET, FILM COATED ORAL at 11:07

## 2018-07-26 RX ADMIN — DIPHENHYDRAMINE HYDROCHLORIDE 50 MG: 50 INJECTION, SOLUTION INTRAMUSCULAR; INTRAVENOUS at 11:07

## 2018-07-26 NOTE — PLAN OF CARE
Problem: Patient Care Overview  Goal: Plan of Care Review  Outcome: Ongoing (interventions implemented as appropriate)  Pt. Tolerated Rituxan Hycela Injection to abdomen over 5 minutes. Stayed for 15 minutes observation afterward. VSS. PIV flushed and removed. No questions at this time.

## 2018-08-15 DIAGNOSIS — E11.9 TYPE 2 DIABETES MELLITUS WITHOUT COMPLICATION, WITHOUT LONG-TERM CURRENT USE OF INSULIN: ICD-10-CM

## 2018-08-15 RX ORDER — GLIPIZIDE 10 MG/1
TABLET ORAL
Qty: 90 TABLET | Refills: 0 | Status: SHIPPED | OUTPATIENT
Start: 2018-08-15 | End: 2018-09-17 | Stop reason: SDUPTHER

## 2018-08-20 ENCOUNTER — TELEPHONE (OUTPATIENT)
Dept: ADMINISTRATIVE | Facility: HOSPITAL | Age: 73
End: 2018-08-20

## 2018-08-20 NOTE — TELEPHONE ENCOUNTER
Member was phoned about getting her eye exam and mammogram scheduled. She refused on today, but said that she will schedule both of the soon.     Letty SHERIFF LPN  Clinical Care Coordinator  Internal Medicine  [facility]

## 2018-09-10 ENCOUNTER — TELEPHONE (OUTPATIENT)
Dept: ADMINISTRATIVE | Facility: HOSPITAL | Age: 73
End: 2018-09-10

## 2018-09-10 NOTE — TELEPHONE ENCOUNTER
Patient was phoned about scheduling a follow up appointment for her Diabetes and hypertension. The member states that she is in [place] at the moment and would not be able to schedule her appointment until she returns to [place].     Letty SHERIFF LPN  Clinical Care Coordinator  Internal Medicine  [facility]

## 2018-09-17 ENCOUNTER — TELEPHONE (OUTPATIENT)
Dept: ADMINISTRATIVE | Facility: HOSPITAL | Age: 73
End: 2018-09-17

## 2018-09-17 DIAGNOSIS — E11.9 TYPE 2 DIABETES MELLITUS WITHOUT COMPLICATION, WITHOUT LONG-TERM CURRENT USE OF INSULIN: ICD-10-CM

## 2018-09-17 NOTE — TELEPHONE ENCOUNTER
Phoned the patient about an overdue eye exam and there was no answer. I was unable to leave a voice message.     Letty SHERIFF LPN  Clinical Care Coordinator  Internal Medicine  [facility]

## 2018-09-18 RX ORDER — GLIPIZIDE 10 MG/1
TABLET ORAL
Qty: 90 TABLET | Refills: 0 | Status: SHIPPED | OUTPATIENT
Start: 2018-09-18 | End: 2019-01-31 | Stop reason: SDUPTHER

## 2018-09-27 ENCOUNTER — INFUSION (OUTPATIENT)
Dept: INFUSION THERAPY | Facility: HOSPITAL | Age: 73
End: 2018-09-27
Attending: INTERNAL MEDICINE
Payer: MEDICARE

## 2018-09-27 VITALS
TEMPERATURE: 98 F | DIASTOLIC BLOOD PRESSURE: 68 MMHG | HEART RATE: 80 BPM | SYSTOLIC BLOOD PRESSURE: 133 MMHG | RESPIRATION RATE: 18 BRPM

## 2018-09-27 DIAGNOSIS — C82.08 FOLLICULAR LYMPHOMA GRADE I, LYMPH NODES OF MULTIPLE SITES: Primary | ICD-10-CM

## 2018-09-27 PROCEDURE — 63600175 PHARM REV CODE 636 W HCPCS: Mod: TB | Performed by: INTERNAL MEDICINE

## 2018-09-27 PROCEDURE — C9467 INJ RITUXIMAB HYALURONIDASE: HCPCS | Mod: TB | Performed by: INTERNAL MEDICINE

## 2018-09-27 PROCEDURE — 25000003 PHARM REV CODE 250: Performed by: INTERNAL MEDICINE

## 2018-09-27 PROCEDURE — 96401 CHEMO ANTI-NEOPL SQ/IM: CPT

## 2018-09-27 RX ORDER — ACETAMINOPHEN 325 MG/1
650 TABLET ORAL
Status: COMPLETED | OUTPATIENT
Start: 2018-09-27 | End: 2018-09-27

## 2018-09-27 RX ORDER — FAMOTIDINE 10 MG/ML
20 INJECTION INTRAVENOUS
Status: CANCELLED | OUTPATIENT
Start: 2018-09-27

## 2018-09-27 RX ORDER — DIPHENHYDRAMINE HCL 50 MG
50 CAPSULE ORAL
Status: COMPLETED | OUTPATIENT
Start: 2018-09-27 | End: 2018-09-27

## 2018-09-27 RX ORDER — HEPARIN 100 UNIT/ML
500 SYRINGE INTRAVENOUS
Status: DISCONTINUED | OUTPATIENT
Start: 2018-09-27 | End: 2018-09-27 | Stop reason: HOSPADM

## 2018-09-27 RX ORDER — SODIUM CHLORIDE 0.9 % (FLUSH) 0.9 %
10 SYRINGE (ML) INJECTION
Status: DISCONTINUED | OUTPATIENT
Start: 2018-09-27 | End: 2018-09-27 | Stop reason: HOSPADM

## 2018-09-27 RX ORDER — ACETAMINOPHEN 325 MG/1
650 TABLET ORAL
Status: CANCELLED | OUTPATIENT
Start: 2018-09-27

## 2018-09-27 RX ORDER — MEPERIDINE HYDROCHLORIDE 25 MG/ML
25 INJECTION INTRAMUSCULAR; INTRAVENOUS; SUBCUTANEOUS
Status: CANCELLED | OUTPATIENT
Start: 2018-09-27

## 2018-09-27 RX ORDER — SODIUM CHLORIDE 0.9 % (FLUSH) 0.9 %
10 SYRINGE (ML) INJECTION
Status: CANCELLED | OUTPATIENT
Start: 2018-09-27

## 2018-09-27 RX ORDER — HEPARIN 100 UNIT/ML
500 SYRINGE INTRAVENOUS
Status: CANCELLED | OUTPATIENT
Start: 2018-09-27

## 2018-09-27 RX ORDER — FAMOTIDINE 20 MG/1
20 TABLET, FILM COATED ORAL 2 TIMES DAILY
Status: DISCONTINUED | OUTPATIENT
Start: 2018-09-27 | End: 2018-09-27 | Stop reason: HOSPADM

## 2018-09-27 RX ORDER — MEPERIDINE HYDROCHLORIDE 50 MG/ML
25 INJECTION INTRAMUSCULAR; INTRAVENOUS; SUBCUTANEOUS
Status: DISCONTINUED | OUTPATIENT
Start: 2018-09-27 | End: 2018-09-27 | Stop reason: HOSPADM

## 2018-09-27 RX ADMIN — ACETAMINOPHEN 650 MG: 325 TABLET ORAL at 02:09

## 2018-09-27 RX ADMIN — FAMOTIDINE 20 MG: 20 TABLET ORAL at 02:09

## 2018-09-27 RX ADMIN — RITUXIMAB AND HYALURONIDASE 1400 MG: 120; 2000 INJECTION, SOLUTION SUBCUTANEOUS at 03:09

## 2018-09-27 RX ADMIN — DIPHENHYDRAMINE HYDROCHLORIDE 50 MG: 50 CAPSULE ORAL at 02:09

## 2018-09-27 NOTE — PLAN OF CARE
Problem: Patient Care Overview  Goal: Individualization & Mutuality  Outcome: Ongoing (interventions implemented as appropriate)  1445-Labs , hx, and medications reviewed, no labs or md visit needed today, patient okay to receive all PO meds today per Dr. Rodríguez. Assessment completed. Discussed plan of care with patient. Patient in agreement. Chair reclined and warm blanket and snack offered.

## 2018-09-27 NOTE — PLAN OF CARE
Problem: Patient Care Overview  Goal: Plan of Care Review  Outcome: Ongoing (interventions implemented as appropriate)  1556-Patient tolerated treatment well, she received PO pr meds and her rituxan hycela injection, then was monitored for 15 minutes after wards with no signs or symptoms of reaction. Discharged without complaints or S/S of adverse event. AVS given.  Instructed to call provider for any questions or concerns.

## 2018-10-24 RX ORDER — METFORMIN HYDROCHLORIDE 500 MG/1
TABLET, EXTENDED RELEASE ORAL
Qty: 180 TABLET | Refills: 3 | Status: SHIPPED | OUTPATIENT
Start: 2018-10-24 | End: 2019-12-30

## 2018-11-12 ENCOUNTER — TELEPHONE (OUTPATIENT)
Dept: HEMATOLOGY/ONCOLOGY | Facility: CLINIC | Age: 73
End: 2018-11-12

## 2018-11-12 NOTE — TELEPHONE ENCOUNTER
Spoke to patient. Explained to her the policy with appts and infusions. Verbalized understanding.  Moved chemo appt to earlier time for her convenience      ----- Message from Henry Godinez sent at 11/12/2018 10:30 AM CST -----  Contact: Pt  Rescheduling Apt    Who called: Pt  Original Apt: 11/15  Contact preference: [phone]  Additional Information: Pt would like to r/s chemo appt on Friday, she stated she normally gets it done on the days she have her labs done and wanted to know why it was scheduled for the next day.

## 2018-11-15 ENCOUNTER — IMMUNIZATION (OUTPATIENT)
Dept: PHARMACY | Facility: CLINIC | Age: 73
End: 2018-11-15
Payer: MEDICARE

## 2018-11-15 ENCOUNTER — LAB VISIT (OUTPATIENT)
Dept: LAB | Facility: HOSPITAL | Age: 73
End: 2018-11-15
Attending: INTERNAL MEDICINE
Payer: MEDICARE

## 2018-11-15 ENCOUNTER — IMMUNIZATION (OUTPATIENT)
Dept: PHARMACY | Facility: CLINIC | Age: 73
End: 2018-11-15

## 2018-11-15 ENCOUNTER — OFFICE VISIT (OUTPATIENT)
Dept: HEMATOLOGY/ONCOLOGY | Facility: CLINIC | Age: 73
End: 2018-11-15
Payer: MEDICARE

## 2018-11-15 VITALS
TEMPERATURE: 98 F | WEIGHT: 148.13 LBS | DIASTOLIC BLOOD PRESSURE: 82 MMHG | HEART RATE: 88 BPM | SYSTOLIC BLOOD PRESSURE: 169 MMHG | HEIGHT: 63 IN | RESPIRATION RATE: 18 BRPM | OXYGEN SATURATION: 100 % | BODY MASS INDEX: 26.25 KG/M2

## 2018-11-15 DIAGNOSIS — C82.08 FOLLICULAR LYMPHOMA GRADE I, LYMPH NODES OF MULTIPLE SITES: Primary | ICD-10-CM

## 2018-11-15 DIAGNOSIS — C82.08 FOLLICULAR LYMPHOMA GRADE I, LYMPH NODES OF MULTIPLE SITES: ICD-10-CM

## 2018-11-15 LAB
ALBUMIN SERPL BCP-MCNC: 3.3 G/DL
ALP SERPL-CCNC: 82 U/L
ALT SERPL W/O P-5'-P-CCNC: 24 U/L
ANION GAP SERPL CALC-SCNC: 10 MMOL/L
ANISOCYTOSIS BLD QL SMEAR: SLIGHT
AST SERPL-CCNC: 28 U/L
BASOPHILS # BLD AUTO: 0.03 K/UL
BASOPHILS NFR BLD: 1 %
BILIRUB SERPL-MCNC: 0.3 MG/DL
BUN SERPL-MCNC: 28 MG/DL
CALCIUM SERPL-MCNC: 9.7 MG/DL
CHLORIDE SERPL-SCNC: 109 MMOL/L
CO2 SERPL-SCNC: 24 MMOL/L
CREAT SERPL-MCNC: 1.1 MG/DL
DACRYOCYTES BLD QL SMEAR: ABNORMAL
DIFFERENTIAL METHOD: ABNORMAL
EOSINOPHIL # BLD AUTO: 0.4 K/UL
EOSINOPHIL NFR BLD: 14.1 %
ERYTHROCYTE [DISTWIDTH] IN BLOOD BY AUTOMATED COUNT: 12.5 %
EST. GFR  (AFRICAN AMERICAN): 58.4 ML/MIN/1.73 M^2
EST. GFR  (NON AFRICAN AMERICAN): 50.6 ML/MIN/1.73 M^2
GLUCOSE SERPL-MCNC: 172 MG/DL
HCT VFR BLD AUTO: 33.3 %
HGB BLD-MCNC: 10.5 G/DL
HYPOCHROMIA BLD QL SMEAR: ABNORMAL
IMM GRANULOCYTES # BLD AUTO: 0.15 K/UL
IMM GRANULOCYTES NFR BLD AUTO: 4.8 %
LDH SERPL L TO P-CCNC: 388 U/L
LYMPHOCYTES # BLD AUTO: 1.1 K/UL
LYMPHOCYTES NFR BLD: 36.1 %
MCH RBC QN AUTO: 30.8 PG
MCHC RBC AUTO-ENTMCNC: 31.5 G/DL
MCV RBC AUTO: 98 FL
MONOCYTES # BLD AUTO: 0.6 K/UL
MONOCYTES NFR BLD: 18.5 %
NEUTROPHILS # BLD AUTO: 0.8 K/UL
NEUTROPHILS NFR BLD: 25.5 %
NRBC BLD-RTO: 0 /100 WBC
OVALOCYTES BLD QL SMEAR: ABNORMAL
PLATELET # BLD AUTO: 234 K/UL
PLATELET BLD QL SMEAR: ABNORMAL
PMV BLD AUTO: 10.5 FL
POIKILOCYTOSIS BLD QL SMEAR: SLIGHT
POLYCHROMASIA BLD QL SMEAR: ABNORMAL
POTASSIUM SERPL-SCNC: 5.3 MMOL/L
PROT SERPL-MCNC: 6.4 G/DL
RBC # BLD AUTO: 3.41 M/UL
SODIUM SERPL-SCNC: 143 MMOL/L
WBC # BLD AUTO: 3.13 K/UL

## 2018-11-15 PROCEDURE — 3079F DIAST BP 80-89 MM HG: CPT | Mod: CPTII,HCNC,S$GLB, | Performed by: INTERNAL MEDICINE

## 2018-11-15 PROCEDURE — 99215 OFFICE O/P EST HI 40 MIN: CPT | Mod: HCNC,S$GLB,, | Performed by: INTERNAL MEDICINE

## 2018-11-15 PROCEDURE — 3077F SYST BP >= 140 MM HG: CPT | Mod: CPTII,HCNC,S$GLB, | Performed by: INTERNAL MEDICINE

## 2018-11-15 PROCEDURE — 80053 COMPREHEN METABOLIC PANEL: CPT | Mod: HCNC

## 2018-11-15 PROCEDURE — 85025 COMPLETE CBC W/AUTO DIFF WBC: CPT | Mod: HCNC

## 2018-11-15 PROCEDURE — 83615 LACTATE (LD) (LDH) ENZYME: CPT | Mod: HCNC

## 2018-11-15 PROCEDURE — 1101F PT FALLS ASSESS-DOCD LE1/YR: CPT | Mod: CPTII,HCNC,S$GLB, | Performed by: INTERNAL MEDICINE

## 2018-11-15 PROCEDURE — 99999 PR PBB SHADOW E&M-EST. PATIENT-LVL III: CPT | Mod: PBBFAC,HCNC,, | Performed by: INTERNAL MEDICINE

## 2018-11-15 PROCEDURE — 36415 COLL VENOUS BLD VENIPUNCTURE: CPT | Mod: HCNC

## 2018-11-16 ENCOUNTER — DOCUMENTATION ONLY (OUTPATIENT)
Dept: HEMATOLOGY/ONCOLOGY | Facility: CLINIC | Age: 73
End: 2018-11-16

## 2018-11-16 ENCOUNTER — INFUSION (OUTPATIENT)
Dept: INFUSION THERAPY | Facility: HOSPITAL | Age: 73
End: 2018-11-16
Attending: INTERNAL MEDICINE
Payer: MEDICARE

## 2018-11-16 NOTE — PROGRESS NOTES
Called regarding rituximab orders for this patient. Ms. Pizarro has completed six cycles of BR chemotherapy for follicular lymphoma and presented for her 3rd cycle of maintenance rituximab. Since starting rituximab maintenance, her ANC has declined from normal in 12/2017 to ANC of only 800 today. It was 800 in 7/2018 as well.     As rituximab can infrequently cause neutropenia, we will hold today and arrange follow-up appt with Dr. Rodríguez to review etiology of neutropenia and consider whether to continue maintenance rituximab.    Henrry Escalante MD

## 2018-11-16 NOTE — NURSING
Per Dr. Escalante, hold chemo d/t XPZ=594.  Lab and MD appt with Dr. Rodríguez scheduled for 11/19/18 at 0830 and 0930.  Chemo to be rescheduled at that time.

## 2018-11-19 ENCOUNTER — TELEPHONE (OUTPATIENT)
Dept: HEMATOLOGY/ONCOLOGY | Facility: CLINIC | Age: 73
End: 2018-11-19

## 2018-11-19 ENCOUNTER — LAB VISIT (OUTPATIENT)
Dept: LAB | Facility: HOSPITAL | Age: 73
End: 2018-11-19
Attending: INTERNAL MEDICINE
Payer: MEDICARE

## 2018-11-19 DIAGNOSIS — C82.08 FOLLICULAR LYMPHOMA GRADE I, LYMPH NODES OF MULTIPLE SITES: ICD-10-CM

## 2018-11-19 LAB
ALBUMIN SERPL BCP-MCNC: 3.2 G/DL
ALP SERPL-CCNC: 72 U/L
ALT SERPL W/O P-5'-P-CCNC: 23 U/L
ANION GAP SERPL CALC-SCNC: 8 MMOL/L
ANISOCYTOSIS BLD QL SMEAR: SLIGHT
AST SERPL-CCNC: 20 U/L
BASOPHILS # BLD AUTO: 0.02 K/UL
BASOPHILS NFR BLD: 0.7 %
BILIRUB SERPL-MCNC: 0.4 MG/DL
BUN SERPL-MCNC: 26 MG/DL
CALCIUM SERPL-MCNC: 9.3 MG/DL
CHLORIDE SERPL-SCNC: 111 MMOL/L
CO2 SERPL-SCNC: 21 MMOL/L
CREAT SERPL-MCNC: 1 MG/DL
DACRYOCYTES BLD QL SMEAR: ABNORMAL
DIFFERENTIAL METHOD: ABNORMAL
EOSINOPHIL # BLD AUTO: 0.4 K/UL
EOSINOPHIL NFR BLD: 13.8 %
ERYTHROCYTE [DISTWIDTH] IN BLOOD BY AUTOMATED COUNT: 12.4 %
EST. GFR  (AFRICAN AMERICAN): >60 ML/MIN/1.73 M^2
EST. GFR  (NON AFRICAN AMERICAN): 56.8 ML/MIN/1.73 M^2
GLUCOSE SERPL-MCNC: 177 MG/DL
HCT VFR BLD AUTO: 32 %
HGB BLD-MCNC: 9.9 G/DL
HYPOCHROMIA BLD QL SMEAR: ABNORMAL
IMM GRANULOCYTES # BLD AUTO: 0.04 K/UL
IMM GRANULOCYTES NFR BLD AUTO: 1.3 %
LDH SERPL L TO P-CCNC: 248 U/L
LYMPHOCYTES # BLD AUTO: 1.4 K/UL
LYMPHOCYTES NFR BLD: 45.3 %
MCH RBC QN AUTO: 31 PG
MCHC RBC AUTO-ENTMCNC: 30.9 G/DL
MCV RBC AUTO: 100 FL
MONOCYTES # BLD AUTO: 0.5 K/UL
MONOCYTES NFR BLD: 16.4 %
NEUTROPHILS # BLD AUTO: 0.7 K/UL
NEUTROPHILS NFR BLD: 22.5 %
NRBC BLD-RTO: 0 /100 WBC
OVALOCYTES BLD QL SMEAR: ABNORMAL
PLATELET # BLD AUTO: 188 K/UL
PLATELET BLD QL SMEAR: ABNORMAL
PMV BLD AUTO: 10 FL
POIKILOCYTOSIS BLD QL SMEAR: SLIGHT
POTASSIUM SERPL-SCNC: 4.4 MMOL/L
PROT SERPL-MCNC: 5.9 G/DL
RBC # BLD AUTO: 3.19 M/UL
SODIUM SERPL-SCNC: 140 MMOL/L
WBC # BLD AUTO: 2.98 K/UL

## 2018-11-19 PROCEDURE — 85025 COMPLETE CBC W/AUTO DIFF WBC: CPT | Mod: HCNC

## 2018-11-19 PROCEDURE — 83615 LACTATE (LD) (LDH) ENZYME: CPT | Mod: HCNC

## 2018-11-19 PROCEDURE — 80053 COMPREHEN METABOLIC PANEL: CPT | Mod: HCNC

## 2018-11-19 PROCEDURE — 36415 COLL VENOUS BLD VENIPUNCTURE: CPT | Mod: HCNC

## 2018-11-19 RX ORDER — SODIUM CHLORIDE 0.9 % (FLUSH) 0.9 %
10 SYRINGE (ML) INJECTION
Status: CANCELLED | OUTPATIENT
Start: 2018-11-19

## 2018-11-19 RX ORDER — MEPERIDINE HYDROCHLORIDE 25 MG/ML
25 INJECTION INTRAMUSCULAR; INTRAVENOUS; SUBCUTANEOUS
Status: CANCELLED | OUTPATIENT
Start: 2018-11-19

## 2018-11-19 RX ORDER — FAMOTIDINE 10 MG/ML
20 INJECTION INTRAVENOUS
Status: CANCELLED | OUTPATIENT
Start: 2018-11-19

## 2018-11-19 RX ORDER — HEPARIN 100 UNIT/ML
500 SYRINGE INTRAVENOUS
Status: CANCELLED | OUTPATIENT
Start: 2018-11-19

## 2018-11-19 RX ORDER — ACETAMINOPHEN 325 MG/1
650 TABLET ORAL
Status: CANCELLED | OUTPATIENT
Start: 2018-11-19

## 2018-11-19 NOTE — TELEPHONE ENCOUNTER
Attempted to call patient.  Left message informing her I will contact her with a time for chemo and that Dr Rodríguez  Reviewed her labs and will not need to be seen this am. Asked her to return call once received.

## 2018-11-19 NOTE — PROGRESS NOTES
Subjective:       Patient ID: Keely Pizarro is a 73 y.o. female.    Chief Complaint: Follicular lymphoma grade i, lymph nodes of multiple sites; Results; and Pain (lower back and feels like fire since the scan was done)    Onc Hx:  Keely presents with her  for evaluation of newly diagnosed grade 1-2 follicular lymphoma diagnosed by excisional biopsy of a right cervical lymph node 8/1/2017.  Pathologic diagnosis was received by [facility].  Keely reports feeling fatigue since March 2017. At the time she had an infected tooth and developed cervical lymphadenopathy. She started loosing weight, total quantity to date is unknown. Appetite remains normal and she denies night sweats. She then developed shortness of breath worse with activity and left lower lung pleural effusion was noted. The received thoracentesis twice with negative pleural fluid flow cytometry. Cytology was positive for lymphocytes, but was not diagnostic for follicular lymphoma. CT imaging demonstrates diffuse, bilateral submandibular, cervical, axillary, supraclavicular, and intrathoracic lymphadenopathy.     Keely has a history of uterine cancer in 2012 treated with radiation and chemotherapy. She had a port-a-cath at that time for treatment. She has insulin dependent diabetes mellitus with poorly controlled blood sugar, reporting both high and lows. She has hypertension, and blood pressure was elevated at intake today.      Follow-up Visit 9/1/17  PET imaging shows stage 4 follicular lymphoma, large left side pleural effusion     Follow-up visit 3/15/18  Return visit to review results of post-treatment staging scan that shows complete remission after 6 cycles of BR chemotherapy. She continues to have weight loss- 7lbs since last visit with waxing/waning appetite.     Today: Interval follow-up for follicular lymphoma. Currently on maintenance Rituxan after BR for 6 cycles with complete response.      Review of Systems    Constitutional: Negative for fatigue and fever.   HENT: Negative for congestion and trouble swallowing.    Eyes: Negative for photophobia and visual disturbance.   Respiratory: Negative for cough and shortness of breath.  Cardiovascular: Negative for chest pain and leg swelling.   Gastrointestinal: Negative for abdominal distention and abdominal pain.   Genitourinary: Negative for dysuria and hematuria.   Musculoskeletal: Negative for myalgias and neck pain.   Skin: Negative for color change and pallor.   Neurological: Negative for light-headedness and headaches.   Hematological: Negative for adenopathy. Does not bruise/bleed easily.   Psychiatric/Behavioral: Negative for agitation.       Objective:      Physical Exam   Constitutional: She is oriented to person, place, and time. She appears well-developed and well-nourished.   HENT:   Mouth/Throat: Oropharynx is clear and moist. No oropharyngeal exudate.   Eyes: Pupils are equal, round, and reactive to light.   Cardiovascular: Normal rate and regular rhythm.    Pulmonary/Chest: Good aeration and clear lung sounds to all  lobes.   Abdominal: Soft. Bowel sounds are normal.   Lymphadenopathy:     She has no cervical adenopathy.   Neurological: She is alert and oriented to person, place, and time.   Skin: Skin is warm.   Psychiatric: She has a normal mood and affect. Her behavior is normal.       Assessment:       No diagnosis found.    Plan:     [place]  Completed 6 cycles of BR chemotherapy for follicular lymphoma achieving a complete response on end of therapy PET.   LDH WNL.CBC and CMP stable  Currently on maintenance Rituxan once every 2 months for 2 years. (Second cycle)    HTN  - BP elevated; return to PCP for management  - pt states lisinopril was stopped by PCP when he started amlodipine.     DM2  - hyergylcemic; return to PCP for management    CKD  - likely secondary to uncontrolled HTN/DM2, meds, chemo    Rituxan every 2 months  Return visit every 4 months with  CBC, CMP and LDH

## 2018-11-20 ENCOUNTER — TELEPHONE (OUTPATIENT)
Dept: HEMATOLOGY/ONCOLOGY | Facility: CLINIC | Age: 73
End: 2018-11-20

## 2018-11-23 ENCOUNTER — INFUSION (OUTPATIENT)
Dept: INFUSION THERAPY | Facility: HOSPITAL | Age: 73
End: 2018-11-23
Attending: INTERNAL MEDICINE
Payer: MEDICARE

## 2018-11-23 VITALS
TEMPERATURE: 98 F | RESPIRATION RATE: 18 BRPM | HEART RATE: 90 BPM | HEIGHT: 63 IN | BODY MASS INDEX: 26.25 KG/M2 | WEIGHT: 148.13 LBS | SYSTOLIC BLOOD PRESSURE: 148 MMHG | DIASTOLIC BLOOD PRESSURE: 71 MMHG

## 2018-11-23 DIAGNOSIS — C82.08 FOLLICULAR LYMPHOMA GRADE I, LYMPH NODES OF MULTIPLE SITES: Primary | ICD-10-CM

## 2018-11-23 PROCEDURE — 96409 CHEMO IV PUSH SNGL DRUG: CPT | Mod: HCNC

## 2018-11-23 PROCEDURE — 96401 CHEMO ANTI-NEOPL SQ/IM: CPT | Mod: HCNC

## 2018-11-23 PROCEDURE — S0028 INJECTION, FAMOTIDINE, 20 MG: HCPCS | Mod: HCNC | Performed by: INTERNAL MEDICINE

## 2018-11-23 PROCEDURE — 63600175 PHARM REV CODE 636 W HCPCS: Mod: HCNC | Performed by: INTERNAL MEDICINE

## 2018-11-23 PROCEDURE — 25000003 PHARM REV CODE 250: Mod: HCNC | Performed by: INTERNAL MEDICINE

## 2018-11-23 PROCEDURE — C9467 INJ RITUXIMAB HYALURONIDASE: HCPCS | Mod: TB,HCNC | Performed by: INTERNAL MEDICINE

## 2018-11-23 PROCEDURE — 96375 TX/PRO/DX INJ NEW DRUG ADDON: CPT | Mod: HCNC

## 2018-11-23 PROCEDURE — 96367 TX/PROPH/DG ADDL SEQ IV INF: CPT | Mod: HCNC

## 2018-11-23 RX ORDER — FAMOTIDINE 10 MG/ML
20 INJECTION INTRAVENOUS
Status: COMPLETED | OUTPATIENT
Start: 2018-11-23 | End: 2018-11-23

## 2018-11-23 RX ORDER — MEPERIDINE HYDROCHLORIDE 50 MG/ML
25 INJECTION INTRAMUSCULAR; INTRAVENOUS; SUBCUTANEOUS
Status: DISCONTINUED | OUTPATIENT
Start: 2018-11-23 | End: 2018-11-23 | Stop reason: HOSPADM

## 2018-11-23 RX ORDER — ACETAMINOPHEN 325 MG/1
650 TABLET ORAL
Status: DISCONTINUED | OUTPATIENT
Start: 2018-11-23 | End: 2018-11-23 | Stop reason: HOSPADM

## 2018-11-23 RX ADMIN — DIPHENHYDRAMINE HYDROCHLORIDE 50 MG: 50 INJECTION, SOLUTION INTRAMUSCULAR; INTRAVENOUS at 02:11

## 2018-11-23 RX ADMIN — FAMOTIDINE 20 MG: 10 INJECTION, SOLUTION INTRAVENOUS at 02:11

## 2018-11-23 RX ADMIN — SODIUM CHLORIDE: 0.9 INJECTION, SOLUTION INTRAVENOUS at 02:11

## 2018-11-23 RX ADMIN — RITUXIMAB AND HYALURONIDASE 1400 MG: 120; 2000 INJECTION, SOLUTION SUBCUTANEOUS at 03:11

## 2018-11-23 NOTE — PLAN OF CARE
Problem: Chemotherapy Effects (Adult)  Goal: Signs and Symptoms of Listed Potential Problems Will be Absent, Minimized or Managed (Chemotherapy Effects)  Signs and symptoms of listed potential problems will be absent, minimized or managed by discharge/transition of care (reference Chemotherapy Effects (Adult) CPG).    Outcome: Ongoing (interventions implemented as appropriate)  Patient here for Rituxan hycela.  Assessment completed and labs reviewed.  VSS.  No needs at this time.  Chair reclined and blanket offered.  Will continue to monitor.

## 2018-11-23 NOTE — PLAN OF CARE
Problem: Patient Care Overview  Goal: Plan of Care Review  Outcome: Ongoing (interventions implemented as appropriate)  Tolerated injection well.  Observed 15 minutes post injection, no reactions noted.  No questions or concerns at this time.  Ambulated off unit unassisted.

## 2019-01-18 ENCOUNTER — INFUSION (OUTPATIENT)
Dept: INFUSION THERAPY | Facility: HOSPITAL | Age: 74
End: 2019-01-18
Attending: INTERNAL MEDICINE
Payer: MEDICARE

## 2019-01-18 VITALS
DIASTOLIC BLOOD PRESSURE: 91 MMHG | TEMPERATURE: 98 F | HEART RATE: 83 BPM | SYSTOLIC BLOOD PRESSURE: 173 MMHG | WEIGHT: 148.13 LBS | BODY MASS INDEX: 26.25 KG/M2 | RESPIRATION RATE: 18 BRPM | HEIGHT: 63 IN

## 2019-01-18 DIAGNOSIS — C82.08 FOLLICULAR LYMPHOMA GRADE I, LYMPH NODES OF MULTIPLE SITES: Primary | ICD-10-CM

## 2019-01-18 PROCEDURE — 96401 CHEMO ANTI-NEOPL SQ/IM: CPT | Mod: HCNC

## 2019-01-18 PROCEDURE — 25000003 PHARM REV CODE 250: Mod: HCNC | Performed by: INTERNAL MEDICINE

## 2019-01-18 PROCEDURE — 96367 TX/PROPH/DG ADDL SEQ IV INF: CPT | Mod: HCNC

## 2019-01-18 PROCEDURE — 63600175 PHARM REV CODE 636 W HCPCS: Mod: HCNC | Performed by: INTERNAL MEDICINE

## 2019-01-18 PROCEDURE — S0028 INJECTION, FAMOTIDINE, 20 MG: HCPCS | Mod: HCNC | Performed by: INTERNAL MEDICINE

## 2019-01-18 PROCEDURE — 96375 TX/PRO/DX INJ NEW DRUG ADDON: CPT | Mod: HCNC

## 2019-01-18 PROCEDURE — 96365 THER/PROPH/DIAG IV INF INIT: CPT

## 2019-01-18 RX ORDER — SODIUM CHLORIDE 0.9 % (FLUSH) 0.9 %
10 SYRINGE (ML) INJECTION
Status: DISCONTINUED | OUTPATIENT
Start: 2019-01-18 | End: 2019-01-18 | Stop reason: HOSPADM

## 2019-01-18 RX ORDER — ACETAMINOPHEN 325 MG/1
650 TABLET ORAL
Status: COMPLETED | OUTPATIENT
Start: 2019-01-18 | End: 2019-01-18

## 2019-01-18 RX ORDER — SODIUM CHLORIDE 0.9 % (FLUSH) 0.9 %
10 SYRINGE (ML) INJECTION
Status: CANCELLED | OUTPATIENT
Start: 2019-01-18

## 2019-01-18 RX ORDER — MEPERIDINE HYDROCHLORIDE 50 MG/ML
25 INJECTION INTRAMUSCULAR; INTRAVENOUS; SUBCUTANEOUS
Status: DISCONTINUED | OUTPATIENT
Start: 2019-01-18 | End: 2019-01-18 | Stop reason: HOSPADM

## 2019-01-18 RX ORDER — HEPARIN 100 UNIT/ML
500 SYRINGE INTRAVENOUS
Status: CANCELLED | OUTPATIENT
Start: 2019-01-18

## 2019-01-18 RX ORDER — ACETAMINOPHEN 325 MG/1
650 TABLET ORAL
Status: CANCELLED | OUTPATIENT
Start: 2019-01-18

## 2019-01-18 RX ORDER — FAMOTIDINE 10 MG/ML
20 INJECTION INTRAVENOUS
Status: COMPLETED | OUTPATIENT
Start: 2019-01-18 | End: 2019-01-18

## 2019-01-18 RX ORDER — MEPERIDINE HYDROCHLORIDE 25 MG/ML
25 INJECTION INTRAMUSCULAR; INTRAVENOUS; SUBCUTANEOUS
Status: CANCELLED | OUTPATIENT
Start: 2019-01-18

## 2019-01-18 RX ORDER — FAMOTIDINE 10 MG/ML
20 INJECTION INTRAVENOUS
Status: CANCELLED | OUTPATIENT
Start: 2019-01-18

## 2019-01-18 RX ADMIN — ACETAMINOPHEN 650 MG: 325 TABLET ORAL at 08:01

## 2019-01-18 RX ADMIN — DIPHENHYDRAMINE HYDROCHLORIDE 50 MG: 50 INJECTION, SOLUTION INTRAMUSCULAR; INTRAVENOUS at 08:01

## 2019-01-18 RX ADMIN — FAMOTIDINE 20 MG: 10 INJECTION, SOLUTION INTRAVENOUS at 08:01

## 2019-01-18 RX ADMIN — RITUXIMAB AND HYALURONIDASE 1400 MG: 120; 2000 INJECTION, SOLUTION SUBCUTANEOUS at 08:01

## 2019-01-18 RX ADMIN — SODIUM CHLORIDE: 0.9 INJECTION, SOLUTION INTRAVENOUS at 08:01

## 2019-01-31 DIAGNOSIS — E11.9 TYPE 2 DIABETES MELLITUS WITHOUT COMPLICATION, WITHOUT LONG-TERM CURRENT USE OF INSULIN: ICD-10-CM

## 2019-02-01 RX ORDER — GLIPIZIDE 10 MG/1
TABLET ORAL
Qty: 90 TABLET | Refills: 0 | Status: SHIPPED | OUTPATIENT
Start: 2019-02-01 | End: 2019-03-04 | Stop reason: SDUPTHER

## 2019-02-14 ENCOUNTER — TELEPHONE (OUTPATIENT)
Dept: HEMATOLOGY/ONCOLOGY | Facility: CLINIC | Age: 74
End: 2019-02-14

## 2019-02-14 NOTE — TELEPHONE ENCOUNTER
----- Message from Roro Pablo sent at 2/14/2019 12:50 PM CST -----  Contact: Pt   Pt will need to schedule her lab or f/u appt . Please contact her at [phone]

## 2019-02-26 ENCOUNTER — TELEPHONE (OUTPATIENT)
Dept: HEMATOLOGY/ONCOLOGY | Facility: CLINIC | Age: 74
End: 2019-02-26

## 2019-02-26 NOTE — TELEPHONE ENCOUNTER
----- Message from Roro Pablo sent at 2/26/2019  2:41 PM CST -----  Contact: Pt   Pt will need to schedule her lab and follow up appts   Please contact her at [phone]

## 2019-02-27 ENCOUNTER — TELEPHONE (OUTPATIENT)
Dept: HEMATOLOGY/ONCOLOGY | Facility: CLINIC | Age: 74
End: 2019-02-27

## 2019-02-27 NOTE — TELEPHONE ENCOUNTER
----- Message from Monique Melendez sent at 2/27/2019 10:07 AM CST -----  Contact: Patient  Pt wants to schedule f/u appt with Dr. Rodríguez    Contact:: [phone]

## 2019-03-04 DIAGNOSIS — E11.9 TYPE 2 DIABETES MELLITUS WITHOUT COMPLICATION, WITHOUT LONG-TERM CURRENT USE OF INSULIN: ICD-10-CM

## 2019-03-06 RX ORDER — GLIPIZIDE 10 MG/1
TABLET ORAL
Qty: 90 TABLET | Refills: 0 | Status: SHIPPED | OUTPATIENT
Start: 2019-03-06 | End: 2019-04-08 | Stop reason: SDUPTHER

## 2019-03-18 ENCOUNTER — TELEPHONE (OUTPATIENT)
Dept: HEMATOLOGY/ONCOLOGY | Facility: CLINIC | Age: 74
End: 2019-03-18

## 2019-03-18 NOTE — TELEPHONE ENCOUNTER
Spoke to patient. Infusion appt scheduled for Friday        ----- Message from Monique Melendez sent at 3/18/2019  1:18 PM CDT -----  Contact: Patient  Pt would like to schedule an infusion appt.      Contact:: [phone]

## 2019-03-22 ENCOUNTER — INFUSION (OUTPATIENT)
Dept: INFUSION THERAPY | Facility: HOSPITAL | Age: 74
End: 2019-03-22
Attending: INTERNAL MEDICINE
Payer: MEDICARE

## 2019-03-22 VITALS
RESPIRATION RATE: 18 BRPM | BODY MASS INDEX: 27.03 KG/M2 | WEIGHT: 152.56 LBS | HEIGHT: 63 IN | DIASTOLIC BLOOD PRESSURE: 81 MMHG | TEMPERATURE: 98 F | SYSTOLIC BLOOD PRESSURE: 172 MMHG | HEART RATE: 84 BPM

## 2019-03-22 DIAGNOSIS — C82.08 FOLLICULAR LYMPHOMA GRADE I, LYMPH NODES OF MULTIPLE SITES: Primary | ICD-10-CM

## 2019-03-22 PROCEDURE — 63600175 PHARM REV CODE 636 W HCPCS: Mod: HCNC,TB | Performed by: INTERNAL MEDICINE

## 2019-03-22 PROCEDURE — 96365 THER/PROPH/DIAG IV INF INIT: CPT | Mod: HCNC

## 2019-03-22 PROCEDURE — 25000003 PHARM REV CODE 250: Mod: HCNC | Performed by: INTERNAL MEDICINE

## 2019-03-22 PROCEDURE — 96367 TX/PROPH/DG ADDL SEQ IV INF: CPT | Mod: HCNC

## 2019-03-22 PROCEDURE — 96401 CHEMO ANTI-NEOPL SQ/IM: CPT | Mod: HCNC

## 2019-03-22 PROCEDURE — S0028 INJECTION, FAMOTIDINE, 20 MG: HCPCS | Mod: HCNC | Performed by: INTERNAL MEDICINE

## 2019-03-22 PROCEDURE — 96375 TX/PRO/DX INJ NEW DRUG ADDON: CPT | Mod: HCNC

## 2019-03-22 PROCEDURE — 63600175 PHARM REV CODE 636 W HCPCS: Mod: HCNC | Performed by: INTERNAL MEDICINE

## 2019-03-22 RX ORDER — MEPERIDINE HYDROCHLORIDE 50 MG/ML
25 INJECTION INTRAMUSCULAR; INTRAVENOUS; SUBCUTANEOUS
Status: DISCONTINUED | OUTPATIENT
Start: 2019-03-22 | End: 2019-03-22 | Stop reason: HOSPADM

## 2019-03-22 RX ORDER — HEPARIN 100 UNIT/ML
500 SYRINGE INTRAVENOUS
Status: CANCELLED | OUTPATIENT
Start: 2019-03-22

## 2019-03-22 RX ORDER — MEPERIDINE HYDROCHLORIDE 25 MG/ML
25 INJECTION INTRAMUSCULAR; INTRAVENOUS; SUBCUTANEOUS
Status: CANCELLED | OUTPATIENT
Start: 2019-03-22

## 2019-03-22 RX ORDER — ACETAMINOPHEN 325 MG/1
650 TABLET ORAL
Status: COMPLETED | OUTPATIENT
Start: 2019-03-22 | End: 2019-03-22

## 2019-03-22 RX ORDER — FAMOTIDINE 10 MG/ML
20 INJECTION INTRAVENOUS
Status: COMPLETED | OUTPATIENT
Start: 2019-03-22 | End: 2019-03-22

## 2019-03-22 RX ORDER — ACETAMINOPHEN 325 MG/1
650 TABLET ORAL
Status: CANCELLED | OUTPATIENT
Start: 2019-03-22

## 2019-03-22 RX ORDER — FAMOTIDINE 10 MG/ML
20 INJECTION INTRAVENOUS
Status: CANCELLED | OUTPATIENT
Start: 2019-03-22

## 2019-03-22 RX ORDER — SODIUM CHLORIDE 0.9 % (FLUSH) 0.9 %
10 SYRINGE (ML) INJECTION
Status: CANCELLED | OUTPATIENT
Start: 2019-03-22

## 2019-03-22 RX ADMIN — ACETAMINOPHEN 650 MG: 325 TABLET ORAL at 09:03

## 2019-03-22 RX ADMIN — FAMOTIDINE 20 MG: 10 INJECTION, SOLUTION INTRAVENOUS at 09:03

## 2019-03-22 RX ADMIN — DIPHENHYDRAMINE HYDROCHLORIDE 50 MG: 50 INJECTION, SOLUTION INTRAMUSCULAR; INTRAVENOUS at 09:03

## 2019-03-22 RX ADMIN — RITUXIMAB AND HYALURONIDASE 1400 MG: 120; 2000 INJECTION, SOLUTION SUBCUTANEOUS at 10:03

## 2019-03-22 RX ADMIN — SODIUM CHLORIDE: 9 INJECTION, SOLUTION INTRAVENOUS at 09:03

## 2019-03-22 NOTE — PLAN OF CARE
Problem: Fatigue  Goal: Improved Activity Tolerance  Outcome: Ongoing (interventions implemented as appropriate)  Labs , hx, and medications reviewed. Assessment completed. Discussed plan of care with patient. Patient in agreement. VSS.  Per Kailyn NAGY at DR Rodríguez office ok to proceed without labs/MD visit.  Chair reclined and warm blanket and snack offered. Will cont to monitor

## 2019-03-22 NOTE — PLAN OF CARE
Problem: Adult Inpatient Plan of Care  Goal: Plan of Care Review  Outcome: Ongoing (interventions implemented as appropriate)  Pt tolerated rituxan hycela and 15 min observation post injection without adverse effects. VSS. Provided AVS & verbalized understanding of RTC date. DC ambulating independently.

## 2019-03-29 DIAGNOSIS — Z12.39 BREAST CANCER SCREENING: ICD-10-CM

## 2019-03-29 DIAGNOSIS — Z11.59 NEED FOR HEPATITIS C SCREENING TEST: ICD-10-CM

## 2019-04-02 ENCOUNTER — LAB VISIT (OUTPATIENT)
Dept: LAB | Facility: HOSPITAL | Age: 74
End: 2019-04-02
Attending: INTERNAL MEDICINE
Payer: MEDICARE

## 2019-04-02 ENCOUNTER — OFFICE VISIT (OUTPATIENT)
Dept: HEMATOLOGY/ONCOLOGY | Facility: CLINIC | Age: 74
End: 2019-04-02
Payer: MEDICARE

## 2019-04-02 VITALS
HEART RATE: 77 BPM | WEIGHT: 149.88 LBS | BODY MASS INDEX: 26.55 KG/M2 | OXYGEN SATURATION: 100 % | SYSTOLIC BLOOD PRESSURE: 181 MMHG | HEIGHT: 63 IN | RESPIRATION RATE: 18 BRPM | DIASTOLIC BLOOD PRESSURE: 90 MMHG | TEMPERATURE: 98 F

## 2019-04-02 DIAGNOSIS — C82.08 FOLLICULAR LYMPHOMA GRADE I, LYMPH NODES OF MULTIPLE SITES: Primary | ICD-10-CM

## 2019-04-02 DIAGNOSIS — C82.08 FOLLICULAR LYMPHOMA GRADE I, LYMPH NODES OF MULTIPLE SITES: ICD-10-CM

## 2019-04-02 LAB
ALBUMIN SERPL BCP-MCNC: 3.2 G/DL (ref 3.5–5.2)
ALP SERPL-CCNC: 101 U/L (ref 55–135)
ALT SERPL W/O P-5'-P-CCNC: 16 U/L (ref 10–44)
ANION GAP SERPL CALC-SCNC: 7 MMOL/L (ref 8–16)
ANISOCYTOSIS BLD QL SMEAR: SLIGHT
AST SERPL-CCNC: 18 U/L (ref 10–40)
BASOPHILS # BLD AUTO: ABNORMAL K/UL (ref 0–0.2)
BASOPHILS NFR BLD: 2 % (ref 0–1.9)
BILIRUB SERPL-MCNC: 0.3 MG/DL (ref 0.1–1)
BUN SERPL-MCNC: 26 MG/DL (ref 8–23)
CALCIUM SERPL-MCNC: 9.4 MG/DL (ref 8.7–10.5)
CHLORIDE SERPL-SCNC: 116 MMOL/L (ref 95–110)
CO2 SERPL-SCNC: 21 MMOL/L (ref 23–29)
CREAT SERPL-MCNC: 1.2 MG/DL (ref 0.5–1.4)
DIFFERENTIAL METHOD: ABNORMAL
EOSINOPHIL # BLD AUTO: ABNORMAL K/UL (ref 0–0.5)
EOSINOPHIL NFR BLD: 16 % (ref 0–8)
ERYTHROCYTE [DISTWIDTH] IN BLOOD BY AUTOMATED COUNT: 12.2 % (ref 11.5–14.5)
EST. GFR  (AFRICAN AMERICAN): 52.2 ML/MIN/1.73 M^2
EST. GFR  (NON AFRICAN AMERICAN): 45.3 ML/MIN/1.73 M^2
GLUCOSE SERPL-MCNC: 140 MG/DL (ref 70–110)
HCT VFR BLD AUTO: 34.6 % (ref 37–48.5)
HGB BLD-MCNC: 10.4 G/DL (ref 12–16)
HYPOCHROMIA BLD QL SMEAR: ABNORMAL
IMM GRANULOCYTES # BLD AUTO: ABNORMAL K/UL (ref 0–0.04)
IMM GRANULOCYTES NFR BLD AUTO: ABNORMAL % (ref 0–0.5)
LDH SERPL L TO P-CCNC: 277 U/L (ref 110–260)
LYMPHOCYTES # BLD AUTO: ABNORMAL K/UL (ref 1–4.8)
LYMPHOCYTES NFR BLD: 38 % (ref 18–48)
MCH RBC QN AUTO: 29.2 PG (ref 27–31)
MCHC RBC AUTO-ENTMCNC: 30.1 G/DL (ref 32–36)
MCV RBC AUTO: 97 FL (ref 82–98)
METAMYELOCYTES NFR BLD MANUAL: 1 %
MONOCYTES # BLD AUTO: ABNORMAL K/UL (ref 0.3–1)
MONOCYTES NFR BLD: 16 % (ref 4–15)
NEUTROPHILS NFR BLD: 25 % (ref 38–73)
NEUTS BAND NFR BLD MANUAL: 2 %
NRBC BLD-RTO: 0 /100 WBC
OVALOCYTES BLD QL SMEAR: ABNORMAL
PLATELET # BLD AUTO: 203 K/UL (ref 150–350)
PMV BLD AUTO: 10.3 FL (ref 9.2–12.9)
POIKILOCYTOSIS BLD QL SMEAR: SLIGHT
POLYCHROMASIA BLD QL SMEAR: ABNORMAL
POTASSIUM SERPL-SCNC: 4.4 MMOL/L (ref 3.5–5.1)
PROT SERPL-MCNC: 6 G/DL (ref 6–8.4)
RBC # BLD AUTO: 3.56 M/UL (ref 4–5.4)
SODIUM SERPL-SCNC: 144 MMOL/L (ref 136–145)
WBC # BLD AUTO: 2.38 K/UL (ref 3.9–12.7)

## 2019-04-02 PROCEDURE — 3080F PR MOST RECENT DIASTOLIC BLOOD PRESSURE >= 90 MM HG: ICD-10-PCS | Mod: HCNC,CPTII,S$GLB, | Performed by: INTERNAL MEDICINE

## 2019-04-02 PROCEDURE — 99999 PR PBB SHADOW E&M-EST. PATIENT-LVL III: ICD-10-PCS | Mod: PBBFAC,HCNC,, | Performed by: INTERNAL MEDICINE

## 2019-04-02 PROCEDURE — 99215 OFFICE O/P EST HI 40 MIN: CPT | Mod: HCNC,S$GLB,, | Performed by: INTERNAL MEDICINE

## 2019-04-02 PROCEDURE — 85007 BL SMEAR W/DIFF WBC COUNT: CPT | Mod: HCNC

## 2019-04-02 PROCEDURE — 99999 PR PBB SHADOW E&M-EST. PATIENT-LVL III: CPT | Mod: PBBFAC,HCNC,, | Performed by: INTERNAL MEDICINE

## 2019-04-02 PROCEDURE — 85027 COMPLETE CBC AUTOMATED: CPT | Mod: HCNC

## 2019-04-02 PROCEDURE — 83615 LACTATE (LD) (LDH) ENZYME: CPT | Mod: HCNC

## 2019-04-02 PROCEDURE — 3080F DIAST BP >= 90 MM HG: CPT | Mod: HCNC,CPTII,S$GLB, | Performed by: INTERNAL MEDICINE

## 2019-04-02 PROCEDURE — 1101F PR PT FALLS ASSESS DOC 0-1 FALLS W/OUT INJ PAST YR: ICD-10-PCS | Mod: HCNC,CPTII,S$GLB, | Performed by: INTERNAL MEDICINE

## 2019-04-02 PROCEDURE — 3077F SYST BP >= 140 MM HG: CPT | Mod: HCNC,CPTII,S$GLB, | Performed by: INTERNAL MEDICINE

## 2019-04-02 PROCEDURE — 1101F PT FALLS ASSESS-DOCD LE1/YR: CPT | Mod: HCNC,CPTII,S$GLB, | Performed by: INTERNAL MEDICINE

## 2019-04-02 PROCEDURE — 3077F PR MOST RECENT SYSTOLIC BLOOD PRESSURE >= 140 MM HG: ICD-10-PCS | Mod: HCNC,CPTII,S$GLB, | Performed by: INTERNAL MEDICINE

## 2019-04-02 PROCEDURE — 99215 PR OFFICE/OUTPT VISIT, EST, LEVL V, 40-54 MIN: ICD-10-PCS | Mod: HCNC,S$GLB,, | Performed by: INTERNAL MEDICINE

## 2019-04-02 PROCEDURE — 36415 COLL VENOUS BLD VENIPUNCTURE: CPT | Mod: HCNC

## 2019-04-02 PROCEDURE — 80053 COMPREHEN METABOLIC PANEL: CPT | Mod: HCNC

## 2019-04-02 NOTE — PROGRESS NOTES
Subjective:       Patient ID: Keely Pizarro is a 74 y.o. female.    Chief Complaint: No chief complaint on file.    Onc Hx:  Keely presents with her  for evaluation of newly diagnosed grade 1-2 follicular lymphoma diagnosed by excisional biopsy of a right cervical lymph node 8/1/2017.  Pathologic diagnosis was received by [facility].  Keely reports feeling fatigue since March 2017. At the time she had an infected tooth and developed cervical lymphadenopathy. She started loosing weight, total quantity to date is unknown. Appetite remains normal and she denies night sweats. She then developed shortness of breath worse with activity and left lower lung pleural effusion was noted. The received thoracentesis twice with negative pleural fluid flow cytometry. Cytology was positive for lymphocytes, but was not diagnostic for follicular lymphoma. CT imaging demonstrates diffuse, bilateral submandibular, cervical, axillary, supraclavicular, and intrathoracic lymphadenopathy.     Keely has a history of uterine cancer in 2012 treated with radiation and chemotherapy. She had a port-a-cath at that time for treatment. She has insulin dependent diabetes mellitus with poorly controlled blood sugar, reporting both high and lows. She has hypertension, and blood pressure was elevated at intake today.      Follow-up Visit 9/1/17  PET imaging shows stage 4 follicular lymphoma, large left side pleural effusion     Follow-up visit 3/15/18  Return visit to review results of post-treatment staging scan that shows complete remission after 6 cycles of BR chemotherapy. She continues to have weight loss- 7lbs since last visit with waxing/waning appetite.     Follow-up Visit 11/9/18   Interval follow-up for follicular lymphoma. Currently on maintenance Rituxan after BR for 6 cycles with complete response.    Today:  Interval follow-up for follicular lymphoma on maintenance Rituxan after BR for 6 cycles with CR.  Notes fatigue  and bilateral hip pain after Rituxan infusions.  Mild cytopenias after therapy may be side effect from Rituxan.  ROS negative for B symptoms.  HTN improved- 160/80 with manual recheck.      Review of Systems   Constitutional: Negative for fatigue and fever.   HENT: Negative for congestion and trouble swallowing.    Eyes: Negative for photophobia and visual disturbance.   Respiratory: Negative for cough and shortness of breath.  Cardiovascular: Negative for chest pain and leg swelling.   Gastrointestinal: Negative for abdominal distention and abdominal pain.   Genitourinary: Negative for dysuria and hematuria.   Musculoskeletal: Negative for myalgias and neck pain.   Skin: Negative for color change and pallor.   Neurological: Negative for light-headedness and headaches.   Hematological: Negative for adenopathy. Does not bruise/bleed easily.   Psychiatric/Behavioral: Negative for agitation.       Objective:      Physical Exam   Constitutional: She is oriented to person, place, and time. She appears well-developed and well-nourished.   HENT:   Mouth/Throat: Oropharynx is clear and moist. No oropharyngeal exudate.   Eyes: Pupils are equal, round, and reactive to light.   Cardiovascular: Normal rate and regular rhythm.    Pulmonary/Chest: Good aeration and clear lung sounds to all  lobes.   Abdominal: Soft. Bowel sounds are normal.   Lymphadenopathy:     She has no cervical adenopathy, axillary, or inguinal lymphadenopathy  Neurological: She is alert and oriented to person, place, and time.   Skin: Skin is warm.   Psychiatric: She has a normal mood and affect. Her behavior is normal.       Assessment:       No diagnosis found.    Plan:     [place]  Completed 6 cycles of BR chemotherapy for follicular lymphoma achieving a complete response on end of therapy PET.   LDH WNL.CBC and CMP stable  Currently on maintenance Rituxan once every 2 months for 2 years. (4th cycle)    HTN  - Continue amlodipine  - Component of white coat  HTN at our visits    DM2  - hyergylcemic; return to PCP for management    CKD  - likely secondary to uncontrolled HTN/DM2, meds, chemo    Rituxan every 2 months  Return visit every 4 months with CBC, CMP and LDH

## 2019-04-02 NOTE — Clinical Note
Rituxan every 2 months, get CBC in 2 months in day of RituxanReturn visit every 4 months with CBC, CMP and LDH

## 2019-04-04 DIAGNOSIS — I10 HYPERTENSION, ESSENTIAL: ICD-10-CM

## 2019-04-04 RX ORDER — AMLODIPINE BESYLATE 10 MG/1
TABLET ORAL
Qty: 90 TABLET | Refills: 3 | Status: SHIPPED | OUTPATIENT
Start: 2019-04-04 | End: 2020-04-28

## 2019-04-08 DIAGNOSIS — E11.9 TYPE 2 DIABETES MELLITUS WITHOUT COMPLICATION, WITHOUT LONG-TERM CURRENT USE OF INSULIN: ICD-10-CM

## 2019-04-09 RX ORDER — GLIPIZIDE 10 MG/1
TABLET ORAL
Qty: 90 TABLET | Refills: 0 | Status: SHIPPED | OUTPATIENT
Start: 2019-04-09 | End: 2019-05-13 | Stop reason: SDUPTHER

## 2019-05-02 ENCOUNTER — INFUSION (OUTPATIENT)
Dept: INFUSION THERAPY | Facility: HOSPITAL | Age: 74
End: 2019-05-02
Attending: INTERNAL MEDICINE
Payer: MEDICARE

## 2019-05-02 VITALS
HEIGHT: 63 IN | WEIGHT: 153.69 LBS | DIASTOLIC BLOOD PRESSURE: 81 MMHG | HEART RATE: 78 BPM | SYSTOLIC BLOOD PRESSURE: 169 MMHG | TEMPERATURE: 98 F | RESPIRATION RATE: 18 BRPM | BODY MASS INDEX: 27.23 KG/M2

## 2019-05-02 DIAGNOSIS — C82.08 FOLLICULAR LYMPHOMA GRADE I, LYMPH NODES OF MULTIPLE SITES: Primary | ICD-10-CM

## 2019-05-02 PROCEDURE — 25000003 PHARM REV CODE 250: Mod: HCNC | Performed by: INTERNAL MEDICINE

## 2019-05-02 PROCEDURE — 63600175 PHARM REV CODE 636 W HCPCS: Mod: HCNC,TB | Performed by: INTERNAL MEDICINE

## 2019-05-02 PROCEDURE — 96401 CHEMO ANTI-NEOPL SQ/IM: CPT | Mod: HCNC

## 2019-05-02 RX ORDER — ACETAMINOPHEN 325 MG/1
650 TABLET ORAL
Status: COMPLETED | OUTPATIENT
Start: 2019-05-02 | End: 2019-05-02

## 2019-05-02 RX ORDER — SODIUM CHLORIDE 0.9 % (FLUSH) 0.9 %
10 SYRINGE (ML) INJECTION
Status: DISCONTINUED | OUTPATIENT
Start: 2019-05-02 | End: 2019-05-02 | Stop reason: HOSPADM

## 2019-05-02 RX ORDER — MEPERIDINE HYDROCHLORIDE 25 MG/ML
25 INJECTION INTRAMUSCULAR; INTRAVENOUS; SUBCUTANEOUS
Status: CANCELLED | OUTPATIENT
Start: 2019-05-03

## 2019-05-02 RX ORDER — HEPARIN 100 UNIT/ML
500 SYRINGE INTRAVENOUS
Status: CANCELLED | OUTPATIENT
Start: 2019-05-03

## 2019-05-02 RX ORDER — DIPHENHYDRAMINE HCL 50 MG
50 CAPSULE ORAL
Status: COMPLETED | OUTPATIENT
Start: 2019-05-02 | End: 2019-05-02

## 2019-05-02 RX ORDER — HEPARIN 100 UNIT/ML
500 SYRINGE INTRAVENOUS
Status: DISCONTINUED | OUTPATIENT
Start: 2019-05-02 | End: 2019-05-02 | Stop reason: HOSPADM

## 2019-05-02 RX ORDER — DIPHENHYDRAMINE HCL 25 MG
50 CAPSULE ORAL
Status: CANCELLED
Start: 2019-05-03

## 2019-05-02 RX ORDER — FAMOTIDINE 20 MG/1
20 TABLET, FILM COATED ORAL 2 TIMES DAILY
Status: CANCELLED
Start: 2019-05-03

## 2019-05-02 RX ORDER — ACETAMINOPHEN 325 MG/1
650 TABLET ORAL
Status: CANCELLED | OUTPATIENT
Start: 2019-05-03

## 2019-05-02 RX ORDER — FAMOTIDINE 20 MG/1
20 TABLET, FILM COATED ORAL 2 TIMES DAILY
Status: DISCONTINUED | OUTPATIENT
Start: 2019-05-02 | End: 2019-05-02 | Stop reason: HOSPADM

## 2019-05-02 RX ORDER — MEPERIDINE HYDROCHLORIDE 50 MG/ML
25 INJECTION INTRAMUSCULAR; INTRAVENOUS; SUBCUTANEOUS
Status: DISCONTINUED | OUTPATIENT
Start: 2019-05-02 | End: 2019-05-02 | Stop reason: HOSPADM

## 2019-05-02 RX ORDER — SODIUM CHLORIDE 0.9 % (FLUSH) 0.9 %
10 SYRINGE (ML) INJECTION
Status: CANCELLED | OUTPATIENT
Start: 2019-05-03

## 2019-05-02 RX ADMIN — DIPHENHYDRAMINE HYDROCHLORIDE 50 MG: 50 CAPSULE ORAL at 08:05

## 2019-05-02 RX ADMIN — ACETAMINOPHEN 650 MG: 325 TABLET ORAL at 08:05

## 2019-05-02 RX ADMIN — FAMOTIDINE 20 MG: 20 TABLET ORAL at 08:05

## 2019-05-02 RX ADMIN — RITUXIMAB AND HYALURONIDASE 1400 MG: 120; 2000 INJECTION, SOLUTION SUBCUTANEOUS at 08:05

## 2019-05-02 NOTE — PLAN OF CARE
Problem: Adult Inpatient Plan of Care  Goal: Plan of Care Review  Outcome: Ongoing (interventions implemented as appropriate)  Pt received Rituxan Hyclea; tolerated well. VSS and NAD. Observed for 15 minutes. Dr. Rodríguez ok to proceed with treatment with low ANC Pt instructed to call MD with any concerns. Pt discharged home independently.

## 2019-05-13 DIAGNOSIS — E11.9 TYPE 2 DIABETES MELLITUS WITHOUT COMPLICATION, WITHOUT LONG-TERM CURRENT USE OF INSULIN: ICD-10-CM

## 2019-05-14 RX ORDER — GLIPIZIDE 10 MG/1
TABLET ORAL
Qty: 90 TABLET | Refills: 0 | Status: SHIPPED | OUTPATIENT
Start: 2019-05-14 | End: 2019-08-07 | Stop reason: SDUPTHER

## 2019-06-03 ENCOUNTER — TELEPHONE (OUTPATIENT)
Dept: INTERNAL MEDICINE | Facility: CLINIC | Age: 74
End: 2019-06-03

## 2019-06-03 NOTE — TELEPHONE ENCOUNTER
----- Message from Rhea Nelson sent at 6/3/2019  3:09 PM CDT -----  Contact: [facility]  Name of Who is Calling: [facility]       What is the request in detail: would like to speak with nurse about statin recommendation. Please contact to further discuss and advise.      Can the clinic reply by [facility]: N      What Number to Call Back if not in [facility]: [phone]

## 2019-06-12 ENCOUNTER — TELEPHONE (OUTPATIENT)
Dept: INTERNAL MEDICINE | Facility: CLINIC | Age: 74
End: 2019-06-12

## 2019-06-12 NOTE — TELEPHONE ENCOUNTER
----- Message from Leihga Ortez sent at 6/12/2019  8:19 AM CDT -----  Contact: celia  Name of Who is Calling: celia from [facility] medication services       What is the request in detail:per Celia he will be faxing over a form and letter concerning the  statin recommendation      Can the clinic reply by [facility]: no      What Number to Call Back if not in [facility]: [phone]

## 2019-06-25 ENCOUNTER — TELEPHONE (OUTPATIENT)
Dept: INFUSION THERAPY | Facility: HOSPITAL | Age: 74
End: 2019-06-25

## 2019-06-25 NOTE — TELEPHONE ENCOUNTER
----- Message from Monique Melendez sent at 6/25/2019  1:12 PM CDT -----  Contact: Patient  Pt would like to reschedule 07/02 lab and chemo, needs to come early that morning if avail.        Contact:: [phone]

## 2019-06-27 RX ORDER — HEPARIN 100 UNIT/ML
500 SYRINGE INTRAVENOUS
Status: CANCELLED | OUTPATIENT
Start: 2019-06-28

## 2019-06-27 RX ORDER — FAMOTIDINE 20 MG/1
20 TABLET, FILM COATED ORAL 2 TIMES DAILY
Status: CANCELLED
Start: 2019-06-28

## 2019-06-27 RX ORDER — ACETAMINOPHEN 325 MG/1
650 TABLET ORAL
Status: CANCELLED | OUTPATIENT
Start: 2019-06-28

## 2019-06-27 RX ORDER — MEPERIDINE HYDROCHLORIDE 25 MG/ML
25 INJECTION INTRAMUSCULAR; INTRAVENOUS; SUBCUTANEOUS
Status: CANCELLED | OUTPATIENT
Start: 2019-06-28

## 2019-06-27 RX ORDER — DIPHENHYDRAMINE HCL 25 MG
50 CAPSULE ORAL
Status: CANCELLED
Start: 2019-06-28

## 2019-06-27 RX ORDER — SODIUM CHLORIDE 0.9 % (FLUSH) 0.9 %
10 SYRINGE (ML) INJECTION
Status: CANCELLED | OUTPATIENT
Start: 2019-06-28

## 2019-07-02 ENCOUNTER — TELEPHONE (OUTPATIENT)
Dept: HEMATOLOGY/ONCOLOGY | Facility: CLINIC | Age: 74
End: 2019-07-02

## 2019-07-02 ENCOUNTER — INFUSION (OUTPATIENT)
Dept: INFUSION THERAPY | Facility: HOSPITAL | Age: 74
End: 2019-07-02
Attending: INTERNAL MEDICINE
Payer: MEDICARE

## 2019-07-02 VITALS
HEIGHT: 63 IN | HEART RATE: 78 BPM | SYSTOLIC BLOOD PRESSURE: 180 MMHG | BODY MASS INDEX: 27.42 KG/M2 | TEMPERATURE: 98 F | RESPIRATION RATE: 18 BRPM | DIASTOLIC BLOOD PRESSURE: 89 MMHG | WEIGHT: 154.75 LBS

## 2019-07-02 DIAGNOSIS — C82.08 FOLLICULAR LYMPHOMA GRADE I, LYMPH NODES OF MULTIPLE SITES: Primary | ICD-10-CM

## 2019-07-02 PROCEDURE — 96401 CHEMO ANTI-NEOPL SQ/IM: CPT | Mod: HCNC

## 2019-07-02 PROCEDURE — 63600175 PHARM REV CODE 636 W HCPCS: Mod: HCNC,TB | Performed by: INTERNAL MEDICINE

## 2019-07-02 PROCEDURE — 25000003 PHARM REV CODE 250: Mod: HCNC | Performed by: INTERNAL MEDICINE

## 2019-07-02 RX ORDER — DIPHENHYDRAMINE HCL 50 MG
50 CAPSULE ORAL
Status: COMPLETED | OUTPATIENT
Start: 2019-07-02 | End: 2019-07-02

## 2019-07-02 RX ORDER — ACETAMINOPHEN 325 MG/1
650 TABLET ORAL
Status: COMPLETED | OUTPATIENT
Start: 2019-07-02 | End: 2019-07-02

## 2019-07-02 RX ORDER — SODIUM CHLORIDE 0.9 % (FLUSH) 0.9 %
10 SYRINGE (ML) INJECTION
Status: DISCONTINUED | OUTPATIENT
Start: 2019-07-02 | End: 2019-07-02 | Stop reason: HOSPADM

## 2019-07-02 RX ORDER — FAMOTIDINE 20 MG/1
20 TABLET, FILM COATED ORAL 2 TIMES DAILY
Status: DISCONTINUED | OUTPATIENT
Start: 2019-07-02 | End: 2019-07-02 | Stop reason: HOSPADM

## 2019-07-02 RX ORDER — MEPERIDINE HYDROCHLORIDE 50 MG/ML
25 INJECTION INTRAMUSCULAR; INTRAVENOUS; SUBCUTANEOUS
Status: DISCONTINUED | OUTPATIENT
Start: 2019-07-02 | End: 2019-07-02 | Stop reason: HOSPADM

## 2019-07-02 RX ORDER — HEPARIN 100 UNIT/ML
500 SYRINGE INTRAVENOUS
Status: DISCONTINUED | OUTPATIENT
Start: 2019-07-02 | End: 2019-07-02 | Stop reason: HOSPADM

## 2019-07-02 RX ADMIN — ACETAMINOPHEN 650 MG: 325 TABLET ORAL at 02:07

## 2019-07-02 RX ADMIN — DIPHENHYDRAMINE HYDROCHLORIDE 50 MG: 50 CAPSULE ORAL at 02:07

## 2019-07-02 RX ADMIN — RITUXIMAB AND HYALURONIDASE 1400 MG: 120; 2000 INJECTION, SOLUTION SUBCUTANEOUS at 02:07

## 2019-07-02 RX ADMIN — FAMOTIDINE 20 MG: 20 TABLET ORAL at 02:07

## 2019-07-02 NOTE — PLAN OF CARE
Problem: Adult Inpatient Plan of Care  Goal: Plan of Care Review  Outcome: Ongoing (interventions implemented as appropriate)  Pt admitted for C8 of Rituxan/Hycela SQ injection. Labs reviewed and WBC-1.63 ( CRITICAL), spoke to Kailyn NAGY with Dr. Fuller, he was aware of critical value and approved treatment. Pt received SQ injection and was observed 15 minutes post injection as per protocol, discharged @ 15:10

## 2019-08-07 DIAGNOSIS — E11.9 TYPE 2 DIABETES MELLITUS WITHOUT COMPLICATION, WITHOUT LONG-TERM CURRENT USE OF INSULIN: ICD-10-CM

## 2019-08-08 RX ORDER — GLIPIZIDE 10 MG/1
TABLET ORAL
Qty: 90 TABLET | Refills: 0 | Status: SHIPPED | OUTPATIENT
Start: 2019-08-08 | End: 2020-02-03

## 2019-08-19 ENCOUNTER — TELEPHONE (OUTPATIENT)
Dept: INFUSION THERAPY | Facility: HOSPITAL | Age: 74
End: 2019-08-19

## 2019-09-04 ENCOUNTER — INFUSION (OUTPATIENT)
Dept: INFUSION THERAPY | Facility: HOSPITAL | Age: 74
End: 2019-09-04
Attending: INTERNAL MEDICINE
Payer: MEDICARE

## 2019-09-04 ENCOUNTER — OFFICE VISIT (OUTPATIENT)
Dept: HEMATOLOGY/ONCOLOGY | Facility: CLINIC | Age: 74
End: 2019-09-04
Payer: MEDICARE

## 2019-09-04 VITALS
BODY MASS INDEX: 26.88 KG/M2 | HEIGHT: 63 IN | RESPIRATION RATE: 20 BRPM | SYSTOLIC BLOOD PRESSURE: 153 MMHG | TEMPERATURE: 98 F | WEIGHT: 151.69 LBS | HEART RATE: 87 BPM | DIASTOLIC BLOOD PRESSURE: 72 MMHG | OXYGEN SATURATION: 96 %

## 2019-09-04 VITALS
TEMPERATURE: 98 F | DIASTOLIC BLOOD PRESSURE: 67 MMHG | SYSTOLIC BLOOD PRESSURE: 139 MMHG | HEART RATE: 87 BPM | RESPIRATION RATE: 18 BRPM

## 2019-09-04 DIAGNOSIS — C82.08 FOLLICULAR LYMPHOMA GRADE I, LYMPH NODES OF MULTIPLE SITES: Primary | ICD-10-CM

## 2019-09-04 DIAGNOSIS — C82.08 FOLLICULAR LYMPHOMA GRADE I, LYMPH NODES OF MULTIPLE SITES: ICD-10-CM

## 2019-09-04 DIAGNOSIS — N18.30 CKD (CHRONIC KIDNEY DISEASE) STAGE 3, GFR 30-59 ML/MIN: Primary | ICD-10-CM

## 2019-09-04 DIAGNOSIS — Z79.52 LONG TERM CURRENT USE OF SYSTEMIC STEROIDS: ICD-10-CM

## 2019-09-04 PROCEDURE — 25000003 PHARM REV CODE 250: Mod: HCNC | Performed by: INTERNAL MEDICINE

## 2019-09-04 PROCEDURE — 99999 PR PBB SHADOW E&M-EST. PATIENT-LVL IV: ICD-10-PCS | Mod: PBBFAC,HCNC,, | Performed by: INTERNAL MEDICINE

## 2019-09-04 PROCEDURE — 3288F PR FALLS RISK ASSESSMENT DOCUMENTED: ICD-10-PCS | Mod: HCNC,CPTII,S$GLB, | Performed by: INTERNAL MEDICINE

## 2019-09-04 PROCEDURE — 1100F PR PT FALLS ASSESS DOC 2+ FALLS/FALL W/INJURY/YR: ICD-10-PCS | Mod: HCNC,CPTII,S$GLB, | Performed by: INTERNAL MEDICINE

## 2019-09-04 PROCEDURE — 3078F PR MOST RECENT DIASTOLIC BLOOD PRESSURE < 80 MM HG: ICD-10-PCS | Mod: HCNC,CPTII,S$GLB, | Performed by: INTERNAL MEDICINE

## 2019-09-04 PROCEDURE — 96401 CHEMO ANTI-NEOPL SQ/IM: CPT | Mod: HCNC

## 2019-09-04 PROCEDURE — 3078F DIAST BP <80 MM HG: CPT | Mod: HCNC,CPTII,S$GLB, | Performed by: INTERNAL MEDICINE

## 2019-09-04 PROCEDURE — 3077F SYST BP >= 140 MM HG: CPT | Mod: HCNC,CPTII,S$GLB, | Performed by: INTERNAL MEDICINE

## 2019-09-04 PROCEDURE — 99215 PR OFFICE/OUTPT VISIT, EST, LEVL V, 40-54 MIN: ICD-10-PCS | Mod: HCNC,S$GLB,, | Performed by: INTERNAL MEDICINE

## 2019-09-04 PROCEDURE — 3288F FALL RISK ASSESSMENT DOCD: CPT | Mod: HCNC,CPTII,S$GLB, | Performed by: INTERNAL MEDICINE

## 2019-09-04 PROCEDURE — 99999 PR PBB SHADOW E&M-EST. PATIENT-LVL IV: CPT | Mod: PBBFAC,HCNC,, | Performed by: INTERNAL MEDICINE

## 2019-09-04 PROCEDURE — 99499 UNLISTED E&M SERVICE: CPT | Mod: HCNC,S$GLB,, | Performed by: INTERNAL MEDICINE

## 2019-09-04 PROCEDURE — 99215 OFFICE O/P EST HI 40 MIN: CPT | Mod: HCNC,S$GLB,, | Performed by: INTERNAL MEDICINE

## 2019-09-04 PROCEDURE — 99499 RISK ADDL DX/OHS AUDIT: ICD-10-PCS | Mod: HCNC,S$GLB,, | Performed by: INTERNAL MEDICINE

## 2019-09-04 PROCEDURE — 63600175 PHARM REV CODE 636 W HCPCS: Mod: TB,HCNC | Performed by: INTERNAL MEDICINE

## 2019-09-04 PROCEDURE — 1100F PTFALLS ASSESS-DOCD GE2>/YR: CPT | Mod: HCNC,CPTII,S$GLB, | Performed by: INTERNAL MEDICINE

## 2019-09-04 PROCEDURE — 3077F PR MOST RECENT SYSTOLIC BLOOD PRESSURE >= 140 MM HG: ICD-10-PCS | Mod: HCNC,CPTII,S$GLB, | Performed by: INTERNAL MEDICINE

## 2019-09-04 RX ORDER — SODIUM CHLORIDE 0.9 % (FLUSH) 0.9 %
10 SYRINGE (ML) INJECTION
Status: DISCONTINUED | OUTPATIENT
Start: 2019-09-04 | End: 2019-09-04 | Stop reason: HOSPADM

## 2019-09-04 RX ORDER — ACETAMINOPHEN 325 MG/1
650 TABLET ORAL
Status: CANCELLED | OUTPATIENT
Start: 2019-09-04

## 2019-09-04 RX ORDER — DIPHENHYDRAMINE HCL 50 MG
50 CAPSULE ORAL
Status: COMPLETED | OUTPATIENT
Start: 2019-09-04 | End: 2019-09-04

## 2019-09-04 RX ORDER — FAMOTIDINE 20 MG/1
20 TABLET, FILM COATED ORAL
Status: COMPLETED | OUTPATIENT
Start: 2019-09-04 | End: 2019-09-04

## 2019-09-04 RX ORDER — HEPARIN 100 UNIT/ML
500 SYRINGE INTRAVENOUS
Status: DISCONTINUED | OUTPATIENT
Start: 2019-09-04 | End: 2019-09-04 | Stop reason: HOSPADM

## 2019-09-04 RX ORDER — HEPARIN 100 UNIT/ML
500 SYRINGE INTRAVENOUS
Status: CANCELLED | OUTPATIENT
Start: 2019-09-04

## 2019-09-04 RX ORDER — MEPERIDINE HYDROCHLORIDE 25 MG/ML
25 INJECTION INTRAMUSCULAR; INTRAVENOUS; SUBCUTANEOUS
Status: CANCELLED | OUTPATIENT
Start: 2019-09-04

## 2019-09-04 RX ORDER — FAMOTIDINE 20 MG/1
20 TABLET, FILM COATED ORAL 2 TIMES DAILY
Status: CANCELLED
Start: 2019-09-04

## 2019-09-04 RX ORDER — ACETAMINOPHEN 325 MG/1
650 TABLET ORAL
Status: COMPLETED | OUTPATIENT
Start: 2019-09-04 | End: 2019-09-04

## 2019-09-04 RX ORDER — MEPERIDINE HYDROCHLORIDE 50 MG/ML
25 INJECTION INTRAMUSCULAR; INTRAVENOUS; SUBCUTANEOUS
Status: DISCONTINUED | OUTPATIENT
Start: 2019-09-04 | End: 2019-09-04 | Stop reason: HOSPADM

## 2019-09-04 RX ORDER — DIPHENHYDRAMINE HCL 25 MG
50 CAPSULE ORAL
Status: CANCELLED
Start: 2019-09-04

## 2019-09-04 RX ORDER — SODIUM CHLORIDE 0.9 % (FLUSH) 0.9 %
10 SYRINGE (ML) INJECTION
Status: CANCELLED | OUTPATIENT
Start: 2019-09-04

## 2019-09-04 RX ADMIN — FAMOTIDINE 20 MG: 20 TABLET ORAL at 09:09

## 2019-09-04 RX ADMIN — ACETAMINOPHEN 650 MG: 325 TABLET ORAL at 09:09

## 2019-09-04 RX ADMIN — DIPHENHYDRAMINE HYDROCHLORIDE 50 MG: 50 CAPSULE ORAL at 09:09

## 2019-09-04 RX ADMIN — RITUXIMAB AND HYALURONIDASE 1400 MG: 120; 2000 INJECTION, SOLUTION SUBCUTANEOUS at 09:09

## 2019-09-04 NOTE — PLAN OF CARE
Problem: Adult Inpatient Plan of Care  Goal: Plan of Care Review  Outcome: Ongoing (interventions implemented as appropriate)  Labs reviewed by Dr. Rodríguez prior to treatment. Per MD okay to proceed with today's treatment. Patient tolerated Rituxan Hycela SQ injection with no complications. VSS. Pt instructed to call MD with any problems. NAD. Pt discharged home independently.

## 2019-09-04 NOTE — PROGRESS NOTES
Subjective:       Patient ID: Keely Pizarro is a 74 y.o. female.    Chief Complaint: No chief complaint on file.    Onc Hx:  Keely presents with her  for evaluation of newly diagnosed grade 1-2 follicular lymphoma diagnosed by excisional biopsy of a right cervical lymph node 8/1/2017.  Pathologic diagnosis was received by [facility].  Keely reports feeling fatigue since March 2017. At the time she had an infected tooth and developed cervical lymphadenopathy. She started loosing weight, total quantity to date is unknown. Appetite remains normal and she denies night sweats. She then developed shortness of breath worse with activity and left lower lung pleural effusion was noted. The received thoracentesis twice with negative pleural fluid flow cytometry. Cytology was positive for lymphocytes, but was not diagnostic for follicular lymphoma. CT imaging demonstrates diffuse, bilateral submandibular, cervical, axillary, supraclavicular, and intrathoracic lymphadenopathy.     Keely has a history of uterine cancer in 2012 treated with radiation and chemotherapy. She had a port-a-cath at that time for treatment. She has insulin dependent diabetes mellitus with poorly controlled blood sugar, reporting both high and lows. She has hypertension, and blood pressure was elevated at intake today.      Follow-up Visit 9/1/17  PET imaging shows stage 4 follicular lymphoma, large left side pleural effusion     Follow-up visit 3/15/18  Return visit to review results of post-treatment staging scan that shows complete remission after 6 cycles of BR chemotherapy. She continues to have weight loss- 7lbs since last visit with waxing/waning appetite.     Follow-up Visit 11/9/18   Interval follow-up for follicular lymphoma. Currently on maintenance Rituxan after BR for 6 cycles with complete response.    Today:  Interval follow-up for follicular lymphoma on maintenance Rituxan after BR for 6 cycles with CR.  Notes fatigue  and bilateral hip pain after Rituxan infusions.  Mild cytopenias after therapy may be side effect from Rituxan.  ROS negative for B symptoms.        Review of Systems   Constitutional: Negative for fatigue and fever.   HENT: Negative for congestion and trouble swallowing.    Eyes: Negative for photophobia and visual disturbance.   Respiratory: Negative for cough and shortness of breath.  Cardiovascular: Negative for chest pain and leg swelling.   Gastrointestinal: Negative for abdominal distention and abdominal pain.   Genitourinary: Negative for dysuria and hematuria.   Musculoskeletal: Negative for myalgias and neck pain.   Skin: Negative for color change and pallor.   Neurological: Negative for light-headedness and headaches.   Hematological: Negative for adenopathy. Does not bruise/bleed easily.   Psychiatric/Behavioral: Negative for agitation.       Objective:      Physical Exam   Constitutional: She is oriented to person, place, and time. She appears well-developed and well-nourished.   HENT:   Mouth/Throat: Oropharynx is clear and moist. No oropharyngeal exudate.   Eyes: Pupils are equal, round, and reactive to light.   Cardiovascular: Normal rate and regular rhythm.    Pulmonary/Chest: Good aeration and clear lung sounds to all  lobes.   Abdominal: Soft. Bowel sounds are normal.   Lymphadenopathy:     She has no cervical adenopathy, axillary, or inguinal lymphadenopathy  Neurological: She is alert and oriented to person, place, and time.   Skin: Skin is warm.   Psychiatric: She has a normal mood and affect. Her behavior is normal.       Assessment:       No diagnosis found.    Plan:     [place]  Completed 6 cycles of BR chemotherapy for follicular lymphoma achieving a complete response on end of therapy PET.   LDH WNL.CBC and CMP stable  Currently on maintenance Rituxan once every 2 months for 2 years. (4th cycle)    HTN  - Continue amlodipine  - Component of white coat HTN at our visits    DM2  - hyergylcemic;  return to PCP for management    CKD  - likely secondary to uncontrolled HTN/DM2, meds, chemo  - K and Cr elevated today- recheck in 2 weeks  - DEXA scan ordered    Rituxan every 2 months  Return visit every 4 months with CBC, CMP and LDH, HBV core, ab, and ag and DEXA scan

## 2019-09-04 NOTE — NURSING
Last HepB panel drawn in July 2018. Dr. Rodríguez notified. Hep B panel to be drawn prior to next Rituxan hycela injection.

## 2019-09-13 DIAGNOSIS — N18.9 CHRONIC KIDNEY DISEASE, UNSPECIFIED CKD STAGE: ICD-10-CM

## 2019-09-18 ENCOUNTER — LAB VISIT (OUTPATIENT)
Dept: LAB | Facility: HOSPITAL | Age: 74
End: 2019-09-18
Attending: INTERNAL MEDICINE
Payer: MEDICARE

## 2019-09-18 DIAGNOSIS — N18.30 CKD (CHRONIC KIDNEY DISEASE) STAGE 3, GFR 30-59 ML/MIN: ICD-10-CM

## 2019-09-18 LAB
ALBUMIN SERPL BCP-MCNC: 3.2 G/DL (ref 3.5–5.2)
ALP SERPL-CCNC: 94 U/L (ref 55–135)
ALT SERPL W/O P-5'-P-CCNC: 14 U/L (ref 10–44)
ANION GAP SERPL CALC-SCNC: 7 MMOL/L (ref 8–16)
AST SERPL-CCNC: 15 U/L (ref 10–40)
BILIRUB SERPL-MCNC: 0.3 MG/DL (ref 0.1–1)
BUN SERPL-MCNC: 34 MG/DL (ref 8–23)
CALCIUM SERPL-MCNC: 8.6 MG/DL (ref 8.7–10.5)
CHLORIDE SERPL-SCNC: 111 MMOL/L (ref 95–110)
CO2 SERPL-SCNC: 20 MMOL/L (ref 23–29)
CREAT SERPL-MCNC: 1.7 MG/DL (ref 0.5–1.4)
EST. GFR  (AFRICAN AMERICAN): 34.2 ML/MIN/1.73 M^2
EST. GFR  (NON AFRICAN AMERICAN): 29.7 ML/MIN/1.73 M^2
GLUCOSE SERPL-MCNC: 328 MG/DL (ref 70–110)
POTASSIUM SERPL-SCNC: 4.8 MMOL/L (ref 3.5–5.1)
PROT SERPL-MCNC: 5.6 G/DL (ref 6–8.4)
SODIUM SERPL-SCNC: 138 MMOL/L (ref 136–145)

## 2019-09-18 PROCEDURE — 36415 COLL VENOUS BLD VENIPUNCTURE: CPT | Mod: HCNC

## 2019-09-18 PROCEDURE — 80053 COMPREHEN METABOLIC PANEL: CPT | Mod: HCNC

## 2019-10-03 ENCOUNTER — IMMUNIZATION (OUTPATIENT)
Dept: PHARMACY | Facility: CLINIC | Age: 74
End: 2019-10-03
Payer: MEDICARE

## 2019-10-08 ENCOUNTER — LAB VISIT (OUTPATIENT)
Dept: LAB | Facility: OTHER | Age: 74
End: 2019-10-08
Attending: FAMILY MEDICINE
Payer: MEDICARE

## 2019-10-08 ENCOUNTER — PATIENT OUTREACH (OUTPATIENT)
Dept: ADMINISTRATIVE | Facility: OTHER | Age: 74
End: 2019-10-08

## 2019-10-08 ENCOUNTER — TELEPHONE (OUTPATIENT)
Dept: PRIMARY CARE CLINIC | Facility: CLINIC | Age: 74
End: 2019-10-08

## 2019-10-08 ENCOUNTER — OFFICE VISIT (OUTPATIENT)
Dept: INTERNAL MEDICINE | Facility: CLINIC | Age: 74
End: 2019-10-08
Attending: FAMILY MEDICINE
Payer: MEDICARE

## 2019-10-08 VITALS
DIASTOLIC BLOOD PRESSURE: 80 MMHG | OXYGEN SATURATION: 98 % | WEIGHT: 153.25 LBS | HEIGHT: 63 IN | BODY MASS INDEX: 27.15 KG/M2 | HEART RATE: 92 BPM | SYSTOLIC BLOOD PRESSURE: 182 MMHG

## 2019-10-08 DIAGNOSIS — I10 HYPERTENSION, ESSENTIAL: ICD-10-CM

## 2019-10-08 DIAGNOSIS — G89.29 CHRONIC HIP PAIN, BILATERAL: Primary | ICD-10-CM

## 2019-10-08 DIAGNOSIS — M25.551 CHRONIC HIP PAIN, BILATERAL: Primary | ICD-10-CM

## 2019-10-08 DIAGNOSIS — Z12.39 SCREENING FOR BREAST CANCER: Primary | ICD-10-CM

## 2019-10-08 DIAGNOSIS — E11.9 TYPE 2 DIABETES MELLITUS WITHOUT COMPLICATION, WITH LONG-TERM CURRENT USE OF INSULIN: ICD-10-CM

## 2019-10-08 DIAGNOSIS — M25.552 CHRONIC HIP PAIN, BILATERAL: Primary | ICD-10-CM

## 2019-10-08 DIAGNOSIS — Z79.4 TYPE 2 DIABETES MELLITUS WITHOUT COMPLICATION, WITH LONG-TERM CURRENT USE OF INSULIN: ICD-10-CM

## 2019-10-08 DIAGNOSIS — Z11.59 NEED FOR HEPATITIS C SCREENING TEST: ICD-10-CM

## 2019-10-08 LAB
ESTIMATED AVG GLUCOSE: 278 MG/DL (ref 68–131)
HBA1C MFR BLD HPLC: 11.3 % (ref 4–5.6)

## 2019-10-08 PROCEDURE — 99214 OFFICE O/P EST MOD 30 MIN: CPT | Mod: HCNC,S$GLB,, | Performed by: FAMILY MEDICINE

## 2019-10-08 PROCEDURE — 99999 PR PBB SHADOW E&M-EST. PATIENT-LVL III: ICD-10-PCS | Mod: PBBFAC,HCNC,, | Performed by: FAMILY MEDICINE

## 2019-10-08 PROCEDURE — 83036 HEMOGLOBIN GLYCOSYLATED A1C: CPT | Mod: HCNC

## 2019-10-08 PROCEDURE — 3046F HEMOGLOBIN A1C LEVEL >9.0%: CPT | Mod: HCNC,CPTII,S$GLB, | Performed by: FAMILY MEDICINE

## 2019-10-08 PROCEDURE — 3046F PR MOST RECENT HEMOGLOBIN A1C LEVEL > 9.0%: ICD-10-PCS | Mod: HCNC,CPTII,S$GLB, | Performed by: FAMILY MEDICINE

## 2019-10-08 PROCEDURE — 1101F PR PT FALLS ASSESS DOC 0-1 FALLS W/OUT INJ PAST YR: ICD-10-PCS | Mod: HCNC,CPTII,S$GLB, | Performed by: FAMILY MEDICINE

## 2019-10-08 PROCEDURE — 86803 HEPATITIS C AB TEST: CPT | Mod: HCNC

## 2019-10-08 PROCEDURE — 1101F PT FALLS ASSESS-DOCD LE1/YR: CPT | Mod: HCNC,CPTII,S$GLB, | Performed by: FAMILY MEDICINE

## 2019-10-08 PROCEDURE — 36415 COLL VENOUS BLD VENIPUNCTURE: CPT | Mod: HCNC

## 2019-10-08 PROCEDURE — 99214 PR OFFICE/OUTPT VISIT, EST, LEVL IV, 30-39 MIN: ICD-10-PCS | Mod: HCNC,S$GLB,, | Performed by: FAMILY MEDICINE

## 2019-10-08 PROCEDURE — 3077F PR MOST RECENT SYSTOLIC BLOOD PRESSURE >= 140 MM HG: ICD-10-PCS | Mod: HCNC,CPTII,S$GLB, | Performed by: FAMILY MEDICINE

## 2019-10-08 PROCEDURE — 3079F PR MOST RECENT DIASTOLIC BLOOD PRESSURE 80-89 MM HG: ICD-10-PCS | Mod: HCNC,CPTII,S$GLB, | Performed by: FAMILY MEDICINE

## 2019-10-08 PROCEDURE — 99999 PR PBB SHADOW E&M-EST. PATIENT-LVL III: CPT | Mod: PBBFAC,HCNC,, | Performed by: FAMILY MEDICINE

## 2019-10-08 PROCEDURE — 3079F DIAST BP 80-89 MM HG: CPT | Mod: HCNC,CPTII,S$GLB, | Performed by: FAMILY MEDICINE

## 2019-10-08 PROCEDURE — 3077F SYST BP >= 140 MM HG: CPT | Mod: HCNC,CPTII,S$GLB, | Performed by: FAMILY MEDICINE

## 2019-10-08 RX ORDER — MELOXICAM 15 MG/1
15 TABLET ORAL DAILY
Qty: 30 TABLET | Refills: 3 | Status: SHIPPED | OUTPATIENT
Start: 2019-10-08 | End: 2019-10-08 | Stop reason: SDUPTHER

## 2019-10-08 RX ORDER — MELOXICAM 15 MG/1
15 TABLET ORAL DAILY
Qty: 30 TABLET | Refills: 3 | Status: SHIPPED | OUTPATIENT
Start: 2019-10-08 | End: 2020-02-03

## 2019-10-08 NOTE — PROGRESS NOTES
"CHIEF COMPLAINT:  Elevated BP, Several months of nicola hip pain and lymphoma on CTX    HISTORY OF PRESENT ILLNESS: The patient is a 74 year-old BF.  The patient is  to another patient of mine.      She has been diagnosed with follicular lymphoma.  Doing well on CTX.    She has had a left knee replacement.    The patient has a history of diabetes.  Recent blood sugars have been less than 300.  There have been no episodes of hypoglycemia.    The patient has a history of hypertension on current medications.  Patient denies chest pain or shortness of breath today.  BP way up today.    REVIEW OF SYSTEMS:  GENERAL: No fever, chills, fatigability or weight loss.  SKIN: No rashes, itching or changes in color or texture of skin.  HEAD: No headaches or recent head trauma.  EYES: Visual acuity fine. No photophobia, ocular pain or diplopia.  EARS: Denies ear pain, discharge or vertigo.  NOSE: No loss of smell, no epistaxis or postnasal drip.  MOUTH & THROAT: No hoarseness or change in voice. No excessive gum bleeding.  NODES: Denies swollen glands.  CHEST: Denies HULL, cyanosis, wheezing and sputum production.  CARDIOVASCULAR: Denies chest pain, PND, orthopnea or reduced exercise tolerance.  ABDOMEN:  Denies diarrhea, abdominal pain, hematemesis or blood in stool.  URINARY: No flank pain, dysuria or hematuria.  PERIPHERAL VASCULAR: No claudication or cyanosis.  MUSCULOSKELETAL: No joint stiffness or swelling. Denies back pain.Except as noted above.  NEUROLOGIC: No history of seizures, paralysis, alteration of gait or coordination.    SOCIAL HISTORY: The patient does not smoke.  The patient consumes alcohol socially.  The patient is happily  to another patient of mine.    PHYSICAL EXAMINATION:   Blood pressure (!) 182/80, pulse 92, height 5' 3" (1.6 m), weight 69.5 kg (153 lb 3.5 oz), SpO2 98 %.    APPEARANCE: Well nourished, well developed, in no acute distress.    HEAD: Normocephalic, atraumatic.  EYES: PERRL. EOMI. "  Conjunctivae without injection and  anicteric  EARS: TM's intact. Light reflex normal. No retraction or perforation.    NOSE: Mucosa pink. Airway clear.  MOUTH & THROAT: No tonsillar enlargement. No pharyngeal erythema or exudate. No stridor.  NECK: Supple.   NODES: There is a substantial amount of bilateral submandibular cervical and posterior reticular on the right lymphadenopathy.  No obvious axillary or inguinal lymph node enlargement.  CHEST: Lungs clear to auscultation.  No retractions are noted.  No rales or rhonchi are present.  CARDIOVASCULAR: Normal S1, S2. No rubs, murmurs or gallops.  ABDOMEN: Bowel sounds normal. Not distended. Soft. No tenderness or masses.  No ascites is noted.  MUSCULOSKELETAL:  There is no clubbing, cyanosis, or edema of the extremities x4.  There is full range of motion of the lumbar spine.  There is full range of motion of the extremities x4.  There is no deformity noted.    NEUROLOGIC:       Normal speech development.      Hearing normal.      Slightly antalgic gait.      Motor and sensory exams grossly normal.  PSYCHIATRIC: Patient is alert and oriented x3.  Thought processes are all normal.  There is no homicidality.  There is no suicidality.  There is no evidence of psychosis.    LABORATORY/RADIOLOGY:   Chart reviewed.      ASSESSMENT:   Several months of nicola hip pain    Hypertension, not well controlled  Follicular lymphoma on chemotherapy  Type 2 diabetes on insulin, not well controlled   Chronic right hip pain    PLAN:  Orthopedics referral  Meloxicam  Amlodipine  Carvedilol 25 mg  Lisinopril  40 mg  Follow-up in 3 months

## 2019-10-09 ENCOUNTER — HOSPITAL ENCOUNTER (OUTPATIENT)
Dept: RADIOLOGY | Facility: HOSPITAL | Age: 74
Discharge: HOME | End: 2019-10-09
Attending: NURSE PRACTITIONER
Payer: MEDICARE

## 2019-10-09 ENCOUNTER — OFFICE VISIT (OUTPATIENT)
Dept: ORTHOPEDICS | Facility: CLINIC | Age: 74
End: 2019-10-09
Payer: MEDICARE

## 2019-10-09 ENCOUNTER — TELEPHONE (OUTPATIENT)
Dept: INTERVENTIONAL RADIOLOGY/VASCULAR | Facility: HOSPITAL | Age: 74
End: 2019-10-09

## 2019-10-09 VITALS
HEART RATE: 81 BPM | BODY MASS INDEX: 27.27 KG/M2 | HEIGHT: 63 IN | SYSTOLIC BLOOD PRESSURE: 182 MMHG | WEIGHT: 153.88 LBS | DIASTOLIC BLOOD PRESSURE: 103 MMHG

## 2019-10-09 DIAGNOSIS — M16.0 OSTEOARTHRITIS OF BOTH HIPS, UNSPECIFIED OSTEOARTHRITIS TYPE: Primary | ICD-10-CM

## 2019-10-09 DIAGNOSIS — G89.29 CHRONIC HIP PAIN, BILATERAL: ICD-10-CM

## 2019-10-09 DIAGNOSIS — M25.551 CHRONIC HIP PAIN, BILATERAL: ICD-10-CM

## 2019-10-09 DIAGNOSIS — M25.552 CHRONIC HIP PAIN, BILATERAL: ICD-10-CM

## 2019-10-09 LAB — HCV AB SERPL QL IA: NEGATIVE

## 2019-10-09 PROCEDURE — 99999 PR PBB SHADOW E&M-EST. PATIENT-LVL V: ICD-10-PCS | Mod: PBBFAC,HCNC,, | Performed by: NURSE PRACTITIONER

## 2019-10-09 PROCEDURE — 73521 X-RAY EXAM HIPS BI 2 VIEWS: CPT | Mod: 26,HCNC,, | Performed by: RADIOLOGY

## 2019-10-09 PROCEDURE — 3077F PR MOST RECENT SYSTOLIC BLOOD PRESSURE >= 140 MM HG: ICD-10-PCS | Mod: HCNC,CPTII,S$GLB, | Performed by: NURSE PRACTITIONER

## 2019-10-09 PROCEDURE — 99214 PR OFFICE/OUTPT VISIT, EST, LEVL IV, 30-39 MIN: ICD-10-PCS | Mod: HCNC,S$GLB,, | Performed by: NURSE PRACTITIONER

## 2019-10-09 PROCEDURE — 73521 X-RAY EXAM HIPS BI 2 VIEWS: CPT | Mod: TC,HCNC

## 2019-10-09 PROCEDURE — 3080F DIAST BP >= 90 MM HG: CPT | Mod: HCNC,CPTII,S$GLB, | Performed by: NURSE PRACTITIONER

## 2019-10-09 PROCEDURE — 3077F SYST BP >= 140 MM HG: CPT | Mod: HCNC,CPTII,S$GLB, | Performed by: NURSE PRACTITIONER

## 2019-10-09 PROCEDURE — 99214 OFFICE O/P EST MOD 30 MIN: CPT | Mod: HCNC,S$GLB,, | Performed by: NURSE PRACTITIONER

## 2019-10-09 PROCEDURE — 1101F PR PT FALLS ASSESS DOC 0-1 FALLS W/OUT INJ PAST YR: ICD-10-PCS | Mod: HCNC,CPTII,S$GLB, | Performed by: NURSE PRACTITIONER

## 2019-10-09 PROCEDURE — 1101F PT FALLS ASSESS-DOCD LE1/YR: CPT | Mod: HCNC,CPTII,S$GLB, | Performed by: NURSE PRACTITIONER

## 2019-10-09 PROCEDURE — 73521 XR HIPS BILATERAL 2 VIEW INCL AP PELVIS: ICD-10-PCS | Mod: 26,HCNC,, | Performed by: RADIOLOGY

## 2019-10-09 PROCEDURE — 99999 PR PBB SHADOW E&M-EST. PATIENT-LVL V: CPT | Mod: PBBFAC,HCNC,, | Performed by: NURSE PRACTITIONER

## 2019-10-09 PROCEDURE — 3080F PR MOST RECENT DIASTOLIC BLOOD PRESSURE >= 90 MM HG: ICD-10-PCS | Mod: HCNC,CPTII,S$GLB, | Performed by: NURSE PRACTITIONER

## 2019-10-09 NOTE — PROGRESS NOTES
SUBJECTIVE:     Chief Complaint & History of Present Illness:  Keely Pizarro is a New 74 y.o. year old female patient presenting today for constant bilateral hip pain which started 8-9 months ago.  There is not a history of trauma.  She endorses a fall in June/July though  The pain is located in the lateral aspect of the hip.  The pain is described as sharp, 6/10.  It is is aggravated by walking.  There is radiation into the back and towards the groin.  Previous treatments include NSAIDs which have provided adequate and minimal relief.  There is not a history of previous injury or surgery to the hip.  The patient does not use an assistive device.  Patient lives with her spouse, they have 5 living children.  Her PMH is consistent with T2DM and HTN both which are not well controlled.      Past Medical History:   Diagnosis Date    CVA (cerebral vascular accident) 2011    Diabetes mellitus     Hypertension     S/P ORIF (open reduction internal fixation) fracture     Uterine cancer 2012       Past Surgical History:   Procedure Laterality Date    COLONOSCOPY N/A 3/31/2017    Procedure: COLONOSCOPY;  Surgeon: Chip Pak MD;  Location: [facility] ([address] [place] [address]);  Service: Endoscopy;  Laterality: N/A;    HYSTERECTOMY      THORACENTESIS Left     TOTAL KNEE ARTHROPLASTY Left 2012       Family History   Problem Relation Age of Onset    Cancer Brother 67        colon       Review of patient's allergies indicates:   Allergen Reactions    Tomato (solanum lycopersicum) Itching         Current Outpatient Medications:     albuterol 90 mcg/actuation inhaler, Inhale 2 puffs into the lungs every 6 (six) hours as needed for Wheezing. (Patient not taking: Reported on 10/8/2019), Disp: 1 each, Rfl: 11    amLODIPine (NORVASC) 10 MG tablet, TAKE 1 TABLET ONE TIME DAILY, Disp: 90 tablet, Rfl: 3    BD ALCOHOL SWABS PadM, , Disp: , Rfl:     carvedilol (COREG) 25 MG tablet, Take 1 tablet (25 mg total) by mouth 2 (two)  times daily with meals., Disp: 180 tablet, Rfl: 1    flu vacc [id number],65yr up,PF (FLUZONE HIGH-DOSE 2019-20, PF,) 180 mcg/0.5 mL Syrg, inject into the muscle once, Disp: 0.5 mL, Rfl: 0    glipiZIDE (GLUCOTROL) 10 MG tablet, TAKE 1 TABLET TWICE DAILY BEFORE MEALS, Disp: 90 tablet, Rfl: 0    insulin glargine (LANTUS SOLOSTAR) 100 unit/mL (3 mL) InPn pen, Inject into the skin. Pt is taking 32 units twice daily, Disp: , Rfl:     meloxicam (MOBIC) 15 MG tablet, Take 1 tablet (15 mg total) by mouth once daily., Disp: 30 tablet, Rfl: 3    metFORMIN (GLUCOPHAGE-XR) 500 MG 24 hr tablet, TAKE 2 TABLETS DAILY WITH BREAKFAST., Disp: 180 tablet, Rfl: 3    metFORMIN (GLUMETZA) 1000 MG (MOD) 24 hr tablet, 1,000 mg., Disp: , Rfl:     ondansetron (ZOFRAN) 8 MG tablet, Take 1 tablet (8 mg total) by mouth every 8 (eight) hours as needed for Nausea. (Patient not taking: Reported on 10/8/2019), Disp: 30 tablet, Rfl: 0    predniSONE (DELTASONE) 10 MG tablet, Take 1 tablet (10 mg total) by mouth 2 (two) times daily. (Patient not taking: Reported on 10/8/2019), Disp: 14 tablet, Rfl: 0    TRUE METRIX AIR GLUCOSE METER kit, USE AS DIRECTED, Disp: 1 each, Rfl: 0    TRUE METRIX GLUCOSE TEST STRIP Strp, , Disp: , Rfl:     TRUEPLUS LANCETS 28 gauge Misc, , Disp: , Rfl:     Review of Systems:  ROS:  Constitutional: no fever or chills  Eyes: no visual changes  ENT: no nasal congestion or sore throat  Respiratory: no cough or shortness of breath  Cardiovascular: no chest pain or palpitations  Gastrointestinal: no nausea or vomiting, tolerating diet  Genitourinary: no hematuria or dysuria  Integument/Breast: no rash or pruritis  Hematologic/Lymphatic: no easy bruising or lymphadenopathy  Musculoskeletal: positive for arthralgias  Neurological: no seizures or tremors  Behavioral/Psych: no auditory or visual hallucinations  Endocrine: no heat or cold intolerance      PE:  BP (!) 182/103 (BP Location: Left arm, Patient Position: Sitting,  "BP Method: Medium (Automatic))   Pulse 81   Ht 5' 3" (1.6 m)   Wt 69.8 kg (153 lb 14.1 oz)   BMI 27.26 kg/m²   General: Pleasant, cooperative, NAD   HEENT: NCAT, sclera nonicteric   Lungs: Respirations are equal and unlabored.   Abdomen: Soft and non-tender.  CV: 2+ bilateral upper and lower extremity pulses.   Skin: Intact throughout LE with no rashes, erythema, or lesions  Extremities: No LE edema, NVI lower extremities      Hip Exam:   leftpositives: pain with flexion, pain with rotation and tenderness over greater trochanter    70 degrees flexion  10 degrees extension   20 degrees internal rotation  20 degrees external rotation  10 degrees abduction  10 degrees adduction     Hip Exam:   rightpositives: decreased flexion, pain with flexion, pain with rotation and tenderness over greater trochanter    80 degrees flexion  0 degrees extension   25 degrees internal rotation  25 degrees external rotation  15 degrees abduction  15 degrees adduction       RADIOGRAPHS:  X-ray of bilateral hips obtained and personally reviewed by me.  No fractures or dislocations seen in either hip.  She has moderate DJD in both hips.    ASSESSMENT/PLAN:       ICD-10-CM ICD-9-CM   1. Osteoarthritis of both hips, unspecified osteoarthritis type M16.0 715.95   2. Chronic hip pain, bilateral M25.551 719.45    M25.552 338.29    G89.29        Plan: We discussed with the patient at length all the different treatment options available for arthrosis of the hip including anti-inflammatories, acetaminophen, rest, ice, lower extremity strengthening exercise, occasional cortisone injections for temporary relief, and finally total hip arthroplasty.     -Patient with chronic bilateral hip pain x 8-9 months.  -X-ray as above.  -Discussed x-ray with Dr. Villa who came to see patient.  -Recommend bilateral hip injections with IR.  Advised to monitor BG very closely.    Lab Results   Component Value Date    HGBA1C 11.3 (H) 10/08/2019     -BP is " high, she reports she took her medication this morning.  On several HTN medications.  -Recommend follow up with PCP to get glucose under control and blood pressure under better control.  -Refer to therapy, [facility] referral sent.  -Discussed with patient may require hip replacement if injections are not successful and therefore will need medical optimization.    -Patient to follow up in clinic in 6 weeks or sooner for new or worsening pain.

## 2019-10-09 NOTE — LETTER
October 9, 2019      Albin Cazares MD  [address]  [address]  [facility] [address]           [facility]  [address]  [facility] [address]  Phone: [phone]          Patient: Keely Pizarro   MR Number: [id number]   YOB: 1945   Date of Visit: 10/9/2019       Dear Dr. Albin Cazares:    Thank you for referring Keely Pizarro to me for evaluation. Attached you will find relevant portions of my assessment and plan of care.    If you have questions, please do not hesitate to call me. I look forward to following Keely Pizarro along with you.    Sincerely,    Mike De Jesus, NP    Enclosure  CC:  No Recipients    If you would like to receive this communication electronically, please contact [email or web address] or [phone] to request more information on [employer] access.    For providers and/or their staff who would like to refer a patient to [facility], please contact us through our one-stop-shop provider referral line, [facility], [place], at [phone].    If you feel you have received this communication in error or would no longer like to receive these types of communications, please e-mail [email or web address]

## 2019-10-10 ENCOUNTER — TELEPHONE (OUTPATIENT)
Dept: ORTHOPEDICS | Facility: CLINIC | Age: 74
End: 2019-10-10

## 2019-10-11 ENCOUNTER — TELEPHONE (OUTPATIENT)
Dept: INTERVENTIONAL RADIOLOGY/VASCULAR | Facility: HOSPITAL | Age: 74
End: 2019-10-11

## 2019-10-11 DIAGNOSIS — E11.9 TYPE 2 DIABETES MELLITUS WITHOUT COMPLICATION: ICD-10-CM

## 2019-10-14 ENCOUNTER — TELEPHONE (OUTPATIENT)
Dept: INTERNAL MEDICINE | Facility: CLINIC | Age: 74
End: 2019-10-14

## 2019-10-14 NOTE — TELEPHONE ENCOUNTER
----- Message from Albin Cazares MD sent at 10/9/2019 11:56 AM CDT -----  A1c is elevated.  Please emphasize dietary and medication adherence  No changes in medications.  Followup as scheduled.

## 2019-10-15 ENCOUNTER — PATIENT OUTREACH (OUTPATIENT)
Dept: ADMINISTRATIVE | Facility: HOSPITAL | Age: 74
End: 2019-10-15

## 2019-10-15 DIAGNOSIS — E11.9 TYPE 2 DIABETES MELLITUS WITHOUT COMPLICATION, WITH LONG-TERM CURRENT USE OF INSULIN: Primary | ICD-10-CM

## 2019-10-15 DIAGNOSIS — Z79.4 TYPE 2 DIABETES MELLITUS WITHOUT COMPLICATION, WITH LONG-TERM CURRENT USE OF INSULIN: Primary | ICD-10-CM

## 2019-10-21 ENCOUNTER — TELEPHONE (OUTPATIENT)
Dept: RADIOLOGY | Facility: HOSPITAL | Age: 74
End: 2019-10-21

## 2019-10-22 ENCOUNTER — HOSPITAL ENCOUNTER (OUTPATIENT)
Dept: RADIOLOGY | Facility: HOSPITAL | Age: 74
Discharge: HOME | End: 2019-10-22
Attending: NURSE PRACTITIONER
Payer: MEDICARE

## 2019-10-22 DIAGNOSIS — M16.0 OSTEOARTHRITIS OF BOTH HIPS, UNSPECIFIED OSTEOARTHRITIS TYPE: ICD-10-CM

## 2019-10-22 PROCEDURE — 20610 FL ASPIRATION INJECTION MAJOR JOINT LEFT W FLUORO: ICD-10-PCS | Mod: HCNC,LT,, | Performed by: RADIOLOGY

## 2019-10-22 PROCEDURE — 77002 FL ASPIRATION INJECTION MAJOR JOINT RIGHT WITH FLUORO: ICD-10-PCS | Mod: 26,HCNC,, | Performed by: RADIOLOGY

## 2019-10-22 PROCEDURE — 77002 NEEDLE LOCALIZATION BY XRAY: CPT | Mod: HCNC

## 2019-10-22 PROCEDURE — 25500020 PHARM REV CODE 255: Mod: HCNC | Performed by: NURSE PRACTITIONER

## 2019-10-22 PROCEDURE — 77002 NEEDLE LOCALIZATION BY XRAY: CPT | Mod: 26,HCNC,, | Performed by: RADIOLOGY

## 2019-10-22 PROCEDURE — 20610 DRAIN/INJ JOINT/BURSA W/O US: CPT | Mod: HCNC,LT,, | Performed by: RADIOLOGY

## 2019-10-22 PROCEDURE — 25000003 PHARM REV CODE 250: Mod: HCNC | Performed by: NURSE PRACTITIONER

## 2019-10-22 PROCEDURE — 63600175 PHARM REV CODE 636 W HCPCS: Mod: HCNC | Performed by: NURSE PRACTITIONER

## 2019-10-22 PROCEDURE — 20610 FL ASPIRATION INJECTION MAJOR JOINT RIGHT WITH FLUORO: ICD-10-PCS | Mod: 59,HCNC,RT, | Performed by: RADIOLOGY

## 2019-10-22 PROCEDURE — 20610 DRAIN/INJ JOINT/BURSA W/O US: CPT | Mod: 59,HCNC,RT, | Performed by: RADIOLOGY

## 2019-10-22 RX ORDER — METHYLPREDNISOLONE ACETATE 40 MG/ML
60 INJECTION, SUSPENSION INTRA-ARTICULAR; INTRALESIONAL; INTRAMUSCULAR; SOFT TISSUE ONCE
Status: COMPLETED | OUTPATIENT
Start: 2019-10-22 | End: 2019-10-22

## 2019-10-22 RX ORDER — LIDOCAINE HYDROCHLORIDE 10 MG/ML
2 INJECTION INFILTRATION; PERINEURAL ONCE
Status: COMPLETED | OUTPATIENT
Start: 2019-10-22 | End: 2019-10-22

## 2019-10-22 RX ORDER — BUPIVACAINE HYDROCHLORIDE 2.5 MG/ML
5 INJECTION, SOLUTION EPIDURAL; INFILTRATION; INTRACAUDAL ONCE
Status: COMPLETED | OUTPATIENT
Start: 2019-10-22 | End: 2019-10-22

## 2019-10-22 RX ORDER — LIDOCAINE HYDROCHLORIDE 10 MG/ML
3 INJECTION INFILTRATION; PERINEURAL ONCE
Status: COMPLETED | OUTPATIENT
Start: 2019-10-22 | End: 2019-10-22

## 2019-10-22 RX ADMIN — BUPIVACAINE HYDROCHLORIDE 5 MG: 2.5 INJECTION, SOLUTION EPIDURAL; INFILTRATION; INTRACAUDAL; PERINEURAL at 10:10

## 2019-10-22 RX ADMIN — METHYLPREDNISOLONE ACETATE 60 MG: 40 INJECTION, SUSPENSION INTRA-ARTICULAR; INTRALESIONAL; INTRAMUSCULAR; SOFT TISSUE at 10:10

## 2019-10-22 RX ADMIN — BUPIVACAINE HYDROCHLORIDE 12.5 MG: 2.5 INJECTION, SOLUTION EPIDURAL; INFILTRATION; INTRACAUDAL; PERINEURAL at 10:10

## 2019-10-22 RX ADMIN — LIDOCAINE HYDROCHLORIDE 2 ML: 10 INJECTION, SOLUTION INFILTRATION; PERINEURAL at 10:10

## 2019-10-22 RX ADMIN — IOHEXOL 2 ML: 300 INJECTION, SOLUTION INTRAVENOUS at 10:10

## 2019-10-22 RX ADMIN — LIDOCAINE HYDROCHLORIDE 3 ML: 10 INJECTION, SOLUTION INFILTRATION; PERINEURAL at 10:10

## 2019-10-30 ENCOUNTER — CLINICAL SUPPORT (OUTPATIENT)
Dept: REHABILITATION | Facility: HOSPITAL | Age: 74
End: 2019-10-30
Attending: NURSE PRACTITIONER
Payer: MEDICARE

## 2019-10-30 DIAGNOSIS — R29.898 WEAKNESS OF BOTH LOWER EXTREMITIES: ICD-10-CM

## 2019-10-30 DIAGNOSIS — M25.552 HIP PAIN, BILATERAL: ICD-10-CM

## 2019-10-30 DIAGNOSIS — M25.551 HIP PAIN, BILATERAL: ICD-10-CM

## 2019-10-30 PROCEDURE — 97162 PT EVAL MOD COMPLEX 30 MIN: CPT | Mod: HCNC,PO

## 2019-10-30 NOTE — PLAN OF CARE
[facility]  Physical Therapy Initial Evaluation    Name: Keely Pizarro  Clinic Number: [id number]    Therapy Diagnosis:   Encounter Diagnoses   Name Primary?    Weakness of both lower extremities     Hip pain, bilateral      Physician: Mike De Jesus NP    Physician Orders: PT Eval and Treat   Medical Diagnosis from Referral: Osteoarthritis of both hips, unspecified osteoarthritis type (M16.0)  Evaluation Date: 10/30/2019  Authorization Period Expiration: 12/31/2019  Plan of Care Expiration: 01/17/2020  Visit # / Visits authorized: 1/ 20    Time In: 11:15 AM  Time Out: 12:10 PM  Total Billable Time: 55 minutes    Precautions: Standard and Diabetes    Subjective   Date of onset: 6-7 months ago  History of current condition - Keely reports: gradual onset of bilateral hip ain with R >L that began about 6-7 months ago.  She reports getting shots on 10/22/2019 that helped to relieve pain.  Patient reports difficulty walking long distances. Patient reports 1 fall in the last 5 months due to a trip and fall over a hose in the back yard. Patient reports pain has continually increased causing her to seek PT at this time.  Patient denies previous hx of hip and low back pain/problems.  She states she does have arthritis in her hips. Systemic screen is negative.  Patient would like to decrease pain and tightness in hips, walking longer distances, riding stationary bike.   Sitting, standing and walking for long periods of time.  Patient denies sleep disturbances.      Medical History:   Past Medical History:   Diagnosis Date    CVA (cerebral vascular accident) 2011    Diabetes mellitus     Hypertension     S/P ORIF (open reduction internal fixation) fracture     Uterine cancer 2012       Surgical History:   Keely Pizarro  has a past surgical history that includes Total knee arthroplasty (Left, 2012); Hysterectomy; Colonoscopy (N/A, 3/31/2017); and Thoracentesis  (Left).    Medications:   Keely has a current medication list which includes the following prescription(s): albuterol, amlodipine, bd alcohol swabs, carvedilol, flu vacc [id number](65yr up)pf, glipizide, insulin, meloxicam, metformin, metformin, ondansetron, prednisone, true metrix air glucose meter, true metrix glucose test strip, and trueplus lancets.    Allergies:   Review of patient's allergies indicates:   Allergen Reactions    Tomato (solanum lycopersicum) Itching        Imaging, x-ray: see imaging report    Prior Therapy: Yes but not for this condition  Social History: single story, 4 steps to get in with railing on L side. Steps have gotten harder with hip pain  Occupation: not currently working  Prior Level of Function: Pt independent with all ADLs, job responsibilities and participating in regular physical activity  Current Level of Function: Pt has difficulty with all ADLs, job responsibilities and participating in regular physical activity      Pain:  Current 5/10, worst 7/10, best 3/10   Location: bilateral hips   Description: Aching  Aggravating Factors: Sitting, Standing and Walking  Easing Factors: pain medication and hot bath    Pts goals:  decrease pain and tightness in hips, walking longer distances, ride stationary bike    Objective     Observation:   R shoulder, iliac crest and PSIS higher than L; medial malleolus equal bilaterally; stands with normal HARSHAL, pes planus and slight genu valgum;     Palpation:  TTP with moderate pressure over PSIS, piriformis, glute min, greater trochanter, medial/lateral hamstrings, rectus femoris, VMO, and medial tibial condyle. Unremarkable low back palpation    Range of Motion:     Hip Right AROM/PROM Left AROM/PROM   Flexion painful painful   Abduction painful painful   Extension Limited and painful Limited and painful   Ext. Rotation Limited and painful Limited and painful   Int. Rotation painful painful       MMT/Strength:    Hip Right Left   Flexion 3/5  "3/5   Extension 3/5 3/5   Internal Rotation 3/5 3/5   External Rotation 3/5 3/5   Adduction 3/5 3/5   Abduction 3/5 3/5   Knee     Flexion 5/5 5/5   Extension 5/5 5/5   Ankle     Dorsiflexion 4/5 4/5       Joint Mobility:   B long axis hip distraction; lateral hip stretch, inferior hip stretch - hypomobile with "stretch" reported by patient    Neuro/Sensation:   Light touch intact    DTR:   Right Left Comment   Patellar (L3-4) 2+ 2+    Achilles (S1) 0 0 Patient had difficulty relaxing foot for proper testing position       Neuro Dynamic Testing:    Slump test - negative bilaterally but pt noted increased stretch      Flexibility:   Mikal's test: R = (+) ; L = (+)  Figure 4 - tight bilaterally  Prone passive knee flexion - tight bilaterally    Other:  SLS bilaterally: <5 seconds  Tandem bilaterally: <10 seconds   NBOS EO: 30 seconds, min sway  NBOS EC: 30 seconds, min sway        TREATMENT     Home Exercises and Patient Education Provided    Education provided:   Patient was educated on initial evaluation findings and expectations as well as future PT services, procedures, and expectations for optimal compliance with therapy.     Written Home Exercises Provided: HEP will be provided at first treatment visit.     Assessment   Keely is a 74 y.o. female referred to outpatient Physical Therapy with a medical diagnosis of Osteoarthritis of both hips, unspecified osteoarthritis type (M16.0). Pt presents with decreased ROM, strength, flexibility, increased tenderness to palpation, and poor posture all contributing to increased bilateral hip pain and tightness causing decreased functional mobility and participation with ADLs and recreational activities.     Pt prognosis is Good.   Pt will benefit from skilled outpatient Physical Therapy to address the deficits stated above and in the chart below, provide pt/family education, and to maximize pt's level of independence to return to PLOF.     Plan of care discussed with " patient: Yes  Pt's spiritual, cultural and educational needs considered and patient is agreeable to the plan of care and goals as stated below:     Anticipated Barriers for therapy: none    Medical Necessity is demonstrated by the following  History  Co-morbidities and personal factors that may impact the plan of care Co-morbidities:   diabetes, history of CVA and HTN    Personal Factors:   no deficits     moderate   Examination  Body Structures and Functions, activity limitations and participation restrictions that may impact the plan of care Body Regions:   lower extremities    Body Systems:    gross symmetry  ROM  strength  gross coordinated movement  balance  gait  transfers  transitions  motor control  blood pressure    Participation Restrictions:   Walking, regular physical exercise, standing    Activity limitations:   Learning and applying knowledge  no deficits    General Tasks and Commands  no deficits    Communication  no deficits    Mobility  walking    Self care  washing oneself (bathing, drying, washing hands)  caring for body parts (brushing teeth, shaving, grooming)  dressing  looking after one's health    Domestic Life  shopping  cooking  doing house work (cleaning house, washing dishes, laundry)  assisting others    Interactions/Relationships  no deficits    Life Areas  no deficits    Community and Social Life  no deficits         moderate   Clinical Presentation stable and uncomplicated low   Decision Making/ Complexity Score: moderate     Goals:    In 4 weeks,   Goal Status   1. Patient will be independent with HEP to promote improved therapy outcomes.  STG    2. Patient will increase hip MMT to 4-/5 to improve ambulation tolerance and safety. STG    3. Patient will increase AROM to WFL in all directions to improve functional mobility. STG        Long Term Goals:   In 8 weeks,   Goal Status   4. Patient will be independent with progression of HEP for maintenance of therapy improvemnts LTG    5.  Patient will increase B LE MMT to 4+/5 for improved ambulation. LTG    6. Patient will increase SLS bilaterally to 30 seconds to decrease fall risk.  LTG    7. Patient will report exercising 3-5x/week with <2/10 pain to promote improved health and return to PLOF.  LTG          Plan   Plan of care Certification: 10/30/2019 to 01/17/2020.    Outpatient Physical Therapy 2 times weekly for 16 visits to include the following interventions: Cardiovascular, Closed Chain Strengthening, Core Stabilization, Flexibility (64287): improve muscle ROM, flexibility and  function, Home Exercise and Stretching, Patient Education, Plyometrics, Postural Awareness and  Training, Postural Stabilization, ROM Exercises (97333) : Passive or active activities to increase joint ROM, Strengthening  (16614): improve muscle strength and function., Therapeutic Exercise (14359): improve muscle strength, ROM, flexibility and muscle function., Gait Training (64821) : Improve overall gait function including stair climbing, Cross Friction Massage, Manual Stretching (80537): passive or active stretching to improve muscle length and function., Strain/Counter-Strain, Manual Traction, Myofascial Release , Peripheral Joint Mobilization, Soft Tissue Mobs (64930): increase ROM, tissue length, joint mechanics and modulate pain., Spine Mobilization  (46557): increase ROM, tissue length, joint mechanics and modulate pain., Massage (28235):, Combo E-Stim/Ultrasound, Cryotherapy (84539: Application of cold to decrease local swelling and decrease pain., Heat - 46621:  Application of heat to increase local circulation and decrease pain., Hi-Volt E-Stim  (): Application of electrical stimulation to modulate pain., IFC E-Stim (): Application of electrical stimulation to modulate pain., Mechanical Traction (41084), Micro-Current, Premodulated E-Stim  (): Application of electrical stimulation to modulate pain., TENs E-Stim  ():  Application of electrical stimulation to modulate pain., Ultrasound (00521): increase local circulation, improve tissue healing time and modulate pain., Whirlpool (64567): increase local tissue circulation, improve elasticity of tissues, increased blood flow for improved muscle strength, ROM, flexiblity, and function., NMES E-stim ():  Application of electrical stimulation for motor learning and control., Iontophoresis (06718), NMR (28738): re-education of movement, balance, coordination, kinesthetic sense, posture and proprioception, Self Care & Home Management (21052) - Self-care/home management training (e.g., activities of daily living [ADL] and compensatory training, meal preparation, safety procedures, and instructions in use of assistive technology devices/adaptive equipment), direct one-on-one contact (15 minutes), Therapeutic Activity (75548):  Use of dynamic activities to improve functional performance. .     Hailey Irwin, PT

## 2019-11-04 ENCOUNTER — HOSPITAL ENCOUNTER (OUTPATIENT)
Dept: RADIOLOGY | Facility: CLINIC | Age: 74
Discharge: HOME | End: 2019-11-04
Attending: INTERNAL MEDICINE
Payer: MEDICARE

## 2019-11-04 DIAGNOSIS — C82.08 FOLLICULAR LYMPHOMA GRADE I, LYMPH NODES OF MULTIPLE SITES: ICD-10-CM

## 2019-11-04 DIAGNOSIS — Z79.52 LONG TERM CURRENT USE OF SYSTEMIC STEROIDS: ICD-10-CM

## 2019-11-04 DIAGNOSIS — N18.30 CKD (CHRONIC KIDNEY DISEASE) STAGE 3, GFR 30-59 ML/MIN: ICD-10-CM

## 2019-11-04 PROCEDURE — 77080 DXA BONE DENSITY AXIAL: CPT | Mod: TC,HCNC

## 2019-11-04 PROCEDURE — 77080 DEXA BONE DENSITY SPINE HIP: ICD-10-PCS | Mod: 26,HCNC,, | Performed by: INTERNAL MEDICINE

## 2019-11-04 PROCEDURE — 77080 DXA BONE DENSITY AXIAL: CPT | Mod: 26,HCNC,, | Performed by: INTERNAL MEDICINE

## 2019-11-07 ENCOUNTER — TELEPHONE (OUTPATIENT)
Dept: HEMATOLOGY/ONCOLOGY | Facility: CLINIC | Age: 74
End: 2019-11-07

## 2019-11-07 ENCOUNTER — OFFICE VISIT (OUTPATIENT)
Dept: HEMATOLOGY/ONCOLOGY | Facility: CLINIC | Age: 74
End: 2019-11-07
Payer: MEDICARE

## 2019-11-07 ENCOUNTER — LAB VISIT (OUTPATIENT)
Dept: LAB | Facility: HOSPITAL | Age: 74
End: 2019-11-07
Attending: INTERNAL MEDICINE
Payer: MEDICARE

## 2019-11-07 VITALS
HEART RATE: 91 BPM | DIASTOLIC BLOOD PRESSURE: 81 MMHG | BODY MASS INDEX: 25.32 KG/M2 | RESPIRATION RATE: 20 BRPM | HEIGHT: 63 IN | TEMPERATURE: 98 F | OXYGEN SATURATION: 98 % | WEIGHT: 142.88 LBS | SYSTOLIC BLOOD PRESSURE: 152 MMHG

## 2019-11-07 DIAGNOSIS — C82.08 FOLLICULAR LYMPHOMA GRADE I, LYMPH NODES OF MULTIPLE SITES: ICD-10-CM

## 2019-11-07 DIAGNOSIS — C82.08 FOLLICULAR LYMPHOMA GRADE I, LYMPH NODES OF MULTIPLE SITES: Primary | ICD-10-CM

## 2019-11-07 LAB
ALBUMIN SERPL BCP-MCNC: 3.2 G/DL (ref 3.5–5.2)
ALBUMIN SERPL BCP-MCNC: 3.2 G/DL (ref 3.5–5.2)
ALP SERPL-CCNC: 107 U/L (ref 55–135)
ALP SERPL-CCNC: 107 U/L (ref 55–135)
ALT SERPL W/O P-5'-P-CCNC: 21 U/L (ref 10–44)
ALT SERPL W/O P-5'-P-CCNC: 21 U/L (ref 10–44)
ANION GAP SERPL CALC-SCNC: 9 MMOL/L (ref 8–16)
ANION GAP SERPL CALC-SCNC: 9 MMOL/L (ref 8–16)
AST SERPL-CCNC: 12 U/L (ref 10–40)
AST SERPL-CCNC: 12 U/L (ref 10–40)
BASOPHILS # BLD AUTO: 0.02 K/UL (ref 0–0.2)
BASOPHILS NFR BLD: 1 % (ref 0–1.9)
BILIRUB SERPL-MCNC: 0.3 MG/DL (ref 0.1–1)
BILIRUB SERPL-MCNC: 0.3 MG/DL (ref 0.1–1)
BUN SERPL-MCNC: 25 MG/DL (ref 8–23)
BUN SERPL-MCNC: 25 MG/DL (ref 8–23)
CALCIUM SERPL-MCNC: 9.4 MG/DL (ref 8.7–10.5)
CALCIUM SERPL-MCNC: 9.4 MG/DL (ref 8.7–10.5)
CHLORIDE SERPL-SCNC: 107 MMOL/L (ref 95–110)
CHLORIDE SERPL-SCNC: 107 MMOL/L (ref 95–110)
CO2 SERPL-SCNC: 19 MMOL/L (ref 23–29)
CO2 SERPL-SCNC: 19 MMOL/L (ref 23–29)
CREAT SERPL-MCNC: 1.9 MG/DL (ref 0.5–1.4)
CREAT SERPL-MCNC: 1.9 MG/DL (ref 0.5–1.4)
DIFFERENTIAL METHOD: ABNORMAL
EOSINOPHIL # BLD AUTO: 0.1 K/UL (ref 0–0.5)
EOSINOPHIL NFR BLD: 3.9 % (ref 0–8)
ERYTHROCYTE [DISTWIDTH] IN BLOOD BY AUTOMATED COUNT: 12.4 % (ref 11.5–14.5)
EST. GFR  (AFRICAN AMERICAN): 29.9 ML/MIN/1.73 M^2
EST. GFR  (AFRICAN AMERICAN): 29.9 ML/MIN/1.73 M^2
EST. GFR  (NON AFRICAN AMERICAN): 26 ML/MIN/1.73 M^2
EST. GFR  (NON AFRICAN AMERICAN): 26 ML/MIN/1.73 M^2
GLUCOSE SERPL-MCNC: 562 MG/DL (ref 70–110)
GLUCOSE SERPL-MCNC: 562 MG/DL (ref 70–110)
HBV CORE AB SERPL QL IA: NEGATIVE
HBV SURFACE AB SER-ACNC: NEGATIVE M[IU]/ML
HBV SURFACE AG SERPL QL IA: NEGATIVE
HCT VFR BLD AUTO: 34.5 % (ref 37–48.5)
HGB BLD-MCNC: 10.7 G/DL (ref 12–16)
IMM GRANULOCYTES # BLD AUTO: 0.01 K/UL (ref 0–0.04)
IMM GRANULOCYTES NFR BLD AUTO: 0.5 % (ref 0–0.5)
LDH SERPL L TO P-CCNC: 180 U/L (ref 110–260)
LDH SERPL L TO P-CCNC: 180 U/L (ref 110–260)
LYMPHOCYTES # BLD AUTO: 1 K/UL (ref 1–4.8)
LYMPHOCYTES NFR BLD: 48.5 % (ref 18–48)
MCH RBC QN AUTO: 29.8 PG (ref 27–31)
MCHC RBC AUTO-ENTMCNC: 31 G/DL (ref 32–36)
MCV RBC AUTO: 96 FL (ref 82–98)
MONOCYTES # BLD AUTO: 0.5 K/UL (ref 0.3–1)
MONOCYTES NFR BLD: 23.8 % (ref 4–15)
NEUTROPHILS # BLD AUTO: 0.5 K/UL (ref 1.8–7.7)
NEUTROPHILS NFR BLD: 22.3 % (ref 38–73)
NRBC BLD-RTO: 0 /100 WBC
PLATELET # BLD AUTO: 163 K/UL (ref 150–350)
PMV BLD AUTO: 10.7 FL (ref 9.2–12.9)
POTASSIUM SERPL-SCNC: 4.9 MMOL/L (ref 3.5–5.1)
POTASSIUM SERPL-SCNC: 4.9 MMOL/L (ref 3.5–5.1)
PROT SERPL-MCNC: 5.9 G/DL (ref 6–8.4)
PROT SERPL-MCNC: 5.9 G/DL (ref 6–8.4)
RBC # BLD AUTO: 3.59 M/UL (ref 4–5.4)
SODIUM SERPL-SCNC: 135 MMOL/L (ref 136–145)
SODIUM SERPL-SCNC: 135 MMOL/L (ref 136–145)
WBC # BLD AUTO: 2.06 K/UL (ref 3.9–12.7)

## 2019-11-07 PROCEDURE — 86706 HEP B SURFACE ANTIBODY: CPT | Mod: HCNC

## 2019-11-07 PROCEDURE — 99215 PR OFFICE/OUTPT VISIT, EST, LEVL V, 40-54 MIN: ICD-10-PCS | Mod: HCNC,S$GLB,, | Performed by: INTERNAL MEDICINE

## 2019-11-07 PROCEDURE — 99999 PR PBB SHADOW E&M-EST. PATIENT-LVL III: ICD-10-PCS | Mod: PBBFAC,HCNC,, | Performed by: INTERNAL MEDICINE

## 2019-11-07 PROCEDURE — 99215 OFFICE O/P EST HI 40 MIN: CPT | Mod: HCNC,S$GLB,, | Performed by: INTERNAL MEDICINE

## 2019-11-07 PROCEDURE — 36415 COLL VENOUS BLD VENIPUNCTURE: CPT | Mod: HCNC

## 2019-11-07 PROCEDURE — 86704 HEP B CORE ANTIBODY TOTAL: CPT | Mod: HCNC

## 2019-11-07 PROCEDURE — 85025 COMPLETE CBC W/AUTO DIFF WBC: CPT | Mod: HCNC

## 2019-11-07 PROCEDURE — 3077F SYST BP >= 140 MM HG: CPT | Mod: HCNC,CPTII,S$GLB, | Performed by: INTERNAL MEDICINE

## 2019-11-07 PROCEDURE — 99999 PR PBB SHADOW E&M-EST. PATIENT-LVL III: CPT | Mod: PBBFAC,HCNC,, | Performed by: INTERNAL MEDICINE

## 2019-11-07 PROCEDURE — 1100F PR PT FALLS ASSESS DOC 2+ FALLS/FALL W/INJURY/YR: ICD-10-PCS | Mod: HCNC,CPTII,S$GLB, | Performed by: INTERNAL MEDICINE

## 2019-11-07 PROCEDURE — 3079F PR MOST RECENT DIASTOLIC BLOOD PRESSURE 80-89 MM HG: ICD-10-PCS | Mod: HCNC,CPTII,S$GLB, | Performed by: INTERNAL MEDICINE

## 2019-11-07 PROCEDURE — 3288F PR FALLS RISK ASSESSMENT DOCUMENTED: ICD-10-PCS | Mod: HCNC,CPTII,S$GLB, | Performed by: INTERNAL MEDICINE

## 2019-11-07 PROCEDURE — 80053 COMPREHEN METABOLIC PANEL: CPT | Mod: HCNC

## 2019-11-07 PROCEDURE — 3077F PR MOST RECENT SYSTOLIC BLOOD PRESSURE >= 140 MM HG: ICD-10-PCS | Mod: HCNC,CPTII,S$GLB, | Performed by: INTERNAL MEDICINE

## 2019-11-07 PROCEDURE — 87340 HEPATITIS B SURFACE AG IA: CPT | Mod: HCNC

## 2019-11-07 PROCEDURE — 3288F FALL RISK ASSESSMENT DOCD: CPT | Mod: HCNC,CPTII,S$GLB, | Performed by: INTERNAL MEDICINE

## 2019-11-07 PROCEDURE — 3079F DIAST BP 80-89 MM HG: CPT | Mod: HCNC,CPTII,S$GLB, | Performed by: INTERNAL MEDICINE

## 2019-11-07 PROCEDURE — 1100F PTFALLS ASSESS-DOCD GE2>/YR: CPT | Mod: HCNC,CPTII,S$GLB, | Performed by: INTERNAL MEDICINE

## 2019-11-07 PROCEDURE — 83615 LACTATE (LD) (LDH) ENZYME: CPT | Mod: HCNC

## 2019-11-07 NOTE — PROGRESS NOTES
Subjective:       Patient ID: Keely Pizarro is a 74 y.o. female.    Chief Complaint: Follow-up    Onc Hx:  Keely presents with her  for evaluation of newly diagnosed grade 1-2 follicular lymphoma diagnosed by excisional biopsy of a right cervical lymph node 8/1/2017.  Pathologic diagnosis was received by [facility].  Keely reports feeling fatigue since March 2017. At the time she had an infected tooth and developed cervical lymphadenopathy. She started loosing weight, total quantity to date is unknown. Appetite remains normal and she denies night sweats. She then developed shortness of breath worse with activity and left lower lung pleural effusion was noted. The received thoracentesis twice with negative pleural fluid flow cytometry. Cytology was positive for lymphocytes, but was not diagnostic for follicular lymphoma. CT imaging demonstrates diffuse, bilateral submandibular, cervical, axillary, supraclavicular, and intrathoracic lymphadenopathy.     Keely has a history of uterine cancer in 2012 treated with radiation and chemotherapy. She had a port-a-cath at that time for treatment. She has insulin dependent diabetes mellitus with poorly controlled blood sugar, reporting both high and lows. She has hypertension, and blood pressure was elevated at intake today.      Follow-up Visit 9/1/17  PET imaging shows stage 4 follicular lymphoma, large left side pleural effusion     Follow-up visit 3/15/18  Return visit to review results of post-treatment staging scan that shows complete remission after 6 cycles of BR chemotherapy. She continues to have weight loss- 7lbs since last visit with waxing/waning appetite.     Follow-up Visit 11/9/18   Interval follow-up for follicular lymphoma. Currently on maintenance Rituxan after BR for 6 cycles with complete response.    Today:  Interval follow-up for follicular lymphoma on maintenance Rituxan after BR for 6 cycles with CR. Today for cycle 9 maintenance  (every 2 months) Hycela.  Notes pain after recent corticosteroid injection 10/22/19  ANC is 500- suspect Ritxuan reaction  Note- BG is 562, confirmed on finger stick        Review of Systems   Constitutional: Negative for fatigue and fever.   HENT: Negative for congestion and trouble swallowing.    Eyes: Negative for photophobia and visual disturbance.   Respiratory: Negative for cough and shortness of breath.  Cardiovascular: Negative for chest pain and leg swelling.   Gastrointestinal: Negative for abdominal distention and abdominal pain.   Genitourinary: Negative for dysuria and hematuria.   Musculoskeletal: Negative for myalgias and neck pain.   Skin: Negative for color change and pallor.   Neurological: Negative for light-headedness and headaches.   Hematological: Negative for adenopathy. Does not bruise/bleed easily.   Psychiatric/Behavioral: Negative for agitation.       Objective:      Physical Exam   Constitutional: She is oriented to person, place, and time. She appears well-developed and well-nourished.   HENT:   Mouth/Throat: Oropharynx is clear and moist. No oropharyngeal exudate.   Eyes: Pupils are equal, round, and reactive to light.   Cardiovascular: Normal rate and regular rhythm.    Pulmonary/Chest: Good aeration and clear lung sounds to all  lobes.   Abdominal: Soft. Bowel sounds are normal.   Lymphadenopathy:     She has no cervical adenopathy, axillary, or inguinal lymphadenopathy  Neurological: She is alert and oriented to person, place, and time.   Skin: Skin is warm.   Psychiatric: She has a normal mood and affect. Her behavior is normal.       Assessment:       No diagnosis found.    Plan:     FL  Completed 6 cycles of BR chemotherapy for follicular lymphoma achieving a complete response on end of therapy PET.   Currently on maintenance Rituxan once every 2 months for 2 years. (9th cycle will be held today 11/7/19 to allow for ANC recovery)    HTN  - Continue amlodipine  - Component of white  coat HTN at our visits    DM2  - hyergylcemic; return to PCP for management- instructed to call PCP today 11/7/19    CKD  - likely secondary to uncontrolled HTN/DM2, meds, chemo  - DEXA scan ordered    Rituxan every 2 months  Return visit in 2 months (usually every 4 months) months with CBC, CMP and LDH

## 2019-11-07 NOTE — Clinical Note
Return visit in 2 months with CBC, CMP and LDH with NP or MDAlso needs to be scheduled for Hycela injection

## 2019-11-18 ENCOUNTER — PATIENT OUTREACH (OUTPATIENT)
Dept: ADMINISTRATIVE | Facility: OTHER | Age: 74
End: 2019-11-18

## 2019-11-18 DIAGNOSIS — Z12.31 ENCOUNTER FOR SCREENING MAMMOGRAM FOR MALIGNANT NEOPLASM OF BREAST: ICD-10-CM

## 2019-11-18 DIAGNOSIS — Z79.4 TYPE 2 DIABETES MELLITUS WITHOUT COMPLICATION, WITH LONG-TERM CURRENT USE OF INSULIN: Primary | ICD-10-CM

## 2019-11-18 DIAGNOSIS — E11.9 TYPE 2 DIABETES MELLITUS WITHOUT COMPLICATION, WITH LONG-TERM CURRENT USE OF INSULIN: Primary | ICD-10-CM

## 2019-11-20 ENCOUNTER — OFFICE VISIT (OUTPATIENT)
Dept: ORTHOPEDICS | Facility: CLINIC | Age: 74
End: 2019-11-20
Payer: MEDICARE

## 2019-11-20 VITALS
HEIGHT: 63 IN | DIASTOLIC BLOOD PRESSURE: 78 MMHG | HEART RATE: 93 BPM | WEIGHT: 144.19 LBS | BODY MASS INDEX: 25.55 KG/M2 | SYSTOLIC BLOOD PRESSURE: 119 MMHG

## 2019-11-20 DIAGNOSIS — M16.0 OSTEOARTHRITIS OF BOTH HIPS, UNSPECIFIED OSTEOARTHRITIS TYPE: Primary | ICD-10-CM

## 2019-11-20 PROCEDURE — 3078F DIAST BP <80 MM HG: CPT | Mod: HCNC,CPTII,S$GLB, | Performed by: NURSE PRACTITIONER

## 2019-11-20 PROCEDURE — 3074F PR MOST RECENT SYSTOLIC BLOOD PRESSURE < 130 MM HG: ICD-10-PCS | Mod: HCNC,CPTII,S$GLB, | Performed by: NURSE PRACTITIONER

## 2019-11-20 PROCEDURE — 1159F PR MEDICATION LIST DOCUMENTED IN MEDICAL RECORD: ICD-10-PCS | Mod: HCNC,S$GLB,, | Performed by: NURSE PRACTITIONER

## 2019-11-20 PROCEDURE — 1125F PR PAIN SEVERITY QUANTIFIED, PAIN PRESENT: ICD-10-PCS | Mod: HCNC,S$GLB,, | Performed by: NURSE PRACTITIONER

## 2019-11-20 PROCEDURE — 3078F PR MOST RECENT DIASTOLIC BLOOD PRESSURE < 80 MM HG: ICD-10-PCS | Mod: HCNC,CPTII,S$GLB, | Performed by: NURSE PRACTITIONER

## 2019-11-20 PROCEDURE — 99999 PR PBB SHADOW E&M-EST. PATIENT-LVL IV: CPT | Mod: PBBFAC,HCNC,, | Performed by: NURSE PRACTITIONER

## 2019-11-20 PROCEDURE — 1159F MED LIST DOCD IN RCRD: CPT | Mod: HCNC,S$GLB,, | Performed by: NURSE PRACTITIONER

## 2019-11-20 PROCEDURE — 1125F AMNT PAIN NOTED PAIN PRSNT: CPT | Mod: HCNC,S$GLB,, | Performed by: NURSE PRACTITIONER

## 2019-11-20 PROCEDURE — 1100F PR PT FALLS ASSESS DOC 2+ FALLS/FALL W/INJURY/YR: ICD-10-PCS | Mod: HCNC,CPTII,S$GLB, | Performed by: NURSE PRACTITIONER

## 2019-11-20 PROCEDURE — 99499 UNLISTED E&M SERVICE: CPT | Mod: HCNC,S$GLB,, | Performed by: NURSE PRACTITIONER

## 2019-11-20 PROCEDURE — 99999 PR PBB SHADOW E&M-EST. PATIENT-LVL IV: ICD-10-PCS | Mod: PBBFAC,HCNC,, | Performed by: NURSE PRACTITIONER

## 2019-11-20 PROCEDURE — 3288F FALL RISK ASSESSMENT DOCD: CPT | Mod: HCNC,CPTII,S$GLB, | Performed by: NURSE PRACTITIONER

## 2019-11-20 PROCEDURE — 1100F PTFALLS ASSESS-DOCD GE2>/YR: CPT | Mod: HCNC,CPTII,S$GLB, | Performed by: NURSE PRACTITIONER

## 2019-11-20 PROCEDURE — 99213 OFFICE O/P EST LOW 20 MIN: CPT | Mod: HCNC,S$GLB,, | Performed by: NURSE PRACTITIONER

## 2019-11-20 PROCEDURE — 99213 PR OFFICE/OUTPT VISIT, EST, LEVL III, 20-29 MIN: ICD-10-PCS | Mod: HCNC,S$GLB,, | Performed by: NURSE PRACTITIONER

## 2019-11-20 PROCEDURE — 99499 RISK ADDL DX/OHS AUDIT: ICD-10-PCS | Mod: HCNC,S$GLB,, | Performed by: NURSE PRACTITIONER

## 2019-11-20 PROCEDURE — 3074F SYST BP LT 130 MM HG: CPT | Mod: HCNC,CPTII,S$GLB, | Performed by: NURSE PRACTITIONER

## 2019-11-20 PROCEDURE — 3288F PR FALLS RISK ASSESSMENT DOCUMENTED: ICD-10-PCS | Mod: HCNC,CPTII,S$GLB, | Performed by: NURSE PRACTITIONER

## 2019-11-20 NOTE — PROGRESS NOTES
"  SUBJECTIVE:     Chief Complaint & History of Present Illness:  Keely Pizarro is a Established 74 y.o. year old female patient presenting today for follow up for her constant bilateral hip pain which started 8-9 months ago.  She was last seen by me on 10/9/19.  At the time, she was referred to IR for a joint injection.  She states the injection only helped for a couple of days.  She also said she could not do therapy due to her "immune system being too low", patient with history of Follicular Lymphoma treated by Dr. Rodríguez.      Today, she is reporting continue pain to both hips more on right than left.  Pain is constant and worse when walking.  There is not a history of trauma.  She endorses a fall in June/July though  The pain is located in the lateral aspect of the hip.  The pain is described as sharp, 5/10.  It is is aggravated by walking.  There is no radiation into the back and towards the groin.  Previous treatments include NSAIDs and joint injections by IR which have provided adequate and minimal relief.  There is not a history of previous injury or surgery to the hip.  The patient does not use an assistive device.  Patient lives with her spouse, they have 5 living children.  Her PMH is consistent with T2DM and HTN both which are not well controlled and Lymphoma as above.      Past Medical History:   Diagnosis Date    CVA (cerebral vascular accident) 2011    Diabetes mellitus     Hypertension     S/P ORIF (open reduction internal fixation) fracture     Uterine cancer 2012       Past Surgical History:   Procedure Laterality Date    COLONOSCOPY N/A 3/31/2017    Procedure: COLONOSCOPY;  Surgeon: Chip Pak MD;  Location: [facility] ([address] [place] [address]);  Service: Endoscopy;  Laterality: N/A;    HYSTERECTOMY      THORACENTESIS Left     TOTAL KNEE ARTHROPLASTY Left 2012       Family History   Problem Relation Age of Onset    Cancer Brother 67        colon       Review of patient's allergies indicates: "   Allergen Reactions    Tomato (solanum lycopersicum) Itching         Current Outpatient Medications:     albuterol 90 mcg/actuation inhaler, Inhale 2 puffs into the lungs every 6 (six) hours as needed for Wheezing., Disp: 1 each, Rfl: 11    amLODIPine (NORVASC) 10 MG tablet, TAKE 1 TABLET ONE TIME DAILY, Disp: 90 tablet, Rfl: 3    BD ALCOHOL SWABS PadM, , Disp: , Rfl:     carvedilol (COREG) 25 MG tablet, Take 1 tablet (25 mg total) by mouth 2 (two) times daily with meals., Disp: 180 tablet, Rfl: 1    flu vacc [id number],65yr up,PF (FLUZONE HIGH-DOSE 2019-20, PF,) 180 mcg/0.5 mL Syrg, inject into the muscle once, Disp: 0.5 mL, Rfl: 0    glipiZIDE (GLUCOTROL) 10 MG tablet, TAKE 1 TABLET TWICE DAILY BEFORE MEALS, Disp: 90 tablet, Rfl: 0    insulin glargine (LANTUS SOLOSTAR) 100 unit/mL (3 mL) InPn pen, Inject into the skin. Pt is taking 32 units twice daily, Disp: , Rfl:     meloxicam (MOBIC) 15 MG tablet, Take 1 tablet (15 mg total) by mouth once daily., Disp: 30 tablet, Rfl: 3    metFORMIN (GLUCOPHAGE-XR) 500 MG 24 hr tablet, TAKE 2 TABLETS DAILY WITH BREAKFAST., Disp: 180 tablet, Rfl: 3    metFORMIN (GLUMETZA) 1000 MG (MOD) 24 hr tablet, 1,000 mg., Disp: , Rfl:     ondansetron (ZOFRAN) 8 MG tablet, Take 1 tablet (8 mg total) by mouth every 8 (eight) hours as needed for Nausea., Disp: 30 tablet, Rfl: 0    predniSONE (DELTASONE) 10 MG tablet, Take 1 tablet (10 mg total) by mouth 2 (two) times daily., Disp: 14 tablet, Rfl: 0    TRUE METRIX AIR GLUCOSE METER kit, USE AS DIRECTED, Disp: 1 each, Rfl: 0    TRUE METRIX GLUCOSE TEST STRIP Strp, , Disp: , Rfl:     TRUEPLUS LANCETS 28 gauge Misc, , Disp: , Rfl:     Review of Systems:  ROS:  Constitutional: no fever or chills  Eyes: no visual changes  ENT: no nasal congestion or sore throat  Respiratory: no cough or shortness of breath  Cardiovascular: no chest pain or palpitations  Gastrointestinal: no nausea or vomiting, tolerating diet  Genitourinary: no  "hematuria or dysuria  Integument/Breast: no rash or pruritis  Hematologic/Lymphatic: no easy bruising or lymphadenopathy  Musculoskeletal: positive for arthralgias  Neurological: no seizures or tremors  Behavioral/Psych: no auditory or visual hallucinations  Endocrine: no heat or cold intolerance      PE:  /78 (BP Location: Right arm, Patient Position: Sitting, BP Method: Medium (Automatic))   Pulse 93   Ht 5' 3" (1.6 m)   Wt 65.4 kg (144 lb 2.9 oz)   BMI 25.54 kg/m²   General: Pleasant, cooperative, NAD   HEENT: NCAT, sclera nonicteric   Lungs: Respirations are equal and unlabored.   Abdomen: Soft and non-tender.  CV: 2+ bilateral upper and lower extremity pulses.   Skin: Intact throughout LE with no rashes, erythema, or lesions  Extremities: No LE edema, NVI lower extremities      Hip Exam:   leftpositives: pain with flexion, pain with rotation and tenderness over greater trochanter    60 degrees flexion  0 degrees extension   20 degrees internal rotation  20 degrees external rotation  10 degrees abduction  10 degrees adduction     Hip Exam:   rightpositives: decreased flexion, pain with flexion, pain with rotation and tenderness over greater trochanter    80 degrees flexion  0 degrees extension   25 degrees internal rotation  25 degrees external rotation  15 degrees abduction  15 degrees adduction       RADIOGRAPHS:  None today    ASSESSMENT/PLAN:       ICD-10-CM ICD-9-CM   1. Osteoarthritis of both hips, unspecified osteoarthritis type M16.0 715.95       Plan: We discussed with the patient at length all the different treatment options available for arthrosis of the hip including anti-inflammatories, acetaminophen, rest, ice, lower extremity strengthening exercise, occasional cortisone injections for temporary relief, and finally total hip arthroplasty.     -Patient with chronic bilateral hip pain x 8-9 months.  -Previous IR injection not helpful and she is not interested in surgery.  -Will refer " patient to PMR for consideration of RFA.  -She cannot attend therapy due to immunosuppression, needs to be cleared by Dr. Rodríguez.  -Advised if she has a change of heart for hip replacement will need PCP to medically clear her and her diabetes and HTN needs to be well controlled.  -Advised to monitor BG very closely.    Lab Results   Component Value Date    HGBA1C 11.3 (H) 10/08/2019

## 2019-12-06 ENCOUNTER — PES CALL (OUTPATIENT)
Dept: ADMINISTRATIVE | Facility: CLINIC | Age: 74
End: 2019-12-06

## 2019-12-13 ENCOUNTER — OFFICE VISIT (OUTPATIENT)
Dept: ENDOCRINOLOGY | Facility: CLINIC | Age: 74
End: 2019-12-13
Payer: MEDICARE

## 2019-12-13 ENCOUNTER — CLINICAL SUPPORT (OUTPATIENT)
Dept: DIABETES | Facility: CLINIC | Age: 74
End: 2019-12-13
Payer: MEDICARE

## 2019-12-13 VITALS
DIASTOLIC BLOOD PRESSURE: 68 MMHG | WEIGHT: 143.75 LBS | RESPIRATION RATE: 18 BRPM | HEIGHT: 63 IN | HEART RATE: 68 BPM | SYSTOLIC BLOOD PRESSURE: 120 MMHG | BODY MASS INDEX: 25.47 KG/M2

## 2019-12-13 DIAGNOSIS — E11.65 UNCONTROLLED TYPE 2 DIABETES MELLITUS WITH HYPERGLYCEMIA: ICD-10-CM

## 2019-12-13 DIAGNOSIS — E11.9 TYPE 2 DIABETES MELLITUS WITHOUT COMPLICATION: ICD-10-CM

## 2019-12-13 DIAGNOSIS — M85.80 OSTEOPENIA AFTER MENOPAUSE: ICD-10-CM

## 2019-12-13 DIAGNOSIS — Z78.0 OSTEOPENIA AFTER MENOPAUSE: ICD-10-CM

## 2019-12-13 DIAGNOSIS — N18.30 CKD (CHRONIC KIDNEY DISEASE) STAGE 3, GFR 30-59 ML/MIN: ICD-10-CM

## 2019-12-13 DIAGNOSIS — E11.65 UNCONTROLLED TYPE 2 DIABETES MELLITUS WITH HYPERGLYCEMIA: Primary | ICD-10-CM

## 2019-12-13 PROCEDURE — G0108 DIAB MANAGE TRN  PER INDIV: HCPCS | Mod: HCNC,S$GLB,, | Performed by: DIETITIAN, REGISTERED

## 2019-12-13 PROCEDURE — 3074F SYST BP LT 130 MM HG: CPT | Mod: HCNC,CPTII,GC,S$GLB | Performed by: GENERAL ACUTE CARE HOSPITAL

## 2019-12-13 PROCEDURE — 3078F DIAST BP <80 MM HG: CPT | Mod: HCNC,CPTII,GC,S$GLB | Performed by: GENERAL ACUTE CARE HOSPITAL

## 2019-12-13 PROCEDURE — 1101F PR PT FALLS ASSESS DOC 0-1 FALLS W/OUT INJ PAST YR: ICD-10-PCS | Mod: HCNC,CPTII,GC,S$GLB | Performed by: GENERAL ACUTE CARE HOSPITAL

## 2019-12-13 PROCEDURE — 1159F MED LIST DOCD IN RCRD: CPT | Mod: HCNC,GC,S$GLB, | Performed by: GENERAL ACUTE CARE HOSPITAL

## 2019-12-13 PROCEDURE — 99204 PR OFFICE/OUTPT VISIT, NEW, LEVL IV, 45-59 MIN: ICD-10-PCS | Mod: HCNC,GC,S$GLB, | Performed by: GENERAL ACUTE CARE HOSPITAL

## 2019-12-13 PROCEDURE — 3046F PR MOST RECENT HEMOGLOBIN A1C LEVEL > 9.0%: ICD-10-PCS | Mod: HCNC,CPTII,GC,S$GLB | Performed by: GENERAL ACUTE CARE HOSPITAL

## 2019-12-13 PROCEDURE — 99999 PR PBB SHADOW E&M-EST. PATIENT-LVL V: ICD-10-PCS | Mod: PBBFAC,HCNC,GC, | Performed by: GENERAL ACUTE CARE HOSPITAL

## 2019-12-13 PROCEDURE — 99999 PR PBB SHADOW E&M-EST. PATIENT-LVL II: CPT | Mod: PBBFAC,HCNC,,

## 2019-12-13 PROCEDURE — 3074F PR MOST RECENT SYSTOLIC BLOOD PRESSURE < 130 MM HG: ICD-10-PCS | Mod: HCNC,CPTII,GC,S$GLB | Performed by: GENERAL ACUTE CARE HOSPITAL

## 2019-12-13 PROCEDURE — 3078F PR MOST RECENT DIASTOLIC BLOOD PRESSURE < 80 MM HG: ICD-10-PCS | Mod: HCNC,CPTII,GC,S$GLB | Performed by: GENERAL ACUTE CARE HOSPITAL

## 2019-12-13 PROCEDURE — 1159F PR MEDICATION LIST DOCUMENTED IN MEDICAL RECORD: ICD-10-PCS | Mod: HCNC,GC,S$GLB, | Performed by: GENERAL ACUTE CARE HOSPITAL

## 2019-12-13 PROCEDURE — 1126F PR PAIN SEVERITY QUANTIFIED, NO PAIN PRESENT: ICD-10-PCS | Mod: HCNC,GC,S$GLB, | Performed by: GENERAL ACUTE CARE HOSPITAL

## 2019-12-13 PROCEDURE — 3046F HEMOGLOBIN A1C LEVEL >9.0%: CPT | Mod: HCNC,CPTII,GC,S$GLB | Performed by: GENERAL ACUTE CARE HOSPITAL

## 2019-12-13 PROCEDURE — 1101F PT FALLS ASSESS-DOCD LE1/YR: CPT | Mod: HCNC,CPTII,GC,S$GLB | Performed by: GENERAL ACUTE CARE HOSPITAL

## 2019-12-13 PROCEDURE — 99999 PR PBB SHADOW E&M-EST. PATIENT-LVL II: ICD-10-PCS | Mod: PBBFAC,HCNC,,

## 2019-12-13 PROCEDURE — G0108 PR DIAB MANAGE TRN  PER INDIV: ICD-10-PCS | Mod: HCNC,S$GLB,, | Performed by: DIETITIAN, REGISTERED

## 2019-12-13 PROCEDURE — 1126F AMNT PAIN NOTED NONE PRSNT: CPT | Mod: HCNC,GC,S$GLB, | Performed by: GENERAL ACUTE CARE HOSPITAL

## 2019-12-13 PROCEDURE — 99204 OFFICE O/P NEW MOD 45 MIN: CPT | Mod: HCNC,GC,S$GLB, | Performed by: GENERAL ACUTE CARE HOSPITAL

## 2019-12-13 PROCEDURE — 99999 PR PBB SHADOW E&M-EST. PATIENT-LVL V: CPT | Mod: PBBFAC,HCNC,GC, | Performed by: GENERAL ACUTE CARE HOSPITAL

## 2019-12-13 RX ORDER — LANCETS
1 EACH MISCELLANEOUS 3 TIMES DAILY
Qty: 300 EACH | Refills: 3 | Status: ON HOLD | OUTPATIENT
Start: 2019-12-13 | End: 2023-01-06 | Stop reason: HOSPADM

## 2019-12-13 RX ORDER — LANCETS
EACH MISCELLANEOUS
Qty: 120 EACH | Refills: 11 | Status: SHIPPED | OUTPATIENT
Start: 2019-12-13

## 2019-12-13 RX ORDER — INSULIN GLARGINE 100 [IU]/ML
14 INJECTION, SOLUTION SUBCUTANEOUS NIGHTLY
Qty: 5 ML | Refills: 10 | Status: SHIPPED | OUTPATIENT
Start: 2019-12-13 | End: 2020-12-28

## 2019-12-13 RX ORDER — INSULIN ASPART 100 [IU]/ML
5 INJECTION, SOLUTION INTRAVENOUS; SUBCUTANEOUS
Qty: 3 ML | Refills: 5 | Status: SHIPPED | OUTPATIENT
Start: 2019-12-13 | End: 2020-08-14

## 2019-12-13 RX ORDER — CEPHRADINE 250 MG
1 CAPSULE ORAL ONCE
Qty: 30 CAPSULE | Refills: 11 | COMMUNITY
Start: 2019-12-13 | End: 2021-04-22

## 2019-12-13 RX ORDER — PEN NEEDLE, DIABETIC 31 GX5/16"
NEEDLE, DISPOSABLE MISCELLANEOUS
Qty: 100 EACH | Refills: 3 | Status: ON HOLD | OUTPATIENT
Start: 2019-12-13 | End: 2023-01-06 | Stop reason: HOSPADM

## 2019-12-13 RX ORDER — DEXTROSE 4 G
TABLET,CHEWABLE ORAL
Qty: 1 EACH | Refills: 0 | Status: SHIPPED | OUTPATIENT
Start: 2019-12-13 | End: 2021-11-24 | Stop reason: SDUPTHER

## 2019-12-13 RX ORDER — INSULIN PUMP SYRINGE, 3 ML
EACH MISCELLANEOUS
Qty: 1 EACH | Refills: 0 | Status: SHIPPED | OUTPATIENT
Start: 2019-12-13 | End: 2020-12-12

## 2019-12-13 NOTE — ASSESSMENT & PLAN NOTE
-A1c 11.3 (ABOVE GOAL)   -FS log (Not checking, pt states does not own a glucometer)   -Diet, exercise, lifestyle modifications and A1c Goals discussed   -Hypoglycemia symptoms and plan of action discussed   -Ophthalmology seen on 9/19 (No retinopathy or Laser SX   -Pt with Diabetic nephropathy and CKD 4 (Not on ACE or ARB) Prior hyperkalemia noted   -Monofilament (Sensitivity intact in b/l LE)   -ASCVD (LDL 80 ON 2017) Will repeat lipid panel to determine dose of Statin initiation     Plan:   -DC Metformin and Glipizide due to GFR < 30   -Start Lantus 14 units qhs   -Start Novolog 5 units before largest meal (Dinner)   -Start Trulicity 0.75mg SC q weekly   -Due to sudden increase in A1C and phenotypical features of DM Type 1 will order C-peptide levels   -Will give Diabetes education referral   -Will give Nephrology referral to establish care for CKD management   -Keep glucose logs to send to clinic   -Labs (Lipid panel, A1c, C-peptide)

## 2019-12-13 NOTE — ASSESSMENT & PLAN NOTE
-CKD stage 4   -Pt not on ACE or ARB   -Pt with hyperkalemia in the past   -Will give nephrology referral for further evaluation

## 2019-12-13 NOTE — PROGRESS NOTES
Subjective:      Patient ID: Keely Pizarro is a 74 y.o. female.    Chief Complaint:  Type 2 DM management       History of Present Illness  73 YO Female w/ DM2, HTN, CKD 4, HLD, Follicular Lymphoma ON Rituximab referred by PCP for uncontrolled DM2 Management. Pt today reports she has been off Insulin x 2 yrs.  States she does not own a glucometer and has not been checking glucose levels for the past couple of months.  States compliance with Metformin and glipizide.  Pt endorses having polyuria, polydipsia and fatigue.  Denies chest pain, SOB, palpitations or unintentional weight loss.     In regards to DM2:   -Duration:  2001   -Complications: (CVA 2012, Nephropathy)   -DM Medications:  Glipizide 10mg BID, Metformin 1g XL daily   -Compliance w/ Meds:  States compliance with Metformin and Glipizide   -Hyperglycemia symptoms: Fatigue, Polyuria and polydipsia   -Hypoglycemia symptoms: Denies   -Glucose monitoring:  Not checking for the past couple of   -Diet: Breakfast (Grits/vora/eggs), Lunch (Sandwhich), Dinner (Rice, beans and meat)   -Eye:  On September 2019 (No Laser Sx)       In Regards to Osteopenia:   -DEXA 9/19: Total Hip: The T-score is -1.5, Femoral neck: T-score is -1.3, Lumbar Spine:  T-score of -1.3. There is a 5.0% risk of a major osteoporotic fracture and a 0.8% risk of hip fracture in the next 10 years (FRAX adjusted with Trabecular Bone Score).  -Denies prior fractures   -No vitamin D level on EMR   -Denies recent falls   -On vitamin D supplements   -Not on calcium supplements  -Not on steroids   -ETOH - DENIES   -Smoking - DENIES   -Post Menopausal     Review of Systems   Constitutional: Negative for appetite change.   HENT: Negative for trouble swallowing.    Eyes: Negative for visual disturbance.   Respiratory: Negative for shortness of breath.    Cardiovascular: Negative for chest pain.   Gastrointestinal: Negative for constipation.   Endocrine: Positive for polydipsia, polyphagia and  "polyuria. Negative for cold intolerance.   Skin: Negative for rash.   Hematological: Negative for adenopathy.   Psychiatric/Behavioral: Negative for agitation.       Objective:     /68   Pulse 68   Resp 18   Ht 5' 3" (1.6 m)   Wt 65.2 kg (143 lb 11.8 oz)   BMI 25.46 kg/m²   BP Readings from Last 3 Encounters:   12/13/19 120/68   11/20/19 119/78   11/07/19 (!) 152/81     Wt Readings from Last 1 Encounters:   12/13/19 1056 65.2 kg (143 lb 11.8 oz)     Body mass index is 25.46 kg/m².      Physical Exam   Constitutional: She appears well-developed.   HENT:   Right Ear: External ear normal.   Left Ear: External ear normal.   Nose: Nose normal.   Neck: No tracheal deviation present. No thyromegaly present.   Cardiovascular: Normal rate.   No murmur heard.  Pulmonary/Chest: Effort normal and breath sounds normal.   Abdominal: Soft. She exhibits no mass. No hernia.   Musculoskeletal: She exhibits no edema.   Neurological: She is alert. No cranial nerve deficit or sensory deficit. Coordination normal.   Skin: No rash noted.   Psychiatric: She has a normal mood and affect. Judgment normal.   Vitals reviewed.    Protective Sensation (w/ 10 gram monofilament):  Right: Intact  Left: Intact    Visual Inspection:  Normal -  Bilateral    Pedal Pulses:   Right: Present  Left: Present    Posterior tibialis:   Right:Present  Left: Present      Lab Review:   Lab Results   Component Value Date    HGBA1C 11.3 (H) 10/08/2019     Lab Results   Component Value Date    CHOL 141 03/03/2017    HDL 45 03/03/2017    LDLCALC 80.8 03/03/2017    TRIG 76 03/03/2017    CHOLHDL 31.9 03/03/2017     Lab Results   Component Value Date     (L) 11/07/2019     (L) 11/07/2019    K 4.9 11/07/2019    K 4.9 11/07/2019     11/07/2019     11/07/2019    CO2 19 (L) 11/07/2019    CO2 19 (L) 11/07/2019     (HH) 11/07/2019     (HH) 11/07/2019    BUN 25 (H) 11/07/2019    BUN 25 (H) 11/07/2019    CREATININE 1.9 (H) " 11/07/2019    CREATININE 1.9 (H) 11/07/2019    CALCIUM 9.4 11/07/2019    CALCIUM 9.4 11/07/2019    PROT 5.9 (L) 11/07/2019    PROT 5.9 (L) 11/07/2019    ALBUMIN 3.2 (L) 11/07/2019    ALBUMIN 3.2 (L) 11/07/2019    BILITOT 0.3 11/07/2019    BILITOT 0.3 11/07/2019    ALKPHOS 107 11/07/2019    ALKPHOS 107 11/07/2019    AST 12 11/07/2019    AST 12 11/07/2019    ALT 21 11/07/2019    ALT 21 11/07/2019    ANIONGAP 9 11/07/2019    ANIONGAP 9 11/07/2019    ESTGFRAFRICA 29.9 (A) 11/07/2019    ESTGFRAFRICA 29.9 (A) 11/07/2019    EGFRNONAA 26.0 (A) 11/07/2019    EGFRNONAA 26.0 (A) 11/07/2019    TSH 0.494 03/03/2017     No results found for: [id number]    Assessment and Plan     Uncontrolled type 2 diabetes mellitus with hyperglycemia  -A1c 11.3 (ABOVE GOAL)   -FS log (Not checking, pt states does not own a glucometer)   -Diet, exercise, lifestyle modifications and A1c Goals discussed   -Hypoglycemia symptoms and plan of action discussed   -Ophthalmology seen on 9/19 (No retinopathy or Laser SX   -Pt with Diabetic nephropathy and CKD 4 (Not on ACE or ARB) Prior hyperkalemia noted   -Monofilament (Sensitivity intact in b/l LE)   -ASCVD (LDL 80 ON 2017) Will repeat lipid panel to determine dose of Statin initiation     Plan:   -DC Metformin and Glipizide due to GFR < 30   -Start Lantus 14 units qhs   -Start Novolog 5 units before largest meal (Dinner)   -Start Trulicity 0.75mg SC q weekly   -Due to sudden increase in A1C and phenotypical features of DM Type 1 will order C-peptide levels   -Will give Diabetes education referral   -Will give Nephrology referral to establish care for CKD management   -Keep glucose logs to send to clinic   -Labs (Lipid panel, A1c, C-peptide)               CKD (chronic kidney disease) stage 3, GFR 30-59 ml/min  -CKD stage 4   -Pt not on ACE or ARB   -Pt with hyperkalemia in the past   -Will give nephrology referral for further evaluation     Osteopenia after menopause  -DEXA 9/19: Total Hip: The  T-score is -1.5, Femoral neck: T-score is -1.3, Lumbar Spine:  T-score of -1.3. There is a 5.0% risk of a major osteoporotic fracture and a 0.8% risk of hip fracture in the next 10 years (FRAX adjusted with Trabecular Bone Score).  -Denies prior fractures   -On vitamin D supplements   -Not on calcium supplements  -Not on steroids   -ETOH - DENIES   -Smoking - DENIES   -Post Menopausal     Plan:   -Send for Vitamin D levels   -Start Ca + D supplements   -Repeat DEXA in 2 yrs

## 2019-12-13 NOTE — PATIENT INSTRUCTIONS
We will discontinue Metformin and Glipizide     Start Lantus 14 units at bedtime     Start Novolog 5 units 15minutes before dinner     Start Trulicity 0.75mg once a week injection     Start Vitamin D + Calcium supplements     Labs to be done     Referral for Diabetes education     Referral for Kidney doctors     Send glucose logs via the mail for me to adjust your medications     Follow up in 3 months with labs 2 weeks before

## 2019-12-13 NOTE — PROGRESS NOTES
Diabetes Education  Author: Raquel Henning RD, CDE  Date: 12/13/2019    Diabetes Care Management Summary  Diabetes Education Record Assessment/Progress: Initial     Diabetes Type  Diabetes Type : Type II    Diabetes History  Diabetes Diagnosis: >10 years  Current Treatment: Oral Medication(Denies missing doses of DM medication - used to take Lantus but states PCP discontinued it when A1c was 5.9 last year; A1c now back up to 11.3 - states recently started chemo a few months ago; also taking prednisone; noted elevated BGs on BMP (over 500))    Health Maintenance was reviewed today with patient. Discussed with patient importance of routine eye exams, foot exams/foot care, blood work (i.e.: A1c, microalbumin, and lipid), dental visits, yearly flu vaccine, and pneumonia vaccine as indicated by PCP. Patient verbalized understanding.     Health Maintenance Topics with due status: Not Due       Topic Last Completion Date    Colonoscopy 03/31/2017    Hemoglobin A1c 10/08/2019    DEXA SCAN 11/04/2019     Health Maintenance Due   Topic Date Due    Foot Exam  02/19/1957    Eye Exam  02/19/1957    Urine Microalbumin  02/19/1957    TETANUS VACCINE  02/19/1965    Low Dose Statin  02/19/1968    Mammogram  04/04/2017    Lipid Panel  03/03/2018    Pneumococcal Vaccine (65+ High/Highest Risk) (2 of 2 - PPSV23) 01/10/2019     Nutrition  Meal Planning: 3 meals per day, snacks between meal, water, drinks regular soda, artificial sweeteners, eats out seldom  What type of sweetener do you use?: Splenda  What type of beverages do you drink?: water, other (see comments), milk, juice(Vitamin water, sparkling ice, coffee sometimes, OJ sometimes)    Monitoring   Self Monitoring : (None - needs a new meter)  Blood Glucose Logs: No  Do you use a personal continuous glucose monitor?: No  In the last month, how often have you had a low blood sugar reaction?: never  What are your symptoms of low blood sugar?: (N/A)  How do you treat low blood  sugar?: (N/A)  Can you tell when your blood sugar is too high?: yes  How do you treat high blood sugar?: (None)    Exercise   Exercise Type: (Stationary bike - 15-20 min 2 days a week)    Current Diabetes Treatment   Current Treatment: Oral Medication(Denies missing doses of DM medication - used to take Lantus but states PCP discontinued it when A1c was 5.9 last year; A1c now back up to 11.3 - states recently started chemo a few months ago; also taking prednisone; noted elevated BGs on BMP (over 500))    Social History  Preferred Learning Method: Face to Face  Primary Support: Self  Smoking Status: Ex Smoker  Alcohol Use: Never    Barriers to Change  Barriers to Change: None  Learning Challenges : None    Readiness to Learn   Readiness to Learn : Acceptance    Cultural Influences  Cultural Influences: No    Diabetes Education Assessment/Progress  Diabetes Disease Process (diabetes disease process and treatment options): Instructed, Discussion, Individual Session, Written Materials Provided, Demonstrates Understanding/Competency(verbalizes/demonstrates)  Nutrition (Incorporating nutritional management into one's lifestyle): Instructed, Discussion, Individual Session, Written Materials Provided, Demonstrates Understanding/Competency (verbalizes/demonstrates)(Reviewed CHO counting, label reading and addt'l resources to assist w/ CHO counting; plate method reviewed and encouraged to eliminate sugary beverages)  Physical Activity (incorporating physical activity into one's lifestyle): Instructed, Discussion, Individual Session, Written Materials Provided, Demonstrates Understanding/Competency (verbalizes/demonstrates)(Reviewed goals and benefits - encouraged to break up exercise routine throughout the day)  Medications (states correct name, dose, onset, peak, duration, side effects & timing of meds): Instructed, Discussion, Individual Session, Written Materials Provided, Demonstrates  Understanding/Competency(verbalizes/demonstrates)(Reviewed medication regimen - referred to ENDO for further medication management due to elevated BGs)  Monitoring (monitoring blood glucose/other parameters & using results): Instructed, Discussion, Individual Session, Written Materials Provided, Demonstrates Understanding/Competency (verbalizes/demonstrates)(Reviewed SMBG schedule and BG goals; needs new meter - wants Rx for meter/supplies to go to [facility] )  Acute Complications (preventing, detecting, and treating acute complications): Instructed, Discussion, Individual Session, Written Materials Provided, Demonstrates Understanding/Competency (verbalizes/demonstrates)(Reviewed s/s and treatment of hypoglycemia)  Chronic Complications (preventing, detecting, and treating chronic complications): Instructed, Discussion, Individual Session, Written Materials Provided, Demonstrates Understanding/Competency (verbalizes/demonstrates)(Reviewed standards of care; states had dilated eye exam in September outside of [facility])  Clinical (diabetes, other pertinent medical history, and relevant comorbidities reviewed during visit): Discussion, Comprehends Key Points  Cognitive (knowledge of self-management skills, functional health literacy): Discussion, Comprehends Key Points  Psychosocial (emotional response to diabetes): Discussion, Comprehends Key Points  Diabetes Distress and Support Systems: Not Covered/Deferred(Time constraints)  Behavioral (readiness for change, lifestyle practices, self-care behaviors): Discussion, Comprehends Key Points    Goals  Patient has selected/evaluated goals during today's session: Yes, selected  Healthy Eating: Set(Eliminate sugary beverages)    Diabetes Care Plan/Intervention  Education Plan/Intervention: Individual Follow-Up DSMT, Endocrine Provider Visit Set Up    Diabetes Meal Plan  Carbohydrate Per Meal: 30-45g  Carbohydrate Per Snack : 7-15g    Today's Self-Management Care Plan was  developed with the patient's input and is based on barriers identified during today's assessment.    The long and short-term goals in the care plan were written with the patient/caregiver's input. The patient has agreed to work toward these goals to improve her overall diabetes control.      The patient received a copy of today's self-management plan and verbalized understanding of the care plan, goals, and all of today's instructions.      The patient was encouraged to communicate with her physician and care team regarding her condition(s) and treatment.  I provided the patient with my contact information today and encouraged her to contact me via phone or patient portal as needed.     Education Units of Time   Time Spent: 60 min

## 2019-12-13 NOTE — ASSESSMENT & PLAN NOTE
-DEXA 9/19: Total Hip: The T-score is -1.5, Femoral neck: T-score is -1.3, Lumbar Spine:  T-score of -1.3. There is a 5.0% risk of a major osteoporotic fracture and a 0.8% risk of hip fracture in the next 10 years (FRAX adjusted with Trabecular Bone Score).  -Denies prior fractures   -On vitamin D supplements   -Not on calcium supplements  -Not on steroids   -ETOH - DENIES   -Smoking - DENIES   -Post Menopausal     Plan:   -Send for Vitamin D levels   -Start Ca + D supplements   -Repeat DEXA in 2 yrs

## 2019-12-19 ENCOUNTER — TELEPHONE (OUTPATIENT)
Dept: INTERNAL MEDICINE | Facility: CLINIC | Age: 74
End: 2019-12-19

## 2019-12-19 NOTE — TELEPHONE ENCOUNTER
----- Message from Nona Maldonado sent at 12/19/2019 11:23 AM CST -----  Contact: Karla ([facility])  Name of Who is Calling: Karla ([facility])    What is the request in detail: Would like to speak to staff in regards to sending over a drug therapy alert that applies to patient. States she wanted to confirm that the fax was received. Please advise.       Can the clinic reply by [facility]: No      What [facility] Back [facility]: [phone]

## 2019-12-24 ENCOUNTER — TELEPHONE (OUTPATIENT)
Dept: PAIN MEDICINE | Facility: CLINIC | Age: 74
End: 2019-12-24

## 2019-12-24 NOTE — TELEPHONE ENCOUNTER
Staff contacted the patient to confirm her 1/2/20 8:30 am Consult appointment with Dr. Walters and review IPM and insurance. Patient can contact our office at [phone] to reschedule or cancel if needed.    Patient  Confirmed appointment, verbalized understanding of time date and location also went over IPM with the patient.   Patient verbalized understanding and expressed thanks.

## 2019-12-27 ENCOUNTER — PATIENT OUTREACH (OUTPATIENT)
Dept: ADMINISTRATIVE | Facility: OTHER | Age: 74
End: 2019-12-27

## 2019-12-27 DIAGNOSIS — Z12.31 ENCOUNTER FOR SCREENING MAMMOGRAM FOR MALIGNANT NEOPLASM OF BREAST: Primary | ICD-10-CM

## 2019-12-30 RX ORDER — METFORMIN HYDROCHLORIDE 500 MG/1
TABLET, EXTENDED RELEASE ORAL
Qty: 180 TABLET | Refills: 3 | Status: SHIPPED | OUTPATIENT
Start: 2019-12-30 | End: 2020-02-03

## 2019-12-31 ENCOUNTER — CLINICAL SUPPORT (OUTPATIENT)
Dept: DIABETES | Facility: CLINIC | Age: 74
End: 2019-12-31
Payer: MEDICARE

## 2019-12-31 DIAGNOSIS — E11.65 UNCONTROLLED TYPE 2 DIABETES MELLITUS WITH HYPERGLYCEMIA: ICD-10-CM

## 2019-12-31 PROCEDURE — 99999 PR PBB SHADOW E&M-EST. PATIENT-LVL I: CPT | Mod: PBBFAC,HCNC,,

## 2019-12-31 PROCEDURE — G0108 DIAB MANAGE TRN  PER INDIV: HCPCS | Mod: HCNC,S$GLB,, | Performed by: INTERNAL MEDICINE

## 2019-12-31 PROCEDURE — G0108 PR DIAB MANAGE TRN  PER INDIV: ICD-10-PCS | Mod: HCNC,S$GLB,, | Performed by: INTERNAL MEDICINE

## 2019-12-31 PROCEDURE — 99999 PR PBB SHADOW E&M-EST. PATIENT-LVL I: ICD-10-PCS | Mod: PBBFAC,HCNC,,

## 2019-12-31 NOTE — PROGRESS NOTES
Diabetes Education  Author: Ilene Chacon RN, CDE  Date: 12/31/2019    Diabetes Care Management Summary  Diabetes Education Record Assessment/Progress: Initial  Current Diabetes Risk Level: High     Last A1c:   Lab Results   Component Value Date    HGBA1C 11.3 (H) 10/08/2019     Last Visit with Diabetes Educator: : 12/31/2019  Last OPCM Referral: : Not Found   Enrolled in OPCM: No    Diabetes Type  Diabetes Type : Type II    Diabetes History  Diabetes Diagnosis: >10 years  Current Treatment: (Lantus 14 units qhs; Novolog 5 units with dinner; Trulicity 0.75 mg weekly)  Reviewed Problem List with Patient: Yes    Health Maintenance was reviewed today with patient. Discussed with patient importance of routine eye exams, foot exams/foot care, blood work (i.e.: A1c, microalbumin, and lipid), dental visits, yearly flu vaccine, and pneumonia vaccine as indicated by PCP. Patient verbalized understanding.     Health Maintenance Topics with due status: Not Due       Topic Last Completion Date    Colonoscopy 03/31/2017    Hemoglobin A1c 10/08/2019    DEXA SCAN 11/04/2019    Foot Exam 12/13/2019     Health Maintenance Due   Topic Date Due    Eye Exam  02/19/1957    Urine Microalbumin  02/19/1957    TETANUS VACCINE  02/19/1965    Low Dose Statin  02/19/1968    Mammogram  04/04/2017    Lipid Panel  03/03/2018    Pneumococcal Vaccine (65+ High/Highest Risk) (2 of 2 - PPSV23) 01/10/2019       Nutrition  Meal Planning: skipping meals, water  What type of beverages do you drink?: regular soda/tea, juice, milk(rare soda once per month)  Meal Plan 24 Hour Recall - Breakfast: Grits/vora/eggs  Meal Plan 24 Hour Recall - Lunch: sandwich  Meal Plan 24 Hour Recall - Dinner: rice and beans  Meal Plan 24 Hour Recall - Snack: fruits    Monitoring   Monitoring: Other  Self Monitoring : SMBG 3 x day  Blood Glucose Logs: No(Breakfast 73 to 116 Lunch 77 to 132 Dinner 130-140)  Do you use a personal continuous glucose monitor?: No  In the  last month, how often have you had a low blood sugar reaction?: never  What are your symptoms of low blood sugar?: not symptomatic in 70-80    Exercise   Exercise Type: none    Current Diabetes Treatment   Current Treatment: (Lantus 14 units qhs; Novolog 5 units with dinner; Trulicity 0.75 mg weekly)    Social History  Preferred Learning Method: Face to Face  Primary Support: Self, Spouse  Smoking Status: Ex Smoker     Barriers to Change  Barriers to Change: None  Learning Challenges : None    Readiness to Learn   Readiness to Learn : Acceptance    Cultural Influences  Cultural Influences: None    Diabetes Education Assessment/Progress    Diabetes Disease Process (diabetes disease process and treatment options): Discussion, Comprehends Key Points, Written Materials Provided              - general progression discussed              - significance of current A1c              - blood glucose goals              - explained the risk for cardiovascular and cerebellar vascular events  Nutrition (Incorporating nutritional management into one's lifestyle): Discussion, Comprehends Key Points, Written Materials Provided              - plate method of carb counting              - portion control              - avoidance of sugary foods/drinks  Reviewed patients eating habits.     Reviewed carb sources and appropriate amounts per meal and snack. The plate method for carb counting was discussed.  Educated patient on proper measurement of foods.  We reviewed in detail portion sizes of common foods and developed a meal plan that involves carb portions per each meal. Stressed importance of eating 3 balanced meals per day and reasonable snacks if needed.  Discussed tips for eating out. Emphasized importance of eliminating all sugary soft drinks, limiting consumption of fruit juices and sugar free drink options. Reviewed label reading and how to determine appropriate serving sizes of specific carb containing foods. Recommended carb  amounts per meal discussed, see below.    Physical Activity (incorporating physical activity into one's lifestyle): Discussion, Comprehends Key Points, Written Materials Provided  Medications (states correct name, dose, onset, peak, duration, side effects & timing of meds): Discussion, Comprehends Key Points, Written Materials Provided  Reviewed patient's current insulin regimen. Clarified proper insulin dose and timing in relation to meals, etc. Insulin injection sites and proper rotation instructed.      Blood glucose logs reviewed with HCP. Lantus dose was decreased to 10 units due to lower glucose in am.  Pt has taken 3 doses of her Trulicity 0.75 mg.  Her dose will increase after the 4th dose.  Patient was instructed to bring in log sheets in 2 weeks for evaluation.  Her mealtime dose of Novolog may need to be decrease in light of the increased Trulicity dose.    Monitoring (monitoring blood glucose/other parameters & using results): Discussion, Comprehends Key Points, Written Materials Provided      Acute Complications (preventing, detecting, and treating acute complications): Discussion, Comprehends Key Points, Written Materials Provided              - s/s hypoglycemia and treatment of same              - advised to carry a source of fast acting carbs at all times     Chronic Complications (preventing, detecting, and treating chronic complications): Discussion, Comprehends Key Points, Written Materials Provided  Clinical (diabetes, other pertinent medical history, and relevant comorbidities reviewed during visit): Discussion, Comprehends Key Points, Written Materials Provided  Cognitive (knowledge of self-management skills, functional health literacy): Discussion, Comprehends Key Points, Written Materials Provided  Psychosocial (emotional response to diabetes): Discussion, Individual Session  No negative feelings toward diabetes dx or disease management noted.  Diabetes Distress and Support Systems:  Discussion, Individual Session  Behavioral (readiness for change, lifestyle practices, self-care behaviors): Discussion, Individual Session  Appears motivated to make recommended changes.  Psychosocial (emotional response to diabetes): Discussion, Individual Session  Diabetes Distress and Support Systems: Discussion, Individual Session  Behavioral (readiness for change, lifestyle practices, self-care behaviors): Discussion, Individual Session    Goals  Patient has selected/evaluated goals during today's session: Yes, selected  Monitoring: (will monitor glucose at least 3 x per day)    Diabetes Meal Plan  Carbohydrate Per Meal: 45-60g  Carbohydrate Per Snack : 15-20g    Today's Self-Management Care Plan was developed with the patient's input and is based on barriers identified during today's assessment.    The long and short-term goals in the care plan were written with the patient/caregiver's input. The patient has agreed to work toward these goals to improve her overall diabetes control.      The patient received a copy of today's self-management plan and verbalized understanding of the care plan, goals, and all of today's instructions.      The patient was encouraged to communicate with her physician and care team regarding her condition(s) and treatment.  I provided the patient with my contact information today and encouraged her to contact me via phone or patient portal as needed.     Education Units of Time   Time Spent: 60 min

## 2020-01-02 ENCOUNTER — PATIENT OUTREACH (OUTPATIENT)
Dept: ADMINISTRATIVE | Facility: OTHER | Age: 75
End: 2020-01-02

## 2020-01-02 ENCOUNTER — OFFICE VISIT (OUTPATIENT)
Dept: PAIN MEDICINE | Facility: CLINIC | Age: 75
End: 2020-01-02
Attending: ANESTHESIOLOGY
Payer: MEDICARE

## 2020-01-02 VITALS
HEIGHT: 63 IN | HEART RATE: 93 BPM | BODY MASS INDEX: 25.39 KG/M2 | TEMPERATURE: 99 F | DIASTOLIC BLOOD PRESSURE: 77 MMHG | SYSTOLIC BLOOD PRESSURE: 135 MMHG | RESPIRATION RATE: 18 BRPM | WEIGHT: 143.31 LBS

## 2020-01-02 DIAGNOSIS — G89.4 CHRONIC PAIN DISORDER: Primary | ICD-10-CM

## 2020-01-02 DIAGNOSIS — Z12.31 ENCOUNTER FOR SCREENING MAMMOGRAM FOR MALIGNANT NEOPLASM OF BREAST: Primary | ICD-10-CM

## 2020-01-02 DIAGNOSIS — G57.03 PIRIFORMIS SYNDROME OF BOTH SIDES: ICD-10-CM

## 2020-01-02 DIAGNOSIS — M79.18 MYOFASCIAL PAIN: ICD-10-CM

## 2020-01-02 PROCEDURE — 99999 PR PBB SHADOW E&M-EST. PATIENT-LVL V: ICD-10-PCS | Mod: PBBFAC,HCNC,, | Performed by: ANESTHESIOLOGY

## 2020-01-02 PROCEDURE — 99204 PR OFFICE/OUTPT VISIT, NEW, LEVL IV, 45-59 MIN: ICD-10-PCS | Mod: HCNC,GC,S$GLB, | Performed by: ANESTHESIOLOGY

## 2020-01-02 PROCEDURE — 3075F SYST BP GE 130 - 139MM HG: CPT | Mod: HCNC,CPTII,S$GLB, | Performed by: ANESTHESIOLOGY

## 2020-01-02 PROCEDURE — 1101F PT FALLS ASSESS-DOCD LE1/YR: CPT | Mod: HCNC,CPTII,S$GLB, | Performed by: ANESTHESIOLOGY

## 2020-01-02 PROCEDURE — 99204 OFFICE O/P NEW MOD 45 MIN: CPT | Mod: HCNC,GC,S$GLB, | Performed by: ANESTHESIOLOGY

## 2020-01-02 PROCEDURE — 1125F PR PAIN SEVERITY QUANTIFIED, PAIN PRESENT: ICD-10-PCS | Mod: HCNC,S$GLB,, | Performed by: ANESTHESIOLOGY

## 2020-01-02 PROCEDURE — 1101F PR PT FALLS ASSESS DOC 0-1 FALLS W/OUT INJ PAST YR: ICD-10-PCS | Mod: HCNC,CPTII,S$GLB, | Performed by: ANESTHESIOLOGY

## 2020-01-02 PROCEDURE — 3078F PR MOST RECENT DIASTOLIC BLOOD PRESSURE < 80 MM HG: ICD-10-PCS | Mod: HCNC,CPTII,S$GLB, | Performed by: ANESTHESIOLOGY

## 2020-01-02 PROCEDURE — 1159F PR MEDICATION LIST DOCUMENTED IN MEDICAL RECORD: ICD-10-PCS | Mod: HCNC,S$GLB,, | Performed by: ANESTHESIOLOGY

## 2020-01-02 PROCEDURE — 1159F MED LIST DOCD IN RCRD: CPT | Mod: HCNC,S$GLB,, | Performed by: ANESTHESIOLOGY

## 2020-01-02 PROCEDURE — 3075F PR MOST RECENT SYSTOLIC BLOOD PRESS GE 130-139MM HG: ICD-10-PCS | Mod: HCNC,CPTII,S$GLB, | Performed by: ANESTHESIOLOGY

## 2020-01-02 PROCEDURE — 3078F DIAST BP <80 MM HG: CPT | Mod: HCNC,CPTII,S$GLB, | Performed by: ANESTHESIOLOGY

## 2020-01-02 PROCEDURE — 1125F AMNT PAIN NOTED PAIN PRSNT: CPT | Mod: HCNC,S$GLB,, | Performed by: ANESTHESIOLOGY

## 2020-01-02 PROCEDURE — 99999 PR PBB SHADOW E&M-EST. PATIENT-LVL V: CPT | Mod: PBBFAC,HCNC,, | Performed by: ANESTHESIOLOGY

## 2020-01-02 RX ORDER — METHOCARBAMOL 500 MG/1
500 TABLET, FILM COATED ORAL 3 TIMES DAILY PRN
Qty: 45 TABLET | Refills: 3 | Status: SHIPPED | OUTPATIENT
Start: 2020-01-02 | End: 2020-04-04

## 2020-01-02 NOTE — PATIENT INSTRUCTIONS
We are going to start home piriformis stretches as shown in the office to help with this pain.      Piriformis stretch:  In a seated position, cross the affected leg over the unaffected leg, place your hand on the knee of the affected leg, push down on your knee.  Roll your hips forward until you feel the stretch. Hold this position for 10 seconds. Repeat 3 times. Repeat this session up to 4-6 times daily as needed.    I also recommend using a tennis ball for myofascial release.  To do this, place the tennis ball under your hip while seated.  Move it around until you find the point of maximal tenderness.  Lower your full weight on to the tennis ball for 10 sec to a minute as tolerated.  You may repeat this as often as he would like.  Please note that this may cause some temporary numbness in your leg and/or foot that will begin to resolve immediately upon cessation.    Recommend starting methocarbamol 500 mg at bedtime for pain as needed for muscle spasm/pain. If tolerated, you may increase this up to 3 times daily.  This medication may make you sleepy, so do not drive until you know how it affect you.  Some people choose to only take this at night.

## 2020-01-02 NOTE — LETTER
January 2, 2020      Mike De Jesus, LINH  [address]  [facility] [address]           [facility] [place] [address]  [address]  [place] [address]  Phone: [phone]  Fax: [phone]          Patient: Keely Pizarro   MR Number: [id number]   YOB: 1945   Date of Visit: 1/2/2020       Dear Mike De Jesus:    Thank you for referring Keely Pizarro to me for evaluation. Attached you will find relevant portions of my assessment and plan of care.    If you have questions, please do not hesitate to call me. I look forward to following Keely Pizarro along with you.    Sincerely,    Kalina Walters MD    Enclosure  CC:  No Recipients    If you would like to receive this communication electronically, please contact [email or web address] or [phone] to request more information on [facility] [employer] access.    For providers and/or their staff who would like to refer a patient to [facility], please contact us through our one-stop-shop provider referral line, [facility], at [phone].    If you feel you have received this communication in error or would no longer like to receive these types of communications, please e-mail [email or web address]

## 2020-01-02 NOTE — PROGRESS NOTES
Subjective:     Patient ID: Keely Pizarro is a 74 y.o. female.    Chief Complaint: Pain    Consulted by: Mike Herrera NP    Disclaimer: This note was generated using voice recognition software.  There may be typographical errors that were missed during proofreading.      HPI:    Keely Pizarro is a 74 y.o. female who presents today with chronic low back pain. This pain started 6 months ago insidiously, has been progressively worsening but is only painful when she walks.  She describes a tired, achy, fatigue like pain that worsens the longer that she walks.  This pain is described in detail below.    Aggravating factors: walking    Mitigating factors: rest, advil, aleve    Previously seeing: Saw a doctor at [facility] where she received a one time steroid injection in both hips    Physical Therapy: did one session but had to stop for low immunity due to maintenance cancer treatment injections.    Non-pharmacologic Treatment:     · Ice/Heat: no  · TENS: no  · Massage: no  · Chiropractic care: no  · Acupuncture: no  · Other: no         Pain Medications:         · Currently taking: advil, aleve. Takes each one twice per day    · Has tried in the past:    · Opioids: none  · NSAIDS: advil/aleve  · Tylenol: yes, but does not help the pain  · Muscle relaxants: none  · TCAs: none  · SNRIs: none  · Anticonvulsants: none  · topical creams: none  · Other: none    Blood thinners: aspirin    Interventional Therapies: one set of steroid injections to hips    Relevant Surgeries: left knee TKA    Affecting sleep? Sometimes wakes her up from her sleep    Affecting daily activities? Yes, is still able to perform ADL's, but has to take breaks    Depressive symptoms? none          · SI/HI? No    Work status: retired, used to work in [employer] at [employer]      Prescription Monitoring Program database:  Not applicable    Last 3 PDI Scores 1/2/2020   Pain Disability Index (PDI) 45       GENERAL:  No weight loss, malaise or  "fevers.  HEENT:   No recent changes in vision or hearing  NECK:  Negative for lumps, no difficulty with swallowing.  RESPIRATORY:  Negative for cough, wheezing or shortness of breath, patient denies any recent URI.  CARDIOVASCULAR:  Negative for chest pain, leg swelling or palpitations.  GI:  Negative for abdominal discomfort, blood in stools or black stools or change in bowel habits.  MUSCULOSKELETAL:  See HPI.  SKIN:  Negative for lesions, rash, and itching.  PSYCH:  no mood disorder or recent psychosocial stressors.    HEMATOLOGY/LYMPHOLOGY:  Negative for prolonged bleeding, bruising easily or swollen nodes.    ENDO: No history of diabetes or thyroid dysfunction  NEURO:   No history of headaches, syncope, paralysis, seizures or tremors.  All other reviewed and negative other than HPI.          Past Medical History:   Diagnosis Date    CVA (cerebral vascular accident) 2011    Diabetes mellitus     Hypertension     S/P ORIF (open reduction internal fixation) fracture     Uterine cancer 2012   - current treatment for leukemia    Past Surgical History:   Procedure Laterality Date    COLONOSCOPY N/A 3/31/2017    Procedure: COLONOSCOPY;  Surgeon: Chip Pak MD;  Location: [facility] ([address] [place] [address]);  Service: Endoscopy;  Laterality: N/A;    HYSTERECTOMY      THORACENTESIS Left     TOTAL KNEE ARTHROPLASTY Left 2012       Review of patient's allergies indicates:   Allergen Reactions    Tomato (solanum lycopersicum) Itching       Current Outpatient Medications   Medication Sig Dispense Refill    albuterol 90 mcg/actuation inhaler Inhale 2 puffs into the lungs every 6 (six) hours as needed for Wheezing. 1 each 11    amLODIPine (NORVASC) 10 MG tablet TAKE 1 TABLET ONE TIME DAILY 90 tablet 3    BD ALCOHOL SWABS PadM       BD ULTRA-FINE BRIGITTE PEN NEEDLE 32 gauge x 5/32" Ndle To use with Insulin pens 100 each 3    blood sugar diagnostic Strp 1 each by Misc.(Non-Drug; Combo Route) route 3 (three) times daily. 300 " each 3    blood sugar diagnostic Strp To check BG 4 times daily, to use with insurance preferred meter 120 strip 11    blood-glucose meter kit Use as instructed 1 each 0    blood-glucose meter Misc Use as directed 1 each 0    calcium carbonate-vitamin D3 600mg (1,500mg) -1,000 unit Cap Take 1 capsule by mouth once. for 1 dose 30 capsule 11    carvedilol (COREG) 25 MG tablet Take 1 tablet (25 mg total) by mouth 2 (two) times daily with meals. 180 tablet 1    dulaglutide (TRULICITY) 0.75 mg/0.5 mL PnIj Inject 0.5 mLs (0.75 mg total) into the skin every 7 days. 5 Syringe 10    flu vacc [id number],65yr up,PF (FLUZONE HIGH-DOSE 2019-20, PF,) 180 mcg/0.5 mL Syrg inject into the muscle once 0.5 mL 0    glipiZIDE (GLUCOTROL) 10 MG tablet TAKE 1 TABLET TWICE DAILY BEFORE MEALS 90 tablet 0    insulin (LANTUS SOLOSTAR U-100 INSULIN) glargine 100 units/mL (3mL) SubQ pen Inject 14 Units into the skin every evening. Use 14 units at before bedtime 5 mL 10    insulin aspart U-100 (NOVOLOG FLEXPEN U-100 INSULIN) 100 unit/mL (3 mL) InPn pen Inject 5 Units into the skin before dinner. 3 mL 5    lancets Misc 1 each by Misc.(Non-Drug; Combo Route) route 3 (three) times daily. 300 each 3    lancets Misc To check blood glucose 4 times daily, to use with insurance preferred meter 120 each 11    meloxicam (MOBIC) 15 MG tablet Take 1 tablet (15 mg total) by mouth once daily. 30 tablet 3    metFORMIN (GLUCOPHAGE-XR) 500 MG 24 hr tablet TAKE 2 TABLETS DAILY WITH BREAKFAST. 180 tablet 3    metFORMIN (GLUMETZA) 1000 MG (MOD) 24 hr tablet 1,000 mg.      ondansetron (ZOFRAN) 8 MG tablet Take 1 tablet (8 mg total) by mouth every 8 (eight) hours as needed for Nausea. 30 tablet 0    predniSONE (DELTASONE) 10 MG tablet Take 1 tablet (10 mg total) by mouth 2 (two) times daily. 14 tablet 0     No current facility-administered medications for this visit.        Family History   Problem Relation Age of Onset    Cancer Brother 67         "colon       Social History     Socioeconomic History    Marital status:      Spouse name: Not on file    Number of children: Not on file    Years of education: Not on file    Highest education level: Not on file   Occupational History    Not on file   Social Needs    Financial resource strain: Not on file    Food insecurity:     Worry: Not on file     Inability: Not on file    Transportation needs:     Medical: Not on file     Non-medical: Not on file   Tobacco Use    Smoking status: Former Smoker     Types: Cigarettes     Last attempt to quit: 2010     Years since quittin.4    Smokeless tobacco: Never Used   Substance and Sexual Activity    Alcohol use: No    Drug use: No    Sexual activity: Yes     Partners: Male   Lifestyle    Physical activity:     Days per week: Not on file     Minutes per session: Not on file    Stress: Not on file   Relationships    Social connections:     Talks on phone: Not on file     Gets together: Not on file     Attends Latter day service: Not on file     Active member of club or organization: Not on file     Attends meetings of clubs or organizations: Not on file     Relationship status: Not on file   Other Topics Concern    Not on file   Social History Narrative    Not on file       Objective:     Vitals:    20 0818   BP: 135/77   Pulse: 93   Resp: 18   Temp: 98.9 °F (37.2 °C)   TempSrc: Oral   Weight: 65 kg (143 lb 4.8 oz)   Height: 5' 3" (1.6 m)   PainSc:   4   PainLoc: Hip       GEN:  Well developed, well nourished.  No acute distress. Some guarding on exam 2/2 pain.  HEENT:  No trauma.  Mucous membranes moist.  Nares patent bilaterally.  PSYCH: Normal affect. Thought content appropriate.  CHEST:  Breathing symmetric.  No audible wheezing.  ABD: Soft, non-distended.  SKIN:  Warm, pink, dry.  No rash on exposed areas.    EXT:  No cyanosis, clubbing, or edema.  No color change or changes in nail or hair growth.  NEURO/MUSCULOSKELETAL:  Fully " alert, oriented, and appropriate. Speech normal haim. No cranial nerve deficits.   Gait: slow haim, stiff walk. + trendelenburg sign bilaterally.   Motor Strength: 5/5 motor strength throughout lower extremities.   Sensory: no sensory deficit in the lower extremities.   Reflexes:  2+ and symmetric throughout.  Downgoing Babinski's bilaterally.  No clonus or spasticity.  L-Spine:  Slightly limited ROM with pain on full flexion.   Left side:  no pain with axial/facet loading.  negative SLR.  + JAY JAY. + FADIR  Right side:  no pain with axial/facet loading.  negative SLR.  + JAY JAY. + FADIR  Palpation:  Left side: + TTP over piriformis muscle, GTB (mild).  No TTP over SI joint, ischial bursa  Right side: + TTP over piriformis muscle, GTB (did not reproduce pain). No TTP over SI joint, ischial bursa    Imaging:        The imaging studies listed below were independently reviewed by me, and I agree with the findings as documented below.     EXAMINATION:  XR HIPS BILATERAL 2 VIEW INCL AP PELVIS    CLINICAL HISTORY:  nicola hip xray;  Pain in right hip    TECHNIQUE:  AP view of the pelvis and frogleg lateral views of both hips were performed.    COMPARISON:  None 03/24/2017.    FINDINGS:  Mild DJD.  No fracture or dislocation.  No bone destruction identified    Assessment:     Encounter Diagnoses   Name Primary?    Chronic pain disorder Yes    Myofascial pain     Piriformis syndrome of both sides        Plan:     Diagnoses and all orders for this visit:    Chronic pain disorder  -     methocarbamol (ROBAXIN) 500 MG Tab; Take 1 tablet (500 mg total) by mouth 3 (three) times daily as needed (muscle spasms).    Myofascial pain  -     methocarbamol (ROBAXIN) 500 MG Tab; Take 1 tablet (500 mg total) by mouth 3 (three) times daily as needed (muscle spasms).    Piriformis syndrome of both sides  -     methocarbamol (ROBAXIN) 500 MG Tab; Take 1 tablet (500 mg total) by mouth 3 (three) times daily as needed (muscle spasms).          Her pain is consistent with the above.    We discussed the assessment and recommendations.  All available images were reviewed. We discussed the disease process, prognosis, treatment plan, and risks and benefits. The patient is aware of the risks and benefits of the medications being prescribed, common side effects, and proper usage. The following is the plan we agreed on:     1. Will avoid procedure in the setting of increased CBG.  Can consider piriformis muscle injection in the future.  2. Trial methocarbamol 500 mg TID PRN. Stay at most comfortable dose.    3. Recommend using Tylenol 1000 mg every 8 hours as needed for pain.  Do not take this with any other medications containing acetaminophen.  Do not exceed 3000 mg of acetaminophen in 24 hours.  4. The primary treatment for this syndrome is Physical therapy.  We will work with her oncologist regarding the timing for returning to PT.  5. Home myofascial release/stretches shown in the office today.  6. RTC in 3-6 weeks or sooner if needed.    Dr. Zach Weilenman, MD  PGY 2  [facility]    Thank you for allowing me to participate in the care of this patient.   Please do not hesitate to call me at [phone] with any questions or concerns.    Kalina Walters MD  01/02/2020     The above plan and management options were discussed at length with patient. Patient is in agreement with the above and verbalized understanding. It will be communicated with the referring physician via electronic record, fax, or mail.

## 2020-01-13 NOTE — PROGRESS NOTES
Subjective:       Patient ID: Keely Pizarro is a 74 y.o. female.    Chief Complaint: No chief complaint on file.    Onc Hx:  Keely presents with her  for evaluation of newly diagnosed grade 1-2 follicular lymphoma diagnosed by excisional biopsy of a right cervical lymph node 8/1/2017.  Pathologic diagnosis was received by [facility].  Keley reports feeling fatigue since March 2017. At the time she had an infected tooth and developed cervical lymphadenopathy. She started loosing weight, total quantity to date is unknown. Appetite remains normal and she denies night sweats. She then developed shortness of breath worse with activity and left lower lung pleural effusion was noted. The received thoracentesis twice with negative pleural fluid flow cytometry. Cytology was positive for lymphocytes, but was not diagnostic for follicular lymphoma. CT imaging demonstrates diffuse, bilateral submandibular, cervical, axillary, supraclavicular, and intrathoracic lymphadenopathy.     Keely has a history of uterine cancer in 2012 treated with radiation and chemotherapy. She had a port-a-cath at that time for treatment. She has insulin dependent diabetes mellitus with poorly controlled blood sugar, reporting both high and lows. She has hypertension, and blood pressure was elevated at intake today.      Follow-up Visit 9/1/17  PET imaging shows stage 4 follicular lymphoma, large left side pleural effusion     Follow-up visit 3/15/18  Return visit to review results of post-treatment staging scan that shows complete remission after 6 cycles of BR chemotherapy. She continues to have weight loss- 7lbs since last visit with waxing/waning appetite.     Follow-up Visit 11/9/18   Interval follow-up for follicular lymphoma. Currently on maintenance Rituxan after BR for 6 cycles with complete response.    Today:  Ms. Pizarro presents today for C9 of maintenance Rituxan. C9 was held in November to allow for ANC recovery.  ANC is 1400 today. She denies any fevers, chills, flu-like symptoms, SOB, chest pain, fatigue, weight loss, bruising, bleeding, and n/v/d/c.     Review of Systems   Constitutional: Negative for fatigue and fever.   HENT: Negative for congestion and trouble swallowing.    Eyes: Negative for photophobia and visual disturbance.   Respiratory: Negative for cough and shortness of breath.  Cardiovascular: Negative for chest pain and leg swelling.   Gastrointestinal: Negative for abdominal distention and abdominal pain.   Genitourinary: Negative for dysuria and hematuria.   Musculoskeletal: Negative for myalgias and neck pain.   Skin: Negative for color change and pallor.   Neurological: Negative for light-headedness and headaches.   Hematological: Negative for adenopathy. Does not bruise/bleed easily.   Psychiatric/Behavioral: Negative for agitation.       Objective:      Physical Exam   Constitutional: She is oriented to person, place, and time. She appears well-developed and well-nourished.   HENT:   Mouth/Throat: Oropharynx is clear and moist. No oropharyngeal exudate.   Eyes: Pupils are equal, round, and reactive to light.   Cardiovascular: Normal rate and regular rhythm.  Murmur.  Pulmonary/Chest: Good aeration and clear lung sounds to all  lobes.   Abdominal: Soft. Bowel sounds are normal.   Lymphadenopathy:     She has no cervical adenopathy, axillary, or inguinal lymphadenopathy  Neurological: She is alert and oriented to person, place, and time.   Skin: Skin is warm.   Psychiatric: She has a normal mood and affect. Her behavior is normal.       Assessment:       1. Follicular lymphoma grade i, lymph nodes of multiple sites    2. CKD (chronic kidney disease) stage 3, GFR 30-59 ml/min    3. Type 2 diabetes mellitus without complication, with long-term current use of insulin    4. Essential hypertension        Plan:     [place]  Completed 6 cycles of BR chemotherapy for follicular lymphoma achieving a complete response  on end of therapy PET.   Currently on maintenance Rituxan once every 2 months for 2 years.   Cycle 9 was held 11/7/19 to allow for ANC recovery  She will receive C9 of maintenance Rituxan today  LDH is elevated at 339 today. Was 180 2 mos ago. Will recheck CBC, CMP, and LDH in 4 weeks.    HTN  - Continue amlodipine  - Component of white coat HTN at our visits    DM2  - hyergylcemic at last visit (blood glucose 562) and instructed to call PCP   - PCP continues to manage  - blood glucose is 107 today    CKD  - likely secondary to uncontrolled HTN/DM2, meds, chemo  - DEXA scan ordered and shows mild osteopenia  - continue calcium with vit D    Follow-up: CBC, CMP, and LDH in 4 weeks. Chemo chair for Rituxan in 2 months (on or around 3/9/20). Return visit in 4 months (on or around 5/4/20) with Dr. Rodríguez or NP with CBC, CMP and LDH. She will need a chemo chair the same day following her clinic appt.

## 2020-01-14 ENCOUNTER — INFUSION (OUTPATIENT)
Dept: INFUSION THERAPY | Facility: HOSPITAL | Age: 75
End: 2020-01-14
Attending: INTERNAL MEDICINE
Payer: MEDICARE

## 2020-01-14 ENCOUNTER — OFFICE VISIT (OUTPATIENT)
Dept: HEMATOLOGY/ONCOLOGY | Facility: CLINIC | Age: 75
End: 2020-01-14
Payer: MEDICARE

## 2020-01-14 VITALS
TEMPERATURE: 98 F | WEIGHT: 142.19 LBS | BODY MASS INDEX: 25.2 KG/M2 | OXYGEN SATURATION: 100 % | HEIGHT: 63 IN | RESPIRATION RATE: 18 BRPM | SYSTOLIC BLOOD PRESSURE: 150 MMHG | HEART RATE: 80 BPM | DIASTOLIC BLOOD PRESSURE: 70 MMHG

## 2020-01-14 VITALS
DIASTOLIC BLOOD PRESSURE: 77 MMHG | HEART RATE: 83 BPM | SYSTOLIC BLOOD PRESSURE: 161 MMHG | TEMPERATURE: 98 F | RESPIRATION RATE: 18 BRPM

## 2020-01-14 DIAGNOSIS — I10 ESSENTIAL HYPERTENSION: ICD-10-CM

## 2020-01-14 DIAGNOSIS — Z79.4 TYPE 2 DIABETES MELLITUS WITHOUT COMPLICATION, WITH LONG-TERM CURRENT USE OF INSULIN: ICD-10-CM

## 2020-01-14 DIAGNOSIS — N18.30 CKD (CHRONIC KIDNEY DISEASE) STAGE 3, GFR 30-59 ML/MIN: ICD-10-CM

## 2020-01-14 DIAGNOSIS — C82.08 FOLLICULAR LYMPHOMA GRADE I, LYMPH NODES OF MULTIPLE SITES: Primary | ICD-10-CM

## 2020-01-14 DIAGNOSIS — E11.9 TYPE 2 DIABETES MELLITUS WITHOUT COMPLICATION, WITH LONG-TERM CURRENT USE OF INSULIN: ICD-10-CM

## 2020-01-14 PROCEDURE — 1159F PR MEDICATION LIST DOCUMENTED IN MEDICAL RECORD: ICD-10-PCS | Mod: HCNC,S$GLB,, | Performed by: NURSE PRACTITIONER

## 2020-01-14 PROCEDURE — 3046F HEMOGLOBIN A1C LEVEL >9.0%: CPT | Mod: HCNC,CPTII,S$GLB, | Performed by: NURSE PRACTITIONER

## 2020-01-14 PROCEDURE — 1101F PT FALLS ASSESS-DOCD LE1/YR: CPT | Mod: HCNC,CPTII,S$GLB, | Performed by: NURSE PRACTITIONER

## 2020-01-14 PROCEDURE — 1126F AMNT PAIN NOTED NONE PRSNT: CPT | Mod: HCNC,S$GLB,, | Performed by: NURSE PRACTITIONER

## 2020-01-14 PROCEDURE — 63600175 PHARM REV CODE 636 W HCPCS: Mod: TB,HCNC | Performed by: INTERNAL MEDICINE

## 2020-01-14 PROCEDURE — 99999 PR PBB SHADOW E&M-EST. PATIENT-LVL III: CPT | Mod: PBBFAC,HCNC,, | Performed by: NURSE PRACTITIONER

## 2020-01-14 PROCEDURE — 3077F SYST BP >= 140 MM HG: CPT | Mod: HCNC,CPTII,S$GLB, | Performed by: NURSE PRACTITIONER

## 2020-01-14 PROCEDURE — 1101F PR PT FALLS ASSESS DOC 0-1 FALLS W/OUT INJ PAST YR: ICD-10-PCS | Mod: HCNC,CPTII,S$GLB, | Performed by: NURSE PRACTITIONER

## 2020-01-14 PROCEDURE — 99999 PR PBB SHADOW E&M-EST. PATIENT-LVL III: ICD-10-PCS | Mod: PBBFAC,HCNC,, | Performed by: NURSE PRACTITIONER

## 2020-01-14 PROCEDURE — 3078F DIAST BP <80 MM HG: CPT | Mod: HCNC,CPTII,S$GLB, | Performed by: NURSE PRACTITIONER

## 2020-01-14 PROCEDURE — 3078F PR MOST RECENT DIASTOLIC BLOOD PRESSURE < 80 MM HG: ICD-10-PCS | Mod: HCNC,CPTII,S$GLB, | Performed by: NURSE PRACTITIONER

## 2020-01-14 PROCEDURE — 96401 CHEMO ANTI-NEOPL SQ/IM: CPT | Mod: HCNC

## 2020-01-14 PROCEDURE — 3077F PR MOST RECENT SYSTOLIC BLOOD PRESSURE >= 140 MM HG: ICD-10-PCS | Mod: HCNC,CPTII,S$GLB, | Performed by: NURSE PRACTITIONER

## 2020-01-14 PROCEDURE — 1126F PR PAIN SEVERITY QUANTIFIED, NO PAIN PRESENT: ICD-10-PCS | Mod: HCNC,S$GLB,, | Performed by: NURSE PRACTITIONER

## 2020-01-14 PROCEDURE — 99214 PR OFFICE/OUTPT VISIT, EST, LEVL IV, 30-39 MIN: ICD-10-PCS | Mod: HCNC,S$GLB,, | Performed by: NURSE PRACTITIONER

## 2020-01-14 PROCEDURE — 99214 OFFICE O/P EST MOD 30 MIN: CPT | Mod: HCNC,S$GLB,, | Performed by: NURSE PRACTITIONER

## 2020-01-14 PROCEDURE — 3046F PR MOST RECENT HEMOGLOBIN A1C LEVEL > 9.0%: ICD-10-PCS | Mod: HCNC,CPTII,S$GLB, | Performed by: NURSE PRACTITIONER

## 2020-01-14 PROCEDURE — 1159F MED LIST DOCD IN RCRD: CPT | Mod: HCNC,S$GLB,, | Performed by: NURSE PRACTITIONER

## 2020-01-14 PROCEDURE — 25000003 PHARM REV CODE 250: Mod: HCNC | Performed by: INTERNAL MEDICINE

## 2020-01-14 RX ORDER — SODIUM CHLORIDE 0.9 % (FLUSH) 0.9 %
10 SYRINGE (ML) INJECTION
Status: DISCONTINUED | OUTPATIENT
Start: 2020-01-14 | End: 2020-01-14 | Stop reason: HOSPADM

## 2020-01-14 RX ORDER — DIPHENHYDRAMINE HCL 50 MG
50 CAPSULE ORAL
Status: COMPLETED | OUTPATIENT
Start: 2020-01-14 | End: 2020-01-14

## 2020-01-14 RX ORDER — MEPERIDINE HYDROCHLORIDE 25 MG/ML
25 INJECTION INTRAMUSCULAR; INTRAVENOUS; SUBCUTANEOUS
Status: CANCELLED | OUTPATIENT
Start: 2020-01-14

## 2020-01-14 RX ORDER — HEPARIN 100 UNIT/ML
500 SYRINGE INTRAVENOUS
Status: DISCONTINUED | OUTPATIENT
Start: 2020-01-14 | End: 2020-01-14 | Stop reason: HOSPADM

## 2020-01-14 RX ORDER — MEPERIDINE HYDROCHLORIDE 50 MG/ML
25 INJECTION INTRAMUSCULAR; INTRAVENOUS; SUBCUTANEOUS
Status: DISCONTINUED | OUTPATIENT
Start: 2020-01-14 | End: 2020-01-14 | Stop reason: HOSPADM

## 2020-01-14 RX ORDER — FAMOTIDINE 20 MG/1
20 TABLET, FILM COATED ORAL 2 TIMES DAILY
Status: CANCELLED
Start: 2020-01-14

## 2020-01-14 RX ORDER — FAMOTIDINE 20 MG/1
20 TABLET, FILM COATED ORAL 2 TIMES DAILY
Status: DISCONTINUED | OUTPATIENT
Start: 2020-01-14 | End: 2020-01-14 | Stop reason: HOSPADM

## 2020-01-14 RX ORDER — ACETAMINOPHEN 325 MG/1
650 TABLET ORAL
Status: CANCELLED | OUTPATIENT
Start: 2020-01-14

## 2020-01-14 RX ORDER — SODIUM CHLORIDE 0.9 % (FLUSH) 0.9 %
10 SYRINGE (ML) INJECTION
Status: CANCELLED | OUTPATIENT
Start: 2020-01-14

## 2020-01-14 RX ORDER — DIPHENHYDRAMINE HCL 25 MG
50 CAPSULE ORAL
Status: CANCELLED
Start: 2020-01-14

## 2020-01-14 RX ORDER — HEPARIN 100 UNIT/ML
500 SYRINGE INTRAVENOUS
Status: CANCELLED | OUTPATIENT
Start: 2020-01-14

## 2020-01-14 RX ORDER — ACETAMINOPHEN 325 MG/1
650 TABLET ORAL
Status: COMPLETED | OUTPATIENT
Start: 2020-01-14 | End: 2020-01-14

## 2020-01-14 RX ADMIN — DIPHENHYDRAMINE HYDROCHLORIDE 50 MG: 50 CAPSULE ORAL at 11:01

## 2020-01-14 RX ADMIN — ACETAMINOPHEN 650 MG: 325 TABLET ORAL at 11:01

## 2020-01-14 RX ADMIN — FAMOTIDINE 20 MG: 20 TABLET ORAL at 11:01

## 2020-01-14 RX ADMIN — RITUXIMAB AND HYALURONIDASE 1400 MG: 120; 2000 INJECTION, SOLUTION SUBCUTANEOUS at 11:01

## 2020-01-14 NOTE — Clinical Note
CBC, CMP, and LDH in 4 weeks. Chemo chair for Rituxan in 2 months (on or around 3/9/20). Return visit in 4 months (on or around 5/4/20) with Dr. Rodríguez or NP with CBC, CMP and LDH. She will need a chemo chair the same day following her clinic appt.

## 2020-01-14 NOTE — PLAN OF CARE
Pt received Rituxan Hycela; tolerated well. Monitored 15 minutes post injection. VSS and NAD. Pt instructed to call MD with any concerns. Pt discharged home independently.

## 2020-01-15 ENCOUNTER — DOCUMENTATION ONLY (OUTPATIENT)
Dept: REHABILITATION | Facility: HOSPITAL | Age: 75
End: 2020-01-15

## 2020-01-15 PROBLEM — M25.552 HIP PAIN, BILATERAL: Status: RESOLVED | Noted: 2019-10-30 | Resolved: 2020-01-15

## 2020-01-15 PROBLEM — M25.551 HIP PAIN, BILATERAL: Status: RESOLVED | Noted: 2019-10-30 | Resolved: 2020-01-15

## 2020-01-15 PROBLEM — R29.898 WEAKNESS OF BOTH LOWER EXTREMITIES: Status: RESOLVED | Noted: 2019-10-30 | Resolved: 2020-01-15

## 2020-01-16 NOTE — PROGRESS NOTES
Outpatient Therapy Discharge Summary     Name: Keely Pizarro  Clinic Number: [id number]    Therapy Diagnosis:        Encounter Diagnoses   Name Primary?    Weakness of both lower extremities      Hip pain, bilateral        Physician: Mike De Jesus NP     Physician Orders: PT Eval and Treat   Medical Diagnosis from Referral: Osteoarthritis of both hips, unspecified osteoarthritis type (M16.0)  Evaluation Date: 10/30/2019      Date of Last visit: 10/30/2019  Total Visits Received: 1  Cancelled Visits: 4  No Show Visits: 3    Assessment    Goals:     In 4 weeks,    Goal Status   1. Patient will be independent with HEP to promote improved therapy outcomes.  STG  unable to re-assess for d/c status   2. Patient will increase hip MMT to 4-/5 to improve ambulation tolerance and safety. STG  unable to re-assess for d/c status    3. Patient will increase AROM to WFL in all directions to improve functional mobility. STG   unable to re-assess for d/c status         Long Term Goals:   In 8 weeks,    Goal Status   4. Patient will be independent with progression of HEP for maintenance of therapy improvemnts LTG   unable to re-assess for d/c status   5. Patient will increase B LE MMT to 4+/5 for improved ambulation. LTG   unable to re-assess for d/c status   6. Patient will increase SLS bilaterally to 30 seconds to decrease fall risk.  LTG   unable to re-assess for d/c status   7. Patient will report exercising 3-5x/week with <2/10 pain to promote improved health and return to PLOF.  LTG   unable to re-assess for d/c status          Discharge reason: Patient requested to cancel all physical therapy appointments and has not attended therapy since IE on 10/30/2019.     Plan   This patient is discharged from Physical Therapy

## 2020-01-20 ENCOUNTER — PATIENT OUTREACH (OUTPATIENT)
Dept: ADMINISTRATIVE | Facility: HOSPITAL | Age: 75
End: 2020-01-20

## 2020-01-30 ENCOUNTER — TELEPHONE (OUTPATIENT)
Dept: PAIN MEDICINE | Facility: CLINIC | Age: 75
End: 2020-01-30

## 2020-02-03 ENCOUNTER — TELEPHONE (OUTPATIENT)
Dept: PAIN MEDICINE | Facility: CLINIC | Age: 75
End: 2020-02-03

## 2020-02-03 ENCOUNTER — OFFICE VISIT (OUTPATIENT)
Dept: PAIN MEDICINE | Facility: CLINIC | Age: 75
End: 2020-02-03
Payer: MEDICARE

## 2020-02-03 VITALS
BODY MASS INDEX: 25.32 KG/M2 | HEART RATE: 90 BPM | SYSTOLIC BLOOD PRESSURE: 136 MMHG | HEIGHT: 63 IN | RESPIRATION RATE: 18 BRPM | TEMPERATURE: 98 F | WEIGHT: 142.88 LBS | OXYGEN SATURATION: 100 % | DIASTOLIC BLOOD PRESSURE: 81 MMHG

## 2020-02-03 DIAGNOSIS — M79.18 MYOFASCIAL PAIN: ICD-10-CM

## 2020-02-03 DIAGNOSIS — G57.03 PIRIFORMIS SYNDROME OF BOTH SIDES: Primary | ICD-10-CM

## 2020-02-03 DIAGNOSIS — G57.01 PIRIFORMIS SYNDROME OF RIGHT SIDE: Primary | ICD-10-CM

## 2020-02-03 PROCEDURE — 1125F PR PAIN SEVERITY QUANTIFIED, PAIN PRESENT: ICD-10-PCS | Mod: HCNC,S$GLB,, | Performed by: NURSE PRACTITIONER

## 2020-02-03 PROCEDURE — 3079F DIAST BP 80-89 MM HG: CPT | Mod: HCNC,CPTII,S$GLB, | Performed by: NURSE PRACTITIONER

## 2020-02-03 PROCEDURE — 1101F PT FALLS ASSESS-DOCD LE1/YR: CPT | Mod: HCNC,CPTII,S$GLB, | Performed by: NURSE PRACTITIONER

## 2020-02-03 PROCEDURE — 99999 PR PBB SHADOW E&M-EST. PATIENT-LVL III: CPT | Mod: PBBFAC,HCNC,, | Performed by: NURSE PRACTITIONER

## 2020-02-03 PROCEDURE — 1159F MED LIST DOCD IN RCRD: CPT | Mod: HCNC,S$GLB,, | Performed by: NURSE PRACTITIONER

## 2020-02-03 PROCEDURE — 3075F SYST BP GE 130 - 139MM HG: CPT | Mod: HCNC,CPTII,S$GLB, | Performed by: NURSE PRACTITIONER

## 2020-02-03 PROCEDURE — 99213 PR OFFICE/OUTPT VISIT, EST, LEVL III, 20-29 MIN: ICD-10-PCS | Mod: HCNC,S$GLB,, | Performed by: NURSE PRACTITIONER

## 2020-02-03 PROCEDURE — 3079F PR MOST RECENT DIASTOLIC BLOOD PRESSURE 80-89 MM HG: ICD-10-PCS | Mod: HCNC,CPTII,S$GLB, | Performed by: NURSE PRACTITIONER

## 2020-02-03 PROCEDURE — 99213 OFFICE O/P EST LOW 20 MIN: CPT | Mod: HCNC,S$GLB,, | Performed by: NURSE PRACTITIONER

## 2020-02-03 PROCEDURE — 1159F PR MEDICATION LIST DOCUMENTED IN MEDICAL RECORD: ICD-10-PCS | Mod: HCNC,S$GLB,, | Performed by: NURSE PRACTITIONER

## 2020-02-03 PROCEDURE — 1101F PR PT FALLS ASSESS DOC 0-1 FALLS W/OUT INJ PAST YR: ICD-10-PCS | Mod: HCNC,CPTII,S$GLB, | Performed by: NURSE PRACTITIONER

## 2020-02-03 PROCEDURE — 3075F PR MOST RECENT SYSTOLIC BLOOD PRESS GE 130-139MM HG: ICD-10-PCS | Mod: HCNC,CPTII,S$GLB, | Performed by: NURSE PRACTITIONER

## 2020-02-03 PROCEDURE — 99999 PR PBB SHADOW E&M-EST. PATIENT-LVL III: ICD-10-PCS | Mod: PBBFAC,HCNC,, | Performed by: NURSE PRACTITIONER

## 2020-02-03 PROCEDURE — 1125F AMNT PAIN NOTED PAIN PRSNT: CPT | Mod: HCNC,S$GLB,, | Performed by: NURSE PRACTITIONER

## 2020-02-03 NOTE — PROGRESS NOTES
Subjective:     Patient ID: Keely Pizarro is a 74 y.o. female.    Chief Complaint: Pain    Consulted by: Mike Herrera NP    Disclaimer: This note was generated using voice recognition software.  There may be typographical errors that were missed during proofreading.      HPI:    Keely Pizarro is a 74 y.o. female who presents today with chronic low back pain. This pain started 6 months ago insidiously, has been progressively worsening but is only painful when she walks.  She describes a tired, achy, fatigue like pain that worsens the longer that she walks.  This pain is described in detail below.    Aggravating factors: walking    Mitigating factors: rest, advil, aleve    Interval History (2/3/2020):  The patient returns to clinic today for follow up. She continues to report low back and bilateral buttock pain, right greater than left. She describes this pain as aching in nature. She denies any radicular leg pain. Her pain is worse with prolonged walking. She does report some improvement since last visit with stretching and Robaxin. She does report that her blood glucose levels have decreased since last visit. Her morning glucose levels are now in the 100s. She continues to follow up with her PCP. She denies any other health changes. Her pain today is 5/10.    Previously seeing: Saw a doctor at [facility] where she received a one time steroid injection in both hips    Physical Therapy: did one session but had to stop for low immunity due to maintenance cancer treatment injections.    Non-pharmacologic Treatment:     · Ice/Heat: no  · TENS: no  · Massage: no  · Chiropractic care: no  · Acupuncture: no  · Other: no         Pain Medications:         · Currently taking: advil, aleve. Takes each one twice per day    · Has tried in the past:    · Opioids: none  · NSAIDS: advil/aleve  · Tylenol: yes, but does not help the pain  · Muscle relaxants: none  · TCAs: none  · SNRIs: none  · Anticonvulsants:  none  · topical creams: none  · Other: none    Blood thinners: aspirin    Interventional Therapies: one set of steroid injections to hips    Relevant Surgeries: left knee TKA    Affecting sleep? Sometimes wakes her up from her sleep    Affecting daily activities? Yes, is still able to perform ADL's, but has to take breaks    Depressive symptoms? none          · SI/HI? No    Work status: retired, used to work in [employer] at [employer]      Prescription Monitoring Program database:  Not applicable    Last 3 PDI Scores 2/3/2020 1/2/2020   Pain Disability Index (PDI) 17 45       GENERAL:  No weight loss, malaise or fevers.  HEENT:   No recent changes in vision or hearing  NECK:  Negative for lumps, no difficulty with swallowing.  RESPIRATORY:  Negative for cough, wheezing or shortness of breath, patient denies any recent URI.  CARDIOVASCULAR:  Negative for chest pain, leg swelling or palpitations.  GI:  Negative for abdominal discomfort, blood in stools or black stools or change in bowel habits.  MUSCULOSKELETAL:  See HPI.  SKIN:  Negative for lesions, rash, and itching.  PSYCH:  no mood disorder or recent psychosocial stressors.    HEMATOLOGY/LYMPHOLOGY:  Negative for prolonged bleeding, bruising easily or swollen nodes.    ENDO: No history of diabetes or thyroid dysfunction  NEURO:   No history of headaches, syncope, paralysis, seizures or tremors.  All other reviewed and negative other than HPI.          Past Medical History:   Diagnosis Date    CVA (cerebral vascular accident) 2011    Diabetes mellitus     Hypertension     S/P ORIF (open reduction internal fixation) fracture     Uterine cancer 2012   - current treatment for leukemia    Past Surgical History:   Procedure Laterality Date    COLONOSCOPY N/A 3/31/2017    Procedure: COLONOSCOPY;  Surgeon: Chip Pak MD;  Location: [facility] ([address] [place] [address];  Service: Endoscopy;  Laterality: N/A;    HYSTERECTOMY      THORACENTESIS Left     TOTAL KNEE ARTHROPLASTY Left  "2012       Review of patient's allergies indicates:   Allergen Reactions    Tomato (solanum lycopersicum) Itching       Current Outpatient Medications   Medication Sig Dispense Refill    amLODIPine (NORVASC) 10 MG tablet TAKE 1 TABLET ONE TIME DAILY 90 tablet 3    BD ALCOHOL SWABS PadM       BD ULTRA-FINE BRIGITTE PEN NEEDLE 32 gauge x 5/32" Ndle To use with Insulin pens 100 each 3    blood sugar diagnostic Strp 1 each by Misc.(Non-Drug; Combo Route) route 3 (three) times daily. 300 each 3    blood sugar diagnostic Strp To check BG 4 times daily, to use with insurance preferred meter 120 strip 11    blood-glucose meter kit Use as instructed 1 each 0    blood-glucose meter Misc Use as directed 1 each 0    dulaglutide (TRULICITY) 0.75 mg/0.5 mL PnIj Inject 0.5 mLs (0.75 mg total) into the skin every 7 days. 5 Syringe 10    flu vacc [id number],65yr up,PF (FLUZONE HIGH-DOSE 2019-20, PF,) 180 mcg/0.5 mL Syrg inject into the muscle once 0.5 mL 0    insulin (LANTUS SOLOSTAR U-100 INSULIN) glargine 100 units/mL (3mL) SubQ pen Inject 14 Units into the skin every evening. Use 14 units at before bedtime 5 mL 10    insulin aspart U-100 (NOVOLOG FLEXPEN U-100 INSULIN) 100 unit/mL (3 mL) InPn pen Inject 5 Units into the skin before dinner. 3 mL 5    lancets Misc 1 each by Misc.(Non-Drug; Combo Route) route 3 (three) times daily. 300 each 3    lancets Misc To check blood glucose 4 times daily, to use with insurance preferred meter 120 each 11    methocarbamol (ROBAXIN) 500 MG Tab Take 1 tablet (500 mg total) by mouth 3 (three) times daily as needed (muscle spasms). 45 tablet 3    albuterol 90 mcg/actuation inhaler Inhale 2 puffs into the lungs every 6 (six) hours as needed for Wheezing. 1 each 11    calcium carbonate-vitamin D3 600mg (1,500mg) -1,000 unit Cap Take 1 capsule by mouth once. for 1 dose 30 capsule 11    carvedilol (COREG) 25 MG tablet Take 1 tablet (25 mg total) by mouth 2 (two) times daily with meals. " 180 tablet 1    glipiZIDE (GLUCOTROL) 10 MG tablet TAKE 1 TABLET TWICE DAILY BEFORE MEALS 90 tablet 0    meloxicam (MOBIC) 15 MG tablet Take 1 tablet (15 mg total) by mouth once daily. 30 tablet 3    metFORMIN (GLUCOPHAGE-XR) 500 MG 24 hr tablet TAKE 2 TABLETS DAILY WITH BREAKFAST. 180 tablet 3    metFORMIN (GLUMETZA) 1000 MG (MOD) 24 hr tablet 1,000 mg.      ondansetron (ZOFRAN) 8 MG tablet Take 1 tablet (8 mg total) by mouth every 8 (eight) hours as needed for Nausea. 30 tablet 0    predniSONE (DELTASONE) 10 MG tablet Take 1 tablet (10 mg total) by mouth 2 (two) times daily. 14 tablet 0     No current facility-administered medications for this visit.        Family History   Problem Relation Age of Onset    Cancer Brother 67        colon       Social History     Socioeconomic History    Marital status:      Spouse name: Not on file    Number of children: Not on file    Years of education: Not on file    Highest education level: Not on file   Occupational History    Not on file   Social Needs    Financial resource strain: Not on file    Food insecurity:     Worry: Not on file     Inability: Not on file    Transportation needs:     Medical: Not on file     Non-medical: Not on file   Tobacco Use    Smoking status: Former Smoker     Types: Cigarettes     Last attempt to quit: 2010     Years since quittin.5    Smokeless tobacco: Never Used   Substance and Sexual Activity    Alcohol use: No    Drug use: No    Sexual activity: Yes     Partners: Male   Lifestyle    Physical activity:     Days per week: Not on file     Minutes per session: Not on file    Stress: Not on file   Relationships    Social connections:     Talks on phone: Not on file     Gets together: Not on file     Attends Yazidi service: Not on file     Active member of club or organization: Not on file     Attends meetings of clubs or organizations: Not on file     Relationship status: Not on file   Other Topics  "Concern    Not on file   Social History Narrative    Not on file       Objective:     Vitals:    02/03/20 0939   BP: 136/81   Pulse: 90   Resp: 18   Temp: 97.6 °F (36.4 °C)   SpO2: 100%   Weight: 64.8 kg (142 lb 13.7 oz)   Height: 5' 3" (1.6 m)   PainSc:   5   PainLoc: Hip       GEN:  Well developed, well nourished.  No acute distress. Some guarding on exam 2/2 pain.  HEENT:  No trauma.  Mucous membranes moist.  Nares patent bilaterally.  PSYCH: Normal affect. Thought content appropriate.  CHEST:  Breathing symmetric.  No audible wheezing.  ABD: Soft, non-distended.  SKIN:  Warm, pink, dry.  No rash on exposed areas.    EXT:  No cyanosis, clubbing, or edema.  No color change or changes in nail or hair growth.  NEURO/MUSCULOSKELETAL:  Fully alert, oriented, and appropriate. Speech normal haim. No cranial nerve deficits.   Gait: slow haim, stiff walk. + trendelenburg sign bilaterally.   Motor Strength: 5/5 motor strength throughout lower extremities.   Sensory: no sensory deficit in the lower extremities.   Reflexes:  2+ and symmetric throughout.  Downgoing Babinski's bilaterally.  No clonus or spasticity.  L-Spine:  Slightly limited ROM with pain on full flexion.   Left side:  no pain with axial/facet loading.  negative SLR.  + JAY JAY. + FADIR  Right side:  no pain with axial/facet loading.  negative SLR.  + JAY JAY. + FADIR  Palpation:  Left side: + TTP over piriformis muscle, GTB (mild).  No TTP over SI joint, ischial bursa  Right side: + TTP over piriformis muscle.No TTP over SI joint, GTB, or ischial bursa    Imaging:        The imaging studies listed below were independently reviewed by me, and I agree with the findings as documented below.     EXAMINATION:  XR HIPS BILATERAL 2 VIEW INCL AP PELVIS    CLINICAL HISTORY:  [facility] hip xray;  Pain in right hip    TECHNIQUE:  AP view of the pelvis and frogleg lateral views of both hips were performed.    COMPARISON:  None 03/24/2017.    FINDINGS:  Mild DJD.  No " fracture or dislocation.  No bone destruction identified    Assessment:     Encounter Diagnoses   Name Primary?    Piriformis syndrome of both sides Yes    Myofascial pain        Plan:     Keely was seen today for follow-up.    Diagnoses and all orders for this visit:    Piriformis syndrome of both sides    Myofascial pain    Other orders  -     Cancel: Medication Pre-Authorization         Her pain is consistent with the above.    We discussed the assessment and recommendations.  All available images were reviewed. We discussed the disease process, prognosis, treatment plan, and risks and benefits. The patient is aware of the risks and benefits of the medications being prescribed, common side effects, and proper usage. The following is the plan we agreed on:     1. Schedule for right piriformis muscle injection in office under ultrasound. The procedure, risks, benefits and options were discussed with patient. There are no contraindications to the procedure. The patient expressed understanding and agreed to proceed.  Consent obtained today.  2. Continue methocarbamol 500 mg TID PRN. Stay at most comfortable dose.    3. Recommend using Tylenol 1000 mg every 8 hours as needed for pain.  Do not take this with any other medications containing acetaminophen.  Do not exceed 3000 mg of acetaminophen in 24 hours.  4. The primary treatment for this syndrome is Physical therapy.  We will work with her oncologist regarding the timing for returning to PT.  5. Continue home stretches.   6. RTC 2-3 weeks after above procedure.     The above plan and management options were discussed at length with patient. Patient is in agreement with the above and verbalized understanding.     Josefina Schultz NP  02/03/2020

## 2020-02-10 ENCOUNTER — OFFICE VISIT (OUTPATIENT)
Dept: INTERNAL MEDICINE | Facility: CLINIC | Age: 75
End: 2020-02-10
Payer: MEDICARE

## 2020-02-10 ENCOUNTER — IMMUNIZATION (OUTPATIENT)
Dept: PHARMACY | Facility: CLINIC | Age: 75
End: 2020-02-10
Payer: MEDICARE

## 2020-02-10 VITALS
SYSTOLIC BLOOD PRESSURE: 148 MMHG | HEIGHT: 63 IN | BODY MASS INDEX: 25.41 KG/M2 | HEART RATE: 84 BPM | DIASTOLIC BLOOD PRESSURE: 86 MMHG | WEIGHT: 143.44 LBS

## 2020-02-10 DIAGNOSIS — M85.80 OSTEOPENIA AFTER MENOPAUSE: ICD-10-CM

## 2020-02-10 DIAGNOSIS — D64.81 ANEMIA ASSOCIATED WITH CHEMOTHERAPY: ICD-10-CM

## 2020-02-10 DIAGNOSIS — Z79.4 TYPE 2 DIABETES MELLITUS WITH STAGE 3 CHRONIC KIDNEY DISEASE, WITH LONG-TERM CURRENT USE OF INSULIN: ICD-10-CM

## 2020-02-10 DIAGNOSIS — T45.1X5A ANEMIA ASSOCIATED WITH CHEMOTHERAPY: ICD-10-CM

## 2020-02-10 DIAGNOSIS — I10 ESSENTIAL HYPERTENSION: ICD-10-CM

## 2020-02-10 DIAGNOSIS — R59.1 LYMPHADENOPATHY: ICD-10-CM

## 2020-02-10 DIAGNOSIS — D72.819 LEUKOPENIA, UNSPECIFIED TYPE: ICD-10-CM

## 2020-02-10 DIAGNOSIS — N18.30 CKD (CHRONIC KIDNEY DISEASE) STAGE 3, GFR 30-59 ML/MIN: ICD-10-CM

## 2020-02-10 DIAGNOSIS — C82.08 FOLLICULAR LYMPHOMA GRADE I, LYMPH NODES OF MULTIPLE SITES: ICD-10-CM

## 2020-02-10 DIAGNOSIS — N18.30 TYPE 2 DIABETES MELLITUS WITH STAGE 3 CHRONIC KIDNEY DISEASE, WITH LONG-TERM CURRENT USE OF INSULIN: ICD-10-CM

## 2020-02-10 DIAGNOSIS — Z78.0 OSTEOPENIA AFTER MENOPAUSE: ICD-10-CM

## 2020-02-10 DIAGNOSIS — E11.22 TYPE 2 DIABETES MELLITUS WITH STAGE 3 CHRONIC KIDNEY DISEASE, WITH LONG-TERM CURRENT USE OF INSULIN: ICD-10-CM

## 2020-02-10 DIAGNOSIS — Z00.00 ENCOUNTER FOR PREVENTIVE HEALTH EXAMINATION: Primary | ICD-10-CM

## 2020-02-10 DIAGNOSIS — E11.65 UNCONTROLLED TYPE 2 DIABETES MELLITUS WITH HYPERGLYCEMIA: ICD-10-CM

## 2020-02-10 PROCEDURE — 3079F PR MOST RECENT DIASTOLIC BLOOD PRESSURE 80-89 MM HG: ICD-10-PCS | Mod: HCNC,CPTII,S$GLB, | Performed by: NURSE PRACTITIONER

## 2020-02-10 PROCEDURE — 3077F SYST BP >= 140 MM HG: CPT | Mod: HCNC,CPTII,S$GLB, | Performed by: NURSE PRACTITIONER

## 2020-02-10 PROCEDURE — 3046F HEMOGLOBIN A1C LEVEL >9.0%: CPT | Mod: HCNC,CPTII,S$GLB, | Performed by: NURSE PRACTITIONER

## 2020-02-10 PROCEDURE — 99999 PR PBB SHADOW E&M-EST. PATIENT-LVL V: CPT | Mod: PBBFAC,HCNC,, | Performed by: NURSE PRACTITIONER

## 2020-02-10 PROCEDURE — 3077F PR MOST RECENT SYSTOLIC BLOOD PRESSURE >= 140 MM HG: ICD-10-PCS | Mod: HCNC,CPTII,S$GLB, | Performed by: NURSE PRACTITIONER

## 2020-02-10 PROCEDURE — 3046F PR MOST RECENT HEMOGLOBIN A1C LEVEL > 9.0%: ICD-10-PCS | Mod: HCNC,CPTII,S$GLB, | Performed by: NURSE PRACTITIONER

## 2020-02-10 PROCEDURE — G0439 PPPS, SUBSEQ VISIT: HCPCS | Mod: HCNC,S$GLB,, | Performed by: NURSE PRACTITIONER

## 2020-02-10 PROCEDURE — 99999 PR PBB SHADOW E&M-EST. PATIENT-LVL V: ICD-10-PCS | Mod: PBBFAC,HCNC,, | Performed by: NURSE PRACTITIONER

## 2020-02-10 PROCEDURE — G0439 PR MEDICARE ANNUAL WELLNESS SUBSEQUENT VISIT: ICD-10-PCS | Mod: HCNC,S$GLB,, | Performed by: NURSE PRACTITIONER

## 2020-02-10 PROCEDURE — 3079F DIAST BP 80-89 MM HG: CPT | Mod: HCNC,CPTII,S$GLB, | Performed by: NURSE PRACTITIONER

## 2020-02-10 NOTE — PATIENT INSTRUCTIONS
Your blood pressure was elevated at today's visit.  Recheck your blood pressure at home 4-5 times in the next week.  Keep a blood pressure log.  Call your PCP if blood pressures remain consistently greater than 140/90.  Go to the ER if you start having severe headaches, chest pain, shortness of breath or sudden vision changes.  Decrease your salt and caffeine intake.  Maintain adequate water intake.        Counseling and Referral of Other Preventative  (Italic type indicates deductible and co-insurance are waived)    Patient Name: Keely Pizarro  Today's Date: 2/10/2020    Health Maintenance       Date Due Completion Date    Eye Exam 02/19/1957 ---    Urine Microalbumin 02/19/1957 ---    TETANUS VACCINE 02/19/1965 ---    Shingles Vaccine (1 of 2) 02/19/1997 ---    Mammogram 04/04/2017 4/4/2016    Lipid Panel 03/03/2018 3/3/2017    Pneumococcal Vaccine (65+ High/Highest Risk) (2 of 2 - PPSV23) 01/10/2019 11/15/2018    Hemoglobin A1c 01/08/2020 10/8/2019    Foot Exam 12/13/2020 12/13/2019    DEXA SCAN 11/04/2022 11/4/2019    Colonoscopy 03/31/2027 3/31/2017        No orders of the defined types were placed in this encounter.    The following information is provided to all patients.  This information is to help you find resources for any of the problems found today that may be affecting your health:                Living healthy guide: www.[facility].[place].gov      Understanding Diabetes: www.diabetes.org      Eating healthy: www.cdc.gov/healthyweight      CDC home safety checklist: www.cdc.gov/steadi/patient.html      Agency on Aging: www.goea.[place].gov      Alcoholics anonymous (AA): www.aa.org      Physical Activity: www.alo.nih.gov/nl7rpsd      Tobacco use: www.quitwithusla.org

## 2020-02-10 NOTE — Clinical Note
Keely Tracy Dr. was seen today for an HRA visit.  At the visit a mini-cognitive assessment was performed and found to be abnormal.  I am bringing this to your attention so that further evaluation or follow-up can be done should you decide it is appropriate. Thank you for allowing me to participate in the care of your patient.Genny Sosa NP

## 2020-02-10 NOTE — PROGRESS NOTES
"Keely Pizarro presented for a  Medicare AWV and comprehensive Health Risk Assessment today. The following components were reviewed and updated:    · Medical history  · Family History  · Social history  · Allergies and Current Medications  · Health Risk Assessment  · Health Maintenance  · Care Team     ** See Completed Assessments for Annual Wellness Visit within the encounter summary.**       The following assessments were completed:  · Living Situation  · CAGE  · Depression Screening  · Timed Get Up and Go  · Whisper Test  · Cognitive Function Screening  · Nutrition Screening  · ADL Screening  · PAQ Screening        Vitals:    02/10/20 0826 02/10/20 0905   BP: (!) 152/84 (!) 148/86   BP Location: Left arm Left arm   Patient Position: Sitting Sitting   Pulse: 84    Weight: 65.1 kg (143 lb 6.6 oz)    Height: 5' 3" (1.6 m)      Body mass index is 25.4 kg/m².     Physical Exam   Constitutional: She is oriented to person, place, and time. She appears well-developed and well-nourished. No distress.   HENT:   Head: Normocephalic and atraumatic.   Eyes: No scleral icterus.   Neck: Normal range of motion. Neck supple.   Cardiovascular: Normal rate, regular rhythm, normal heart sounds and intact distal pulses.   Pulmonary/Chest: Effort normal and breath sounds normal. No respiratory distress.   Abdominal: She exhibits no distension.   Musculoskeletal: Normal range of motion. She exhibits no edema.   Neurological: She is alert and oriented to person, place, and time.   Skin: Skin is warm and dry. She is not diaphoretic.   Psychiatric: She has a normal mood and affect. Her behavior is normal.       Diagnoses and health risks identified today and associated recommendations/orders:    1. Encounter for preventive health examination  Assessment completed  Preventive health recommendations reviewed  Mini cog screening abnormal today.  Patient scored a 3/7.  Patient denies serious memory concerns, but when requestioned she does " admit some memory problems.  Will notify PCP for further monitoring/evaluation.  Get up and go abnormal.  No recent falls.  Safety precautions reviewed    2. Type 2 diabetes mellitus with stage 3 chronic kidney disease, with long-term current use of insulin  Uncontrolled, last A1c 11.3  Patient on insulin and trulicity  Followed by PCP.     3. Uncontrolled type 2 diabetes mellitus with hyperglycemia  Last A1c 11.3  See above note  Followed by PCP.     4. CKD (chronic kidney disease) stage 3, GFR 30-59 ml/min  Stable.   Followed by PCP.     5. Follicular lymphoma grade i, lymph nodes of multiple sites  Stable.   Controlled with current medical therapy  Followed by Heme-Onc    6. Anemia associated with chemotherapy  Stable.   Controlled with current medical therapy  Followed by Heme/onc    7. Leukopenia, unspecified type  Stable.   Controlled with current medical therapy  Followed by Heme/Onc     8. Lymphadenopathy  Stable.   Controlled with current medical therapy  Followed by Heme/onc.     9. Essential hypertension  Elevated at today's visit.  Checked twice.  Patient denies symptoms including headache, chest pain, shortness of breath, sudden vision changes.  Instructed patient to monitor blood pressure at home and call PCP if it continues to be greater than 140/90.  Followed by PCP.     10. Osteopenia after menopause  Stable.   Controlled with current medical therapy  Followed by PCP.     Provided Keely with a 5-10 year written screening schedule and personal prevention plan. Recommendations were developed using the USPSTF age appropriate recommendations. Education, counseling, and referrals were provided as needed. After Visit Summary printed and given to patient which includes a list of additional screenings\tests needed.    Follow up in about 2 months (around 4/10/2020) for a routine visit with your primary care provider or sooner if problems arise. .    Genny Sosa NP

## 2020-02-11 ENCOUNTER — LAB VISIT (OUTPATIENT)
Dept: LAB | Facility: HOSPITAL | Age: 75
End: 2020-02-11
Payer: MEDICARE

## 2020-02-11 DIAGNOSIS — C82.08 FOLLICULAR LYMPHOMA GRADE I, LYMPH NODES OF MULTIPLE SITES: ICD-10-CM

## 2020-02-11 LAB
ALBUMIN SERPL BCP-MCNC: 3.1 G/DL (ref 3.5–5.2)
ALP SERPL-CCNC: 79 U/L (ref 55–135)
ALT SERPL W/O P-5'-P-CCNC: 18 U/L (ref 10–44)
ANION GAP SERPL CALC-SCNC: 7 MMOL/L (ref 8–16)
ANISOCYTOSIS BLD QL SMEAR: SLIGHT
AST SERPL-CCNC: 20 U/L (ref 10–40)
BASOPHILS NFR BLD: 1 % (ref 0–1.9)
BILIRUB SERPL-MCNC: 0.3 MG/DL (ref 0.1–1)
BUN SERPL-MCNC: 22 MG/DL (ref 8–23)
CALCIUM SERPL-MCNC: 8.5 MG/DL (ref 8.7–10.5)
CHLORIDE SERPL-SCNC: 112 MMOL/L (ref 95–110)
CO2 SERPL-SCNC: 24 MMOL/L (ref 23–29)
CREAT SERPL-MCNC: 1.3 MG/DL (ref 0.5–1.4)
DIFFERENTIAL METHOD: ABNORMAL
EOSINOPHIL NFR BLD: 7 % (ref 0–8)
ERYTHROCYTE [DISTWIDTH] IN BLOOD BY AUTOMATED COUNT: 11.9 % (ref 11.5–14.5)
EST. GFR  (AFRICAN AMERICAN): 47.4 ML/MIN/1.73 M^2
EST. GFR  (NON AFRICAN AMERICAN): 41.1 ML/MIN/1.73 M^2
GLUCOSE SERPL-MCNC: 162 MG/DL (ref 70–110)
HCT VFR BLD AUTO: 31.2 % (ref 37–48.5)
HGB BLD-MCNC: 9.3 G/DL (ref 12–16)
HYPOCHROMIA BLD QL SMEAR: ABNORMAL
IMM GRANULOCYTES # BLD AUTO: ABNORMAL K/UL (ref 0–0.04)
IMM GRANULOCYTES NFR BLD AUTO: ABNORMAL % (ref 0–0.5)
LDH SERPL L TO P-CCNC: 304 U/L (ref 110–260)
LYMPHOCYTES NFR BLD: 37 % (ref 18–48)
MCH RBC QN AUTO: 29.4 PG (ref 27–31)
MCHC RBC AUTO-ENTMCNC: 29.8 G/DL (ref 32–36)
MCV RBC AUTO: 99 FL (ref 82–98)
MONOCYTES NFR BLD: 16 % (ref 4–15)
MYELOCYTES NFR BLD MANUAL: 1 %
NEUTROPHILS NFR BLD: 35 % (ref 38–73)
NEUTS BAND NFR BLD MANUAL: 3 %
NRBC BLD-RTO: 0 /100 WBC
OVALOCYTES BLD QL SMEAR: ABNORMAL
PLATELET # BLD AUTO: 162 K/UL (ref 150–350)
PLATELET BLD QL SMEAR: ABNORMAL
PMV BLD AUTO: 9.8 FL (ref 9.2–12.9)
POIKILOCYTOSIS BLD QL SMEAR: SLIGHT
POTASSIUM SERPL-SCNC: 4.3 MMOL/L (ref 3.5–5.1)
PROT SERPL-MCNC: 5.6 G/DL (ref 6–8.4)
RBC # BLD AUTO: 3.16 M/UL (ref 4–5.4)
SODIUM SERPL-SCNC: 143 MMOL/L (ref 136–145)
WBC # BLD AUTO: 2.46 K/UL (ref 3.9–12.7)

## 2020-02-11 PROCEDURE — 80053 COMPREHEN METABOLIC PANEL: CPT | Mod: HCNC

## 2020-02-11 PROCEDURE — 85007 BL SMEAR W/DIFF WBC COUNT: CPT | Mod: HCNC

## 2020-02-11 PROCEDURE — 36415 COLL VENOUS BLD VENIPUNCTURE: CPT | Mod: HCNC

## 2020-02-11 PROCEDURE — 85027 COMPLETE CBC AUTOMATED: CPT | Mod: HCNC

## 2020-02-11 PROCEDURE — 83615 LACTATE (LD) (LDH) ENZYME: CPT | Mod: HCNC

## 2020-02-20 ENCOUNTER — TELEPHONE (OUTPATIENT)
Dept: HEMATOLOGY/ONCOLOGY | Facility: CLINIC | Age: 75
End: 2020-02-20

## 2020-02-20 NOTE — TELEPHONE ENCOUNTER
Returned patient call, patient requesting appt be rescheduled from 03/09 to 2/29 advised patient I would speak with her nurse and treating physician  To confirm its ok to move appt up.      Spoke with Yarelis FRY RN. And Huy SHERIFF RN. Both stated patient would not be able to reschedule to chemo. Patient notified

## 2020-02-20 NOTE — TELEPHONE ENCOUNTER
----- Message from Monique Melendez sent at 2/20/2020  3:27 PM CST -----  Contact: Patient  Reschedule existing appt      Appt date rescheduling:: 03/09    Is appt today or next day appt:: No    Type of appt :: infusion    Provider:: Marcos    Do you feel you need to be seen today::No    Communication preference:: [phone]    Addition info::

## 2020-03-04 ENCOUNTER — PROCEDURE VISIT (OUTPATIENT)
Dept: PAIN MEDICINE | Facility: CLINIC | Age: 75
End: 2020-03-04
Attending: ANESTHESIOLOGY
Payer: MEDICARE

## 2020-03-04 VITALS
OXYGEN SATURATION: 100 % | TEMPERATURE: 99 F | RESPIRATION RATE: 18 BRPM | DIASTOLIC BLOOD PRESSURE: 89 MMHG | HEIGHT: 63 IN | WEIGHT: 148.81 LBS | HEART RATE: 86 BPM | SYSTOLIC BLOOD PRESSURE: 169 MMHG | BODY MASS INDEX: 26.37 KG/M2

## 2020-03-04 DIAGNOSIS — M79.18 MYOFASCIAL PAIN: Primary | ICD-10-CM

## 2020-03-04 DIAGNOSIS — G57.01 PIRIFORMIS SYNDROME OF RIGHT SIDE: ICD-10-CM

## 2020-03-04 PROCEDURE — 20552 PR INJECT TRIGGER POINT, 1 OR 2: ICD-10-PCS | Mod: HCNC,GC,S$GLB, | Performed by: ANESTHESIOLOGY

## 2020-03-04 PROCEDURE — 76942 ECHO GUIDE FOR BIOPSY: CPT | Mod: HCNC,GC,S$GLB, | Performed by: ANESTHESIOLOGY

## 2020-03-04 PROCEDURE — 76942 PR U/S GUIDANCE FOR NEEDLE GUIDANCE: ICD-10-PCS | Mod: HCNC,GC,S$GLB, | Performed by: ANESTHESIOLOGY

## 2020-03-04 PROCEDURE — 20552 NJX 1/MLT TRIGGER POINT 1/2: CPT | Mod: HCNC,GC,S$GLB, | Performed by: ANESTHESIOLOGY

## 2020-03-04 RX ORDER — BUPIVACAINE HYDROCHLORIDE 2.5 MG/ML
4.5 INJECTION, SOLUTION EPIDURAL; INFILTRATION; INTRACAUDAL
Status: COMPLETED | OUTPATIENT
Start: 2020-03-04 | End: 2020-03-04

## 2020-03-04 RX ORDER — BETAMETHASONE SODIUM PHOSPHATE AND BETAMETHASONE ACETATE 3; 3 MG/ML; MG/ML
3 INJECTION, SUSPENSION INTRA-ARTICULAR; INTRALESIONAL; INTRAMUSCULAR; SOFT TISSUE
Status: COMPLETED | OUTPATIENT
Start: 2020-03-04 | End: 2020-03-04

## 2020-03-04 RX ORDER — LIDOCAINE HYDROCHLORIDE 10 MG/ML
3 INJECTION INFILTRATION; PERINEURAL
Status: COMPLETED | OUTPATIENT
Start: 2020-03-04 | End: 2020-03-04

## 2020-03-04 RX ADMIN — BETAMETHASONE SODIUM PHOSPHATE AND BETAMETHASONE ACETATE 3 MG: 3; 3 INJECTION, SUSPENSION INTRA-ARTICULAR; INTRALESIONAL; INTRAMUSCULAR; SOFT TISSUE at 02:03

## 2020-03-04 RX ADMIN — BUPIVACAINE HYDROCHLORIDE 11.25 MG: 2.5 INJECTION, SOLUTION EPIDURAL; INFILTRATION; INTRACAUDAL at 02:03

## 2020-03-04 RX ADMIN — LIDOCAINE HYDROCHLORIDE 3 ML: 10 INJECTION INFILTRATION; PERINEURAL at 02:03

## 2020-03-05 ENCOUNTER — LAB VISIT (OUTPATIENT)
Dept: LAB | Facility: HOSPITAL | Age: 75
End: 2020-03-05
Payer: MEDICARE

## 2020-03-05 DIAGNOSIS — Z78.0 OSTEOPENIA AFTER MENOPAUSE: ICD-10-CM

## 2020-03-05 DIAGNOSIS — M85.80 OSTEOPENIA AFTER MENOPAUSE: ICD-10-CM

## 2020-03-05 DIAGNOSIS — E11.65 UNCONTROLLED TYPE 2 DIABETES MELLITUS WITH HYPERGLYCEMIA: ICD-10-CM

## 2020-03-05 LAB
25(OH)D3+25(OH)D2 SERPL-MCNC: 23 NG/ML (ref 30–96)
C PEPTIDE SERPL-MCNC: 3.78 NG/ML (ref 0.78–5.19)
CHOLEST SERPL-MCNC: 155 MG/DL (ref 120–199)
CHOLEST/HDLC SERPL: 2.9 {RATIO} (ref 2–5)
ESTIMATED AVG GLUCOSE: 157 MG/DL (ref 68–131)
HBA1C MFR BLD HPLC: 7.1 % (ref 4–5.6)
HDLC SERPL-MCNC: 53 MG/DL (ref 40–75)
HDLC SERPL: 34.2 % (ref 20–50)
LDLC SERPL CALC-MCNC: 91.8 MG/DL (ref 63–159)
NONHDLC SERPL-MCNC: 102 MG/DL
T4 FREE SERPL-MCNC: 0.98 NG/DL (ref 0.71–1.51)
TRIGL SERPL-MCNC: 51 MG/DL (ref 30–150)
TSH SERPL DL<=0.005 MIU/L-ACNC: 0.36 UIU/ML (ref 0.4–4)

## 2020-03-05 PROCEDURE — 84439 ASSAY OF FREE THYROXINE: CPT | Mod: HCNC

## 2020-03-05 PROCEDURE — 36415 COLL VENOUS BLD VENIPUNCTURE: CPT | Mod: HCNC

## 2020-03-05 PROCEDURE — 82306 VITAMIN D 25 HYDROXY: CPT | Mod: HCNC

## 2020-03-05 PROCEDURE — 80061 LIPID PANEL: CPT | Mod: HCNC

## 2020-03-05 PROCEDURE — 84681 ASSAY OF C-PEPTIDE: CPT | Mod: HCNC

## 2020-03-05 PROCEDURE — 84443 ASSAY THYROID STIM HORMONE: CPT | Mod: HCNC

## 2020-03-05 PROCEDURE — 83036 HEMOGLOBIN GLYCOSYLATED A1C: CPT | Mod: HCNC

## 2020-03-06 NOTE — PROCEDURES
Date of Procedure: 03/04/2020    Procedure: RIght piriformis muscle injection using US guidance    Pre-op diagnosis: Myalgia; piriformis syndrome    Post-op diagnosis: Myalgia; piriformis syndrome    Physician: Dr. Kalina Walters     Assistant: Dr. Zelaya    Anesthestia: local    EBL: None    Specimens: None    All medications, allergies, and relevant histories were reviewed. No recent antibiotics or infections.  A time-out was taken to verify the correct patient, procedure, laterality, and appropriate medications/allergies.    Piriformis Muscle Injection Using US guidance:  The right piriformis muscle was then identified using US guidance.  Xylocaine 1% was infiltrated locally over the entry site over the greater trochanter on the right side. A 21-gauge B bevel needle was advanced into the body of the piriformis muscle under US guidance. 5 mL a mixture of 4.5 mL bupivacaine 0.25% and 0.5 mL (3 mg) of betamethasone was injected after repeated negative aspirations.  US guidance showed good spread of local anesthesia. Needle was removed and a dressing was applied.      Patient tolerated the procedure well without any complications.         Future Management:   If helpful, can repeat as needed.    Follow up in 3 weeks or sooner if needed    I certify that I provided the above services.  I was present for the entire procedure, which was performed by the fellow physician under my supervision.  There were no parts of the procedure that were performed not by myself or without my direct supervision.    Kalina Walters MD  03/04/2020

## 2020-03-09 ENCOUNTER — INFUSION (OUTPATIENT)
Dept: INFUSION THERAPY | Facility: HOSPITAL | Age: 75
End: 2020-03-09
Payer: MEDICARE

## 2020-03-09 VITALS
HEART RATE: 68 BPM | HEIGHT: 63 IN | BODY MASS INDEX: 26.22 KG/M2 | RESPIRATION RATE: 18 BRPM | WEIGHT: 148 LBS | TEMPERATURE: 98 F | DIASTOLIC BLOOD PRESSURE: 90 MMHG | SYSTOLIC BLOOD PRESSURE: 180 MMHG

## 2020-03-09 DIAGNOSIS — C82.08 FOLLICULAR LYMPHOMA GRADE I, LYMPH NODES OF MULTIPLE SITES: Primary | ICD-10-CM

## 2020-03-09 PROCEDURE — 25000003 PHARM REV CODE 250: Mod: HCNC | Performed by: INTERNAL MEDICINE

## 2020-03-09 PROCEDURE — 96401 CHEMO ANTI-NEOPL SQ/IM: CPT | Mod: HCNC

## 2020-03-09 PROCEDURE — 63600175 PHARM REV CODE 636 W HCPCS: Mod: TB,HCNC | Performed by: INTERNAL MEDICINE

## 2020-03-09 RX ORDER — ACETAMINOPHEN 325 MG/1
650 TABLET ORAL
Status: CANCELLED | OUTPATIENT
Start: 2020-03-09

## 2020-03-09 RX ORDER — DIPHENHYDRAMINE HCL 25 MG
50 CAPSULE ORAL
Status: CANCELLED
Start: 2020-03-09

## 2020-03-09 RX ORDER — FAMOTIDINE 20 MG/1
20 TABLET, FILM COATED ORAL 2 TIMES DAILY
Status: DISCONTINUED | OUTPATIENT
Start: 2020-03-09 | End: 2020-03-09 | Stop reason: HOSPADM

## 2020-03-09 RX ORDER — DIPHENHYDRAMINE HCL 50 MG
50 CAPSULE ORAL
Status: COMPLETED | OUTPATIENT
Start: 2020-03-09 | End: 2020-03-09

## 2020-03-09 RX ORDER — HEPARIN 100 UNIT/ML
500 SYRINGE INTRAVENOUS
Status: DISCONTINUED | OUTPATIENT
Start: 2020-03-09 | End: 2020-03-09 | Stop reason: HOSPADM

## 2020-03-09 RX ORDER — MEPERIDINE HYDROCHLORIDE 25 MG/ML
25 INJECTION INTRAMUSCULAR; INTRAVENOUS; SUBCUTANEOUS
Status: CANCELLED | OUTPATIENT
Start: 2020-03-09

## 2020-03-09 RX ORDER — SODIUM CHLORIDE 0.9 % (FLUSH) 0.9 %
10 SYRINGE (ML) INJECTION
Status: CANCELLED | OUTPATIENT
Start: 2020-03-09

## 2020-03-09 RX ORDER — HEPARIN 100 UNIT/ML
500 SYRINGE INTRAVENOUS
Status: CANCELLED | OUTPATIENT
Start: 2020-03-09

## 2020-03-09 RX ORDER — SODIUM CHLORIDE 0.9 % (FLUSH) 0.9 %
10 SYRINGE (ML) INJECTION
Status: DISCONTINUED | OUTPATIENT
Start: 2020-03-09 | End: 2020-03-09 | Stop reason: HOSPADM

## 2020-03-09 RX ORDER — ACETAMINOPHEN 325 MG/1
650 TABLET ORAL
Status: COMPLETED | OUTPATIENT
Start: 2020-03-09 | End: 2020-03-09

## 2020-03-09 RX ORDER — FAMOTIDINE 20 MG/1
20 TABLET, FILM COATED ORAL 2 TIMES DAILY
Status: CANCELLED
Start: 2020-03-09

## 2020-03-09 RX ORDER — MEPERIDINE HYDROCHLORIDE 25 MG/ML
25 INJECTION INTRAMUSCULAR; INTRAVENOUS; SUBCUTANEOUS
Status: DISCONTINUED | OUTPATIENT
Start: 2020-03-09 | End: 2020-03-09 | Stop reason: HOSPADM

## 2020-03-09 RX ADMIN — RITUXIMAB AND HYALURONIDASE 1400 MG: 120; 2000 INJECTION, SOLUTION SUBCUTANEOUS at 10:03

## 2020-03-09 RX ADMIN — FAMOTIDINE 20 MG: 20 TABLET, FILM COATED ORAL at 10:03

## 2020-03-09 RX ADMIN — ACETAMINOPHEN 650 MG: 325 TABLET ORAL at 10:03

## 2020-03-09 RX ADMIN — DIPHENHYDRAMINE HYDROCHLORIDE 50 MG: 50 CAPSULE ORAL at 10:03

## 2020-03-09 NOTE — PLAN OF CARE
1020 pt here for rituxan hycela injection, hx, meds, allergies reviewed, pt with no complaints at this time, reclined in chair, continue to monitor

## 2020-03-09 NOTE — PLAN OF CARE
1115 pt tolerated rituxan hycela injection to abdomen without issue, pt has no upcomign appts scheduled at this time, no distress noted upon d/c to home

## 2020-03-10 ENCOUNTER — TELEPHONE (OUTPATIENT)
Dept: ENDOCRINOLOGY | Facility: CLINIC | Age: 75
End: 2020-03-10

## 2020-03-10 NOTE — TELEPHONE ENCOUNTER
----- Message from Annette Powers sent at 3/10/2020  2:54 PM CDT -----  Contact: pt called   Calling to reschedule apt scheduled for 3/19/20 to 3/27/20.  Pt will not be able to make it due to new job she will be starting.

## 2020-03-17 ENCOUNTER — TELEPHONE (OUTPATIENT)
Dept: ENDOCRINOLOGY | Facility: CLINIC | Age: 75
End: 2020-03-17

## 2020-03-17 ENCOUNTER — PATIENT OUTREACH (OUTPATIENT)
Dept: ADMINISTRATIVE | Facility: OTHER | Age: 75
End: 2020-03-17

## 2020-03-17 ENCOUNTER — TELEPHONE (OUTPATIENT)
Dept: PAIN MEDICINE | Facility: CLINIC | Age: 75
End: 2020-03-17

## 2020-03-17 DIAGNOSIS — E55.9 VITAMIN D DEFICIENCY: ICD-10-CM

## 2020-03-17 DIAGNOSIS — R94.6 BORDERLINE ABNORMAL TFTS: Primary | ICD-10-CM

## 2020-03-17 RX ORDER — VIT C/E/ZN/COPPR/LUTEIN/ZEAXAN 250MG-90MG
1000 CAPSULE ORAL DAILY
Qty: 30 CAPSULE | Refills: 11 | Status: SHIPPED | OUTPATIENT
Start: 2020-03-17 | End: 2021-04-22 | Stop reason: SDUPTHER

## 2020-03-17 NOTE — PROGRESS NOTES
Care Everywhere: updated  Immunization: updated  Health Maintenance: updated  Media Review: reviewed for possible outside eye exam and mammogram report  Legacy Review: n/a  Order placed: n/a  Upcoming appts:n/a

## 2020-03-17 NOTE — TELEPHONE ENCOUNTER
"Staff contacted the patient to offer her a telephone visit with Dr. Walters instead of coming in tomorrow 03/18/20 7:45 am due to patient being at  Covid-19 high risk age.     Patient stated, "I would like to be called I am still on cancer medication and I have a weak immune system."      Staff informed the patient that her requested would be documented and that she should receive a call between 7:45 am and 8:45am.     Patient verbalized understanding and expressed thanks.       "

## 2020-03-17 NOTE — TELEPHONE ENCOUNTER
Vitamin D 23 (Low)     TSH 0.3 (Suppressed w/ Normal FT4 0.98)  Subclinical hyperthyroidism     No recent contrast     Not on Synthroid     Plan;    -Start Vitamin D 1,000 UNITS DAILY   -Repeat TFT in 4 weeks

## 2020-03-18 ENCOUNTER — TELEPHONE (OUTPATIENT)
Dept: PAIN MEDICINE | Facility: CLINIC | Age: 75
End: 2020-03-18

## 2020-03-18 NOTE — TELEPHONE ENCOUNTER
Telephone encounter due to Covid 19 concerns    Patient doing well after the right piriformis muscle injection.  Her left-sided pain is well controlled with her medications and was stretches.  She does not feel she needs another injection at this time.  We will make a follow-up visit for 6 weeks to re-evaluate.

## 2020-04-06 DIAGNOSIS — C82.08 FOLLICULAR LYMPHOMA GRADE I, LYMPH NODES OF MULTIPLE SITES: Primary | ICD-10-CM

## 2020-04-28 DIAGNOSIS — I10 HYPERTENSION, ESSENTIAL: ICD-10-CM

## 2020-04-28 RX ORDER — AMLODIPINE BESYLATE 10 MG/1
TABLET ORAL
Qty: 90 TABLET | Refills: 3 | Status: SHIPPED | OUTPATIENT
Start: 2020-04-28 | End: 2020-04-28 | Stop reason: SDUPTHER

## 2020-04-28 RX ORDER — AMLODIPINE BESYLATE 10 MG/1
10 TABLET ORAL DAILY
Qty: 90 TABLET | Refills: 3 | Status: SHIPPED | OUTPATIENT
Start: 2020-04-28 | End: 2020-04-30 | Stop reason: SDUPTHER

## 2020-04-30 ENCOUNTER — TELEPHONE (OUTPATIENT)
Dept: HEMATOLOGY/ONCOLOGY | Facility: CLINIC | Age: 75
End: 2020-04-30

## 2020-04-30 DIAGNOSIS — I10 HYPERTENSION, ESSENTIAL: ICD-10-CM

## 2020-04-30 DIAGNOSIS — Z13.9 SCREENING FOR CONDITION: Primary | ICD-10-CM

## 2020-04-30 RX ORDER — AMLODIPINE BESYLATE 10 MG/1
10 TABLET ORAL DAILY
Qty: 90 TABLET | Refills: 3 | Status: SHIPPED | OUTPATIENT
Start: 2020-04-30 | End: 2022-01-03 | Stop reason: SDUPTHER

## 2020-04-30 NOTE — TELEPHONE ENCOUNTER
----- Message from Divya Son sent at 4/30/2020  9:37 AM CDT -----  Contact: PHUONG FRAIRE [[id number]]  Can the clinic reply in [facility]: N      Please refill the medication(s) listed below. Please call the patient when the prescription(s) is ready for  at this phone number  [phone]      Medication #1 amLODIPine (NORVASC) 10 MG tablet      Preferred Pharmacy: [facility] - [facility] [address]

## 2020-04-30 NOTE — TELEPHONE ENCOUNTER
----- Message from Christiano Mackey DNP sent at 4/30/2020  1:07 PM CDT -----  Contact: Patient  Zulema, please return pt's call as I assume it was about scheduling COVID test. If she needs something else, please let me know.  ThanksChristiano  ----- Message -----  From: Alejandra Singh RN  Sent: 4/30/2020  12:34 PM CDT  To: Christiano Mackey DNP        ----- Message -----  From: Kenneth Patel  Sent: 4/30/2020  12:28 PM CDT  To: Narendra Alvarado Staff    Patient is returning your call  Please be advised    Patient can be reached at    [phone]

## 2020-04-30 NOTE — PROGRESS NOTES
04/30/2020      In an effort to protect our immunocompromised patients from potential exposure to COVID-19, [facility] will now require all patients receiving an infusion, an injection, and/or radiation therapy to be tested for COVID-19 prior to their appointment.  All patients currently under treatment will be tested immediately, and patients initiating new treatment cycles or with one-time appointments (injections, transfusions, etc.) must be tested within 72 hours of their appointment.     Placed COVID-19 test order for patient.  A member of our team is to contact the patient in the near future to explain this process and the rationale behind it, to ask the COVID-19 screening questions, and to get the patient scheduled for their COVID-19 test.     The above was completed in accordance with instructions and guidelines set forth by [facility].     Signed,    Christiano Mackey, LUCY     Date:  04/30/2020

## 2020-05-01 ENCOUNTER — CLINICAL SUPPORT (OUTPATIENT)
Dept: HEMATOLOGY/ONCOLOGY | Facility: CLINIC | Age: 75
End: 2020-05-01
Payer: MEDICARE

## 2020-05-01 ENCOUNTER — TELEPHONE (OUTPATIENT)
Dept: HEMATOLOGY/ONCOLOGY | Facility: CLINIC | Age: 75
End: 2020-05-01

## 2020-05-01 ENCOUNTER — LAB VISIT (OUTPATIENT)
Dept: LAB | Facility: HOSPITAL | Age: 75
End: 2020-05-01
Attending: INTERNAL MEDICINE
Payer: MEDICARE

## 2020-05-01 ENCOUNTER — OFFICE VISIT (OUTPATIENT)
Dept: HEMATOLOGY/ONCOLOGY | Facility: CLINIC | Age: 75
End: 2020-05-01
Payer: MEDICARE

## 2020-05-01 DIAGNOSIS — C82.08 FOLLICULAR LYMPHOMA GRADE I, LYMPH NODES OF MULTIPLE SITES: ICD-10-CM

## 2020-05-01 DIAGNOSIS — C82.08 FOLLICULAR LYMPHOMA GRADE I, LYMPH NODES OF MULTIPLE SITES: Primary | ICD-10-CM

## 2020-05-01 DIAGNOSIS — Z13.9 SCREENING FOR CONDITION: ICD-10-CM

## 2020-05-01 LAB
ALBUMIN SERPL BCP-MCNC: 3.4 G/DL (ref 3.5–5.2)
ALP SERPL-CCNC: 85 U/L (ref 55–135)
ALT SERPL W/O P-5'-P-CCNC: 22 U/L (ref 10–44)
ANION GAP SERPL CALC-SCNC: 8 MMOL/L (ref 8–16)
AST SERPL-CCNC: 22 U/L (ref 10–40)
BASOPHILS # BLD AUTO: 0.03 K/UL (ref 0–0.2)
BASOPHILS NFR BLD: 0.7 % (ref 0–1.9)
BILIRUB SERPL-MCNC: 0.3 MG/DL (ref 0.1–1)
BUN SERPL-MCNC: 22 MG/DL (ref 8–23)
CALCIUM SERPL-MCNC: 9.5 MG/DL (ref 8.7–10.5)
CHLORIDE SERPL-SCNC: 109 MMOL/L (ref 95–110)
CO2 SERPL-SCNC: 24 MMOL/L (ref 23–29)
CREAT SERPL-MCNC: 1.2 MG/DL (ref 0.5–1.4)
DIFFERENTIAL METHOD: ABNORMAL
EOSINOPHIL # BLD AUTO: 0.2 K/UL (ref 0–0.5)
EOSINOPHIL NFR BLD: 4.5 % (ref 0–8)
ERYTHROCYTE [DISTWIDTH] IN BLOOD BY AUTOMATED COUNT: 12.7 % (ref 11.5–14.5)
EST. GFR  (AFRICAN AMERICAN): 51.8 ML/MIN/1.73 M^2
EST. GFR  (NON AFRICAN AMERICAN): 44.9 ML/MIN/1.73 M^2
GLUCOSE SERPL-MCNC: 251 MG/DL (ref 70–110)
HCT VFR BLD AUTO: 34.4 % (ref 37–48.5)
HGB BLD-MCNC: 10.2 G/DL (ref 12–16)
IMM GRANULOCYTES # BLD AUTO: 0.06 K/UL (ref 0–0.04)
IMM GRANULOCYTES NFR BLD AUTO: 1.4 % (ref 0–0.5)
LDH SERPL L TO P-CCNC: 349 U/L (ref 110–260)
LYMPHOCYTES # BLD AUTO: 1.5 K/UL (ref 1–4.8)
LYMPHOCYTES NFR BLD: 36.6 % (ref 18–48)
MCH RBC QN AUTO: 27.7 PG (ref 27–31)
MCHC RBC AUTO-ENTMCNC: 29.7 G/DL (ref 32–36)
MCV RBC AUTO: 94 FL (ref 82–98)
MONOCYTES # BLD AUTO: 0.7 K/UL (ref 0.3–1)
MONOCYTES NFR BLD: 17.5 % (ref 4–15)
NEUTROPHILS # BLD AUTO: 1.6 K/UL (ref 1.8–7.7)
NEUTROPHILS NFR BLD: 39.3 % (ref 38–73)
NRBC BLD-RTO: 0 /100 WBC
PLATELET # BLD AUTO: 201 K/UL (ref 150–350)
PMV BLD AUTO: 10 FL (ref 9.2–12.9)
POTASSIUM SERPL-SCNC: 3.8 MMOL/L (ref 3.5–5.1)
PROT SERPL-MCNC: 6.3 G/DL (ref 6–8.4)
RBC # BLD AUTO: 3.68 M/UL (ref 4–5.4)
SARS-COV-2 RNA RESP QL NAA+PROBE: NOT DETECTED
SODIUM SERPL-SCNC: 141 MMOL/L (ref 136–145)
WBC # BLD AUTO: 4.18 K/UL (ref 3.9–12.7)

## 2020-05-01 PROCEDURE — 99441 PR PHYSICIAN TELEPHONE EVALUATION 5-10 MIN: CPT | Mod: HCNC,95,, | Performed by: INTERNAL MEDICINE

## 2020-05-01 PROCEDURE — 1101F PR PT FALLS ASSESS DOC 0-1 FALLS W/OUT INJ PAST YR: ICD-10-PCS | Mod: HCNC,CPTII,95, | Performed by: INTERNAL MEDICINE

## 2020-05-01 PROCEDURE — U0002 COVID-19 LAB TEST NON-CDC: HCPCS | Mod: HCNC

## 2020-05-01 PROCEDURE — 99441 PR PHYSICIAN TELEPHONE EVALUATION 5-10 MIN: ICD-10-PCS | Mod: HCNC,95,, | Performed by: INTERNAL MEDICINE

## 2020-05-01 PROCEDURE — 1159F PR MEDICATION LIST DOCUMENTED IN MEDICAL RECORD: ICD-10-PCS | Mod: HCNC,95,, | Performed by: INTERNAL MEDICINE

## 2020-05-01 PROCEDURE — 1101F PT FALLS ASSESS-DOCD LE1/YR: CPT | Mod: HCNC,CPTII,95, | Performed by: INTERNAL MEDICINE

## 2020-05-01 PROCEDURE — 83615 LACTATE (LD) (LDH) ENZYME: CPT | Mod: HCNC

## 2020-05-01 PROCEDURE — 36415 COLL VENOUS BLD VENIPUNCTURE: CPT | Mod: HCNC

## 2020-05-01 PROCEDURE — 1159F MED LIST DOCD IN RCRD: CPT | Mod: HCNC,95,, | Performed by: INTERNAL MEDICINE

## 2020-05-01 PROCEDURE — 85025 COMPLETE CBC W/AUTO DIFF WBC: CPT | Mod: HCNC

## 2020-05-01 PROCEDURE — 80053 COMPREHEN METABOLIC PANEL: CPT | Mod: HCNC

## 2020-05-01 RX ORDER — MEPERIDINE HYDROCHLORIDE 25 MG/ML
25 INJECTION INTRAMUSCULAR; INTRAVENOUS; SUBCUTANEOUS
Status: CANCELLED | OUTPATIENT
Start: 2020-05-01

## 2020-05-01 RX ORDER — SODIUM CHLORIDE 0.9 % (FLUSH) 0.9 %
10 SYRINGE (ML) INJECTION
Status: CANCELLED | OUTPATIENT
Start: 2020-05-01

## 2020-05-01 RX ORDER — DIPHENHYDRAMINE HCL 25 MG
50 CAPSULE ORAL
Status: CANCELLED
Start: 2020-05-01

## 2020-05-01 RX ORDER — ACETAMINOPHEN 325 MG/1
650 TABLET ORAL
Status: CANCELLED | OUTPATIENT
Start: 2020-05-01

## 2020-05-01 RX ORDER — HEPARIN 100 UNIT/ML
500 SYRINGE INTRAVENOUS
Status: CANCELLED | OUTPATIENT
Start: 2020-05-01

## 2020-05-01 RX ORDER — FAMOTIDINE 20 MG/1
20 TABLET, FILM COATED ORAL 2 TIMES DAILY
Status: CANCELLED
Start: 2020-05-01

## 2020-05-01 NOTE — TELEPHONE ENCOUNTER
Message fwd to NP.         ----- Message from Monique Melendez sent at 5/1/2020 11:52 AM CDT -----  Contact: Patient  Returning a call     Caller name:: Pt    Who Left Message for Patient:: Marlene Chasity    Communication preference:: [phone]    Additional info::

## 2020-05-01 NOTE — TELEPHONE ENCOUNTER
----- Message from Marlene Gaspar NP sent at 5/1/2020  1:05 PM CDT -----  Contact: Patient  Returned call twice; went to [facility]. I let her know she does not need to return our phone call   ----- Message -----  From: Octavia Bush  Sent: 5/1/2020  11:53 AM CDT  To: Marlene Gaspar NP        ----- Message -----  From: Monique Melendez  Sent: 5/1/2020  11:52 AM CDT  To: Chasity Rosario Staff    Returning a call     Caller name:: Pt    Who Left Message for Patient:: Marlene Gaspar    Communication preference:: [phone]    Additional info::

## 2020-05-01 NOTE — TELEPHONE ENCOUNTER
Called patient to let her know her recent COVID-19 test came back NEGATIVE.  Based on that result, you can proceed with your appointments as indicated by your healthcare providers.  If you have any questions, please reach out to your healthcare provider by phone.    Left voicemail.

## 2020-05-01 NOTE — PROGRESS NOTES
Subjective:   The patient location is: home  The chief complaint leading to consultation is: follicular lymphoma  Visit type: audio only  Total time spent with patient: 10 minutes  Each patient to whom he or she provides medical services by telemedicine is:  (1) informed of the relationship between the physician and patient and the respective role of any other health care provider with respect to management of the patient; and (2) notified that he or she may decline to receive medical services by telemedicine and may withdraw from such care at any time.    Notes: see below       Patient ID: Keely Pizarro is a 75 y.o. female.    Chief Complaint: No chief complaint on file.    Onc Hx:  Keely presents with her  for evaluation of newly diagnosed grade 1-2 follicular lymphoma diagnosed by excisional biopsy of a right cervical lymph node 8/1/2017.  Pathologic diagnosis was received by [facility].  Keely reports feeling fatigue since March 2017. At the time she had an infected tooth and developed cervical lymphadenopathy. She started loosing weight, total quantity to date is unknown. Appetite remains normal and she denies night sweats. She then developed shortness of breath worse with activity and left lower lung pleural effusion was noted. The received thoracentesis twice with negative pleural fluid flow cytometry. Cytology was positive for lymphocytes, but was not diagnostic for follicular lymphoma. CT imaging demonstrates diffuse, bilateral submandibular, cervical, axillary, supraclavicular, and intrathoracic lymphadenopathy.     Keely has a history of uterine cancer in 2012 treated with radiation and chemotherapy. She had a port-a-cath at that time for treatment. She has insulin dependent diabetes mellitus with poorly controlled blood sugar, reporting both high and lows. She has hypertension, and blood pressure was elevated at intake today.      Follow-up Visit 9/1/17  PET imaging shows stage 4  follicular lymphoma, large left side pleural effusion     Follow-up visit 3/15/18  Return visit to review results of post-treatment staging scan that shows complete remission after 6 cycles of BR chemotherapy. She continues to have weight loss- 7lbs since last visit with waxing/waning appetite.     Follow-up Visit 11/9/18   Interval follow-up for follicular lymphoma. Currently on maintenance Rituxan after BR for 6 cycles with complete response.    Today:  Interval follow-up for follicular lymphoma on maintenance Rituxan after BR for 6 cycles with CR. Cycle 11 maintenance (every 2 months) Hycela scheduled for tomorrow.  CBC, CMP, and LDH are stable  She reports increased stress; her  who always accompanies her to visits is currently admitted to ICU after open heart surgery for a ruptured thoracic aorta aneurysm.         Review of Systems   Constitutional: Negative for fatigue and fever.   HENT: Negative for congestion and trouble swallowing.    Eyes: Negative for photophobia and visual disturbance.   Respiratory: Negative for cough and shortness of breath.  Cardiovascular: Negative for chest pain and leg swelling.   Gastrointestinal: Negative for abdominal distention and abdominal pain.   Genitourinary: Negative for dysuria and hematuria.   Musculoskeletal: Negative for myalgias and neck pain.   Skin: Negative for color change and pallor.   Neurological: Negative for light-headedness and headaches.   Hematological: Negative for adenopathy. Does not bruise/bleed easily.   Psychiatric/Behavioral: Negative for agitation.       Objective:    No exam due to telehealth visit for COVID19  Physical Exam   Constitutional: She is oriented to person, place, and time. She appears well-developed and well-nourished.   HENT:   Mouth/Throat: Oropharynx is clear and moist. No oropharyngeal exudate.   Eyes: Pupils are equal, round, and reactive to light.   Cardiovascular: Normal rate and regular rhythm.    Pulmonary/Chest: Good  aeration and clear lung sounds to all  lobes.   Abdominal: Soft. Bowel sounds are normal.   Lymphadenopathy:     She has no cervical adenopathy, axillary, or inguinal lymphadenopathy  Neurological: She is alert and oriented to person, place, and time.   Skin: Skin is warm.   Psychiatric: She has a normal mood and affect. Her behavior is normal.       Assessment:       1. Follicular lymphoma grade i, lymph nodes of multiple sites        Plan:     [place]  Completed 6 cycles of BR chemotherapy for follicular lymphoma achieving a complete response on end of therapy PET.   Currently on maintenance Rituxan once every 2 months for 2 years. (9th cycle will be held today 11/7/19 to allow for ANC recovery)  Cycle 12 (last treatment) will be in 2 months    HTN  - Continue amlodipine  - Component of white coat HTN at our visits    DM2  - hyergylcemic; return to PCP for management- instructed to call PCP today 11/7/19    CKD  - likely secondary to uncontrolled HTN/DM2, meds, chemo  - DEXA scan ordered      Return visit in 4 months with CBC, CMP, and LDH and Rituxan #12

## 2020-05-04 ENCOUNTER — INFUSION (OUTPATIENT)
Dept: INFUSION THERAPY | Facility: HOSPITAL | Age: 75
End: 2020-05-04
Attending: INTERNAL MEDICINE
Payer: MEDICARE

## 2020-05-04 VITALS
HEIGHT: 63 IN | TEMPERATURE: 98 F | WEIGHT: 147.94 LBS | DIASTOLIC BLOOD PRESSURE: 74 MMHG | BODY MASS INDEX: 26.21 KG/M2 | SYSTOLIC BLOOD PRESSURE: 143 MMHG | RESPIRATION RATE: 18 BRPM | HEART RATE: 94 BPM

## 2020-05-04 DIAGNOSIS — C82.08 FOLLICULAR LYMPHOMA GRADE I, LYMPH NODES OF MULTIPLE SITES: Primary | ICD-10-CM

## 2020-05-04 PROCEDURE — 96401 CHEMO ANTI-NEOPL SQ/IM: CPT | Mod: HCNC

## 2020-05-04 PROCEDURE — 63600175 PHARM REV CODE 636 W HCPCS: Mod: TB,HCNC | Performed by: INTERNAL MEDICINE

## 2020-05-04 PROCEDURE — 25000003 PHARM REV CODE 250: Mod: HCNC | Performed by: INTERNAL MEDICINE

## 2020-05-04 RX ORDER — MEPERIDINE HYDROCHLORIDE 50 MG/ML
25 INJECTION INTRAMUSCULAR; INTRAVENOUS; SUBCUTANEOUS
Status: DISCONTINUED | OUTPATIENT
Start: 2020-05-04 | End: 2020-05-04 | Stop reason: HOSPADM

## 2020-05-04 RX ORDER — ACETAMINOPHEN 325 MG/1
650 TABLET ORAL
Status: COMPLETED | OUTPATIENT
Start: 2020-05-04 | End: 2020-05-04

## 2020-05-04 RX ORDER — DIPHENHYDRAMINE HCL 50 MG
50 CAPSULE ORAL
Status: COMPLETED | OUTPATIENT
Start: 2020-05-04 | End: 2020-05-04

## 2020-05-04 RX ORDER — SODIUM CHLORIDE 0.9 % (FLUSH) 0.9 %
10 SYRINGE (ML) INJECTION
Status: DISCONTINUED | OUTPATIENT
Start: 2020-05-04 | End: 2020-05-04 | Stop reason: HOSPADM

## 2020-05-04 RX ORDER — HEPARIN 100 UNIT/ML
500 SYRINGE INTRAVENOUS
Status: DISCONTINUED | OUTPATIENT
Start: 2020-05-04 | End: 2020-05-04 | Stop reason: HOSPADM

## 2020-05-04 RX ORDER — FAMOTIDINE 20 MG/1
20 TABLET, FILM COATED ORAL
Status: COMPLETED | OUTPATIENT
Start: 2020-05-04 | End: 2020-05-04

## 2020-05-04 RX ADMIN — DIPHENHYDRAMINE HYDROCHLORIDE 50 MG: 50 CAPSULE ORAL at 10:05

## 2020-05-04 RX ADMIN — ACETAMINOPHEN 650 MG: 325 TABLET ORAL at 10:05

## 2020-05-04 RX ADMIN — FAMOTIDINE 20 MG: 20 TABLET ORAL at 10:05

## 2020-05-04 RX ADMIN — RITUXIMAB AND HYALURONIDASE 1400 MG: 120; 2000 INJECTION, SOLUTION SUBCUTANEOUS at 10:05

## 2020-05-04 NOTE — PLAN OF CARE
Tolerated rituxan hycela well.  Observed pt 15 minutes injection, no reactions noted.  No questions or concerns at this time.  Ambulated off unit in NAD.

## 2020-05-11 RX ORDER — ACETAMINOPHEN 325 MG/1
650 TABLET ORAL
Status: CANCELLED | OUTPATIENT
Start: 2020-07-24

## 2020-05-11 RX ORDER — SODIUM CHLORIDE 0.9 % (FLUSH) 0.9 %
10 SYRINGE (ML) INJECTION
Status: CANCELLED | OUTPATIENT
Start: 2020-07-24

## 2020-05-11 RX ORDER — DIPHENHYDRAMINE HCL 25 MG
50 CAPSULE ORAL
Status: CANCELLED
Start: 2020-07-24

## 2020-05-11 RX ORDER — MEPERIDINE HYDROCHLORIDE 25 MG/ML
25 INJECTION INTRAMUSCULAR; INTRAVENOUS; SUBCUTANEOUS
Status: CANCELLED | OUTPATIENT
Start: 2020-07-24

## 2020-05-11 RX ORDER — HEPARIN 100 UNIT/ML
500 SYRINGE INTRAVENOUS
Status: CANCELLED | OUTPATIENT
Start: 2020-07-24

## 2020-05-11 RX ORDER — FAMOTIDINE 20 MG/1
20 TABLET, FILM COATED ORAL
Status: CANCELLED
Start: 2020-07-24

## 2020-05-19 ENCOUNTER — PATIENT OUTREACH (OUTPATIENT)
Dept: ADMINISTRATIVE | Facility: OTHER | Age: 75
End: 2020-05-19

## 2020-05-19 ENCOUNTER — TELEPHONE (OUTPATIENT)
Dept: PAIN MEDICINE | Facility: CLINIC | Age: 75
End: 2020-05-19

## 2020-05-19 NOTE — PROGRESS NOTES
Chart reviewed.   Immunizations: Triggered Imm Registry     Orders placed: n/a  Upcoming appts to satisfy RICARDA topics: n/a

## 2020-05-19 NOTE — TELEPHONE ENCOUNTER
Called to virtual audio re-schedule 05/20/20 appointment with Dr Walters, no answer, left detailed voicemail message

## 2020-05-20 ENCOUNTER — OFFICE VISIT (OUTPATIENT)
Dept: PAIN MEDICINE | Facility: CLINIC | Age: 75
End: 2020-05-20
Attending: ANESTHESIOLOGY
Payer: MEDICARE

## 2020-05-20 DIAGNOSIS — M79.18 MYOFASCIAL PAIN: Primary | ICD-10-CM

## 2020-05-20 DIAGNOSIS — G57.03 PIRIFORMIS SYNDROME OF BOTH SIDES: ICD-10-CM

## 2020-05-20 DIAGNOSIS — G57.01 PIRIFORMIS SYNDROME OF RIGHT SIDE: ICD-10-CM

## 2020-05-20 DIAGNOSIS — G89.4 CHRONIC PAIN DISORDER: ICD-10-CM

## 2020-05-20 PROCEDURE — 99442 PR PHYSICIAN TELEPHONE EVALUATION 11-20 MIN: ICD-10-PCS | Mod: HCNC,95,, | Performed by: NURSE PRACTITIONER

## 2020-05-20 PROCEDURE — 99442 PR PHYSICIAN TELEPHONE EVALUATION 11-20 MIN: CPT | Mod: HCNC,95,, | Performed by: NURSE PRACTITIONER

## 2020-05-20 NOTE — PROGRESS NOTES
Chronic Pain-Tele-Medicine-Established Note (Follow up visit)        The patient location is: Home  The chief complaint leading to consultation is: pain  Visit type: Virtual visit with synchronous audio   Total time spent with patient: 25 min  Each patient to whom he or she provides medical services by telemedicine is:  (1) informed of the relationship between the physician and patient and the respective role of any other health care provider with respect to management of the patient; and (2) notified that he or she may decline to receive medical services by telemedicine and may withdraw from such care at any time.    Subjective:     Patient ID: Keely Pizarro is a 75 y.o. female.    Chief Complaint: Pain    Consulted by: Mike Herrera NP    Disclaimer: This note was generated using voice recognition software.  There may be typographical errors that were missed during proofreading.      HPI:    Keely Pizarro is a 75 y.o. female who presents today with chronic low back pain. This pain started 6 months ago insidiously, has been progressively worsening but is only painful when she walks.  She describes a tired, achy, fatigue like pain that worsens the longer that she walks.  This pain is described in detail below.    Aggravating factors: walking    Mitigating factors: rest, advil, aleve    Interval History (2/3/2020):  The patient returns to clinic today for follow up. She continues to report low back and bilateral buttock pain, right greater than left. She describes this pain as aching in nature. She denies any radicular leg pain. Her pain is worse with prolonged walking. She does report some improvement since last visit with stretching and Robaxin. She does report that her blood glucose levels have decreased since last visit. Her morning glucose levels are now in the 100s. She continues to follow up with her PCP. She denies any other health changes. Her pain today is 5/10.    Interval History  5/20/2020:  Patient presents for telephone telemedicine visit.  Patient states she is having returning right lower/lateral buttock pain.  She states left side has eased but right side pain has returned.  She had prior right piriformis injection or 3/4/2020 with near 100% resolution and was able to ambulate easier. She feels pain is returning to point where she would like a repeat injection. She continues to take  Robaxin 500mg QD, Tylenol 1000mg PRN, and do physician guided stretching with some mild relief. She voices her CBGs at home 117mg/dL at home. She denies new areas of pain or neurological changes, denies loss of B/B, denies saddle paresthesias. Patient rates pain 4/10 today.     Previously seeing: Saw a doctor at [facility] where she received a one time steroid injection in both hips    Physical Therapy: did one session but had to stop for low immunity due to maintenance cancer treatment injections.    Non-pharmacologic Treatment:     · Ice/Heat: no  · TENS: no  · Massage: no  · Chiropractic care: no  · Acupuncture: no  · Other: no         Pain Medications:         · Currently taking: advil, aleve. Takes each one twice per day    · Has tried in the past:    · Opioids: none  · NSAIDS: advil/aleve  · Tylenol: yes, but does not help the pain  · Muscle relaxants: none  · TCAs: none  · SNRIs: none  · Anticonvulsants: none  · topical creams: none  · Other: none    Blood thinners: aspirin    Interventional Therapies: one set of steroid injections to hips    3/4/2020- Right pirformis injection under US guidance . Near 100% resolution.     Relevant Surgeries: left knee TKA    Affecting sleep? Sometimes wakes her up from her sleep    Affecting daily activities? Yes, is still able to perform ADL's, but has to take breaks    Depressive symptoms? none          · SI/HI? No    Work status: retired, used to work in [employer] at [facility]      Prescription Monitoring Program database:  Not applicable    Last 3 PDI Scores 2/3/2020  "1/2/2020   Pain Disability Index (PDI) 17 45       GENERAL:  No weight loss, malaise or fevers.  HEENT:   No recent changes in vision or hearing  NECK:  Negative for lumps, no difficulty with swallowing.  RESPIRATORY:  Negative for cough, wheezing or shortness of breath, patient denies any recent URI.  CARDIOVASCULAR:  Negative for chest pain, leg swelling or palpitations.  GI:  Negative for abdominal discomfort, blood in stools or black stools or change in bowel habits.  MUSCULOSKELETAL:  See HPI.  SKIN:  Negative for lesions, rash, and itching.  PSYCH:  no mood disorder or recent psychosocial stressors.    HEMATOLOGY/LYMPHOLOGY:  Negative for prolonged bleeding, bruising easily or swollen nodes.    ENDO: No history of diabetes or thyroid dysfunction  NEURO:   No history of headaches, syncope, paralysis, seizures or tremors.  All other reviewed and negative other than HPI.          Past Medical History:   Diagnosis Date    CVA (cerebral vascular accident) 2011    Diabetes mellitus     Hypertension     Renal manifestation of secondary diabetes mellitus     S/P ORIF (open reduction internal fixation) fracture     Uterine cancer 2012   - current treatment for leukemia    Past Surgical History:   Procedure Laterality Date    COLONOSCOPY N/A 3/31/2017    Procedure: COLONOSCOPY;  Surgeon: Chip Pak MD;  Location: [address]);  Service: Endoscopy;  Laterality: N/A;    FRACTURE SURGERY      right arm    HYSTERECTOMY      THORACENTESIS Left     TOTAL KNEE ARTHROPLASTY Left 2012       Review of patient's allergies indicates:   Allergen Reactions    Tomato (solanum lycopersicum) Itching       Current Outpatient Medications   Medication Sig Dispense Refill    amLODIPine (NORVASC) 10 MG tablet Take 1 tablet (10 mg total) by mouth once daily. 90 tablet 3    BD ALCOHOL SWABS PadM       BD ULTRA-FINE BRIGITTE PEN NEEDLE 32 gauge x 5/32" Ndle To use with Insulin pens 100 each 3    blood sugar diagnostic " Strp 1 each by Misc.(Non-Drug; Combo Route) route 3 (three) times daily. 300 each 3    blood sugar diagnostic Strp To check BG 4 times daily, to use with insurance preferred meter 120 strip 11    blood-glucose meter kit Use as instructed 1 each 0    blood-glucose meter Misc Use as directed 1 each 0    calcium carbonate-vitamin D3 600mg (1,500mg) -1,000 unit Cap Take 1 capsule by mouth once. for 1 dose 30 capsule 11    cholecalciferol, vitamin D3, (VITAMIN D3) 25 mcg (1,000 unit) capsule Take 1 capsule (1,000 Units total) by mouth once daily. 30 capsule 11    dulaglutide (TRULICITY) 0.75 mg/0.5 mL PnIj Inject 0.5 mLs (0.75 mg total) into the skin every 7 days. 5 Syringe 10    flu vacc [id number],65yr up,PF (FLUZONE HIGH-DOSE 2019-20, PF,) 180 mcg/0.5 mL Syrg inject into the muscle once 0.5 mL 0    insulin (LANTUS SOLOSTAR U-100 INSULIN) glargine 100 units/mL (3mL) SubQ pen Inject 14 Units into the skin every evening. Use 14 units at before bedtime 5 mL 10    insulin aspart U-100 (NOVOLOG FLEXPEN U-100 INSULIN) 100 unit/mL (3 mL) InPn pen Inject 5 Units into the skin before dinner. 3 mL 5    lancets Misc 1 each by Misc.(Non-Drug; Combo Route) route 3 (three) times daily. 300 each 3    lancets Misc To check blood glucose 4 times daily, to use with insurance preferred meter 120 each 11     No current facility-administered medications for this visit.        Family History   Problem Relation Age of Onset    Cancer Brother 67        colon    Aneurysm Mother     Stroke Father     Cancer Sister         pancreatic    No Known Problems Daughter     No Known Problems Son     Cancer Brother         colon    Hypertension Brother     Liver disease Sister     Alcohol abuse Sister        Social History     Socioeconomic History    Marital status:      Spouse name: Not on file    Number of children: Not on file    Years of education: Not on file    Highest education level: Not on file   Occupational  History    Not on file   Social Needs    Financial resource strain: Not on file    Food insecurity:     Worry: Not on file     Inability: Not on file    Transportation needs:     Medical: Not on file     Non-medical: Not on file   Tobacco Use    Smoking status: Former Smoker     Packs/day: 1.00     Years: 10.00     Pack years: 10.00     Types: Cigarettes     Last attempt to quit: 2010     Years since quittin.8    Smokeless tobacco: Never Used   Substance and Sexual Activity    Alcohol use: No    Drug use: No    Sexual activity: Yes     Partners: Male   Lifestyle    Physical activity:     Days per week: Not on file     Minutes per session: Not on file    Stress: Not on file   Relationships    Social connections:     Talks on phone: Not on file     Gets together: Not on file     Attends Anabaptist service: Not on file     Active member of club or organization: Not on file     Attends meetings of clubs or organizations: Not on file     Relationship status: Not on file   Other Topics Concern    Not on file   Social History Narrative    Not on file       Objective:     There were no vitals filed for this visit.    PHYSICAL EXAMINATION for telemedicine visit today 2020  Voice normal.  Normal affect without evidence of distress.      Prior Exam  GEN:  Well developed, well nourished.  No acute distress. Some guarding on exam 2/2 pain.  HEENT:  No trauma.  Mucous membranes moist.  Nares patent bilaterally.  PSYCH: Normal affect. Thought content appropriate.  CHEST:  Breathing symmetric.  No audible wheezing.  ABD: Soft, non-distended.  SKIN:  Warm, pink, dry.  No rash on exposed areas.    EXT:  No cyanosis, clubbing, or edema.  No color change or changes in nail or hair growth.  NEURO/MUSCULOSKELETAL:  Fully alert, oriented, and appropriate. Speech normal haim. No cranial nerve deficits.   Gait: slow haim, stiff walk. + trendelenburg sign bilaterally.   Motor Strength: 5/5 motor strength  throughout lower extremities.   Sensory: no sensory deficit in the lower extremities.   Reflexes:  2+ and symmetric throughout.  Downgoing Babinski's bilaterally.  No clonus or spasticity.  L-Spine:  Slightly limited ROM with pain on full flexion.   Left side:  no pain with axial/facet loading.  negative SLR.  + JAY JAY. + FADIR  Right side:  no pain with axial/facet loading.  negative SLR.  + JAY JAY. + FADIR  Palpation:  Left side: + TTP over piriformis muscle, GTB (mild).  No TTP over SI joint, ischial bursa  Right side: + TTP over piriformis muscle.No TTP over SI joint, GTB, or ischial bursa    Imaging:        The imaging studies listed below were independently reviewed by me, and I agree with the findings as documented below.     EXAMINATION:  XR HIPS BILATERAL 2 VIEW INCL AP PELVIS    CLINICAL HISTORY:  [facility] hip xray;  Pain in right hip    TECHNIQUE:  AP view of the pelvis and frogleg lateral views of both hips were performed.    COMPARISON:  None 03/24/2017.    FINDINGS:  Mild DJD.  No fracture or dislocation.  No bone destruction identified    Assessment:     Encounter Diagnoses   Name Primary?    Myofascial pain Yes    Piriformis syndrome of right side     Piriformis syndrome of both sides     Chronic pain disorder        Plan:     Keely was seen today for low-back pain.    Diagnoses and all orders for this visit:    Myofascial pain    Piriformis syndrome of right side    Piriformis syndrome of both sides    Chronic pain disorder         Her pain is consistent with the above.    We discussed the assessment and recommendations.  All available images were reviewed. We discussed the disease process, prognosis, treatment plan, and risks and benefits. The patient is aware of the risks and benefits of the medications being prescribed, common side effects, and proper usage. The following is the plan we agreed on:     1. 1st Schedule for right piriformis muscle injection in office under ultrasound. Prior  injection provided 100% for approx 3 months. Pt states she feels she can wait and I agree this is not best       time for patient to come into a hospital setting at this time as pain is tolerable.   2. Consider to schedule left pirfirmis in future if needed thereafter. (pain is R>L at this time)  3. Continue methocarbamol 500 mg up to TID. ( Currently taking QD)  4. Continue Tylenol 1000 mg every 8 hours as needed for pain.  Do not take this with any other medications containing acetaminophen.  Do not exceed 3000 mg of acetaminophen in 24 hours.  5. Discussed continuing physician guided Home Exercise/Stretching program discussed with Pt on initial visit.   6. PT could be an option for patient but with Covid-19 and Medical History minimizing exposure would be best for patient at this time.    7. RTC Will schedule repeat Telemedicine visit in 3 weeks to re-evaluate situation and pain level.     The above plan and management options were discussed at length with patient. Patient is in agreement with the above and verbalized understanding.     Gordon Huerta NP  05/20/2020

## 2020-06-08 ENCOUNTER — TELEPHONE (OUTPATIENT)
Dept: PAIN MEDICINE | Facility: CLINIC | Age: 75
End: 2020-06-08

## 2020-06-08 NOTE — TELEPHONE ENCOUNTER
Staff spoke with patient to confirm audio visit scheduled 6/9/20 at 8 am with Gordon Huerta patient was informed provider may call 15 minutes before or after depending on the visit prior.

## 2020-06-09 ENCOUNTER — OFFICE VISIT (OUTPATIENT)
Dept: PAIN MEDICINE | Facility: CLINIC | Age: 75
End: 2020-06-09
Payer: MEDICARE

## 2020-06-09 DIAGNOSIS — G57.01 PIRIFORMIS SYNDROME OF RIGHT SIDE: Primary | ICD-10-CM

## 2020-06-09 DIAGNOSIS — G89.4 CHRONIC PAIN DISORDER: ICD-10-CM

## 2020-06-09 PROCEDURE — 99442 PR PHYSICIAN TELEPHONE EVALUATION 11-20 MIN: ICD-10-PCS | Mod: HCNC,95,, | Performed by: NURSE PRACTITIONER

## 2020-06-09 PROCEDURE — 99442 PR PHYSICIAN TELEPHONE EVALUATION 11-20 MIN: CPT | Mod: HCNC,95,, | Performed by: NURSE PRACTITIONER

## 2020-06-09 RX ORDER — METHOCARBAMOL 500 MG/1
500 TABLET, FILM COATED ORAL 3 TIMES DAILY
Qty: 90 TABLET | Refills: 2 | Status: SHIPPED | OUTPATIENT
Start: 2020-06-09 | End: 2020-07-11

## 2020-06-09 NOTE — PROGRESS NOTES
Chronic Pain-Tele-Medicine-Established Note (Follow up visit)        The patient location is: Home  The chief complaint leading to consultation is: pain  Visit type: Virtual visit with synchronous audio   Total time spent with patient: 12 min  Each patient to whom he or she provides medical services by telemedicine is:  (1) informed of the relationship between the physician and patient and the respective role of any other health care provider with respect to management of the patient; and (2) notified that he or she may decline to receive medical services by telemedicine and may withdraw from such care at any time.    Subjective:     Patient ID: Keely Pizarro is a 75 y.o. female.    Chief Complaint: Pain    Consulted by: Mike Herrera NP    Disclaimer: This note was generated using voice recognition software.  There may be typographical errors that were missed during proofreading.      HPI:    Keely Pizarro is a 75 y.o. female who presents today with chronic low back pain. This pain started 6 months ago insidiously, has been progressively worsening but is only painful when she walks.  She describes a tired, achy, fatigue like pain that worsens the longer that she walks.  This pain is described in detail below.    Aggravating factors: walking    Mitigating factors: rest, advil, aleve    Interval History (2/3/2020):  The patient returns to clinic today for follow up. She continues to report low back and bilateral buttock pain, right greater than left. She describes this pain as aching in nature. She denies any radicular leg pain. Her pain is worse with prolonged walking. She does report some improvement since last visit with stretching and Robaxin. She does report that her blood glucose levels have decreased since last visit. Her morning glucose levels are now in the 100s. She continues to follow up with her PCP. She denies any other health changes. Her pain today is 5/10.    Interval History  5/20/2020:  Patient presents for telephone telemedicine visit.  Patient states she is having returning right lower/lateral buttock pain.  She states left side has eased but right side pain has returned.  She had prior right piriformis injection or 3/4/2020 with near 100% resolution and was able to ambulate easier. She feels pain is returning to point where she would like a repeat injection. She continues to take  Robaxin 500mg QD, Tylenol 1000mg PRN, and do physician guided stretching with some mild relief. She voices her CBGs at home 117mg/dL at home. She denies new areas of pain or neurological changes, denies loss of B/B, denies saddle paresthesias. Patient rates pain 4/10 today.     Interval history 06/09/2020  The patient present via telephone for telemedicine follow up. Pt continues to have R sided lower back/buttock pain into right hip. Pt has had prior pirformis injection which provided 100% relief fro aprpox 3 months then pain returned. Pt delayed procedure with concerns of coming into hospital setting secondary to Covid-19. Pt at this time feels pain is severe and would like to proceed with procedure. Pt denies any new areas of pain, denies any neurological changes, Denies loss of bowel/bladder and denies saddle paresthesias. She rates pain 6/10 today.     Previously seeing: Saw a doctor at [facility] where she received a one time steroid injection in both hips    Physical Therapy: did one session but had to stop for low immunity due to maintenance cancer treatment injections.    Non-pharmacologic Treatment:     · Ice/Heat: no  · TENS: no  · Massage: no  · Chiropractic care: no  · Acupuncture: no  · Other: no         Pain Medications:         · Currently taking: advil, aleve. Takes each one twice per day    · Has tried in the past:    · Opioids: none  · NSAIDS: advil/aleve  · Tylenol: yes, but does not help the pain  · Muscle relaxants: none  · TCAs: none  · SNRIs: none  · Anticonvulsants:  none  · topical creams: none  · Other: none    Blood thinners: aspirin    Interventional Therapies: one set of steroid injections to hips    3/4/2020- Right pirformis injection under US guidance . Near 100% resolution.     Relevant Surgeries: left knee TKA    Affecting sleep? Sometimes wakes her up from her sleep    Affecting daily activities? Yes, is still able to perform ADL's, but has to take breaks    Depressive symptoms? none          · SI/HI? No    Work status: retired, used to work in [employer] at [employer]      Prescription Monitoring Program database:  Not applicable    Last 3 PDI Scores 2/3/2020 1/2/2020   Pain Disability Index (PDI) 17 45       GENERAL:  No weight loss, malaise or fevers.  HEENT:   No recent changes in vision or hearing  NECK:  Negative for lumps, no difficulty with swallowing.  RESPIRATORY:  Negative for cough, wheezing or shortness of breath, patient denies any recent URI.  CARDIOVASCULAR:  Negative for chest pain, leg swelling or palpitations.  GI:  Negative for abdominal discomfort, blood in stools or black stools or change in bowel habits.  MUSCULOSKELETAL:  See HPI.  SKIN:  Negative for lesions, rash, and itching.  PSYCH:  no mood disorder or recent psychosocial stressors.    HEMATOLOGY/LYMPHOLOGY:  Negative for prolonged bleeding, bruising easily or swollen nodes.    ENDO: No history of diabetes or thyroid dysfunction  NEURO:   No history of headaches, syncope, paralysis, seizures or tremors.  All other reviewed and negative other than HPI.          Past Medical History:   Diagnosis Date    CVA (cerebral vascular accident) 2011    Diabetes mellitus     Hypertension     Renal manifestation of secondary diabetes mellitus     S/P ORIF (open reduction internal fixation) fracture     Uterine cancer 2012   - current treatment for leukemia    Past Surgical History:   Procedure Laterality Date    COLONOSCOPY N/A 3/31/2017    Procedure: COLONOSCOPY;  Surgeon: Chip Pak MD;   "Location: [facility] (4TH FLR);  Service: Endoscopy;  Laterality: N/A;    FRACTURE SURGERY      right arm    HYSTERECTOMY      THORACENTESIS Left     TOTAL KNEE ARTHROPLASTY Left 2012       Review of patient's allergies indicates:   Allergen Reactions    Tomato (solanum lycopersicum) Itching       Current Outpatient Medications   Medication Sig Dispense Refill    amLODIPine (NORVASC) 10 MG tablet Take 1 tablet (10 mg total) by mouth once daily. 90 tablet 3    BD ALCOHOL SWABS PadM       BD ULTRA-FINE BRIGITTE PEN NEEDLE 32 gauge x 5/32" Ndle To use with Insulin pens 100 each 3    blood sugar diagnostic Strp 1 each by Misc.(Non-Drug; Combo Route) route 3 (three) times daily. 300 each 3    blood sugar diagnostic Strp To check BG 4 times daily, to use with insurance preferred meter 120 strip 11    blood-glucose meter kit Use as instructed 1 each 0    blood-glucose meter Misc Use as directed 1 each 0    calcium carbonate-vitamin D3 600mg (1,500mg) -1,000 unit Cap Take 1 capsule by mouth once. for 1 dose 30 capsule 11    cholecalciferol, vitamin D3, (VITAMIN D3) 25 mcg (1,000 unit) capsule Take 1 capsule (1,000 Units total) by mouth once daily. 30 capsule 11    flu vacc [id number],65yr up,PF (FLUZONE HIGH-DOSE 2019-20, PF,) 180 mcg/0.5 mL Syrg inject into the muscle once 0.5 mL 0    insulin (LANTUS SOLOSTAR U-100 INSULIN) glargine 100 units/mL (3mL) SubQ pen Inject 14 Units into the skin every evening. Use 14 units at before bedtime 5 mL 10    insulin aspart U-100 (NOVOLOG FLEXPEN U-100 INSULIN) 100 unit/mL (3 mL) InPn pen Inject 5 Units into the skin before dinner. 3 mL 5    lancets Misc 1 each by Misc.(Non-Drug; Combo Route) route 3 (three) times daily. 300 each 3    lancets Misc To check blood glucose 4 times daily, to use with insurance preferred meter 120 each 11    methocarbamoL (ROBAXIN) 500 MG Tab Take 1 tablet (500 mg total) by mouth 3 (three) times daily. 90 tablet 2     No current " facility-administered medications for this visit.        Family History   Problem Relation Age of Onset    Cancer Brother 67        colon    Aneurysm Mother     Stroke Father     Cancer Sister         pancreatic    No Known Problems Daughter     No Known Problems Son     Cancer Brother         colon    Hypertension Brother     Liver disease Sister     Alcohol abuse Sister        Social History     Socioeconomic History    Marital status:      Spouse name: Not on file    Number of children: Not on file    Years of education: Not on file    Highest education level: Not on file   Occupational History    Not on file   Social Needs    Financial resource strain: Not on file    Food insecurity:     Worry: Not on file     Inability: Not on file    Transportation needs:     Medical: Not on file     Non-medical: Not on file   Tobacco Use    Smoking status: Former Smoker     Packs/day: 1.00     Years: 10.00     Pack years: 10.00     Types: Cigarettes     Last attempt to quit: 2010     Years since quittin.8    Smokeless tobacco: Never Used   Substance and Sexual Activity    Alcohol use: No    Drug use: No    Sexual activity: Yes     Partners: Male   Lifestyle    Physical activity:     Days per week: Not on file     Minutes per session: Not on file    Stress: Not on file   Relationships    Social connections:     Talks on phone: Not on file     Gets together: Not on file     Attends Sikhism service: Not on file     Active member of club or organization: Not on file     Attends meetings of clubs or organizations: Not on file     Relationship status: Not on file   Other Topics Concern    Not on file   Social History Narrative    Not on file       Objective:     There were no vitals filed for this visit.    PHYSICAL EXAMINATION for telemedicine visit today 2020  Voice normal.  Normal affect without evidence of distress.      Prior Exam  GEN:  Well developed, well nourished.  No  acute distress. Some guarding on exam 2/2 pain.  HEENT:  No trauma.  Mucous membranes moist.  Nares patent bilaterally.  PSYCH: Normal affect. Thought content appropriate.  CHEST:  Breathing symmetric.  No audible wheezing.  ABD: Soft, non-distended.  SKIN:  Warm, pink, dry.  No rash on exposed areas.    EXT:  No cyanosis, clubbing, or edema.  No color change or changes in nail or hair growth.  NEURO/MUSCULOSKELETAL:  Fully alert, oriented, and appropriate. Speech normal haim. No cranial nerve deficits.   Gait: slow haim, stiff walk. + trendelenburg sign bilaterally.   Motor Strength: 5/5 motor strength throughout lower extremities.   Sensory: no sensory deficit in the lower extremities.   Reflexes:  2+ and symmetric throughout.  Downgoing Babinski's bilaterally.  No clonus or spasticity.  L-Spine:  Slightly limited ROM with pain on full flexion.   Left side:  no pain with axial/facet loading.  negative SLR.  + JAY JAY. + FADIR  Right side:  no pain with axial/facet loading.  negative SLR.  + JAY JAY. + FADIR  Palpation:  Left side: + TTP over piriformis muscle, GTB (mild).  No TTP over SI joint, ischial bursa  Right side: + TTP over piriformis muscle.No TTP over SI joint, GTB, or ischial bursa    Imaging:        The imaging studies listed below were independently reviewed by me, and I agree with the findings as documented below.     EXAMINATION:  XR HIPS BILATERAL 2 VIEW INCL AP PELVIS    CLINICAL HISTORY:  [facility] hip xray;  Pain in right hip    TECHNIQUE:  AP view of the pelvis and frogleg lateral views of both hips were performed.    COMPARISON:  None 03/24/2017.    FINDINGS:  Mild DJD.  No fracture or dislocation.  No bone destruction identified    Assessment:     Encounter Diagnosis   Name Primary?    Piriformis syndrome of right side Yes       Plan:     Diagnoses and all orders for this visit:    Piriformis syndrome of right side    Other orders  -     methocarbamoL (ROBAXIN) 500 MG Tab; Take 1 tablet (500 mg  total) by mouth 3 (three) times daily.         Her pain is consistent with the above.    We discussed the assessment and recommendations.  All available images were reviewed. We discussed the disease process, prognosis, treatment plan, and risks and benefits. The patient is aware of the risks and benefits of the medications being prescribed, common side effects, and proper usage. The following is the plan we agreed on:     1. 1st Schedule for right piriformis muscle injection in office under ultrasound with Dr Hebert as she wants it as an in office procedure. Prior injection provided 100% for approx 3 months.    2. Consider to schedule left pirfirmis in future if needed thereafter. (pain is R>L at this time)  3. Refill Methocarbamol 500 mg up to TID.   4. Continue Tylenol 1000 mg every 8 hours as needed for pain.  Do not take this with any other medications containing acetaminophen.  Do not exceed 3000 mg of acetaminophen in 24 hours.  5. Discussed continuing physician guided Home Exercise/Stretching program discussed with Pt on initial visit.   6. Consider Physical Therapy as an option  7. RTC 10 days after above scheduled procedure.     The above plan and management options were discussed at length with patient. Patient is in agreement with the above and verbalized understanding.     Gordon Huerta NP  06/09/2020

## 2020-06-19 DIAGNOSIS — E11.9 TYPE 2 DIABETES MELLITUS WITHOUT COMPLICATION: ICD-10-CM

## 2020-06-29 ENCOUNTER — TELEPHONE (OUTPATIENT)
Dept: HEMATOLOGY/ONCOLOGY | Facility: CLINIC | Age: 75
End: 2020-06-29

## 2020-06-29 NOTE — TELEPHONE ENCOUNTER
----- Message from Melina Heaton sent at 6/29/2020  2:04 PM CDT -----  Regarding: R/s appt  Contact: self  Pt called to reschedule appt for July 2nd due to  coming out of the hospital.      Pt can be reached [phone]      Thank you!

## 2020-07-13 DIAGNOSIS — G57.01 PIRIFORMIS SYNDROME OF RIGHT SIDE: Primary | ICD-10-CM

## 2020-07-13 DIAGNOSIS — G89.4 CHRONIC PAIN DISORDER: ICD-10-CM

## 2020-07-13 DIAGNOSIS — M79.18 MYOFASCIAL PAIN: ICD-10-CM

## 2020-07-13 RX ORDER — BUPIVACAINE HYDROCHLORIDE 2.5 MG/ML
10 INJECTION, SOLUTION EPIDURAL; INFILTRATION; INTRACAUDAL
Status: SHIPPED | OUTPATIENT
Start: 2020-07-13

## 2020-07-13 RX ORDER — BETAMETHASONE SODIUM PHOSPHATE AND BETAMETHASONE ACETATE 3; 3 MG/ML; MG/ML
3 INJECTION, SUSPENSION INTRA-ARTICULAR; INTRALESIONAL; INTRAMUSCULAR; SOFT TISSUE
Status: SHIPPED | OUTPATIENT
Start: 2020-07-13

## 2020-07-14 NOTE — PROGRESS NOTES
Subjective:      Patient ID: Keely Pizarro is a 75 y.o. female.    Chief Complaint:  Diabetes    History of Present Illness  Keely Pizarro is here for follow up of T2DM, osteopenia, vit D def, and abnormal TFTs.  Previously seen by Dr. No.  Last seen 2019.  This is their first visit with me.      With regards to diabetes:    Diagnosed:   Known complications:  DKA -  RN -  PN -  Nephropathy +  CAD -    Current regimen:  Lantus 14units nightly  Novolog 5units with dinner  Trulicity 0.75mg weekly     Other medications tried:  MFM  Glipizide    Glucose Monitor:   2 times a day testing  Log reviewed: oral recall  Fastin-120  Before bed: 135    Hypoglycemia:  Denies.  Knows how to correct with 15 grams of carbs- juice, coke, or a peppermint.     Diet/Exercise:  Eats 3 meals a day.   B: toast, egg, yogurt  L: tuna sandwich  D: fried pork chop, rice and gravy, green beans   Snacks : watermelon, apples, groups  Drinks : water.   Exercise: walking.       Education - last visit: 2019  Eye Exam: 2019  Podiatry: None    Diabetes Management Status    Hemoglobin A1C   Date Value Ref Range Status   2020 7.1 (H) 4.0 - 5.6 % Final     Comment:     ADA Screening Guidelines:  5.7-6.4%  Consistent with prediabetes  >or=6.5%  Consistent with diabetes  High levels of fetal hemoglobin interfere with the HbA1C  assay. Heterozygous hemoglobin variants (HbS, HgC, etc)do  not significantly interfere with this assay.   However, presence of multiple variants may affect accuracy.     10/08/2019 11.3 (H) 4.0 - 5.6 % Final     Comment:     ADA Screening Guidelines:  5.7-6.4%  Consistent with prediabetes  >or=6.5%  Consistent with diabetes  High levels of fetal hemoglobin interfere with the HbA1C  assay. Heterozygous hemoglobin variants (HbS, HgC, etc)do  not significantly interfere with this assay.   However, presence of multiple variants may affect accuracy.     2018 5.9 (H) 4.0 - 5.6 % Final      Comment:     According to ADA guidelines, hemoglobin A1c <7.0% represents  optimal control in non-pregnant diabetic patients. Different  metrics may apply to specific patient populations.   Standards of Medical Care in Diabetes-2016.  For the purpose of screening for the presence of diabetes:  <5.7%     Consistent with the absence of diabetes  5.7-6.4%  Consistent with increasing risk for diabetes   (prediabetes)  >or=6.5%  Consistent with diabetes  Currently, no consensus exists for use of hemoglobin A1c  for diagnosis of diabetes for children.  This Hemoglobin A1c assay has significant interference with fetal   hemoglobin   (HbF). The results are invalid for patients with abnormal amounts of   HbF,   including those with known Hereditary Persistence   of Fetal Hemoglobin. Heterozygous hemoglobin variants (HbAS, HbAC,   HbAD, HbAE, HbA2) do not significantly interfere with this assay;   however, presence of multiple variants in a sample may impact the %   interference.       Statin: Not taking  ACE/ARB: Not taking  Screening or Prevention Patient's value Goal Complete/Controlled?   HgA1C Testing and Control   Lab Results   Component Value Date    HGBA1C 7.1 (H) 03/05/2020      Annually/Less than 8% Yes   Lipid profile : 03/05/2020 Annually Yes   LDL control Lab Results   Component Value Date    LDLCALC 91.8 03/05/2020    Annually/Less than 100 mg/dl  Yes   Nephropathy screening No results found for: LABMICR  No results found for: PROTEINUA Annually No   Blood pressure BP Readings from Last 1 Encounters:   07/17/20 136/82    Less than 140/90 No   Dilated retinal exam Most Recent Eye Exam Date: Not Found Annually Yes   Foot exam   : 12/13/2019 Annually Yes     With regards to osteopenia:    BMD 11/4/2019  1.  Mild osteopenia multiple sites.  2.  FRAX calculations do not support medical treatment for osteopenia.  RECOMMENDATIONS of [facility]:  1.  Calcium 4669-0445 mg daily and  vitamin D 800-1000 units daily, adequate exercise  2.  No additional pharmacologic therapy recommended at this time.   3.  Consider repeat BMD in 2-4 years.    Fractures : Denies    Calcium : None  Vitamin D : OTC Vit D3 1000iu daily    With regards to Vitamin D Deficiency:    Vit D, 25-Hydroxy   Date Value Ref Range Status   03/05/2020 23 (L) 30 - 96 ng/mL Final     Comment:     Vitamin D deficiency.........<10 ng/mL                              Vitamin D insufficiency......10-29 ng/mL       Vitamin D sufficiency........> or equal to 30 ng/mL  Vitamin D toxicity............>100 ng/mL       Current Meds: OTC VIt D3 1000iu daily.     With regards to hyperthyroidism:    Lab Results   Component Value Date    TSH 0.362 (L) 03/05/2020    FREET4 0.98 03/05/2020     Current Symptoms:   - Palpations  - Weight loss  - Diarrhea  - Hair loss  - Brittle nails  - Skin changes  - Tremor   - Anxiety    Current medication:  None.    Any recent (3-6 months) iodine or contrast?  Denies    Smoke: Denies    Thyroid tenderness?   Denies    [facility] Eye Clinical Activity: 3/7=Active  Eye pain? Denies  Eye pain with movement? Denies  Eyelid erythema? Denies  Erythema of conjunctiva? Denies  Caruncle inflammation? Denies  Eyelid swelling? Denies    Review of Systems   Constitutional: Negative for fatigue.   Eyes: Negative for visual disturbance.   Respiratory: Negative for shortness of breath.    Cardiovascular: Negative for chest pain.   Gastrointestinal: Negative for abdominal pain.   Musculoskeletal: Negative for arthralgias.   Skin: Negative for wound.   Neurological: Negative for headaches.   Hematological: Does not bruise/bleed easily.   Psychiatric/Behavioral: Negative for sleep disturbance.     Objective:   Physical Exam  Vitals signs reviewed.   Neck:      Thyroid: No thyromegaly.   Cardiovascular:      Rate and Rhythm: Normal rate.      Comments: No edema present  Pulmonary:      Effort: Pulmonary effort is normal.   Abdominal:      " Palpations: Abdomen is soft.       Appropriate footwear, foot exam deferred, done 12/2019.  Injection sites are without edema or erythema. No lipo hypertropthy or atrophy.    Visit Vitals  /82 (BP Location: Right arm, Patient Position: Sitting, BP Method: Medium (Manual))   Resp 16   Ht 5' 3" (1.6 m)   Wt 61.7 kg (136 lb 0.4 oz)   BMI 24.10 kg/m²       Body mass index is 24.1 kg/m².    Lab Review:   Lab Results   Component Value Date    HGBA1C 7.1 (H) 03/05/2020    HGBA1C 11.3 (H) 10/08/2019    HGBA1C 5.9 (H) 01/25/2018       Lab Results   Component Value Date    CHOL 155 03/05/2020    HDL 53 03/05/2020    LDLCALC 91.8 03/05/2020    TRIG 51 03/05/2020    CHOLHDL 34.2 03/05/2020     Lab Results   Component Value Date     05/01/2020    K 3.8 05/01/2020     05/01/2020    CO2 24 05/01/2020     (H) 05/01/2020    BUN 22 05/01/2020    CREATININE 1.2 05/01/2020    CALCIUM 9.5 05/01/2020    PROT 6.3 05/01/2020    ALBUMIN 3.4 (L) 05/01/2020    BILITOT 0.3 05/01/2020    ALKPHOS 85 05/01/2020    AST 22 05/01/2020    ALT 22 05/01/2020    ANIONGAP 8 05/01/2020    ESTGFRAFRICA 51.8 (A) 05/01/2020    EGFRNONAA 44.9 (A) 05/01/2020    TSH 0.362 (L) 03/05/2020     Vit D, 25-Hydroxy   Date Value Ref Range Status   03/05/2020 23 (L) 30 - 96 ng/mL Final     Comment:     Vitamin D deficiency.........<10 ng/mL                              Vitamin D insufficiency......10-29 ng/mL       Vitamin D sufficiency........> or equal to 30 ng/mL  Vitamin D toxicity............>100 ng/mL       Assessment and Plan     1. Type 2 diabetes mellitus with stage 3 chronic kidney disease, with long-term current use of insulin  Hemoglobin A1C    Comprehensive metabolic panel   2. Osteopenia after menopause     3. Vitamin D deficiency  Vitamin D   4. Abnormal thyroid function test  TSH    Thyroid Peroxidase Antibody    Thyroid stimulating immunoglobulin    Thyrotropin Receptor Antibody   5. CKD (chronic kidney disease) stage 3, GFR " 30-59 ml/min     6. Essential hypertension     7. Follicular lymphoma grade i, lymph nodes of multiple sites       Type 2 diabetes mellitus with stage 3 chronic kidney disease, with long-term current use of insulin  -- Labs today.  -- A1c goal <7.5%.  -- Medications discussed:  MFM - stopped due to CKD  GLP1-DPP4   REYNOLDS   SGLT2  Insulin   -- Reviewed logs/CGM:  Reported glucose unremarkable.   Instructed to send glucose logs in 14 days.  Reach out to me sooner for any glucose <70 or consistently >200.  -- Suspicious patient is not taking insulin as prescribed.  When discussing regimen she reported something different.  When I went over the prescribed regimen she then reports that is what she is taking.  Instructed patient to document glucose and insulin doses on logs provided.  -- Medication Changes:   CONTINUE:  Lantus 14units nightly  Novolog 5units with dinner  Trulicity 0.75mg weekly  -- Reviewed goals of therapy are to get the best control we can without hypoglycemia.  -- Reviewed patient's current insulin regimen. Clarified proper insulin dose and timing in relation to meals, etc. Insulin injection sites and proper rotation instructed.    -- Advised frequent self blood glucose monitoring.  Patient encouraged to document glucose results and bring them to every clinic visit.  -- Hypoglycemia precautions discussed. Instructed on precautions before driving.    -- Call for Bg repeatedly < 90 or > 180.   -- Close adherence to lifestyle changes recommended.   -- Periodic follow ups for eye evaluations, foot care and dental care suggested.    Osteopenia after menopause  -- BMD due 9/2021.  -- Reassuring she is not fracturing.    Vitamin D deficiency  -- Check vitamin D.  -- CONTINUE: OTC Vit D3 1000iu daily.    Abnormal thyroid function test  -- Check TFTs today.    CKD (chronic kidney disease) stage 3, GFR 30-59 ml/min  -- Continue following with nephrology.    Essential hypertension  -- Controlled on current  medications.     Follicular lymphoma grade i, lymph nodes of multiple sites  -- Continue following with hem/onc.    Follow up in about 4 weeks (around 8/14/2020).

## 2020-07-16 ENCOUNTER — PATIENT OUTREACH (OUTPATIENT)
Dept: ADMINISTRATIVE | Facility: OTHER | Age: 75
End: 2020-07-16

## 2020-07-17 ENCOUNTER — OFFICE VISIT (OUTPATIENT)
Dept: ENDOCRINOLOGY | Facility: CLINIC | Age: 75
End: 2020-07-17
Payer: MEDICARE

## 2020-07-17 VITALS
WEIGHT: 136 LBS | BODY MASS INDEX: 24.1 KG/M2 | RESPIRATION RATE: 16 BRPM | HEIGHT: 63 IN | SYSTOLIC BLOOD PRESSURE: 136 MMHG | DIASTOLIC BLOOD PRESSURE: 82 MMHG

## 2020-07-17 DIAGNOSIS — I10 ESSENTIAL HYPERTENSION: ICD-10-CM

## 2020-07-17 DIAGNOSIS — E11.22 TYPE 2 DIABETES MELLITUS WITH STAGE 3 CHRONIC KIDNEY DISEASE, WITH LONG-TERM CURRENT USE OF INSULIN: Primary | ICD-10-CM

## 2020-07-17 DIAGNOSIS — Z79.4 TYPE 2 DIABETES MELLITUS WITH STAGE 3 CHRONIC KIDNEY DISEASE, WITH LONG-TERM CURRENT USE OF INSULIN: Primary | ICD-10-CM

## 2020-07-17 DIAGNOSIS — R94.6 ABNORMAL THYROID FUNCTION TEST: ICD-10-CM

## 2020-07-17 DIAGNOSIS — N18.30 TYPE 2 DIABETES MELLITUS WITH STAGE 3 CHRONIC KIDNEY DISEASE, WITH LONG-TERM CURRENT USE OF INSULIN: Primary | ICD-10-CM

## 2020-07-17 DIAGNOSIS — E55.9 VITAMIN D DEFICIENCY: ICD-10-CM

## 2020-07-17 DIAGNOSIS — Z78.0 OSTEOPENIA AFTER MENOPAUSE: ICD-10-CM

## 2020-07-17 DIAGNOSIS — M85.80 OSTEOPENIA AFTER MENOPAUSE: ICD-10-CM

## 2020-07-17 DIAGNOSIS — N18.30 CKD (CHRONIC KIDNEY DISEASE) STAGE 3, GFR 30-59 ML/MIN: ICD-10-CM

## 2020-07-17 DIAGNOSIS — C82.08 FOLLICULAR LYMPHOMA GRADE I, LYMPH NODES OF MULTIPLE SITES: ICD-10-CM

## 2020-07-17 PROCEDURE — 3079F DIAST BP 80-89 MM HG: CPT | Mod: HCNC,CPTII,S$GLB, | Performed by: NURSE PRACTITIONER

## 2020-07-17 PROCEDURE — 99499 UNLISTED E&M SERVICE: CPT | Mod: S$GLB,,, | Performed by: NURSE PRACTITIONER

## 2020-07-17 PROCEDURE — 3079F PR MOST RECENT DIASTOLIC BLOOD PRESSURE 80-89 MM HG: ICD-10-PCS | Mod: HCNC,CPTII,S$GLB, | Performed by: NURSE PRACTITIONER

## 2020-07-17 PROCEDURE — 3008F BODY MASS INDEX DOCD: CPT | Mod: HCNC,CPTII,S$GLB, | Performed by: NURSE PRACTITIONER

## 2020-07-17 PROCEDURE — 1159F PR MEDICATION LIST DOCUMENTED IN MEDICAL RECORD: ICD-10-PCS | Mod: HCNC,S$GLB,, | Performed by: NURSE PRACTITIONER

## 2020-07-17 PROCEDURE — 3075F SYST BP GE 130 - 139MM HG: CPT | Mod: HCNC,CPTII,S$GLB, | Performed by: NURSE PRACTITIONER

## 2020-07-17 PROCEDURE — 99999 PR PBB SHADOW E&M-EST. PATIENT-LVL IV: ICD-10-PCS | Mod: PBBFAC,HCNC,, | Performed by: NURSE PRACTITIONER

## 2020-07-17 PROCEDURE — 1126F AMNT PAIN NOTED NONE PRSNT: CPT | Mod: HCNC,S$GLB,, | Performed by: NURSE PRACTITIONER

## 2020-07-17 PROCEDURE — 1101F PT FALLS ASSESS-DOCD LE1/YR: CPT | Mod: HCNC,CPTII,S$GLB, | Performed by: NURSE PRACTITIONER

## 2020-07-17 PROCEDURE — 1101F PR PT FALLS ASSESS DOC 0-1 FALLS W/OUT INJ PAST YR: ICD-10-PCS | Mod: HCNC,CPTII,S$GLB, | Performed by: NURSE PRACTITIONER

## 2020-07-17 PROCEDURE — 1126F PR PAIN SEVERITY QUANTIFIED, NO PAIN PRESENT: ICD-10-PCS | Mod: HCNC,S$GLB,, | Performed by: NURSE PRACTITIONER

## 2020-07-17 PROCEDURE — 3051F PR MOST RECENT HEMOGLOBIN A1C LEVEL 7.0 - < 8.0%: ICD-10-PCS | Mod: HCNC,CPTII,S$GLB, | Performed by: NURSE PRACTITIONER

## 2020-07-17 PROCEDURE — 99214 PR OFFICE/OUTPT VISIT, EST, LEVL IV, 30-39 MIN: ICD-10-PCS | Mod: HCNC,S$GLB,, | Performed by: NURSE PRACTITIONER

## 2020-07-17 PROCEDURE — 99499 RISK ADDL DX/OHS AUDIT: ICD-10-PCS | Mod: S$GLB,,, | Performed by: NURSE PRACTITIONER

## 2020-07-17 PROCEDURE — 99999 PR PBB SHADOW E&M-EST. PATIENT-LVL IV: CPT | Mod: PBBFAC,HCNC,, | Performed by: NURSE PRACTITIONER

## 2020-07-17 PROCEDURE — 3075F PR MOST RECENT SYSTOLIC BLOOD PRESS GE 130-139MM HG: ICD-10-PCS | Mod: HCNC,CPTII,S$GLB, | Performed by: NURSE PRACTITIONER

## 2020-07-17 PROCEDURE — 99214 OFFICE O/P EST MOD 30 MIN: CPT | Mod: HCNC,S$GLB,, | Performed by: NURSE PRACTITIONER

## 2020-07-17 PROCEDURE — 3008F PR BODY MASS INDEX (BMI) DOCUMENTED: ICD-10-PCS | Mod: HCNC,CPTII,S$GLB, | Performed by: NURSE PRACTITIONER

## 2020-07-17 PROCEDURE — 1159F MED LIST DOCD IN RCRD: CPT | Mod: HCNC,S$GLB,, | Performed by: NURSE PRACTITIONER

## 2020-07-17 PROCEDURE — 3051F HG A1C>EQUAL 7.0%<8.0%: CPT | Mod: HCNC,CPTII,S$GLB, | Performed by: NURSE PRACTITIONER

## 2020-07-17 NOTE — PROGRESS NOTES
Requested updates within Care Everywhere.  Patient's chart was reviewed for overdue RICARDA topics.  Immunizations reconciled.

## 2020-07-17 NOTE — PATIENT INSTRUCTIONS
Reach out to me sooner for any glucose <70 or consistently >200.    Hypoglycemia (Low Blood Glucose)  Low blood glucose occurs with the following conditions.  · Not Enough Food or Missing Meal.  · Too Much Insulin  · More Exercise Than Usual    It is best to use something that you can always carry with you. Choose a food that is all carbohydrate because it will be very fast acting. Try not to choose chocolate or other high fat foods. They will not work fast enough and you may also end up over-treating your lows. The suggested amount of carbohydrate to start with is 15 grams. Don't keep eating until you feel better. Eat the required amount and stop. The feelings will pass and you will be grateful that you did not overdo it.    Some people with diabetes know when their blood glucose is low and some do not. If you are a person who is not aware of hypoglycemia, it is important to test your blood glucose more often. Everyone with diabetes should test before driving a car to assure safety on the road. Blood glucose should be above 100 mg/dl before driving and at bedtime.     The symptoms of low blood sugars are usually heart racing, sweating, anxiety, feeling hungry, tremor, weakness or most severely loss of consciousness.     Rule of 15:    Test your blood sugar   If glucose is between 50-70 mg/dL then ingest 15 grams of fast-acting carbs   If glucose is less than 50 mg/dL then ingest 30 grams of fast-acting carbs   Ingest 15 grams of fast-acting carbohydrate - such as:   a. 3-4 glucose tablets  b. 4 oz juice  c. ½ can regular soda pop  d. 15 skittles or mini jelly beans    Re-check your blood sugar in 15 minutes. If its less than 70mg/dl, repeat steps 2 and 3.   If your next meal is more than 1 hour away, eat an additional 15 grams of carbohydrate and 1 ounce of protein (examples include crackers with cheese or one-half of a sandwich with peanut butter). It is important not to eat too much because this can raise your  blood sugar above the target level.    After your blood sugar has normalized, think about why you went low. If you notice a pattern of low blood sugars, contact your Diabetes Team. We may need to adjust your medication.

## 2020-07-17 NOTE — ASSESSMENT & PLAN NOTE
-- Labs today.  -- A1c goal <7.5%.  -- Medications discussed:  MFM - stopped due to CKD  GLP1-DPP4   REYNOLDS   SGLT2  Insulin   -- Reviewed logs/CGM:  Reported glucose unremarkable.   Instructed to send glucose logs in 14 days.  Reach out to me sooner for any glucose <70 or consistently >200.  -- Suspicious patient is not taking insulin as prescribed.  When discussing regimen she reported something different.  When I went over the prescribed regimen she then reports that is what she is taking.  Instructed patient to document glucose and insulin doses on logs provided.  -- Medication Changes:   CONTINUE:  Lantus 14units nightly  Novolog 5units with dinner  Trulicity 0.75mg weekly  -- Reviewed goals of therapy are to get the best control we can without hypoglycemia.  -- Reviewed patient's current insulin regimen. Clarified proper insulin dose and timing in relation to meals, etc. Insulin injection sites and proper rotation instructed.    -- Advised frequent self blood glucose monitoring.  Patient encouraged to document glucose results and bring them to every clinic visit.  -- Hypoglycemia precautions discussed. Instructed on precautions before driving.    -- Call for Bg repeatedly < 90 or > 180.   -- Close adherence to lifestyle changes recommended.   -- Periodic follow ups for eye evaluations, foot care and dental care suggested.

## 2020-07-24 ENCOUNTER — LAB VISIT (OUTPATIENT)
Dept: LAB | Facility: HOSPITAL | Age: 75
End: 2020-07-24
Attending: INTERNAL MEDICINE
Payer: MEDICARE

## 2020-07-24 ENCOUNTER — INFUSION (OUTPATIENT)
Dept: INFUSION THERAPY | Facility: HOSPITAL | Age: 75
End: 2020-07-24
Attending: INTERNAL MEDICINE
Payer: MEDICARE

## 2020-07-24 ENCOUNTER — OFFICE VISIT (OUTPATIENT)
Dept: HEMATOLOGY/ONCOLOGY | Facility: CLINIC | Age: 75
End: 2020-07-24
Payer: MEDICARE

## 2020-07-24 VITALS
SYSTOLIC BLOOD PRESSURE: 175 MMHG | HEART RATE: 77 BPM | RESPIRATION RATE: 18 BRPM | DIASTOLIC BLOOD PRESSURE: 79 MMHG | TEMPERATURE: 98 F

## 2020-07-24 VITALS
WEIGHT: 141.19 LBS | HEART RATE: 86 BPM | DIASTOLIC BLOOD PRESSURE: 82 MMHG | TEMPERATURE: 98 F | SYSTOLIC BLOOD PRESSURE: 178 MMHG | BODY MASS INDEX: 25.01 KG/M2 | RESPIRATION RATE: 16 BRPM | OXYGEN SATURATION: 99 %

## 2020-07-24 DIAGNOSIS — E11.9 TYPE 2 DIABETES MELLITUS WITHOUT COMPLICATION, WITH LONG-TERM CURRENT USE OF INSULIN: ICD-10-CM

## 2020-07-24 DIAGNOSIS — C82.08 FOLLICULAR LYMPHOMA GRADE I, LYMPH NODES OF MULTIPLE SITES: Primary | ICD-10-CM

## 2020-07-24 DIAGNOSIS — I10 ESSENTIAL HYPERTENSION: ICD-10-CM

## 2020-07-24 DIAGNOSIS — N18.30 CKD (CHRONIC KIDNEY DISEASE) STAGE 3, GFR 30-59 ML/MIN: ICD-10-CM

## 2020-07-24 DIAGNOSIS — Z79.4 TYPE 2 DIABETES MELLITUS WITHOUT COMPLICATION, WITH LONG-TERM CURRENT USE OF INSULIN: ICD-10-CM

## 2020-07-24 DIAGNOSIS — C82.08 FOLLICULAR LYMPHOMA GRADE I, LYMPH NODES OF MULTIPLE SITES: ICD-10-CM

## 2020-07-24 LAB
ALBUMIN SERPL BCP-MCNC: 3.5 G/DL (ref 3.5–5.2)
ALP SERPL-CCNC: 85 U/L (ref 55–135)
ALT SERPL W/O P-5'-P-CCNC: 19 U/L (ref 10–44)
ANION GAP SERPL CALC-SCNC: 9 MMOL/L (ref 8–16)
ANISOCYTOSIS BLD QL SMEAR: SLIGHT
AST SERPL-CCNC: 20 U/L (ref 10–40)
BASOPHILS NFR BLD: 1 % (ref 0–1.9)
BILIRUB SERPL-MCNC: 0.3 MG/DL (ref 0.1–1)
BUN SERPL-MCNC: 20 MG/DL (ref 8–23)
CALCIUM SERPL-MCNC: 9.6 MG/DL (ref 8.7–10.5)
CHLORIDE SERPL-SCNC: 110 MMOL/L (ref 95–110)
CO2 SERPL-SCNC: 20 MMOL/L (ref 23–29)
CREAT SERPL-MCNC: 1.3 MG/DL (ref 0.5–1.4)
DIFFERENTIAL METHOD: ABNORMAL
EOSINOPHIL NFR BLD: 5 % (ref 0–8)
ERYTHROCYTE [DISTWIDTH] IN BLOOD BY AUTOMATED COUNT: 13.8 % (ref 11.5–14.5)
EST. GFR  (AFRICAN AMERICAN): 47 ML/MIN/1.73 M^2
EST. GFR  (NON AFRICAN AMERICAN): 40.8 ML/MIN/1.73 M^2
GLUCOSE SERPL-MCNC: 156 MG/DL (ref 70–110)
HCT VFR BLD AUTO: 34.3 % (ref 37–48.5)
HGB BLD-MCNC: 9.8 G/DL (ref 12–16)
IMM GRANULOCYTES # BLD AUTO: ABNORMAL K/UL (ref 0–0.04)
IMM GRANULOCYTES NFR BLD AUTO: ABNORMAL % (ref 0–0.5)
LDH SERPL L TO P-CCNC: 334 U/L (ref 110–260)
LYMPHOCYTES NFR BLD: 27 % (ref 18–48)
MCH RBC QN AUTO: 28.2 PG (ref 27–31)
MCHC RBC AUTO-ENTMCNC: 28.6 G/DL (ref 32–36)
MCV RBC AUTO: 99 FL (ref 82–98)
MONOCYTES NFR BLD: 16 % (ref 4–15)
NEUTROPHILS NFR BLD: 47 % (ref 38–73)
NEUTS BAND NFR BLD MANUAL: 4 %
NRBC BLD-RTO: 0 /100 WBC
OVALOCYTES BLD QL SMEAR: ABNORMAL
PLATELET # BLD AUTO: 198 K/UL (ref 150–350)
PLATELET BLD QL SMEAR: ABNORMAL
PMV BLD AUTO: 9.8 FL (ref 9.2–12.9)
POIKILOCYTOSIS BLD QL SMEAR: SLIGHT
POTASSIUM SERPL-SCNC: 5.3 MMOL/L (ref 3.5–5.1)
PROT SERPL-MCNC: 6.3 G/DL (ref 6–8.4)
RBC # BLD AUTO: 3.48 M/UL (ref 4–5.4)
SODIUM SERPL-SCNC: 139 MMOL/L (ref 136–145)
WBC # BLD AUTO: 3.97 K/UL (ref 3.9–12.7)

## 2020-07-24 PROCEDURE — 3044F HG A1C LEVEL LT 7.0%: CPT | Mod: HCNC,CPTII,S$GLB, | Performed by: NURSE PRACTITIONER

## 2020-07-24 PROCEDURE — 99214 OFFICE O/P EST MOD 30 MIN: CPT | Mod: HCNC,S$GLB,, | Performed by: NURSE PRACTITIONER

## 2020-07-24 PROCEDURE — 99499 UNLISTED E&M SERVICE: CPT | Mod: S$GLB,,, | Performed by: NURSE PRACTITIONER

## 2020-07-24 PROCEDURE — 3044F PR MOST RECENT HEMOGLOBIN A1C LEVEL <7.0%: ICD-10-PCS | Mod: HCNC,CPTII,S$GLB, | Performed by: NURSE PRACTITIONER

## 2020-07-24 PROCEDURE — 1101F PR PT FALLS ASSESS DOC 0-1 FALLS W/OUT INJ PAST YR: ICD-10-PCS | Mod: HCNC,CPTII,S$GLB, | Performed by: NURSE PRACTITIONER

## 2020-07-24 PROCEDURE — 99999 PR PBB SHADOW E&M-EST. PATIENT-LVL IV: ICD-10-PCS | Mod: PBBFAC,HCNC,, | Performed by: NURSE PRACTITIONER

## 2020-07-24 PROCEDURE — 36415 COLL VENOUS BLD VENIPUNCTURE: CPT | Mod: HCNC

## 2020-07-24 PROCEDURE — 3079F DIAST BP 80-89 MM HG: CPT | Mod: HCNC,CPTII,S$GLB, | Performed by: NURSE PRACTITIONER

## 2020-07-24 PROCEDURE — 1159F PR MEDICATION LIST DOCUMENTED IN MEDICAL RECORD: ICD-10-PCS | Mod: HCNC,S$GLB,, | Performed by: NURSE PRACTITIONER

## 2020-07-24 PROCEDURE — 3077F SYST BP >= 140 MM HG: CPT | Mod: HCNC,CPTII,S$GLB, | Performed by: NURSE PRACTITIONER

## 2020-07-24 PROCEDURE — 96401 CHEMO ANTI-NEOPL SQ/IM: CPT | Mod: HCNC

## 2020-07-24 PROCEDURE — 1126F AMNT PAIN NOTED NONE PRSNT: CPT | Mod: HCNC,S$GLB,, | Performed by: NURSE PRACTITIONER

## 2020-07-24 PROCEDURE — 1126F PR PAIN SEVERITY QUANTIFIED, NO PAIN PRESENT: ICD-10-PCS | Mod: HCNC,S$GLB,, | Performed by: NURSE PRACTITIONER

## 2020-07-24 PROCEDURE — 85007 BL SMEAR W/DIFF WBC COUNT: CPT | Mod: HCNC

## 2020-07-24 PROCEDURE — 85027 COMPLETE CBC AUTOMATED: CPT | Mod: HCNC

## 2020-07-24 PROCEDURE — 99999 PR PBB SHADOW E&M-EST. PATIENT-LVL IV: CPT | Mod: PBBFAC,HCNC,, | Performed by: NURSE PRACTITIONER

## 2020-07-24 PROCEDURE — 80053 COMPREHEN METABOLIC PANEL: CPT | Mod: HCNC

## 2020-07-24 PROCEDURE — 3008F BODY MASS INDEX DOCD: CPT | Mod: HCNC,CPTII,S$GLB, | Performed by: NURSE PRACTITIONER

## 2020-07-24 PROCEDURE — 3008F PR BODY MASS INDEX (BMI) DOCUMENTED: ICD-10-PCS | Mod: HCNC,CPTII,S$GLB, | Performed by: NURSE PRACTITIONER

## 2020-07-24 PROCEDURE — 83615 LACTATE (LD) (LDH) ENZYME: CPT | Mod: HCNC

## 2020-07-24 PROCEDURE — 3079F PR MOST RECENT DIASTOLIC BLOOD PRESSURE 80-89 MM HG: ICD-10-PCS | Mod: HCNC,CPTII,S$GLB, | Performed by: NURSE PRACTITIONER

## 2020-07-24 PROCEDURE — 1159F MED LIST DOCD IN RCRD: CPT | Mod: HCNC,S$GLB,, | Performed by: NURSE PRACTITIONER

## 2020-07-24 PROCEDURE — 63600175 PHARM REV CODE 636 W HCPCS: Mod: TB,HCNC | Performed by: INTERNAL MEDICINE

## 2020-07-24 PROCEDURE — 99214 PR OFFICE/OUTPT VISIT, EST, LEVL IV, 30-39 MIN: ICD-10-PCS | Mod: HCNC,S$GLB,, | Performed by: NURSE PRACTITIONER

## 2020-07-24 PROCEDURE — 1101F PT FALLS ASSESS-DOCD LE1/YR: CPT | Mod: HCNC,CPTII,S$GLB, | Performed by: NURSE PRACTITIONER

## 2020-07-24 PROCEDURE — 99499 RISK ADDL DX/OHS AUDIT: ICD-10-PCS | Mod: S$GLB,,, | Performed by: NURSE PRACTITIONER

## 2020-07-24 PROCEDURE — 3077F PR MOST RECENT SYSTOLIC BLOOD PRESSURE >= 140 MM HG: ICD-10-PCS | Mod: HCNC,CPTII,S$GLB, | Performed by: NURSE PRACTITIONER

## 2020-07-24 PROCEDURE — 25000003 PHARM REV CODE 250: Mod: HCNC | Performed by: INTERNAL MEDICINE

## 2020-07-24 RX ORDER — SODIUM CHLORIDE 0.9 % (FLUSH) 0.9 %
10 SYRINGE (ML) INJECTION
Status: DISCONTINUED | OUTPATIENT
Start: 2020-07-24 | End: 2020-07-24 | Stop reason: HOSPADM

## 2020-07-24 RX ORDER — HEPARIN 100 UNIT/ML
500 SYRINGE INTRAVENOUS
Status: DISCONTINUED | OUTPATIENT
Start: 2020-07-24 | End: 2020-07-24 | Stop reason: HOSPADM

## 2020-07-24 RX ORDER — MEPERIDINE HYDROCHLORIDE 25 MG/ML
25 INJECTION INTRAMUSCULAR; INTRAVENOUS; SUBCUTANEOUS
Status: DISCONTINUED | OUTPATIENT
Start: 2020-07-24 | End: 2020-07-24 | Stop reason: HOSPADM

## 2020-07-24 RX ORDER — ACETAMINOPHEN 325 MG/1
650 TABLET ORAL
Status: COMPLETED | OUTPATIENT
Start: 2020-07-24 | End: 2020-07-24

## 2020-07-24 RX ORDER — FAMOTIDINE 20 MG/1
20 TABLET, FILM COATED ORAL
Status: COMPLETED | OUTPATIENT
Start: 2020-07-24 | End: 2020-07-24

## 2020-07-24 RX ORDER — DIPHENHYDRAMINE HCL 50 MG
50 CAPSULE ORAL
Status: COMPLETED | OUTPATIENT
Start: 2020-07-24 | End: 2020-07-24

## 2020-07-24 RX ADMIN — ACETAMINOPHEN 650 MG: 325 TABLET ORAL at 10:07

## 2020-07-24 RX ADMIN — RITUXIMAB AND HYALURONIDASE 1400 MG: 120; 2000 INJECTION, SOLUTION SUBCUTANEOUS at 11:07

## 2020-07-24 RX ADMIN — DIPHENHYDRAMINE HYDROCHLORIDE 50 MG: 50 CAPSULE ORAL at 10:07

## 2020-07-24 RX ADMIN — FAMOTIDINE 20 MG: 20 TABLET ORAL at 10:07

## 2020-07-24 NOTE — PROGRESS NOTES
"Subjective:      Patient ID: Keely Pizarro is a 75 y.o. female.    Chief Complaint: Follow-up and Lymphoma    Onc Hx:  Keely was diagnosed grade 1-2 follicular lymphoma diagnosed by excisional biopsy of a right cervical lymph node 8/1/2017.  Pathologic diagnosis was received by [facility].    Keely reports feeling fatigue since March 2017. At the time she had an infected tooth and developed cervical lymphadenopathy. She started loosing weight, total quantity to date is unknown. Appetite remains normal and she denies night sweats. She then developed shortness of breath worse with activity and left lower lung pleural effusion was noted. The received thoracentesis twice with negative pleural fluid flow cytometry. Cytology was positive for lymphocytes, but was not diagnostic for follicular lymphoma. CT imaging demonstrates diffuse, bilateral submandibular, cervical, axillary, supraclavicular, and intrathoracic lymphadenopathy.     Keely has a history of uterine cancer in 2012 treated with radiation and chemotherapy. She had a port-a-cath at that time for treatment. She has insulin dependent diabetes mellitus with poorly controlled blood sugar, reporting both high and lows. She has hypertension, and blood pressure was elevated at intake today.      PET imaging showed stage 4 follicular lymphoma, large left side pleural effusion in 9/2017. Noted to be in complete remission after 6 cycles of BR chemotherapy and started maintenance Rituxan in 3/2018.     Today:  Patient presents today for scheduled follow-up of follicular lymphoma on maintenance Rituxan. Cycle 12 maintenance (every 2 months) Hycela scheduled for today. She reports being "stressed" due to  being discharged from hospital after 3 month hospital stay for ruptured aortic aneurysm. She has no other complaints today. She denies adenopathy, night sweats, fatigue, sob, fevers, infections, weight loss and loss of appetite.      Review of Systems "   Constitutional: Negative for fatigue and fever.   HENT: Negative for congestion and trouble swallowing.    Eyes: Negative for photophobia and visual disturbance.   Respiratory: Negative for cough and shortness of breath.  Cardiovascular: Negative for chest pain and leg swelling.   Gastrointestinal: Negative for abdominal distention and abdominal pain.   Genitourinary: Negative for dysuria and hematuria.   Musculoskeletal: Negative for myalgias and neck pain.   Skin: Negative for color change and pallor.   Neurological: Negative for light-headedness and headaches.   Hematological: Negative for adenopathy. Does not bruise/bleed easily.   Psychiatric/Behavioral: Negative for agitation.       Objective:     Physical Exam   Constitutional: She is oriented to person, place, and time. She appears well-developed and well-nourished.   HENT:   Mouth/Throat: Oropharynx is clear and moist. No oropharyngeal exudate.   Eyes: Pupils are equal, round, and reactive to light.   Cardiovascular: Normal rate and regular rhythm.    Pulmonary/Chest: Good aeration and clear lung sounds to all  lobes.   Abdominal: Soft. Bowel sounds are normal.   Lymphadenopathy:     She has no cervical adenopathy, axillary, or inguinal lymphadenopathy  Neurological: She is alert and oriented to person, place, and time.   Skin: Skin is warm.   Psychiatric: She has a normal mood and affect. Her behavior is normal.       Assessment:       1. Follicular lymphoma grade i, lymph nodes of multiple sites    2. CKD (chronic kidney disease) stage 3, GFR 30-59 ml/min    3. Type 2 diabetes mellitus without complication, with long-term current use of insulin    4. Essential hypertension        Plan:     [place]  Completed 6 cycles of BR chemotherapy for follicular lymphoma achieving a complete response on end of therapy PET.   No B symptoms, adenopathy or signs of relapse today  Currently on maintenance Rituxan once every 2 months for 2 years  Cycle 12 (last treatment)  today  Will start every 3 month surveillance     HTN  - Continues amlodipine  - Component of white coat HTN at visits  - managed by PCP    DM2  - saw endocrinology on 7/17/20    CKD stage 3  - likely secondary to uncontrolled HTN/DM2, meds, chemo    Return visit in 3 months with CBC, CMP, and LDH and follow-up with Dr. Marcos King NP  Hematology/BMT

## 2020-07-24 NOTE — NURSING
1123 patient completed and tolerated rituxan hycela inj to abd. Pt observed afterwards with no signs of adverse reaction. Pt voiced no new complaints or concerns at this time. NAD noted. Pt d/c home

## 2020-07-28 ENCOUNTER — TELEPHONE (OUTPATIENT)
Dept: PAIN MEDICINE | Facility: CLINIC | Age: 75
End: 2020-07-28

## 2020-07-28 NOTE — TELEPHONE ENCOUNTER
Staff spoke with patient regarding appointment scheduled 7/29/20 with  due patient not being able to make it the next appointment that was available was 9/2/20 at 7:45am that's the one patient agreed to and patient verbalized understanding.

## 2020-07-28 NOTE — TELEPHONE ENCOUNTER
"Staff left a voicemail    This message is for patient in regards to his/her appointment 7/29/20 at 8:30a       Staff informed patient of the policy in place by [facility] for the safety of all patients and staff members.   Staff also informed patient that all visitors will not be allowed to accompany patient during their appointment with provider Dr. Hebert and any visitors will have to remain inside his/her vehicle until patient appointment is over and patient must wear a face mask the whole time here at [facility].    Upon arriving patient must contact clinic at this number [phone] to notify staff that they have arrive, Staff will give the patient the "okay" to come up or wait inside their vehicle until clinic is ready for the next patient to enter inside the building.     "

## 2020-08-05 DIAGNOSIS — Z79.4 TYPE 2 DIABETES MELLITUS WITH STAGE 3 CHRONIC KIDNEY DISEASE, WITH LONG-TERM CURRENT USE OF INSULIN: Primary | ICD-10-CM

## 2020-08-05 DIAGNOSIS — N18.30 TYPE 2 DIABETES MELLITUS WITH STAGE 3 CHRONIC KIDNEY DISEASE, WITH LONG-TERM CURRENT USE OF INSULIN: Primary | ICD-10-CM

## 2020-08-05 DIAGNOSIS — E11.22 TYPE 2 DIABETES MELLITUS WITH STAGE 3 CHRONIC KIDNEY DISEASE, WITH LONG-TERM CURRENT USE OF INSULIN: Primary | ICD-10-CM

## 2020-08-06 RX ORDER — DULAGLUTIDE 0.75 MG/.5ML
0.75 INJECTION, SOLUTION SUBCUTANEOUS WEEKLY
Qty: 4 PEN | Refills: 2 | Status: SHIPPED | OUTPATIENT
Start: 2020-08-06 | End: 2021-02-25

## 2020-08-13 ENCOUNTER — PATIENT OUTREACH (OUTPATIENT)
Dept: ADMINISTRATIVE | Facility: OTHER | Age: 75
End: 2020-08-13

## 2020-08-13 NOTE — PROGRESS NOTES
Subjective:      Patient ID: Keely Pizarro is a 75 y.o. female.    Chief Complaint:  No chief complaint on file.    History of Present Illness  Keely Pizarro is here for follow up of T2DM, osteopenia, vit D def, and abnormal TFTs.  Previously seen by me on 7/17/2020.    With regards to diabetes:    Diagnosed: 2001  Known complications:  DKA -  RN -  PN -  Nephropathy +  CAD -    Current regimen:  Lantus 14units nightly  Trulicity 0.75mg weekly - Thursday   Novolog 5units with dinner    Ran out of Novolog as of today.    Other medications tried:  MFM  Glipizide    Glucose Monitor:   1 times a day testing  Log reviewed: log provided      Hypoglycemia:  Denies.  Knows how to correct with 15 grams of carbs- juice, coke, or a peppermint.     Diet/Exercise:  Eats 3 meals a day.   B: toast, egg, yogurt  L: tuna sandwich  D: fried pork chop, rice and gravy, green beans   Snacks : watermelon, apples  Drinks : water.   Exercise: walking.       Education - last visit: 12/2019  Eye Exam: 9/2019  Podiatry: None    Diabetes Management Status    Hemoglobin A1C   Date Value Ref Range Status   07/17/2020 6.8 (H) 4.0 - 5.6 % Final     Comment:     ADA Screening Guidelines:  5.7-6.4%  Consistent with prediabetes  >or=6.5%  Consistent with diabetes  High levels of fetal hemoglobin interfere with the HbA1C  assay. Heterozygous hemoglobin variants (HbS, HgC, etc)do  not significantly interfere with this assay.   However, presence of multiple variants may affect accuracy.     03/05/2020 7.1 (H) 4.0 - 5.6 % Final     Comment:     ADA Screening Guidelines:  5.7-6.4%  Consistent with prediabetes  >or=6.5%  Consistent with diabetes  High levels of fetal hemoglobin interfere with the HbA1C  assay. Heterozygous hemoglobin variants (HbS, HgC, etc)do  not significantly interfere with this assay.   However, presence of multiple variants may affect accuracy.     10/08/2019 11.3 (H) 4.0 - 5.6 % Final     Comment:     ADA Screening  Guidelines:  5.7-6.4%  Consistent with prediabetes  >or=6.5%  Consistent with diabetes  High levels of fetal hemoglobin interfere with the HbA1C  assay. Heterozygous hemoglobin variants (HbS, HgC, etc)do  not significantly interfere with this assay.   However, presence of multiple variants may affect accuracy.       Statin: Not taking  ACE/ARB: Not taking  Screening or Prevention Patient's value Goal Complete/Controlled?   HgA1C Testing and Control   Lab Results   Component Value Date    HGBA1C 6.8 (H) 07/17/2020      Annually/Less than 8% Yes   Lipid profile : 03/05/2020 Annually Yes   LDL control Lab Results   Component Value Date    LDLCALC 91.8 03/05/2020    Annually/Less than 100 mg/dl  Yes   Nephropathy screening No results found for: LABMICR  No results found for: PROTEINUA Annually No   Blood pressure BP Readings from Last 1 Encounters:   08/14/20 122/70    Less than 140/90 No   Dilated retinal exam Most Recent Eye Exam Date: Not Found Annually Yes   Foot exam   : 12/13/2019 Annually Yes     With regards to osteopenia:    BMD 11/4/2019  1.  Mild osteopenia multiple sites.  2.  FRAX calculations do not support medical treatment for osteopenia.  RECOMMENDATIONS of [facility]:  1.  Calcium 5677-0015 mg daily and vitamin D 800-1000 units daily, adequate exercise  2.  No additional pharmacologic therapy recommended at this time.   3.  Consider repeat BMD in 2-4 years.    Fractures : Denies    Calcium : None  Vitamin D : OTC Vit D3 1000iu daily    With regards to Vitamin D Deficiency:    Vit D, 25-Hydroxy   Date Value Ref Range Status   07/17/2020 23 (L) 30 - 96 ng/mL Final     Comment:     Vitamin D deficiency.........<10 ng/mL                              Vitamin D insufficiency......10-29 ng/mL       Vitamin D sufficiency........> or equal to 30 ng/mL  Vitamin D toxicity............>100 ng/mL       Current Meds: OTC VIt D3 1000iu daily.     With regards to subclinical  "hyperthyroidism:    Lab Results   Component Value Date    TSH 0.485 07/17/2020    FREET4 0.98 03/05/2020     Current Symptoms:   - Palpations  - Weight loss  - Diarrhea  - Hair loss  - Brittle nails  - Skin changes  - Tremor   - Anxiety    Current medication:  None.    Any recent (3-6 months) iodine or contrast?  Denies    Smoke: Denies    Thyroid tenderness?   Denies    Graves Eye Clinical Activity: 3/7=Active  Eye pain? Denies  Eye pain with movement? Denies  Eyelid erythema? Denies  Erythema of conjunctiva? Denies  Caruncle inflammation? Denies  Eyelid swelling? Denies    Review of Systems   Constitutional: Negative for fatigue.   Eyes: Negative for visual disturbance.   Respiratory: Negative for shortness of breath.    Cardiovascular: Negative for chest pain.   Gastrointestinal: Negative for abdominal pain.   Musculoskeletal: Negative for arthralgias.   Skin: Negative for wound.   Neurological: Negative for headaches.   Hematological: Does not bruise/bleed easily.   Psychiatric/Behavioral: Negative for sleep disturbance.     Objective:   Physical Exam  Vitals signs reviewed.   Neck:      Thyroid: No thyromegaly.   Cardiovascular:      Rate and Rhythm: Normal rate.      Comments: No edema present  Pulmonary:      Effort: Pulmonary effort is normal.   Abdominal:      Palpations: Abdomen is soft.       Appropriate footwear, foot exam deferred, done 12/2019.  Injection sites are without edema or erythema. No lipo hypertropthy or atrophy.    Visit Vitals  /70 (BP Location: Right arm, Patient Position: Sitting, BP Method: Medium (Manual))   Resp 16   Ht 5' 3" (1.6 m)   Wt 62.7 kg (138 lb 3.7 oz)   BMI 24.49 kg/m²       Body mass index is 24.49 kg/m².    Lab Review:   Lab Results   Component Value Date    HGBA1C 6.8 (H) 07/17/2020    HGBA1C 7.1 (H) 03/05/2020    HGBA1C 11.3 (H) 10/08/2019       Lab Results   Component Value Date    CHOL 155 03/05/2020    HDL 53 03/05/2020    LDLCALC 91.8 03/05/2020    TRIG 51 " 03/05/2020    CHOLHDL 34.2 03/05/2020     Lab Results   Component Value Date     07/24/2020    K 5.3 (H) 07/24/2020     07/24/2020    CO2 20 (L) 07/24/2020     (H) 07/24/2020    BUN 20 07/24/2020    CREATININE 1.3 07/24/2020    CALCIUM 9.6 07/24/2020    PROT 6.3 07/24/2020    ALBUMIN 3.5 07/24/2020    BILITOT 0.3 07/24/2020    ALKPHOS 85 07/24/2020    AST 20 07/24/2020    ALT 19 07/24/2020    ANIONGAP 9 07/24/2020    ESTGFRAFRICA 47.0 (A) 07/24/2020    EGFRNONAA 40.8 (A) 07/24/2020    TSH 0.485 07/17/2020     Vit D, 25-Hydroxy   Date Value Ref Range Status   07/17/2020 23 (L) 30 - 96 ng/mL Final     Comment:     Vitamin D deficiency.........<10 ng/mL                              Vitamin D insufficiency......10-29 ng/mL       Vitamin D sufficiency........> or equal to 30 ng/mL  Vitamin D toxicity............>100 ng/mL       Assessment and Plan     1. Type 2 diabetes mellitus with stage 3 chronic kidney disease, with long-term current use of insulin  Hemoglobin A1C    Comprehensive metabolic panel   2. Vitamin D deficiency     3. Osteopenia after menopause     4. Abnormal thyroid function test     5. CKD (chronic kidney disease) stage 3, GFR 30-59 ml/min     6. Essential hypertension     7. Follicular lymphoma grade i, lymph nodes of multiple sites       Type 2 diabetes mellitus with stage 3 chronic kidney disease, with long-term current use of insulin  -- Labs prior to follow up.  -- Sent PAP a message as patient having trouble affording her medication.  -- A1c goal <7.5%.  -- Medications discussed:  MFM - stopped due to CKD  GLP1-DPP4   REYNOLDS   SGLT2  Insulin   -- Reviewed logs/CGM:  Documented glucose unremarkable.  Instructed to check a second time of the day.  Instructed to send glucose logs in 14 days.  Reach out to me sooner for any glucose <70 or consistently >200.  -- Medication Changes:   CONTINUE:  Lantus 14units nightly  Trulicity 0.75mg weekly  Trial off prandial insulin as she is out of  it and cannot afford it at this time.   -- Reviewed goals of therapy are to get the best control we can without hypoglycemia.  -- Reviewed patient's current insulin regimen. Clarified proper insulin dose and timing in relation to meals, etc. Insulin injection sites and proper rotation instructed.    -- Advised frequent self blood glucose monitoring.  Patient encouraged to document glucose results and bring them to every clinic visit.  -- Hypoglycemia precautions discussed. Instructed on precautions before driving.    -- Call for Bg repeatedly < 90 or > 180.   -- Close adherence to lifestyle changes recommended.   -- Periodic follow ups for eye evaluations, foot care and dental care suggested.    Vitamin D deficiency  -- Check vitamin D.  -- CONTINUE: OTC Vit D3 1000iu daily.    Osteopenia after menopause  -- BMD due 9/2021.  -- Reassuring she is not fracturing.    Abnormal thyroid function test  -- TFTs currently unremarkable.    CKD (chronic kidney disease) stage 3, GFR 30-59 ml/min  -- Continue following with nephrology.    Essential hypertension  -- Controlled on current medications.     Follicular lymphoma grade i, lymph nodes of multiple sites  -- Continue following with hem/onc.    Follow up in about 3 months (around 11/14/2020).

## 2020-08-13 NOTE — PROGRESS NOTES
Requested updates from Care Everywhere.  Reviewed chart for overdue RICARDA topics.  Updated Health Maintenance.   Reconciled immunizations in LINKS.

## 2020-08-14 ENCOUNTER — TELEPHONE (OUTPATIENT)
Dept: PHARMACY | Facility: CLINIC | Age: 75
End: 2020-08-14

## 2020-08-14 ENCOUNTER — OFFICE VISIT (OUTPATIENT)
Dept: ENDOCRINOLOGY | Facility: CLINIC | Age: 75
End: 2020-08-14
Payer: MEDICARE

## 2020-08-14 VITALS
SYSTOLIC BLOOD PRESSURE: 122 MMHG | HEIGHT: 63 IN | RESPIRATION RATE: 16 BRPM | WEIGHT: 138.25 LBS | BODY MASS INDEX: 24.5 KG/M2 | DIASTOLIC BLOOD PRESSURE: 70 MMHG

## 2020-08-14 DIAGNOSIS — M85.80 OSTEOPENIA AFTER MENOPAUSE: ICD-10-CM

## 2020-08-14 DIAGNOSIS — C82.08 FOLLICULAR LYMPHOMA GRADE I, LYMPH NODES OF MULTIPLE SITES: ICD-10-CM

## 2020-08-14 DIAGNOSIS — E55.9 VITAMIN D DEFICIENCY: ICD-10-CM

## 2020-08-14 DIAGNOSIS — N18.30 CKD (CHRONIC KIDNEY DISEASE) STAGE 3, GFR 30-59 ML/MIN: ICD-10-CM

## 2020-08-14 DIAGNOSIS — Z79.4 TYPE 2 DIABETES MELLITUS WITH STAGE 3 CHRONIC KIDNEY DISEASE, WITH LONG-TERM CURRENT USE OF INSULIN: Primary | ICD-10-CM

## 2020-08-14 DIAGNOSIS — Z78.0 OSTEOPENIA AFTER MENOPAUSE: ICD-10-CM

## 2020-08-14 DIAGNOSIS — I10 ESSENTIAL HYPERTENSION: ICD-10-CM

## 2020-08-14 DIAGNOSIS — R94.6 ABNORMAL THYROID FUNCTION TEST: ICD-10-CM

## 2020-08-14 DIAGNOSIS — N18.30 TYPE 2 DIABETES MELLITUS WITH STAGE 3 CHRONIC KIDNEY DISEASE, WITH LONG-TERM CURRENT USE OF INSULIN: Primary | ICD-10-CM

## 2020-08-14 DIAGNOSIS — E11.22 TYPE 2 DIABETES MELLITUS WITH STAGE 3 CHRONIC KIDNEY DISEASE, WITH LONG-TERM CURRENT USE OF INSULIN: Primary | ICD-10-CM

## 2020-08-14 PROCEDURE — 1126F PR PAIN SEVERITY QUANTIFIED, NO PAIN PRESENT: ICD-10-PCS | Mod: HCNC,S$GLB,, | Performed by: NURSE PRACTITIONER

## 2020-08-14 PROCEDURE — 99214 PR OFFICE/OUTPT VISIT, EST, LEVL IV, 30-39 MIN: ICD-10-PCS | Mod: HCNC,S$GLB,, | Performed by: NURSE PRACTITIONER

## 2020-08-14 PROCEDURE — 1101F PR PT FALLS ASSESS DOC 0-1 FALLS W/OUT INJ PAST YR: ICD-10-PCS | Mod: HCNC,CPTII,S$GLB, | Performed by: NURSE PRACTITIONER

## 2020-08-14 PROCEDURE — 3008F BODY MASS INDEX DOCD: CPT | Mod: HCNC,CPTII,S$GLB, | Performed by: NURSE PRACTITIONER

## 2020-08-14 PROCEDURE — 3008F PR BODY MASS INDEX (BMI) DOCUMENTED: ICD-10-PCS | Mod: HCNC,CPTII,S$GLB, | Performed by: NURSE PRACTITIONER

## 2020-08-14 PROCEDURE — 99499 RISK ADDL DX/OHS AUDIT: ICD-10-PCS | Mod: S$GLB,,, | Performed by: NURSE PRACTITIONER

## 2020-08-14 PROCEDURE — 3074F PR MOST RECENT SYSTOLIC BLOOD PRESSURE < 130 MM HG: ICD-10-PCS | Mod: HCNC,CPTII,S$GLB, | Performed by: NURSE PRACTITIONER

## 2020-08-14 PROCEDURE — 99999 PR PBB SHADOW E&M-EST. PATIENT-LVL IV: ICD-10-PCS | Mod: PBBFAC,HCNC,, | Performed by: NURSE PRACTITIONER

## 2020-08-14 PROCEDURE — 1159F MED LIST DOCD IN RCRD: CPT | Mod: HCNC,S$GLB,, | Performed by: NURSE PRACTITIONER

## 2020-08-14 PROCEDURE — 3074F SYST BP LT 130 MM HG: CPT | Mod: HCNC,CPTII,S$GLB, | Performed by: NURSE PRACTITIONER

## 2020-08-14 PROCEDURE — 1126F AMNT PAIN NOTED NONE PRSNT: CPT | Mod: HCNC,S$GLB,, | Performed by: NURSE PRACTITIONER

## 2020-08-14 PROCEDURE — 99499 UNLISTED E&M SERVICE: CPT | Mod: S$GLB,,, | Performed by: NURSE PRACTITIONER

## 2020-08-14 PROCEDURE — 99214 OFFICE O/P EST MOD 30 MIN: CPT | Mod: HCNC,S$GLB,, | Performed by: NURSE PRACTITIONER

## 2020-08-14 PROCEDURE — 1159F PR MEDICATION LIST DOCUMENTED IN MEDICAL RECORD: ICD-10-PCS | Mod: HCNC,S$GLB,, | Performed by: NURSE PRACTITIONER

## 2020-08-14 PROCEDURE — 1101F PT FALLS ASSESS-DOCD LE1/YR: CPT | Mod: HCNC,CPTII,S$GLB, | Performed by: NURSE PRACTITIONER

## 2020-08-14 PROCEDURE — 3078F PR MOST RECENT DIASTOLIC BLOOD PRESSURE < 80 MM HG: ICD-10-PCS | Mod: HCNC,CPTII,S$GLB, | Performed by: NURSE PRACTITIONER

## 2020-08-14 PROCEDURE — 3078F DIAST BP <80 MM HG: CPT | Mod: HCNC,CPTII,S$GLB, | Performed by: NURSE PRACTITIONER

## 2020-08-14 PROCEDURE — 3044F PR MOST RECENT HEMOGLOBIN A1C LEVEL <7.0%: ICD-10-PCS | Mod: HCNC,CPTII,S$GLB, | Performed by: NURSE PRACTITIONER

## 2020-08-14 PROCEDURE — 99999 PR PBB SHADOW E&M-EST. PATIENT-LVL IV: CPT | Mod: PBBFAC,HCNC,, | Performed by: NURSE PRACTITIONER

## 2020-08-14 PROCEDURE — 3044F HG A1C LEVEL LT 7.0%: CPT | Mod: HCNC,CPTII,S$GLB, | Performed by: NURSE PRACTITIONER

## 2020-08-14 NOTE — ASSESSMENT & PLAN NOTE
-- Labs prior to follow up.  -- Sent PAP a message as patient having trouble affording her medication.  -- A1c goal <7.5%.  -- Medications discussed:  MFM - stopped due to CKD  GLP1-DPP4   REYNOLDS   SGLT2  Insulin   -- Reviewed logs/CGM:  Documented glucose unremarkable.  Instructed to check a second time of the day.  Instructed to send glucose logs in 14 days.  Reach out to me sooner for any glucose <70 or consistently >200.  -- Medication Changes:   CONTINUE:  Lantus 14units nightly  Trulicity 0.75mg weekly  Trial off prandial insulin as she is out of it and cannot afford it at this time.   -- Reviewed goals of therapy are to get the best control we can without hypoglycemia.  -- Reviewed patient's current insulin regimen. Clarified proper insulin dose and timing in relation to meals, etc. Insulin injection sites and proper rotation instructed.    -- Advised frequent self blood glucose monitoring.  Patient encouraged to document glucose results and bring them to every clinic visit.  -- Hypoglycemia precautions discussed. Instructed on precautions before driving.    -- Call for Bg repeatedly < 90 or > 180.   -- Close adherence to lifestyle changes recommended.   -- Periodic follow ups for eye evaluations, foot care and dental care suggested.

## 2020-08-14 NOTE — TELEPHONE ENCOUNTER
Patient was given a voucher for a free month supply of Trulicity.  I'm waiting for patient to submit her proof of income to submit a application to [employer].

## 2020-08-14 NOTE — PATIENT INSTRUCTIONS
Instructed to send glucose logs in 14 days.  Reach out to me sooner for any glucose <70 or consistently >200.    Hypoglycemia (Low Blood Glucose)  Low blood glucose occurs with the following conditions.  · Not Enough Food or Missing Meal.  · Too Much Insulin  · More Exercise Than Usual    It is best to use something that you can always carry with you. Choose a food that is all carbohydrate because it will be very fast acting. Try not to choose chocolate or other high fat foods. They will not work fast enough and you may also end up over-treating your lows. The suggested amount of carbohydrate to start with is 15 grams. Don't keep eating until you feel better. Eat the required amount and stop. The feelings will pass and you will be grateful that you did not overdo it.    Some people with diabetes know when their blood glucose is low and some do not. If you are a person who is not aware of hypoglycemia, it is important to test your blood glucose more often. Everyone with diabetes should test before driving a car to assure safety on the road. Blood glucose should be above 100 mg/dl before driving and at bedtime.     The symptoms of low blood sugars are usually heart racing, sweating, anxiety, feeling hungry, tremor, weakness or most severely loss of consciousness.     Rule of 15:    Test your blood sugar   If glucose is between 50-70 mg/dL then ingest 15 grams of fast-acting carbs   If glucose is less than 50 mg/dL then ingest 30 grams of fast-acting carbs   Ingest 15 grams of fast-acting carbohydrate - such as:   a. 3-4 glucose tablets  b. 4 oz juice  c. ½ can regular soda pop  d. 15 skittles or mini jelly beans    Re-check your blood sugar in 15 minutes. If its less than 70mg/dl, repeat steps 2 and 3.   If your next meal is more than 1 hour away, eat an additional 15 grams of carbohydrate and 1 ounce of protein (examples include crackers with cheese or one-half of a sandwich with peanut butter). It is important not  to eat too much because this can raise your blood sugar above the target level.    After your blood sugar has normalized, think about why you went low. If you notice a pattern of low blood sugars, contact your Diabetes Team. We may need to adjust your medication.

## 2020-08-17 ENCOUNTER — TELEPHONE (OUTPATIENT)
Dept: ENDOCRINOLOGY | Facility: CLINIC | Age: 75
End: 2020-08-17

## 2020-08-17 NOTE — TELEPHONE ENCOUNTER
----- Message from Carolina Moran NP sent at 2020  6:24 PM CDT -----  Please call the patient with the followin. Patient assistance is waiting on her to call back to get her enrolled in assisting with DM medication costs.   Thank you,   Carolina      ----- Message -----  From: Mechelle Beckham  Sent: 2020   4:14 PM CDT  To: LINH Giordano,   Patient was given a voucher for a free month supply of Trulicity.  I'm waiting for patient to submit her proof of income to submit a application to [employer].  ----- Message -----  From: Carolina Moran NP  Sent: 2020   9:17 AM CDT  To: Pharmacy Patient Assistance Team    Good Morning,    I'd like to see about getting Ms Pizarro enrolled in the patient assistance program.    Thanks,  Carolina

## 2020-09-02 ENCOUNTER — PROCEDURE VISIT (OUTPATIENT)
Dept: PAIN MEDICINE | Facility: CLINIC | Age: 75
End: 2020-09-02
Attending: ANESTHESIOLOGY
Payer: MEDICARE

## 2020-09-02 ENCOUNTER — TELEPHONE (OUTPATIENT)
Dept: PAIN MEDICINE | Facility: CLINIC | Age: 75
End: 2020-09-02

## 2020-09-02 ENCOUNTER — TELEPHONE (OUTPATIENT)
Dept: INTERNAL MEDICINE | Facility: CLINIC | Age: 75
End: 2020-09-02

## 2020-09-02 VITALS
DIASTOLIC BLOOD PRESSURE: 85 MMHG | BODY MASS INDEX: 25.98 KG/M2 | HEIGHT: 63 IN | HEART RATE: 78 BPM | SYSTOLIC BLOOD PRESSURE: 168 MMHG | RESPIRATION RATE: 18 BRPM | WEIGHT: 146.63 LBS | OXYGEN SATURATION: 100 %

## 2020-09-02 DIAGNOSIS — M79.18 MYOFASCIAL PAIN: Primary | ICD-10-CM

## 2020-09-02 DIAGNOSIS — G57.01 PIRIFORMIS SYNDROME OF RIGHT SIDE: ICD-10-CM

## 2020-09-02 PROCEDURE — 20552 PR INJECT TRIGGER POINT, 1 OR 2: ICD-10-PCS | Mod: HCNC,S$GLB,, | Performed by: ANESTHESIOLOGY

## 2020-09-02 PROCEDURE — 20552 NJX 1/MLT TRIGGER POINT 1/2: CPT | Mod: HCNC,S$GLB,, | Performed by: ANESTHESIOLOGY

## 2020-09-02 PROCEDURE — 76942 ECHO GUIDE FOR BIOPSY: CPT | Mod: HCNC,S$GLB,, | Performed by: ANESTHESIOLOGY

## 2020-09-02 PROCEDURE — 76942 PR U/S GUIDANCE FOR NEEDLE GUIDANCE: ICD-10-PCS | Mod: HCNC,S$GLB,, | Performed by: ANESTHESIOLOGY

## 2020-09-02 PROCEDURE — 99499 UNLISTED E&M SERVICE: CPT | Mod: HCNC,S$GLB,, | Performed by: ANESTHESIOLOGY

## 2020-09-02 PROCEDURE — 99499 NO LOS: ICD-10-PCS | Mod: HCNC,S$GLB,, | Performed by: ANESTHESIOLOGY

## 2020-09-02 RX ORDER — BETAMETHASONE SODIUM PHOSPHATE AND BETAMETHASONE ACETATE 3; 3 MG/ML; MG/ML
6 INJECTION, SUSPENSION INTRA-ARTICULAR; INTRALESIONAL; INTRAMUSCULAR; SOFT TISSUE
Status: COMPLETED | OUTPATIENT
Start: 2020-09-02 | End: 2020-09-02

## 2020-09-02 RX ADMIN — BETAMETHASONE SODIUM PHOSPHATE AND BETAMETHASONE ACETATE 6 MG: 3; 3 INJECTION, SUSPENSION INTRA-ARTICULAR; INTRALESIONAL; INTRAMUSCULAR; SOFT TISSUE at 09:09

## 2020-09-02 NOTE — TELEPHONE ENCOUNTER
----- Message from Shira Ritu sent at 9/2/2020  7:36 AM CDT -----  Contact: PHUONG FRAIRE [[id number]]  Type: Patient Call Back    Who called:PHUONG FRAIRE [[id number]]    What is the request in detail: Patient is requesting a call back. She states that she is having car trouble and will be about 15 minutes late.   Please advise.    Can the clinic reply by [facility]? No    Best call back number: [phone]    Additional Information: N/A

## 2020-09-02 NOTE — PROCEDURES
"Subjective:      Patient ID: Keely Pizarro is a 75 y.o. female.    Chief Complaint: No chief complaint on file.    Referred by: Echo Hebert MD     HPI  Here for follow-up and right piriformis muscle injection under ultrasound guidance.  No new symptomatology.      Past Medical History:   Diagnosis Date    CVA (cerebral vascular accident) 2011    Diabetes mellitus     Hypertension     Renal manifestation of secondary diabetes mellitus     S/P ORIF (open reduction internal fixation) fracture     Uterine cancer 2012       Past Surgical History:   Procedure Laterality Date    COLONOSCOPY N/A 3/31/2017    Procedure: COLONOSCOPY;  Surgeon: Chip Pak MD;  Location: [facility] ([address] [place] [address]);  Service: Endoscopy;  Laterality: N/A;    FRACTURE SURGERY      right arm    HYSTERECTOMY      THORACENTESIS Left     TOTAL KNEE ARTHROPLASTY Left 2012       Review of patient's allergies indicates:   Allergen Reactions    Tomato (solanum lycopersicum) Itching       Current Outpatient Medications   Medication Sig Dispense Refill    amLODIPine (NORVASC) 10 MG tablet Take 1 tablet (10 mg total) by mouth once daily. 90 tablet 3    BD ALCOHOL SWABS PadM       BD ULTRA-FINE BRIGITTE PEN NEEDLE 32 gauge x 5/32" Ndle To use with Insulin pens 100 each 3    blood sugar diagnostic Strp 1 each by Misc.(Non-Drug; Combo Route) route 3 (three) times daily. 300 each 3    blood sugar diagnostic Strp To check BG 4 times daily, to use with insurance preferred meter 120 strip 11    blood-glucose meter kit Use as instructed 1 each 0    blood-glucose meter Misc Use as directed 1 each 0    cholecalciferol, vitamin D3, (VITAMIN D3) 25 mcg (1,000 unit) capsule Take 1 capsule (1,000 Units total) by mouth once daily. 30 capsule 11    dulaglutide (TRULICITY) 0.75 mg/0.5 mL pen injector Inject 0.75 mg into the skin once a week. 4 pen 2    flu vacc [id number],65yr up,PF (FLUZONE HIGH-DOSE 2019-20, PF,) 180 mcg/0.5 mL Syrg inject into the " muscle once 0.5 mL 0    insulin (LANTUS SOLOSTAR U-100 INSULIN) glargine 100 units/mL (3mL) SubQ pen Inject 14 Units into the skin every evening. Use 14 units at before bedtime 5 mL 10    lancets Misc 1 each by Misc.(Non-Drug; Combo Route) route 3 (three) times daily. 300 each 3    lancets Misc To check blood glucose 4 times daily, to use with insurance preferred meter 120 each 11    calcium carbonate-vitamin D3 600mg (1,500mg) -1,000 unit Cap Take 1 capsule by mouth once. for 1 dose 30 capsule 11     Current Facility-Administered Medications   Medication Dose Route Frequency Provider Last Rate Last Dose    betamethasone acetate-betamethasone sodium phosphate injection 3 mg  3 mg Intramuscular 1 time in Clinic/LIONEL Hebert MD        bupivacaine (PF) 0.25% (2.5 mg/ml) injection 25 mg  10 mL Intramuscular 1 time in Clinic/LIONEL Hebert MD           Family History   Problem Relation Age of Onset    Cancer Brother 67        colon    Aneurysm Mother     Stroke Father     Cancer Sister         pancreatic    No Known Problems Daughter     No Known Problems Son     Cancer Brother         colon    Hypertension Brother     Liver disease Sister     Alcohol abuse Sister        Social History     Socioeconomic History    Marital status:      Spouse name: Not on file    Number of children: Not on file    Years of education: Not on file    Highest education level: Not on file   Occupational History    Not on file   Social Needs    Financial resource strain: Not on file    Food insecurity     Worry: Not on file     Inability: Not on file    Transportation needs     Medical: Not on file     Non-medical: Not on file   Tobacco Use    Smoking status: Former Smoker     Packs/day: 1.00     Years: 10.00     Pack years: 10.00     Types: Cigarettes     Quit date: 8/1/2010     Years since quitting: 10.0    Smokeless tobacco: Never Used   Substance and Sexual Activity    Alcohol use: No    Drug use:  "No    Sexual activity: Yes     Partners: Male   Lifestyle    Physical activity     Days per week: Not on file     Minutes per session: Not on file    Stress: Not on file   Relationships    Social connections     Talks on phone: Not on file     Gets together: Not on file     Attends Judaism service: Not on file     Active member of club or organization: Not on file     Attends meetings of clubs or organizations: Not on file     Relationship status: Not on file   Other Topics Concern    Not on file   Social History Narrative    Not on file           ROS        Objective:   BP (!) 168/85   Pulse 78   Resp 18   Ht 5' 3" (1.6 m)   Wt 66.5 kg (146 lb 9.7 oz)   SpO2 100%   BMI 25.97 kg/m²   Pain Disability Index Review:  Last 3 PDI Scores 9/2/2020 2/3/2020 1/2/2020   Pain Disability Index (PDI) 25 17 45     Normocephalic.  Atraumatic.  Affect appropriate.  Breathing unlabored.  Extra ocular muscles intact.           Ortho/SPM Exam    piriformis muscle myofascial pain and tenderness.  Assessment:       Encounter Diagnoses   Name Primary?    Myofascial pain Yes    Piriformis syndrome of right side          Plan:   We discussed with the patient the assessment and recommendations. The following is the plan we agreed on:  1.  Procedure:  Under clean technique and after discussing with the patient using ultrasound guidance 0.4 mL of betamethasone mixed with 10 mL of bupivacaine 0.25% were used.  We injected the right piriformis muscle while the patient is laying on her left side using a 26 gauge spinal needle.  Patient tolerated procedure well and had significant of improvement of her symptomatology.  2.  Return as needed.  Otherwise follow-up in 1 month with [facility]      Diagnoses and all orders for this visit:    Myofascial pain  -     betamethasone acetate-betamethasone sodium phosphate injection 6 mg    Piriformis syndrome of right side  -     betamethasone acetate-betamethasone sodium phosphate injection 6 " mg

## 2020-09-02 NOTE — TELEPHONE ENCOUNTER
Staff returned call to patient in regards to message about patient having car trouble.    Patient appointment was schedule for 7:45a today with Pain Provider.    Staff informed patient in message that it's after 8a and patient has missed her 15min window mercy period and would have to be reschedule to a new date & time with Provider Dr. Echo Hebert MD.

## 2020-09-02 NOTE — TELEPHONE ENCOUNTER
Bernarda Pizarro    This is Ceyonne from Dr. Albin Cazares MD office. We are reaching out because our records shows that you are due for a mammogram. Dr. Albin Cazares MD would like for you to get this done as soon as possible.  pt gave verbal understanding     appt schedule

## 2020-09-04 ENCOUNTER — DOCUMENTATION ONLY (OUTPATIENT)
Dept: INTERNAL MEDICINE | Facility: CLINIC | Age: 75
End: 2020-09-04

## 2020-09-29 ENCOUNTER — IMMUNIZATION (OUTPATIENT)
Dept: PHARMACY | Facility: CLINIC | Age: 75
End: 2020-09-29
Payer: MEDICARE

## 2020-10-02 ENCOUNTER — PATIENT OUTREACH (OUTPATIENT)
Dept: ADMINISTRATIVE | Facility: OTHER | Age: 75
End: 2020-10-02

## 2020-10-02 NOTE — PROGRESS NOTES
Health Maintenance Due   Topic Date Due    Eye Exam  02/19/1955    Urine Microalbumin  02[birth year]19[birth year]1957    TETANUS VACCINE  02/19/1965    Mammogram  04/04/2017     Updates were requested from care everywhere.  Chart was reviewed for overdue Proactive [facility] Encounters ([facility]) topics (CRS, Breast Cancer Screening, Eye exam)  Health Maintenance has been updated.  LINKS immunization registry triggered.  Immunizations were reconciled.

## 2020-10-06 ENCOUNTER — OFFICE VISIT (OUTPATIENT)
Dept: PAIN MEDICINE | Facility: CLINIC | Age: 75
End: 2020-10-06
Attending: ANESTHESIOLOGY
Payer: MEDICARE

## 2020-10-06 VITALS
RESPIRATION RATE: 18 BRPM | TEMPERATURE: 99 F | DIASTOLIC BLOOD PRESSURE: 91 MMHG | BODY MASS INDEX: 25.78 KG/M2 | SYSTOLIC BLOOD PRESSURE: 197 MMHG | WEIGHT: 145.5 LBS | HEIGHT: 63 IN | HEART RATE: 70 BPM

## 2020-10-06 DIAGNOSIS — M25.559 HIP PAIN: Primary | ICD-10-CM

## 2020-10-06 DIAGNOSIS — M16.11 PRIMARY OSTEOARTHRITIS OF ONE HIP, RIGHT: ICD-10-CM

## 2020-10-06 PROCEDURE — 3008F BODY MASS INDEX DOCD: CPT | Mod: HCNC,CPTII,S$GLB, | Performed by: ANESTHESIOLOGY

## 2020-10-06 PROCEDURE — 1125F AMNT PAIN NOTED PAIN PRSNT: CPT | Mod: HCNC,S$GLB,, | Performed by: ANESTHESIOLOGY

## 2020-10-06 PROCEDURE — 3008F PR BODY MASS INDEX (BMI) DOCUMENTED: ICD-10-PCS | Mod: HCNC,CPTII,S$GLB, | Performed by: ANESTHESIOLOGY

## 2020-10-06 PROCEDURE — 1101F PR PT FALLS ASSESS DOC 0-1 FALLS W/OUT INJ PAST YR: ICD-10-PCS | Mod: HCNC,CPTII,S$GLB, | Performed by: ANESTHESIOLOGY

## 2020-10-06 PROCEDURE — 1125F PR PAIN SEVERITY QUANTIFIED, PAIN PRESENT: ICD-10-PCS | Mod: HCNC,S$GLB,, | Performed by: ANESTHESIOLOGY

## 2020-10-06 PROCEDURE — 1159F MED LIST DOCD IN RCRD: CPT | Mod: HCNC,S$GLB,, | Performed by: ANESTHESIOLOGY

## 2020-10-06 PROCEDURE — 3077F PR MOST RECENT SYSTOLIC BLOOD PRESSURE >= 140 MM HG: ICD-10-PCS | Mod: HCNC,CPTII,S$GLB, | Performed by: ANESTHESIOLOGY

## 2020-10-06 PROCEDURE — 3080F DIAST BP >= 90 MM HG: CPT | Mod: HCNC,CPTII,S$GLB, | Performed by: ANESTHESIOLOGY

## 2020-10-06 PROCEDURE — 3080F PR MOST RECENT DIASTOLIC BLOOD PRESSURE >= 90 MM HG: ICD-10-PCS | Mod: HCNC,CPTII,S$GLB, | Performed by: ANESTHESIOLOGY

## 2020-10-06 PROCEDURE — 99999 PR PBB SHADOW E&M-EST. PATIENT-LVL V: ICD-10-PCS | Mod: PBBFAC,HCNC,, | Performed by: ANESTHESIOLOGY

## 2020-10-06 PROCEDURE — 99213 PR OFFICE/OUTPT VISIT, EST, LEVL III, 20-29 MIN: ICD-10-PCS | Mod: HCNC,S$GLB,, | Performed by: ANESTHESIOLOGY

## 2020-10-06 PROCEDURE — 3077F SYST BP >= 140 MM HG: CPT | Mod: HCNC,CPTII,S$GLB, | Performed by: ANESTHESIOLOGY

## 2020-10-06 PROCEDURE — 99999 PR PBB SHADOW E&M-EST. PATIENT-LVL V: CPT | Mod: PBBFAC,HCNC,, | Performed by: ANESTHESIOLOGY

## 2020-10-06 PROCEDURE — 1101F PT FALLS ASSESS-DOCD LE1/YR: CPT | Mod: HCNC,CPTII,S$GLB, | Performed by: ANESTHESIOLOGY

## 2020-10-06 PROCEDURE — 99213 OFFICE O/P EST LOW 20 MIN: CPT | Mod: HCNC,S$GLB,, | Performed by: ANESTHESIOLOGY

## 2020-10-06 PROCEDURE — 1159F PR MEDICATION LIST DOCUMENTED IN MEDICAL RECORD: ICD-10-PCS | Mod: HCNC,S$GLB,, | Performed by: ANESTHESIOLOGY

## 2020-10-06 RX ORDER — GABAPENTIN 100 MG/1
CAPSULE ORAL
Qty: 270 CAPSULE | Refills: 2 | Status: SHIPPED | OUTPATIENT
Start: 2020-10-06 | End: 2021-08-06 | Stop reason: ALTCHOICE

## 2020-10-06 NOTE — H&P (VIEW-ONLY)
"Subjective:      Patient ID: Keely Pizaror is a 75 y.o. female.    Chief Complaint: Hip Pain (right hip )    Referred by: No ref. provider found       Hip Pain   Pertinent negatives include no fever or itching.    She is here for follow-up status post right piriformis muscle injection under ultrasound guidance that gave her relief for 4 days only.  Previous injection helped her for much longer.  She had hip injection last year that worked very well.  She has mild degenerative disease of the hip joint and we reviewed the images with the patient.  The pain is localized to the anterior and lateral aspect of right hip joint.  No radicular component.  No bowel or bladder changes.  Methocarbamol not helping well and was requesting to change medications.      Past Medical History:   Diagnosis Date    CVA (cerebral vascular accident) 2011    Diabetes mellitus     Hypertension     Renal manifestation of secondary diabetes mellitus     S/P ORIF (open reduction internal fixation) fracture     Uterine cancer 2012       Past Surgical History:   Procedure Laterality Date    COLONOSCOPY N/A 3/31/2017    Procedure: COLONOSCOPY;  Surgeon: Chip Pak MD;  Location: [address]);  Service: Endoscopy;  Laterality: N/A;    FRACTURE SURGERY      right arm    HYSTERECTOMY      THORACENTESIS Left     TOTAL KNEE ARTHROPLASTY Left 2012       Review of patient's allergies indicates:   Allergen Reactions    Tomato (solanum lycopersicum) Itching       Current Outpatient Medications   Medication Sig Dispense Refill    amLODIPine (NORVASC) 10 MG tablet Take 1 tablet (10 mg total) by mouth once daily. 90 tablet 3    BD ALCOHOL SWABS PadM       BD ULTRA-FINE BRIGITTE PEN NEEDLE 32 gauge x 5/32" Ndle To use with Insulin pens 100 each 3    blood sugar diagnostic Strp 1 each by Misc.(Non-Drug; Combo Route) route 3 (three) times daily. 300 each 3    blood sugar diagnostic Strp To check BG 4 times daily, to use with " insurance preferred meter 120 strip 11    blood-glucose meter kit Use as instructed 1 each 0    blood-glucose meter Misc Use as directed 1 each 0    cholecalciferol, vitamin D3, (VITAMIN D3) 25 mcg (1,000 unit) capsule Take 1 capsule (1,000 Units total) by mouth once daily. 30 capsule 11    dulaglutide (TRULICITY) 0.75 mg/0.5 mL pen injector Inject 0.75 mg into the skin once a week. 4 pen 2    flu vacc [id number],65yr up,PF (FLUZONE HIGH-DOSE 2019-20, PF,) 180 mcg/0.5 mL Syrg inject into the muscle once 0.5 mL 0    insulin (LANTUS SOLOSTAR U-100 INSULIN) glargine 100 units/mL (3mL) SubQ pen Inject 14 Units into the skin every evening. Use 14 units at before bedtime 5 mL 10    lancets Misc 1 each by Misc.(Non-Drug; Combo Route) route 3 (three) times daily. 300 each 3    calcium carbonate-vitamin D3 600mg (1,500mg) -1,000 unit Cap Take 1 capsule by mouth once. for 1 dose 30 capsule 11    gabapentin (NEURONTIN) 100 MG capsule 1@ HS X 3 days then one 3X/day X 3 days then two 3X/day X3 days then 3caps 3X/day after.Stay@ most comfortable dose 270 capsule 2    lancets Misc To check blood glucose 4 times daily, to use with insurance preferred meter 120 each 11     Current Facility-Administered Medications   Medication Dose Route Frequency Provider Last Rate Last Dose    betamethasone acetate-betamethasone sodium phosphate injection 3 mg  3 mg Intramuscular 1 time in Clinic/HOD Echo Hebert MD        bupivacaine (PF) 0.25% (2.5 mg/ml) injection 25 mg  10 mL Intramuscular 1 time in Clinic/HOD Echo Hebert MD           Family History   Problem Relation Age of Onset    Cancer Brother 67        colon    Aneurysm Mother     Stroke Father     Cancer Sister         pancreatic    No Known Problems Daughter     No Known Problems Son     Cancer Brother         colon    Hypertension Brother     Liver disease Sister     Alcohol abuse Sister        Social History     Socioeconomic History    Marital status:       Spouse name: Not on file    Number of children: Not on file    Years of education: Not on file    Highest education level: Not on file   Occupational History    Not on file   Social Needs    Financial resource strain: Not on file    Food insecurity     Worry: Not on file     Inability: Not on file    Transportation needs     Medical: Not on file     Non-medical: Not on file   Tobacco Use    Smoking status: Former Smoker     Packs/day: 1.00     Years: 10.00     Pack years: 10.00     Types: Cigarettes     Quit date: 8/1/2010     Years since quitting: 10.1    Smokeless tobacco: Never Used   Substance and Sexual Activity    Alcohol use: No    Drug use: No    Sexual activity: Yes     Partners: Male   Lifestyle    Physical activity     Days per week: Not on file     Minutes per session: Not on file    Stress: Not on file   Relationships    Social connections     Talks on phone: Not on file     Gets together: Not on file     Attends Adventism service: Not on file     Active member of club or organization: Not on file     Attends meetings of clubs or organizations: Not on file     Relationship status: Not on file   Other Topics Concern    Not on file   Social History Narrative    Not on file           Review of Systems   Constitution: Negative for chills, fever, malaise/fatigue, weight gain and weight loss.   HENT: Negative for ear pain and hoarse voice.    Eyes: Negative for blurred vision, pain and visual disturbance.   Cardiovascular: Negative for chest pain, dyspnea on exertion and irregular heartbeat.   Respiratory: Negative for cough, shortness of breath and wheezing.    Endocrine: Negative for cold intolerance and heat intolerance.   Hematologic/Lymphatic: Negative for adenopathy and bleeding problem. Does not bruise/bleed easily.   Skin: Negative for color change, itching and rash.   Musculoskeletal: Negative for back pain and neck pain.   Gastrointestinal: Negative for change in bowel habit,  "diarrhea, hematemesis, hematochezia, melena and vomiting.   Genitourinary: Negative for flank pain, frequency, hematuria and urgency.   Neurological: Negative for difficulty with concentration, dizziness, headaches, loss of balance and seizures.   Psychiatric/Behavioral: Negative for altered mental status, depression and suicidal ideas. The patient is not nervous/anxious.    Allergic/Immunologic: Negative for HIV exposure.           Objective:   BP (!) 197/91 Comment: b/p medication taken in office at 9:15 am checked at 9:54am  Pulse 70   Temp 99 °F (37.2 °C) (Oral)   Resp 18   Ht 5' 3" (1.6 m)   Wt 66 kg (145 lb 8.1 oz)   BMI 25.77 kg/m²   Pain Disability Index Review:  Last 3 PDI Scores 10/6/2020 9/2/2020 2/3/2020   Pain Disability Index (PDI) 13 25 17     Normocephalic.  Atraumatic.  Affect appropriate.  Breathing unlabored.  Extra ocular muscles intact.           Ortho/SPM Exam    positive hip pain with internal external rotation and palpation along the joint line.  It hurt her in the groin as well when performing the range of motion  Assessment:       Encounter Diagnoses   Name Primary?    Hip pain Yes    Primary osteoarthritis of one hip, right          Plan:   We discussed with the patient the assessment and recommendations. The following is the plan we agreed on:  1.  Schedule patient for right hip joint injection under fluoroscopy.  2.  Start gabapentin and gradually increase.  The counseled the patient in details.  3.  Return as needed.  Otherwise follow-up 2 weeks after injection.  She is to continue doing physical therapy which she does at home because of transportation issues.      Keely was seen today for hip pain.    Diagnoses and all orders for this visit:    Hip pain  -     gabapentin (NEURONTIN) 100 MG capsule; 1@ HS X 3 days then one 3X/day X 3 days then two 3X/day X3 days then 3caps 3X/day after.Stay@ most comfortable dose    Primary osteoarthritis of one hip, right  -     gabapentin " (NEURONTIN) 100 MG capsule; 1@ HS X 3 days then one 3X/day X 3 days then two 3X/day X3 days then 3caps 3X/day after.Stay@ most comfortable dose

## 2020-10-06 NOTE — PROGRESS NOTES
"Subjective:      Patient ID: Keely Pizarro is a 75 y.o. female.    Chief Complaint: Hip Pain (right hip )    Referred by: No ref. provider found       Hip Pain   Pertinent negatives include no fever or itching.    She is here for follow-up status post right piriformis muscle injection under ultrasound guidance that gave her relief for 4 days only.  Previous injection helped her for much longer.  She had hip injection last year that worked very well.  She has mild degenerative disease of the hip joint and we reviewed the images with the patient.  The pain is localized to the anterior and lateral aspect of right hip joint.  No radicular component.  No bowel or bladder changes.  Methocarbamol not helping well and was requesting to change medications.      Past Medical History:   Diagnosis Date    CVA (cerebral vascular accident) 2011    Diabetes mellitus     Hypertension     Renal manifestation of secondary diabetes mellitus     S/P ORIF (open reduction internal fixation) fracture     Uterine cancer 2012       Past Surgical History:   Procedure Laterality Date    COLONOSCOPY N/A 3/31/2017    Procedure: COLONOSCOPY;  Surgeon: Chip Pak MD;  Location: [address]);  Service: Endoscopy;  Laterality: N/A;    FRACTURE SURGERY      right arm    HYSTERECTOMY      THORACENTESIS Left     TOTAL KNEE ARTHROPLASTY Left 2012       Review of patient's allergies indicates:   Allergen Reactions    Tomato (solanum lycopersicum) Itching       Current Outpatient Medications   Medication Sig Dispense Refill    amLODIPine (NORVASC) 10 MG tablet Take 1 tablet (10 mg total) by mouth once daily. 90 tablet 3    BD ALCOHOL SWABS PadM       BD ULTRA-FINE BRIGITTE PEN NEEDLE 32 gauge x 5/32" Ndle To use with Insulin pens 100 each 3    blood sugar diagnostic Strp 1 each by Misc.(Non-Drug; Combo Route) route 3 (three) times daily. 300 each 3    blood sugar diagnostic Strp To check BG 4 times daily, to use with " insurance preferred meter 120 strip 11    blood-glucose meter kit Use as instructed 1 each 0    blood-glucose meter Misc Use as directed 1 each 0    cholecalciferol, vitamin D3, (VITAMIN D3) 25 mcg (1,000 unit) capsule Take 1 capsule (1,000 Units total) by mouth once daily. 30 capsule 11    dulaglutide (TRULICITY) 0.75 mg/0.5 mL pen injector Inject 0.75 mg into the skin once a week. 4 pen 2    flu vacc [id number],65yr up,PF (FLUZONE HIGH-DOSE 2019-20, PF,) 180 mcg/0.5 mL Syrg inject into the muscle once 0.5 mL 0    insulin (LANTUS SOLOSTAR U-100 INSULIN) glargine 100 units/mL (3mL) SubQ pen Inject 14 Units into the skin every evening. Use 14 units at before bedtime 5 mL 10    lancets Misc 1 each by Misc.(Non-Drug; Combo Route) route 3 (three) times daily. 300 each 3    calcium carbonate-vitamin D3 600mg (1,500mg) -1,000 unit Cap Take 1 capsule by mouth once. for 1 dose 30 capsule 11    gabapentin (NEURONTIN) 100 MG capsule 1@ HS X 3 days then one 3X/day X 3 days then two 3X/day X3 days then 3caps 3X/day after.Stay@ most comfortable dose 270 capsule 2    lancets Misc To check blood glucose 4 times daily, to use with insurance preferred meter 120 each 11     Current Facility-Administered Medications   Medication Dose Route Frequency Provider Last Rate Last Dose    betamethasone acetate-betamethasone sodium phosphate injection 3 mg  3 mg Intramuscular 1 time in Clinic/HOD Echo Hebert MD        bupivacaine (PF) 0.25% (2.5 mg/ml) injection 25 mg  10 mL Intramuscular 1 time in Clinic/HOD Echo Hebert MD           Family History   Problem Relation Age of Onset    Cancer Brother 67        colon    Aneurysm Mother     Stroke Father     Cancer Sister         pancreatic    No Known Problems Daughter     No Known Problems Son     Cancer Brother         colon    Hypertension Brother     Liver disease Sister     Alcohol abuse Sister        Social History     Socioeconomic History    Marital status:       Spouse name: Not on file    Number of children: Not on file    Years of education: Not on file    Highest education level: Not on file   Occupational History    Not on file   Social Needs    Financial resource strain: Not on file    Food insecurity     Worry: Not on file     Inability: Not on file    Transportation needs     Medical: Not on file     Non-medical: Not on file   Tobacco Use    Smoking status: Former Smoker     Packs/day: 1.00     Years: 10.00     Pack years: 10.00     Types: Cigarettes     Quit date: 8/1/2010     Years since quitting: 10.1    Smokeless tobacco: Never Used   Substance and Sexual Activity    Alcohol use: No    Drug use: No    Sexual activity: Yes     Partners: Male   Lifestyle    Physical activity     Days per week: Not on file     Minutes per session: Not on file    Stress: Not on file   Relationships    Social connections     Talks on phone: Not on file     Gets together: Not on file     Attends Taoism service: Not on file     Active member of club or organization: Not on file     Attends meetings of clubs or organizations: Not on file     Relationship status: Not on file   Other Topics Concern    Not on file   Social History Narrative    Not on file           Review of Systems   Constitution: Negative for chills, fever, malaise/fatigue, weight gain and weight loss.   HENT: Negative for ear pain and hoarse voice.    Eyes: Negative for blurred vision, pain and visual disturbance.   Cardiovascular: Negative for chest pain, dyspnea on exertion and irregular heartbeat.   Respiratory: Negative for cough, shortness of breath and wheezing.    Endocrine: Negative for cold intolerance and heat intolerance.   Hematologic/Lymphatic: Negative for adenopathy and bleeding problem. Does not bruise/bleed easily.   Skin: Negative for color change, itching and rash.   Musculoskeletal: Negative for back pain and neck pain.   Gastrointestinal: Negative for change in bowel habit,  "diarrhea, hematemesis, hematochezia, melena and vomiting.   Genitourinary: Negative for flank pain, frequency, hematuria and urgency.   Neurological: Negative for difficulty with concentration, dizziness, headaches, loss of balance and seizures.   Psychiatric/Behavioral: Negative for altered mental status, depression and suicidal ideas. The patient is not nervous/anxious.    Allergic/Immunologic: Negative for HIV exposure.           Objective:   BP (!) 197/91 Comment: b/p medication taken in office at 9:15 am checked at 9:54am  Pulse 70   Temp 99 °F (37.2 °C) (Oral)   Resp 18   Ht 5' 3" (1.6 m)   Wt 66 kg (145 lb 8.1 oz)   BMI 25.77 kg/m²   Pain Disability Index Review:  Last 3 PDI Scores 10/6/2020 9/2/2020 2/3/2020   Pain Disability Index (PDI) 13 25 17     Normocephalic.  Atraumatic.  Affect appropriate.  Breathing unlabored.  Extra ocular muscles intact.           Ortho/SPM Exam    positive hip pain with internal external rotation and palpation along the joint line.  It hurt her in the groin as well when performing the range of motion  Assessment:       Encounter Diagnoses   Name Primary?    Hip pain Yes    Primary osteoarthritis of one hip, right          Plan:   We discussed with the patient the assessment and recommendations. The following is the plan we agreed on:  1.  Schedule patient for right hip joint injection under fluoroscopy.  2.  Start gabapentin and gradually increase.  The counseled the patient in details.  3.  Return as needed.  Otherwise follow-up 2 weeks after injection.  She is to continue doing physical therapy which she does at home because of transportation issues.      Keely was seen today for hip pain.    Diagnoses and all orders for this visit:    Hip pain  -     gabapentin (NEURONTIN) 100 MG capsule; 1@ HS X 3 days then one 3X/day X 3 days then two 3X/day X3 days then 3caps 3X/day after.Stay@ most comfortable dose    Primary osteoarthritis of one hip, right  -     gabapentin " (NEURONTIN) 100 MG capsule; 1@ HS X 3 days then one 3X/day X 3 days then two 3X/day X3 days then 3caps 3X/day after.Stay@ most comfortable dose

## 2020-10-08 ENCOUNTER — HOSPITAL ENCOUNTER (OUTPATIENT)
Facility: OTHER | Age: 75
Discharge: HOME | End: 2020-10-08
Attending: ANESTHESIOLOGY | Admitting: ANESTHESIOLOGY
Payer: MEDICARE

## 2020-10-08 VITALS
DIASTOLIC BLOOD PRESSURE: 88 MMHG | HEIGHT: 63 IN | WEIGHT: 145 LBS | OXYGEN SATURATION: 100 % | SYSTOLIC BLOOD PRESSURE: 173 MMHG | RESPIRATION RATE: 16 BRPM | TEMPERATURE: 99 F | BODY MASS INDEX: 25.69 KG/M2 | HEART RATE: 78 BPM

## 2020-10-08 DIAGNOSIS — M16.11 PRIMARY OSTEOARTHRITIS OF RIGHT HIP: Primary | ICD-10-CM

## 2020-10-08 DIAGNOSIS — G89.29 CHRONIC PAIN: ICD-10-CM

## 2020-10-08 LAB — POCT GLUCOSE: 172 MG/DL (ref 70–110)

## 2020-10-08 PROCEDURE — 82947 ASSAY GLUCOSE BLOOD QUANT: CPT | Mod: HCNC | Performed by: ANESTHESIOLOGY

## 2020-10-08 PROCEDURE — 77002 NEEDLE LOCALIZATION BY XRAY: CPT | Mod: 26,HCNC,, | Performed by: ANESTHESIOLOGY

## 2020-10-08 PROCEDURE — 63600175 PHARM REV CODE 636 W HCPCS: Mod: HCNC | Performed by: ANESTHESIOLOGY

## 2020-10-08 PROCEDURE — 20610 DRAIN/INJ JOINT/BURSA W/O US: CPT | Mod: HCNC,RT | Performed by: ANESTHESIOLOGY

## 2020-10-08 PROCEDURE — 20610 PR DRAIN/INJECT LARGE JOINT/BURSA: ICD-10-PCS | Mod: HCNC,RT,, | Performed by: ANESTHESIOLOGY

## 2020-10-08 PROCEDURE — 25000003 PHARM REV CODE 250: Mod: HCNC | Performed by: ANESTHESIOLOGY

## 2020-10-08 PROCEDURE — 77002 NEEDLE LOCALIZATION BY XRAY: CPT | Mod: HCNC | Performed by: ANESTHESIOLOGY

## 2020-10-08 PROCEDURE — 25500020 PHARM REV CODE 255: Mod: HCNC | Performed by: ANESTHESIOLOGY

## 2020-10-08 PROCEDURE — 77002 PR FLUOROSCOPIC GUIDANCE NEEDLE PLACEMENT: ICD-10-PCS | Mod: 26,HCNC,, | Performed by: ANESTHESIOLOGY

## 2020-10-08 PROCEDURE — 20610 DRAIN/INJ JOINT/BURSA W/O US: CPT | Mod: HCNC,RT,, | Performed by: ANESTHESIOLOGY

## 2020-10-08 RX ORDER — LIDOCAINE HYDROCHLORIDE 10 MG/ML
INJECTION INFILTRATION; PERINEURAL
Status: DISCONTINUED | OUTPATIENT
Start: 2020-10-08 | End: 2020-10-08 | Stop reason: HOSPADM

## 2020-10-08 RX ORDER — ALPRAZOLAM 0.5 MG/1
0.5 TABLET ORAL ONCE
Status: COMPLETED | OUTPATIENT
Start: 2020-10-08 | End: 2020-10-08

## 2020-10-08 RX ORDER — SODIUM CHLORIDE 9 MG/ML
500 INJECTION, SOLUTION INTRAVENOUS CONTINUOUS
Status: DISCONTINUED | OUTPATIENT
Start: 2020-10-08 | End: 2020-10-08 | Stop reason: HOSPADM

## 2020-10-08 RX ORDER — TRIAMCINOLONE ACETONIDE 40 MG/ML
INJECTION, SUSPENSION INTRA-ARTICULAR; INTRAMUSCULAR
Status: DISCONTINUED | OUTPATIENT
Start: 2020-10-08 | End: 2020-10-08 | Stop reason: HOSPADM

## 2020-10-08 RX ADMIN — ALPRAZOLAM 0.5 MG: 0.5 TABLET ORAL at 08:10

## 2020-10-08 NOTE — DISCHARGE INSTRUCTIONS

## 2020-10-08 NOTE — DISCHARGE SUMMARY
"Discharge Note  Short Stay      SUMMARY     Admit Date: 10/8/2020    Attending Physician: Echo Hebert      Discharge Physician: Echo Hebert      Discharge Date: 10/8/2020 9:28 AM    Procedure(s) (LRB):  INJECTION, JOINT RIGHT HIP (Right)    Final Diagnosis: Right hip pain [M25.551]  Osteoarthritis of right hip, unspecified osteoarthritis type [M16.11]    Disposition: Home or self care    Patient Instructions:   Current Discharge Medication List      CONTINUE these medications which have NOT CHANGED    Details   amLODIPine (NORVASC) 10 MG tablet Take 1 tablet (10 mg total) by mouth once daily.  Qty: 90 tablet, Refills: 3    Associated Diagnoses: Hypertension, essential      BD ALCOHOL SWABS PadM       BD ULTRA-FINE BRIGITTE PEN NEEDLE 32 gauge x 5/32" Ndle To use with Insulin pens  Qty: 100 each, Refills: 3    Associated Diagnoses: Uncontrolled type 2 diabetes mellitus with hyperglycemia      !! blood sugar diagnostic Strp 1 each by Misc.(Non-Drug; Combo Route) route 3 (three) times daily.  Qty: 300 each, Refills: 3    Comments: Please provide test strips covered by patient's insurance - 300 strips for 3 month supply  Associated Diagnoses: Uncontrolled type 2 diabetes mellitus with hyperglycemia      !! blood sugar diagnostic Strp To check BG 4 times daily, to use with insurance preferred meter  Qty: 120 strip, Refills: 11    Associated Diagnoses: Uncontrolled type 2 diabetes mellitus with hyperglycemia      blood-glucose meter kit Use as instructed  Qty: 1 each, Refills: 0    Comments: Please provide meter covered by patient's insurance  Associated Diagnoses: Uncontrolled type 2 diabetes mellitus with hyperglycemia      blood-glucose meter Misc Use as directed  Qty: 1 each, Refills: 0    Associated Diagnoses: Uncontrolled type 2 diabetes mellitus with hyperglycemia      calcium carbonate-vitamin D3 600mg (1,500mg) -1,000 unit Cap Take 1 capsule by mouth once. for 1 dose  Qty: 30 capsule, Refills: 11    Associated " Diagnoses: Osteopenia after menopause      cholecalciferol, vitamin D3, (VITAMIN D3) 25 mcg (1,000 unit) capsule Take 1 capsule (1,000 Units total) by mouth once daily.  Qty: 30 capsule, Refills: 11    Associated Diagnoses: Vitamin D deficiency      dulaglutide (TRULICITY) 0.75 mg/0.5 mL pen injector Inject 0.75 mg into the skin once a week.  Qty: 4 pen, Refills: 2    Associated Diagnoses: Type 2 diabetes mellitus with stage 3 chronic kidney disease, with long-term current use of insulin      flu vacc [id number],65yr up,PF (FLUZONE HIGH-DOSE 2019-20, PF,) 180 mcg/0.5 mL Syrg inject into the muscle once  Qty: 0.5 mL, Refills: 0      gabapentin (NEURONTIN) 100 MG capsule 1@ HS X 3 days then one 3X/day X 3 days then two 3X/day X3 days then 3caps 3X/day after.Stay@ most comfortable dose  Qty: 270 capsule, Refills: 2    Associated Diagnoses: Hip pain; Primary osteoarthritis of one hip, right      insulin (LANTUS SOLOSTAR U-100 INSULIN) glargine 100 units/mL (3mL) SubQ pen Inject 14 Units into the skin every evening. Use 14 units at before bedtime  Qty: 5 mL, Refills: 10    Associated Diagnoses: Uncontrolled type 2 diabetes mellitus with hyperglycemia      !! lancets Misc 1 each by Misc.(Non-Drug; Combo Route) route 3 (three) times daily.  Qty: 300 each, Refills: 3    Comments: Please provide lancets covered by patient's insurance - 300 lancets for 3 month supply  Associated Diagnoses: Uncontrolled type 2 diabetes mellitus with hyperglycemia      !! lancets Misc To check blood glucose 4 times daily, to use with insurance preferred meter  Qty: 120 each, Refills: 11    Associated Diagnoses: Uncontrolled type 2 diabetes mellitus with hyperglycemia       !! - Potential duplicate medications found. Please discuss with provider.              Discharge Diagnosis: Right hip pain [M25.551]  Osteoarthritis of right hip, unspecified osteoarthritis type [M16.11]  Condition on Discharge: Stable with no complications to procedure   Diet  on Discharge: Same as before.  Activity: as per instruction sheet.  Discharge to: Home with a responsible adult.  Follow up: 2-4 weeks       Please call my office or pager at [phone] if experienced any weakness or loss of sensation, fever > 101.5, pain uncontrolled with oral medications, persistent nausea/vomiting/or diarrhea, redness or drainage from the incisions, or any other worrisome concerns. If physician on call was not reached or could not communicate with our office for any reason please go to the nearest emergency department

## 2020-10-08 NOTE — OP NOTE
27- Hip Injection/Arthrogram  ANTERIOR APPROACH  Time-out taken to identify patient and procedure side prior to starting the procedure.   I attest that I have reviewed the patient's home medications prior to the procedure and no contraindication have been identified. I  re-evaluated the patient after the patient was positioned for the procedure in the procedure room immediately before the procedural time-out. The vital signs are current and represent the current state of the patient which has not significantly changed since the preprocedure assessment.                                                                                                                         Date of Service: 10/08/2020    PCP: Albin Cazares MD    Referring Physician:                                                                                             PROCEDURE:  Right hip joint injection under fluoroscopy.    REASON FOR PROCEDURE: Right hip pain [M25.551]  Osteoarthritis of right hip, unspecified osteoarthritis type [M16.11]     1. Primary osteoarthritis of right hip    2. Chronic pain      POSTOP DIAGNOSIS: Right hip pain [M25.551]  Osteoarthritis of right hip, unspecified osteoarthritis type [M16.11]  1. Primary osteoarthritis of right hip    2. Chronic pain        PHYSICIAN: Echo Hebert MD  ASSISTANTS: Ken Apodaca MD [facility] pain fellow                                                                                                 ANESTHESIA:  Xylocaine 1% 9ml with Sodium Bicarbonate 1ml. 3ml per site.                                                                                                              MEDICATIONS INJECTED:  Kenalog 20mg, bupivacaine 0.25% 2 mL (per side), and sterile saline 2ml (per side)                                                                                                                                     ESTIMATED BLOOD LOSS:  None.                                                                                                                               COMPLICATIONS:  None.     SEDATION: None                                                                                                                                    TECHNIQUE:  With the patient lying in the supine position the the femoral neck identified under fluoroscopy.  The area was prepped and draped in the usual       sterile fashion.  Local anesthetic was used, given by raising a wheal and    going down to the hub of the 27-gauge needle.  A 3-1/2 inch 22-gauge         needle was introduced under fluoroscopy until the tip reached the lateral aspect of the femoral neck.  When the tip of the needle was thought   to be in appropriate position, contrast was injected to confirm proper placement.  Medication was then injected slowly.  The patient tolerated the procedure well.                                                                                                                     PAIN BEFORE THE PROCEDURE: 5/10.                                                                                                                         PAIN AFTER THE PROCEDURE:  2/10.                                                                                                                          The patient was monitored for 20-30 minutes after the procedure and was      given post procedure and discharge instructions to follow at home.  The      patient is to follow-up with me in two weeks by calling.   The patient was discharged in a stable condtion.

## 2020-10-21 ENCOUNTER — TELEPHONE (OUTPATIENT)
Dept: HEMATOLOGY/ONCOLOGY | Facility: CLINIC | Age: 75
End: 2020-10-21

## 2020-10-21 ENCOUNTER — OFFICE VISIT (OUTPATIENT)
Dept: PAIN MEDICINE | Facility: CLINIC | Age: 75
End: 2020-10-21
Payer: MEDICARE

## 2020-10-21 VITALS
BODY MASS INDEX: 24.68 KG/M2 | DIASTOLIC BLOOD PRESSURE: 73 MMHG | HEIGHT: 63 IN | WEIGHT: 139.31 LBS | SYSTOLIC BLOOD PRESSURE: 132 MMHG | HEART RATE: 81 BPM | TEMPERATURE: 99 F

## 2020-10-21 DIAGNOSIS — M25.551 CHRONIC PAIN OF RIGHT HIP: ICD-10-CM

## 2020-10-21 DIAGNOSIS — G57.01 PIRIFORMIS SYNDROME OF RIGHT SIDE: ICD-10-CM

## 2020-10-21 DIAGNOSIS — G89.29 CHRONIC PAIN OF RIGHT HIP: ICD-10-CM

## 2020-10-21 DIAGNOSIS — M79.18 MYOFASCIAL PAIN: ICD-10-CM

## 2020-10-21 DIAGNOSIS — M16.11 PRIMARY OSTEOARTHRITIS OF RIGHT HIP: Primary | ICD-10-CM

## 2020-10-21 DIAGNOSIS — G89.4 CHRONIC PAIN DISORDER: ICD-10-CM

## 2020-10-21 PROCEDURE — 3008F BODY MASS INDEX DOCD: CPT | Mod: HCNC,CPTII,S$GLB, | Performed by: NURSE PRACTITIONER

## 2020-10-21 PROCEDURE — 3078F PR MOST RECENT DIASTOLIC BLOOD PRESSURE < 80 MM HG: ICD-10-PCS | Mod: HCNC,CPTII,S$GLB, | Performed by: NURSE PRACTITIONER

## 2020-10-21 PROCEDURE — 1101F PR PT FALLS ASSESS DOC 0-1 FALLS W/OUT INJ PAST YR: ICD-10-PCS | Mod: HCNC,CPTII,S$GLB, | Performed by: NURSE PRACTITIONER

## 2020-10-21 PROCEDURE — 99999 PR PBB SHADOW E&M-EST. PATIENT-LVL IV: CPT | Mod: PBBFAC,HCNC,, | Performed by: NURSE PRACTITIONER

## 2020-10-21 PROCEDURE — 3075F PR MOST RECENT SYSTOLIC BLOOD PRESS GE 130-139MM HG: ICD-10-PCS | Mod: HCNC,CPTII,S$GLB, | Performed by: NURSE PRACTITIONER

## 2020-10-21 PROCEDURE — 3008F PR BODY MASS INDEX (BMI) DOCUMENTED: ICD-10-PCS | Mod: HCNC,CPTII,S$GLB, | Performed by: NURSE PRACTITIONER

## 2020-10-21 PROCEDURE — 3075F SYST BP GE 130 - 139MM HG: CPT | Mod: HCNC,CPTII,S$GLB, | Performed by: NURSE PRACTITIONER

## 2020-10-21 PROCEDURE — 1125F AMNT PAIN NOTED PAIN PRSNT: CPT | Mod: HCNC,S$GLB,, | Performed by: NURSE PRACTITIONER

## 2020-10-21 PROCEDURE — 1159F PR MEDICATION LIST DOCUMENTED IN MEDICAL RECORD: ICD-10-PCS | Mod: HCNC,S$GLB,, | Performed by: NURSE PRACTITIONER

## 2020-10-21 PROCEDURE — 3078F DIAST BP <80 MM HG: CPT | Mod: HCNC,CPTII,S$GLB, | Performed by: NURSE PRACTITIONER

## 2020-10-21 PROCEDURE — 1101F PT FALLS ASSESS-DOCD LE1/YR: CPT | Mod: HCNC,CPTII,S$GLB, | Performed by: NURSE PRACTITIONER

## 2020-10-21 PROCEDURE — 1159F MED LIST DOCD IN RCRD: CPT | Mod: HCNC,S$GLB,, | Performed by: NURSE PRACTITIONER

## 2020-10-21 PROCEDURE — 99499 UNLISTED E&M SERVICE: CPT | Mod: S$GLB,,, | Performed by: NURSE PRACTITIONER

## 2020-10-21 PROCEDURE — 1125F PR PAIN SEVERITY QUANTIFIED, PAIN PRESENT: ICD-10-PCS | Mod: HCNC,S$GLB,, | Performed by: NURSE PRACTITIONER

## 2020-10-21 PROCEDURE — 99213 OFFICE O/P EST LOW 20 MIN: CPT | Mod: HCNC,S$GLB,, | Performed by: NURSE PRACTITIONER

## 2020-10-21 PROCEDURE — 99213 PR OFFICE/OUTPT VISIT, EST, LEVL III, 20-29 MIN: ICD-10-PCS | Mod: HCNC,S$GLB,, | Performed by: NURSE PRACTITIONER

## 2020-10-21 PROCEDURE — 99999 PR PBB SHADOW E&M-EST. PATIENT-LVL IV: ICD-10-PCS | Mod: PBBFAC,HCNC,, | Performed by: NURSE PRACTITIONER

## 2020-10-21 PROCEDURE — 99499 RISK ADDL DX/OHS AUDIT: ICD-10-PCS | Mod: S$GLB,,, | Performed by: NURSE PRACTITIONER

## 2020-10-21 NOTE — PROGRESS NOTES
Chronic patient Established Note (Follow up visit)      SUBJECTIVE:    Interval History 10/21/2020:  Keely Pizarro presents to the clinic for a follow-up appointment for follow up of hip pain. She is s/p right hip joint injection on 10/8/2020. She reports 50% relief of her right hip pain. She reports intermittent low back and hip pain that is tolerable at this time. She denies any radicular leg pain. She is currently taking Gabapentin with benefit. She denies any other health changes. Her pain today is 4/10.     Interval History 10/6/2020:  She is here for follow-up status post right piriformis muscle injection under ultrasound guidance that gave her relief for 4 days only.  Previous injection helped her for much longer.  She had hip injection last year that worked very well.  She has mild degenerative disease of the hip joint and we reviewed the images with the patient.  The pain is localized to the anterior and lateral aspect of right hip joint.  No radicular component.  No bowel or bladder changes.  Methocarbamol not helping well and was requesting to change medications.    Interval History 6/9/2020:  The patient present via telephone for telemedicine follow up. Pt continues to have R sided lower back/buttock pain into right hip. Pt has had prior pirformis injection which provided 100% relief fro aprpox 3 months then pain returned. Pt delayed procedure with concerns of coming into hospital setting secondary to Covid-19. Pt at this time feels pain is severe and would like to proceed with procedure. Pt denies any new areas of pain, denies any neurological changes, Denies loss of bowel/bladder and denies saddle paresthesias. She rates pain 6/10 today.     Interval History 5/20/2020:  Patient presents for telephone telemedicine visit.  Patient states she is having returning right lower/lateral buttock pain.  She states left side has eased but right side pain has returned.  She had prior right piriformis injection  or 3/4/2020 with near 100% resolution and was able to ambulate easier. She feels pain is returning to point where she would like a repeat injection. She continues to take  Robaxin 500mg QD, Tylenol 1000mg PRN, and do physician guided stretching with some mild relief. She voices her CBGs at home 117mg/dL at home. She denies new areas of pain or neurological changes, denies loss of B/B, denies saddle paresthesias. Patient rates pain 4/10 today.     Interval History 2/3/2020:  The patient returns to clinic today for follow up. She continues to report low back and bilateral buttock pain, right greater than left. She describes this pain as aching in nature. She denies any radicular leg pain. Her pain is worse with prolonged walking. She does report some improvement since last visit with stretching and Robaxin. She does report that her blood glucose levels have decreased since last visit. Her morning glucose levels are now in the 100s. She continues to follow up with her PCP. She denies any other health changes. Her pain today is 5/10.    HPI 1/2/2020:  Keely Pizarro is a 74 y.o. female who presents today with chronic low back pain. This pain started 6 months ago insidiously, has been progressively worsening but is only painful when she walks.  She describes a tired, achy, fatigue like pain that worsens the longer that she walks.  This pain is described in detail below.     Aggravating factors: walking     Mitigating factors: rest, advil, aleve     Previously seeing: Saw a doctor at [facility] where she received a one time steroid injection in both hips     Physical Therapy: did one session but had to stop for low immunity due to maintenance cancer treatment injections    Pain Disability Index Review:  Last 3 PDI Scores 10/21/2020 10/6/2020 9/2/2020   Pain Disability Index (PDI) 6 13 25       Pain Medications:  Gabapentin  Robaxin    Opioid Contract: no     report:  Not applicable    Pain Procedures:   3/4/2020-  "Right piriformis injection  9/2/2020- Right piriformis injection  10/8/2020- Right hip joint injection    Physical Therapy/Home Exercise: yes    Imaging:   Xray Hips 10/9/2019:  COMPARISON:  None 03/24/2017.     FINDINGS:  Mild DJD.  No fracture or dislocation.  No bone destruction identified     Impression:     See above    Allergies:   Review of patient's allergies indicates:   Allergen Reactions    Tomato (solanum lycopersicum) Itching       Current Medications:   Current Outpatient Medications   Medication Sig Dispense Refill    amLODIPine (NORVASC) 10 MG tablet Take 1 tablet (10 mg total) by mouth once daily. 90 tablet 3    BD ALCOHOL SWABS PadM       BD ULTRA-FINE BRIGITTE PEN NEEDLE 32 gauge x 5/32" Ndle To use with Insulin pens 100 each 3    blood sugar diagnostic Strp 1 each by Misc.(Non-Drug; Combo Route) route 3 (three) times daily. 300 each 3    blood sugar diagnostic Strp To check BG 4 times daily, to use with insurance preferred meter 120 strip 11    blood-glucose meter kit Use as instructed 1 each 0    blood-glucose meter Misc Use as directed 1 each 0    cholecalciferol, vitamin D3, (VITAMIN D3) 25 mcg (1,000 unit) capsule Take 1 capsule (1,000 Units total) by mouth once daily. 30 capsule 11    dulaglutide (TRULICITY) 0.75 mg/0.5 mL pen injector Inject 0.75 mg into the skin once a week. 4 pen 2    flu vacc [id number],65yr up,PF (FLUZONE HIGH-DOSE 2019-20, PF,) 180 mcg/0.5 mL Syrg inject into the muscle once 0.5 mL 0    gabapentin (NEURONTIN) 100 MG capsule 1@ HS X 3 days then one 3X/day X 3 days then two 3X/day X3 days then 3caps 3X/day after.Stay@ most comfortable dose 270 capsule 2    insulin (LANTUS SOLOSTAR U-100 INSULIN) glargine 100 units/mL (3mL) SubQ pen Inject 14 Units into the skin every evening. Use 14 units at before bedtime 5 mL 10    lancets Misc 1 each by Misc.(Non-Drug; Combo Route) route 3 (three) times daily. 300 each 3    lancets Misc To check blood glucose 4 times daily, " to use with insurance preferred meter 120 each 11    calcium carbonate-vitamin D3 600mg (1,500mg) -1,000 unit Cap Take 1 capsule by mouth once. for 1 dose 30 capsule 11     Current Facility-Administered Medications   Medication Dose Route Frequency Provider Last Rate Last Dose    betamethasone acetate-betamethasone sodium phosphate injection 3 mg  3 mg Intramuscular 1 time in Clinic/HOD Echo Hebert MD        bupivacaine (PF) 0.25% (2.5 mg/ml) injection 25 mg  10 mL Intramuscular 1 time in Clinic/HOD Echo Hebert MD           REVIEW OF SYSTEMS:    GENERAL:  No weight loss, malaise or fevers.  HEENT:  Negative for frequent or significant headaches.  NECK:  Negative for lumps, goiter, pain and significant neck swelling.  RESPIRATORY:  Negative for cough, wheezing or shortness of breath.  CARDIOVASCULAR:  Negative for chest pain, leg swelling or palpitations. HTN  GI:  Negative for abdominal discomfort, blood in stools or black stools or change in bowel habits.  MUSCULOSKELETAL:  See HPI.  SKIN:  Negative for lesions, rash, and itching.  PSYCH:  Negative for sleep disturbance, mood disorder and recent psychosocial stressors.  HEMATOLOGY/LYMPHOLOGY:  Negative for prolonged bleeding, bruising easily or swollen nodes.  NEURO:   No history of headaches, syncope, paralysis, seizures or tremors. HX of CVA  ENDO: Diabetes  All other reviewed and negative other than HPI.    Past Medical History:  Past Medical History:   Diagnosis Date    CVA (cerebral vascular accident) 2011    Diabetes mellitus     Hypertension     Renal manifestation of secondary diabetes mellitus     S/P ORIF (open reduction internal fixation) fracture     Uterine cancer 2012       Past Surgical History:  Past Surgical History:   Procedure Laterality Date    COLONOSCOPY N/A 3/31/2017    Procedure: COLONOSCOPY;  Surgeon: Chip Pak MD;  Location: [address];  Service: Endoscopy;  Laterality: N/A;    FRACTURE SURGERY      right arm     HYSTERECTOMY      INJECTION OF JOINT Right 10/8/2020    Procedure: INJECTION, JOINT RIGHT HIP;  Surgeon: Echo Hebert MD;  [facility]: [facility];  Service: Pain Management;  Laterality: Right;  INJECTION, JOINT RIGHT HIP    THORACENTESIS Left     TOTAL KNEE ARTHROPLASTY Left 2012       Family History:  Family History   Problem Relation Age of Onset    Cancer Brother 67        colon    Aneurysm Mother     Stroke Father     Cancer Sister         pancreatic    No Known Problems Daughter     No Known Problems Son     Cancer Brother         colon    Hypertension Brother     Liver disease Sister     Alcohol abuse Sister        Social History:  Social History     Socioeconomic History    Marital status:      Spouse name: Not on file    Number of children: Not on file    Years of education: Not on file    Highest education level: Not on file   Occupational History    Not on file   Social Needs    Financial resource strain: Not on file    Food insecurity     Worry: Not on file     Inability: Not on file    Transportation needs     Medical: Not on file     Non-medical: Not on file   Tobacco Use    Smoking status: Former Smoker     Packs/day: 1.00     Years: 10.00     Pack years: 10.00     Types: Cigarettes     Quit date: 8/1/2010     Years since quitting: 10.2    Smokeless tobacco: Never Used   Substance and Sexual Activity    Alcohol use: No    Drug use: No    Sexual activity: Yes     Partners: Male   Lifestyle    Physical activity     Days per week: Not on file     Minutes per session: Not on file    Stress: Not on file   Relationships    Social connections     Talks on phone: Not on file     Gets together: Not on file     Attends Jainism service: Not on file     Active member of club or organization: Not on file     Attends meetings of clubs or organizations: Not on file     Relationship status: Not on file   Other Topics Concern    Not on file   Social History Narrative    Not  "on file       OBJECTIVE:    /73   Pulse 81   Temp 98.7 °F (37.1 °C)   Ht 5' 3" (1.6 m)   Wt 63.2 kg (139 lb 5.3 oz)   BMI 24.68 kg/m²     PHYSICAL EXAMINATION:    General appearance: Well appearing, in no acute distress, alert and oriented x3.  Psych:  Mood and affect appropriate.  Skin: Skin color, texture, turgor normal, no rashes or lesions, in both upper and lower body.  Head/face:  Atraumatic, normocephalic. No palpable lymph nodes  Cor: RRR  Pulm: CTA  GI: Abdomen soft and non-tender.  Back: Straight leg raising in the sitting position is negative to radicular pain bilaterally. There is no pain with palpation over lumbar facet joints. Limited ROM with mild pain on extension. Negative facet loading bilaterally.  Extremities: Peripheral joint ROM is full and pain free without obvious instability or laxity in all four extremities. No deformities, edema, or skin discoloration. Good capillary refill.  Musculoskeletal: Mild pain with internal and external rotation of right hip. There is no pain with palpation over SI joints. FABERs is negative bilaterally. Bilateral lower extremity strength is normal and symmetric.  No atrophy or tone abnormalities are noted.  Neuro: Bilateral lower extremity coordination and muscle stretch reflexes are physiologic and symmetric.  Plantar response are downgoing. No loss of sensation is noted.  Gait: Antalgic- ambulates without assistance.     ASSESSMENT: 75 y.o. year old female with hip pain, consistent with the followin. Primary osteoarthritis of right hip     2. Chronic pain of right hip     3. Piriformis syndrome of right side     4. Myofascial pain     5. Chronic pain disorder           PLAN:     - Previous imaging was reviewed and discussed with the patient today.    - She is s/p right hip joint injection with benefit. We can repeat this as needed.     - Continue Gabapentin.     - I have stressed the importance of physical activity and a home exercise plan " to help with pain and improve health.    - RTC in 3 months.     - Counseled patient regarding the importance of activity modification.    - Dr. Hebert was consulted on the patient and agrees with this plan.    The above plan and management options were discussed at length with patient. Patient is in agreement with the above and verbalized understanding.    Josefina Schultz  10/21/2020

## 2020-10-22 ENCOUNTER — LAB VISIT (OUTPATIENT)
Dept: LAB | Facility: HOSPITAL | Age: 75
End: 2020-10-22
Payer: MEDICARE

## 2020-10-22 ENCOUNTER — OFFICE VISIT (OUTPATIENT)
Dept: HEMATOLOGY/ONCOLOGY | Facility: CLINIC | Age: 75
End: 2020-10-22
Payer: MEDICARE

## 2020-10-22 VITALS
RESPIRATION RATE: 16 BRPM | WEIGHT: 139.44 LBS | HEIGHT: 63 IN | HEART RATE: 82 BPM | BODY MASS INDEX: 24.71 KG/M2 | SYSTOLIC BLOOD PRESSURE: 148 MMHG | TEMPERATURE: 98 F | DIASTOLIC BLOOD PRESSURE: 73 MMHG | OXYGEN SATURATION: 100 %

## 2020-10-22 DIAGNOSIS — C82.08 FOLLICULAR LYMPHOMA GRADE I, LYMPH NODES OF MULTIPLE SITES: Primary | ICD-10-CM

## 2020-10-22 DIAGNOSIS — C82.08 FOLLICULAR LYMPHOMA GRADE I, LYMPH NODES OF MULTIPLE SITES: ICD-10-CM

## 2020-10-22 LAB
ALBUMIN SERPL BCP-MCNC: 3.1 G/DL (ref 3.5–5.2)
ALP SERPL-CCNC: 89 U/L (ref 55–135)
ALT SERPL W/O P-5'-P-CCNC: 15 U/L (ref 10–44)
ANION GAP SERPL CALC-SCNC: 8 MMOL/L (ref 8–16)
ANISOCYTOSIS BLD QL SMEAR: SLIGHT
AST SERPL-CCNC: 18 U/L (ref 10–40)
BASOPHILS NFR BLD: 1 % (ref 0–1.9)
BILIRUB SERPL-MCNC: 0.3 MG/DL (ref 0.1–1)
BUN SERPL-MCNC: 29 MG/DL (ref 8–23)
CALCIUM SERPL-MCNC: 8.7 MG/DL (ref 8.7–10.5)
CHLORIDE SERPL-SCNC: 115 MMOL/L (ref 95–110)
CO2 SERPL-SCNC: 19 MMOL/L (ref 23–29)
CREAT SERPL-MCNC: 1.4 MG/DL (ref 0.5–1.4)
DIFFERENTIAL METHOD: ABNORMAL
EOSINOPHIL NFR BLD: 1 % (ref 0–8)
ERYTHROCYTE [DISTWIDTH] IN BLOOD BY AUTOMATED COUNT: 13.7 % (ref 11.5–14.5)
EST. GFR  (AFRICAN AMERICAN): 43 ML/MIN/1.73 M^2
EST. GFR  (NON AFRICAN AMERICAN): 37.3 ML/MIN/1.73 M^2
GLUCOSE SERPL-MCNC: 152 MG/DL (ref 70–110)
HCT VFR BLD AUTO: 33.2 % (ref 37–48.5)
HGB BLD-MCNC: 9.7 G/DL (ref 12–16)
IMM GRANULOCYTES # BLD AUTO: ABNORMAL K/UL (ref 0–0.04)
IMM GRANULOCYTES NFR BLD AUTO: ABNORMAL % (ref 0–0.5)
LDH SERPL L TO P-CCNC: 308 U/L (ref 110–260)
LYMPHOCYTES NFR BLD: 39 % (ref 18–48)
MCH RBC QN AUTO: 28.1 PG (ref 27–31)
MCHC RBC AUTO-ENTMCNC: 29.2 G/DL (ref 32–36)
MCV RBC AUTO: 96 FL (ref 82–98)
MONOCYTES NFR BLD: 13 % (ref 4–15)
NEUTROPHILS NFR BLD: 45 % (ref 38–73)
NEUTS BAND NFR BLD MANUAL: 1 %
NRBC BLD-RTO: 0 /100 WBC
OVALOCYTES BLD QL SMEAR: ABNORMAL
PLATELET # BLD AUTO: 172 K/UL (ref 150–350)
PLATELET BLD QL SMEAR: ABNORMAL
PMV BLD AUTO: 10.4 FL (ref 9.2–12.9)
POIKILOCYTOSIS BLD QL SMEAR: SLIGHT
POLYCHROMASIA BLD QL SMEAR: ABNORMAL
POTASSIUM SERPL-SCNC: 4.8 MMOL/L (ref 3.5–5.1)
PROT SERPL-MCNC: 5.9 G/DL (ref 6–8.4)
RBC # BLD AUTO: 3.45 M/UL (ref 4–5.4)
SODIUM SERPL-SCNC: 142 MMOL/L (ref 136–145)
WBC # BLD AUTO: 2.74 K/UL (ref 3.9–12.7)

## 2020-10-22 PROCEDURE — 99999 PR PBB SHADOW E&M-EST. PATIENT-LVL IV: CPT | Mod: PBBFAC,HCNC,, | Performed by: INTERNAL MEDICINE

## 2020-10-22 PROCEDURE — 1101F PR PT FALLS ASSESS DOC 0-1 FALLS W/OUT INJ PAST YR: ICD-10-PCS | Mod: HCNC,CPTII,S$GLB, | Performed by: INTERNAL MEDICINE

## 2020-10-22 PROCEDURE — 99215 PR OFFICE/OUTPT VISIT, EST, LEVL V, 40-54 MIN: ICD-10-PCS | Mod: HCNC,S$GLB,, | Performed by: INTERNAL MEDICINE

## 2020-10-22 PROCEDURE — 1101F PT FALLS ASSESS-DOCD LE1/YR: CPT | Mod: HCNC,CPTII,S$GLB, | Performed by: INTERNAL MEDICINE

## 2020-10-22 PROCEDURE — 1159F PR MEDICATION LIST DOCUMENTED IN MEDICAL RECORD: ICD-10-PCS | Mod: HCNC,S$GLB,, | Performed by: INTERNAL MEDICINE

## 2020-10-22 PROCEDURE — 85027 COMPLETE CBC AUTOMATED: CPT | Mod: HCNC

## 2020-10-22 PROCEDURE — 1126F PR PAIN SEVERITY QUANTIFIED, NO PAIN PRESENT: ICD-10-PCS | Mod: HCNC,S$GLB,, | Performed by: INTERNAL MEDICINE

## 2020-10-22 PROCEDURE — 3078F PR MOST RECENT DIASTOLIC BLOOD PRESSURE < 80 MM HG: ICD-10-PCS | Mod: HCNC,CPTII,S$GLB, | Performed by: INTERNAL MEDICINE

## 2020-10-22 PROCEDURE — 3008F PR BODY MASS INDEX (BMI) DOCUMENTED: ICD-10-PCS | Mod: HCNC,CPTII,S$GLB, | Performed by: INTERNAL MEDICINE

## 2020-10-22 PROCEDURE — 83615 LACTATE (LD) (LDH) ENZYME: CPT | Mod: HCNC

## 2020-10-22 PROCEDURE — 99215 OFFICE O/P EST HI 40 MIN: CPT | Mod: HCNC,S$GLB,, | Performed by: INTERNAL MEDICINE

## 2020-10-22 PROCEDURE — 99499 UNLISTED E&M SERVICE: CPT | Mod: HCNC,S$GLB,, | Performed by: INTERNAL MEDICINE

## 2020-10-22 PROCEDURE — 3077F SYST BP >= 140 MM HG: CPT | Mod: HCNC,CPTII,S$GLB, | Performed by: INTERNAL MEDICINE

## 2020-10-22 PROCEDURE — 3008F BODY MASS INDEX DOCD: CPT | Mod: HCNC,CPTII,S$GLB, | Performed by: INTERNAL MEDICINE

## 2020-10-22 PROCEDURE — 36415 COLL VENOUS BLD VENIPUNCTURE: CPT | Mod: HCNC

## 2020-10-22 PROCEDURE — 1159F MED LIST DOCD IN RCRD: CPT | Mod: HCNC,S$GLB,, | Performed by: INTERNAL MEDICINE

## 2020-10-22 PROCEDURE — 80053 COMPREHEN METABOLIC PANEL: CPT | Mod: HCNC

## 2020-10-22 PROCEDURE — 1126F AMNT PAIN NOTED NONE PRSNT: CPT | Mod: HCNC,S$GLB,, | Performed by: INTERNAL MEDICINE

## 2020-10-22 PROCEDURE — 3078F DIAST BP <80 MM HG: CPT | Mod: HCNC,CPTII,S$GLB, | Performed by: INTERNAL MEDICINE

## 2020-10-22 PROCEDURE — 99999 PR PBB SHADOW E&M-EST. PATIENT-LVL IV: ICD-10-PCS | Mod: PBBFAC,HCNC,, | Performed by: INTERNAL MEDICINE

## 2020-10-22 PROCEDURE — 3077F PR MOST RECENT SYSTOLIC BLOOD PRESSURE >= 140 MM HG: ICD-10-PCS | Mod: HCNC,CPTII,S$GLB, | Performed by: INTERNAL MEDICINE

## 2020-10-22 PROCEDURE — 85007 BL SMEAR W/DIFF WBC COUNT: CPT | Mod: HCNC

## 2020-10-22 PROCEDURE — 99499 RISK ADDL DX/OHS AUDIT: ICD-10-PCS | Mod: HCNC,S$GLB,, | Performed by: INTERNAL MEDICINE

## 2020-10-22 NOTE — PROGRESS NOTES
Subjective:       Patient ID: Keely Pizarro is a 75 y.o. female.    Chief Complaint: No chief complaint on file.    HPI   Onc Hx:  Keely presents with her  for evaluation of newly diagnosed grade 1-2 follicular lymphoma diagnosed by excisional biopsy of a right cervical lymph node 8/1/2017.  Pathologic diagnosis was received by [facility].  Keely reports feeling fatigue since March 2017. At the time she had an infected tooth and developed cervical lymphadenopathy. She started loosing weight, total quantity to date is unknown. Appetite remains normal and she denies night sweats. She then developed shortness of breath worse with activity and left lower lung pleural effusion was noted. The received thoracentesis twice with negative pleural fluid flow cytometry. Cytology was positive for lymphocytes, but was not diagnostic for follicular lymphoma. CT imaging demonstrates diffuse, bilateral submandibular, cervical, axillary, supraclavicular, and intrathoracic lymphadenopathy.     Keely has a history of uterine cancer in 2012 treated with radiation and chemotherapy. She had a port-a-cath at that time for treatment. She has insulin dependent diabetes mellitus with poorly controlled blood sugar, reporting both high and lows. She has hypertension, and blood pressure was elevated at intake today.      Follow-up Visit 9/1/17  PET imaging shows stage 4 follicular lymphoma, large left side pleural effusion     Follow-up visit 3/15/18  Return visit to review results of post-treatment staging scan that shows complete remission after 6 cycles of BR chemotherapy. She continues to have weight loss- 7lbs since last visit with waxing/waning appetite.     Follow-up Visit 11/9/18   Interval follow-up for follicular lymphoma. Currently on maintenance Rituxan after BR for 6 cycles with complete response.    Today: 10/22/2020  Interval follow-up for follicular lymphoma after  Rituxan after BR for 6 cycles with CR and  Cycle 12 maintenance (every 2 months) Hycela finished July 2020.  CBC, CMP, and LDH are stable; chronic waxing/wanting leukopenia and anemia.   is now home after open heart surgery and 3 months hospital stay for ruptured aortic aneurysm. He is recovering slowly.  Receiving injections to right hip every 6 months for pain control       Review of Systems   Constitutional: Negative.    HENT: Negative.    Eyes: Negative.    Respiratory: Negative.    Cardiovascular: Negative.    Gastrointestinal: Negative.    Endocrine: Negative.    Genitourinary: Negative.    Musculoskeletal: Negative.    Integumentary:  Negative.   Allergic/Immunologic: Negative for environmental allergies, food allergies and immunocompromised state.   Neurological: Negative.    Hematological: Negative for adenopathy. Does not bruise/bleed easily.   Psychiatric/Behavioral: Negative.          Objective:      Physical Exam  Vitals signs and nursing note reviewed.   Constitutional:       Appearance: She is well-developed.   HENT:      Head: Normocephalic and atraumatic.      Right Ear: External ear normal.      Left Ear: External ear normal.      Nose: Nose normal.      Mouth/Throat:      Pharynx: No oropharyngeal exudate.   Eyes:      General: No scleral icterus.     Conjunctiva/sclera: Conjunctivae normal.      Pupils: Pupils are equal, round, and reactive to light.   Neck:      Musculoskeletal: Normal range of motion and neck supple.      Thyroid: No thyromegaly.      Vascular: No JVD.      Trachea: No tracheal deviation.   Cardiovascular:      Rate and Rhythm: Normal rate and regular rhythm.      Heart sounds: Normal heart sounds. No murmur.   Pulmonary:      Effort: Pulmonary effort is normal. No respiratory distress.      Breath sounds: Normal breath sounds.   Abdominal:      General: Bowel sounds are normal. There is no distension.      Palpations: Abdomen is soft. There is no mass.      Tenderness: There is no abdominal tenderness. There is  no guarding or rebound.   Musculoskeletal: Normal range of motion.   Lymphadenopathy:      Cervical: No cervical adenopathy.      Upper Body:      Right upper body: No supraclavicular adenopathy.      Left upper body: No supraclavicular adenopathy.   Skin:     General: Skin is warm and dry.      Findings: No rash.      Nails: There is no clubbing.     Neurological:      Mental Status: She is alert and oriented to person, place, and time.      Cranial Nerves: No cranial nerve deficit.   Psychiatric:         Behavior: Behavior normal.         Thought Content: Thought content normal.         Judgment: Judgment normal.         Assessment:       1. Follicular lymphoma grade i, lymph nodes of multiple sites        Plan:       Now on observation after completing BR induction and 2 years Rituxan maintenance    Return visit in 4 months with CBC, CMP and LDH

## 2020-11-11 NOTE — PROGRESS NOTES
Subjective:      Patient ID: Keely Pizarro is a 75 y.o. female.    Chief Complaint:  Follow-up    History of Present Illness  Keely Pizarro is here for follow up of T2DM, osteopenia, vit D def, and abnormal TFTs.  Previously seen by me on 8/14/2020.    With regards to diabetes:    Diagnosed: 2001  Known complications:  DKA -  RN -  PN -  Nephropathy +  CAD -    Current regimen:  Lantus 14units nightly  Trulicity 0.75mg weekly     Out of the above medications for one month.     Other medications tried:  MFM  Glipizide  Novolog - stopped end of August 2020 due to running out and not being able to afford it    Glucose Monitor:   2 times a day testing  Log reviewed: oral recall  Fasting: ~200  Before lunch: 135    She reports that when she was on the above regimen glucose was consistently 100-150, denied hypoglycemia.    Hypoglycemia:  Denies.  Knows how to correct with 15 grams of carbs- juice, coke, or a peppermint.     Diet/Exercise:  Eats 3 meals a day.   B: toast, egg, yogurt  L: tuna sandwich  D: fried pork chop, rice and gravy, green beans   Snacks : watermelon, apples  Drinks : water.   Exercise: walking.       Education - last visit: 12/2019  Eye Exam: 9/2019  Podiatry: None    Diabetes Management Status    Hemoglobin A1C   Date Value Ref Range Status   11/13/2020 7.4 (H) 4.0 - 5.6 % Final     Comment:     ADA Screening Guidelines:  5.7-6.4%  Consistent with prediabetes  >or=6.5%  Consistent with diabetes  High levels of fetal hemoglobin interfere with the HbA1C  assay. Heterozygous hemoglobin variants (HbS, HgC, etc)do  not significantly interfere with this assay.   However, presence of multiple variants may affect accuracy.     07/17/2020 6.8 (H) 4.0 - 5.6 % Final     Comment:     ADA Screening Guidelines:  5.7-6.4%  Consistent with prediabetes  >or=6.5%  Consistent with diabetes  High levels of fetal hemoglobin interfere with the HbA1C  assay. Heterozygous hemoglobin variants (HbS, HgC, etc)do  not  significantly interfere with this assay.   However, presence of multiple variants may affect accuracy.     03/05/2020 7.1 (H) 4.0 - 5.6 % Final     Comment:     ADA Screening Guidelines:  5.7-6.4%  Consistent with prediabetes  >or=6.5%  Consistent with diabetes  High levels of fetal hemoglobin interfere with the HbA1C  assay. Heterozygous hemoglobin variants (HbS, HgC, etc)do  not significantly interfere with this assay.   However, presence of multiple variants may affect accuracy.       Statin: Not taking  ACE/ARB: Not taking  Screening or Prevention Patient's value Goal Complete/Controlled?   HgA1C Testing and Control   Lab Results   Component Value Date    HGBA1C 7.4 (H) 11/13/2020      Annually/Less than 8% Yes   Lipid profile : 03/05/2020 Annually Yes   LDL control Lab Results   Component Value Date    LDLCALC 91.8 03/05/2020    Annually/Less than 100 mg/dl  Yes   Nephropathy screening No results found for: LABMICR  No results found for: PROTEINUA Annually No   Blood pressure BP Readings from Last 1 Encounters:   11/16/20 (!) 150/82    Less than 140/90 No   Dilated retinal exam Most Recent Eye Exam Date: Not Found Annually Yes   Foot exam   : 12/13/2019 Annually Yes     With regards to osteopenia:    BMD 11/4/2019  1.  Mild osteopenia multiple sites.  2.  FRAX calculations do not support medical treatment for osteopenia.  RECOMMENDATIONS of [facility]:  1.  Calcium 4163-9677 mg daily and vitamin D 800-1000 units daily, adequate exercise  2.  No additional pharmacologic therapy recommended at this time.   3.  Consider repeat BMD in 2-4 years.    Fractures : Denies    Calcium : None  Vitamin D : OTC Vit D3 1000iu daily    With regards to Vitamin D Deficiency:    Vit D, 25-Hydroxy   Date Value Ref Range Status   07/17/2020 23 (L) 30 - 96 ng/mL Final     Comment:     Vitamin D deficiency.........<10 ng/mL                              Vitamin D insufficiency......10-29 ng/mL        Vitamin D sufficiency........> or equal to 30 ng/mL  Vitamin D toxicity............>100 ng/mL       Current Meds: OTC VIt D3 1000iu daily.     With regards to subclinical hyperthyroidism:    Lab Results   Component Value Date    TSH 0.485 07/17/2020    FREET4 0.98 03/05/2020     Current Symptoms:   - Palpations  - Weight loss  - Diarrhea  - Hair loss  - Brittle nails  - Skin changes  - Tremor   - Anxiety    Current medication:  None.    Any recent (3-6 months) iodine or contrast?  Denies    Smoke: Denies    Thyroid tenderness?   Denies    Graves Eye Clinical Activity: 3/7=Active  Eye pain? Denies  Eye pain with movement? Denies  Eyelid erythema? Denies  Erythema of conjunctiva? Denies  Caruncle inflammation? Denies  Eyelid swelling? Denies    Review of Systems   Constitutional: Negative for fatigue.   Eyes: Negative for visual disturbance.   Respiratory: Negative for shortness of breath.    Cardiovascular: Negative for chest pain.   Gastrointestinal: Negative for abdominal pain.   Musculoskeletal: Negative for arthralgias.   Skin: Negative for wound.   Neurological: Negative for headaches.   Hematological: Does not bruise/bleed easily.   Psychiatric/Behavioral: Negative for sleep disturbance.     Objective:   Physical Exam  Vitals signs reviewed.   Neck:      Thyroid: No thyromegaly (irregular shaped gland).   Cardiovascular:      Rate and Rhythm: Normal rate.      Comments: No edema present  Pulmonary:      Effort: Pulmonary effort is normal.   Abdominal:      Palpations: Abdomen is soft.       Appropriate footwear, foot exam deferred, done 12/2019.  Injection sites are without edema or erythema. No lipo hypertropthy or atrophy.    Visit Vitals  BP (!) 150/82   Pulse 80   Resp 16   Wt 63 kg (139 lb)   SpO2 98%   BMI 24.62 kg/m²       Body mass index is 24.62 kg/m².    Lab Review:   Lab Results   Component Value Date    HGBA1C 7.4 (H) 11/13/2020    HGBA1C 6.8 (H) 07/17/2020    HGBA1C 7.1 (H) 03/05/2020       Lab  Results   Component Value Date    CHOL 155 03/05/2020    HDL 53 03/05/2020    LDLCALC 91.8 03/05/2020    TRIG 51 03/05/2020    CHOLHDL 34.2 03/05/2020     Lab Results   Component Value Date     11/13/2020    K 4.4 11/13/2020     (H) 11/13/2020    CO2 19 (L) 11/13/2020     11/13/2020    BUN 24 (H) 11/13/2020    CREATININE 1.4 11/13/2020    CALCIUM 8.7 11/13/2020    PROT 5.6 (L) 11/13/2020    ALBUMIN 2.9 (L) 11/13/2020    BILITOT 0.3 11/13/2020    ALKPHOS 94 11/13/2020    AST 18 11/13/2020    ALT 17 11/13/2020    ANIONGAP 11 11/13/2020    ESTGFRAFRICA 43.0 (A) 11/13/2020    EGFRNONAA 37.3 (A) 11/13/2020    TSH 0.485 07/17/2020     Vit D, 25-Hydroxy   Date Value Ref Range Status   07/17/2020 23 (L) 30 - 96 ng/mL Final     Comment:     Vitamin D deficiency.........<10 ng/mL                              Vitamin D insufficiency......10-29 ng/mL       Vitamin D sufficiency........> or equal to 30 ng/mL  Vitamin D toxicity............>100 ng/mL       Assessment and Plan     1. Type 2 diabetes mellitus with stage 3 chronic kidney disease, with long-term current use of insulin, unspecified whether stage 3a or 3b CKD     2. Abnormal thyroid function test  US Soft Tissue Head Neck Thyroid   3. Vitamin D deficiency     4. Osteopenia after menopause     5. Stage 3 chronic kidney disease, unspecified whether stage 3a or 3b CKD     6. Essential hypertension     7. Follicular lymphoma grade i, lymph nodes of multiple sites       Type 2 diabetes mellitus with stage 3 chronic kidney disease, with long-term current use of insulin  -- Labs prior to follow up.  -- Attempted to get the patient enrolled in [facility]AP but the patient is not following up as directed. Again, provided the number to contact them.   -- A1c goal <7.5%.  -- Medications discussed:  MFM - stopped due to CKD  GLP1-DPP4   REYNOLDS   SGLT2  Insulin   -- Patient has been out of her DM medication for one month.  -- Reviewed logs/CGM:  Fasting  hyperglycemia.  Instructed to send glucose logs in 14 days.  Reach out to me sooner for any glucose <70 or consistently >200.  -- Medication Changes:   CONTINUE:  Lantus 14units nightly  Trulicity 0.75mg weekly    Medications are cost prohibitive.  Patient would like to contact [facility] to try to get her medications from them before starting vial and syringe. In depth discussion that she has to call them today and notify me ASAP. If unable to get her medication through [facility], we will start [facility] - Insulin NPH 6units in the morning and 5units in the evening. Discussed MOA, timing of administration and SE.  Provided vial and syringe education with return demonstration by the patient.     -- Reviewed goals of therapy are to get the best control we can without hypoglycemia.  -- Reviewed patient's current insulin regimen. Clarified proper insulin dose and timing in relation to meals, etc. Insulin injection sites and proper rotation instructed.    -- Advised frequent self blood glucose monitoring.  Patient encouraged to document glucose results and bring them to every clinic visit.  -- Hypoglycemia precautions discussed. Instructed on precautions before driving.    -- Call for Bg repeatedly < 90 or > 180.   -- Close adherence to lifestyle changes recommended.   -- Periodic follow ups for eye evaluations, foot care and dental care suggested.    Abnormal thyroid function test  -- TFTs currently unremarkable.    Vitamin D deficiency  -- Check vitamin D.  -- CONTINUE: OTC Vit D3 1000iu daily.    Osteopenia after menopause  -- BMD due 9/2021.  -- Reassuring she is not fracturing.    CKD (chronic kidney disease) stage 3, GFR 30-59 ml/min  -- Continue following with nephrology.    Essential hypertension  -- Elevated today. Patient reports she did not take her medication.   -- Discussed medication compliance.  -- BP goals discussed.    Follicular lymphoma grade i, lymph nodes of multiple sites  -- Continue following with  hem/onc.    Follow up in about 3 months (around 2/16/2021).

## 2020-11-13 ENCOUNTER — LAB VISIT (OUTPATIENT)
Dept: LAB | Facility: HOSPITAL | Age: 75
End: 2020-11-13
Payer: MEDICARE

## 2020-11-13 ENCOUNTER — PATIENT OUTREACH (OUTPATIENT)
Dept: ADMINISTRATIVE | Facility: OTHER | Age: 75
End: 2020-11-13

## 2020-11-13 DIAGNOSIS — E11.22 TYPE 2 DIABETES MELLITUS WITH STAGE 3 CHRONIC KIDNEY DISEASE, WITH LONG-TERM CURRENT USE OF INSULIN: ICD-10-CM

## 2020-11-13 DIAGNOSIS — Z79.4 TYPE 2 DIABETES MELLITUS WITH STAGE 3 CHRONIC KIDNEY DISEASE, WITH LONG-TERM CURRENT USE OF INSULIN: ICD-10-CM

## 2020-11-13 DIAGNOSIS — N18.30 TYPE 2 DIABETES MELLITUS WITH STAGE 3 CHRONIC KIDNEY DISEASE, WITH LONG-TERM CURRENT USE OF INSULIN: ICD-10-CM

## 2020-11-13 LAB
ALBUMIN SERPL BCP-MCNC: 2.9 G/DL (ref 3.5–5.2)
ALP SERPL-CCNC: 94 U/L (ref 55–135)
ALT SERPL W/O P-5'-P-CCNC: 17 U/L (ref 10–44)
ANION GAP SERPL CALC-SCNC: 11 MMOL/L (ref 8–16)
AST SERPL-CCNC: 18 U/L (ref 10–40)
BILIRUB SERPL-MCNC: 0.3 MG/DL (ref 0.1–1)
BUN SERPL-MCNC: 24 MG/DL (ref 8–23)
CALCIUM SERPL-MCNC: 8.7 MG/DL (ref 8.7–10.5)
CHLORIDE SERPL-SCNC: 113 MMOL/L (ref 95–110)
CO2 SERPL-SCNC: 19 MMOL/L (ref 23–29)
CREAT SERPL-MCNC: 1.4 MG/DL (ref 0.5–1.4)
EST. GFR  (AFRICAN AMERICAN): 43 ML/MIN/1.73 M^2
EST. GFR  (NON AFRICAN AMERICAN): 37.3 ML/MIN/1.73 M^2
ESTIMATED AVG GLUCOSE: 166 MG/DL (ref 68–131)
GLUCOSE SERPL-MCNC: 106 MG/DL (ref 70–110)
HBA1C MFR BLD HPLC: 7.4 % (ref 4–5.6)
POTASSIUM SERPL-SCNC: 4.4 MMOL/L (ref 3.5–5.1)
PROT SERPL-MCNC: 5.6 G/DL (ref 6–8.4)
SODIUM SERPL-SCNC: 143 MMOL/L (ref 136–145)

## 2020-11-13 PROCEDURE — 80053 COMPREHEN METABOLIC PANEL: CPT | Mod: HCNC

## 2020-11-13 PROCEDURE — 36415 COLL VENOUS BLD VENIPUNCTURE: CPT | Mod: HCNC

## 2020-11-13 PROCEDURE — 83036 HEMOGLOBIN GLYCOSYLATED A1C: CPT | Mod: HCNC

## 2020-11-13 NOTE — PROGRESS NOTES
Care Everywhere: updated  Immunization: updated, links delay   Health Maintenance: updated  Media Review:review for outside eye exam and mammogram report   Legacy Review:   Order placed:   Upcoming appts:

## 2020-11-16 ENCOUNTER — OFFICE VISIT (OUTPATIENT)
Dept: ENDOCRINOLOGY | Facility: CLINIC | Age: 75
End: 2020-11-16
Payer: MEDICARE

## 2020-11-16 VITALS
WEIGHT: 139 LBS | RESPIRATION RATE: 16 BRPM | BODY MASS INDEX: 24.62 KG/M2 | SYSTOLIC BLOOD PRESSURE: 150 MMHG | OXYGEN SATURATION: 98 % | DIASTOLIC BLOOD PRESSURE: 82 MMHG | HEART RATE: 80 BPM

## 2020-11-16 DIAGNOSIS — E11.22 TYPE 2 DIABETES MELLITUS WITH STAGE 3 CHRONIC KIDNEY DISEASE, WITH LONG-TERM CURRENT USE OF INSULIN, UNSPECIFIED WHETHER STAGE 3A OR 3B CKD: Primary | ICD-10-CM

## 2020-11-16 DIAGNOSIS — I10 ESSENTIAL HYPERTENSION: ICD-10-CM

## 2020-11-16 DIAGNOSIS — N18.30 TYPE 2 DIABETES MELLITUS WITH STAGE 3 CHRONIC KIDNEY DISEASE, WITH LONG-TERM CURRENT USE OF INSULIN, UNSPECIFIED WHETHER STAGE 3A OR 3B CKD: Primary | ICD-10-CM

## 2020-11-16 DIAGNOSIS — Z79.4 TYPE 2 DIABETES MELLITUS WITH STAGE 3 CHRONIC KIDNEY DISEASE, WITH LONG-TERM CURRENT USE OF INSULIN, UNSPECIFIED WHETHER STAGE 3A OR 3B CKD: Primary | ICD-10-CM

## 2020-11-16 DIAGNOSIS — R94.6 ABNORMAL THYROID FUNCTION TEST: ICD-10-CM

## 2020-11-16 DIAGNOSIS — Z78.0 OSTEOPENIA AFTER MENOPAUSE: ICD-10-CM

## 2020-11-16 DIAGNOSIS — C82.08 FOLLICULAR LYMPHOMA GRADE I, LYMPH NODES OF MULTIPLE SITES: ICD-10-CM

## 2020-11-16 DIAGNOSIS — M85.80 OSTEOPENIA AFTER MENOPAUSE: ICD-10-CM

## 2020-11-16 DIAGNOSIS — N18.30 STAGE 3 CHRONIC KIDNEY DISEASE, UNSPECIFIED WHETHER STAGE 3A OR 3B CKD: ICD-10-CM

## 2020-11-16 DIAGNOSIS — E55.9 VITAMIN D DEFICIENCY: ICD-10-CM

## 2020-11-16 PROCEDURE — 99499 RISK ADDL DX/OHS AUDIT: ICD-10-PCS | Mod: S$GLB,,, | Performed by: NURSE PRACTITIONER

## 2020-11-16 PROCEDURE — 99499 UNLISTED E&M SERVICE: CPT | Mod: S$GLB,,, | Performed by: NURSE PRACTITIONER

## 2020-11-16 PROCEDURE — 1126F AMNT PAIN NOTED NONE PRSNT: CPT | Mod: HCNC,S$GLB,, | Performed by: NURSE PRACTITIONER

## 2020-11-16 PROCEDURE — 3008F PR BODY MASS INDEX (BMI) DOCUMENTED: ICD-10-PCS | Mod: HCNC,CPTII,S$GLB, | Performed by: NURSE PRACTITIONER

## 2020-11-16 PROCEDURE — 99214 OFFICE O/P EST MOD 30 MIN: CPT | Mod: HCNC,S$GLB,, | Performed by: NURSE PRACTITIONER

## 2020-11-16 PROCEDURE — 3051F HG A1C>EQUAL 7.0%<8.0%: CPT | Mod: HCNC,CPTII,S$GLB, | Performed by: NURSE PRACTITIONER

## 2020-11-16 PROCEDURE — 3008F BODY MASS INDEX DOCD: CPT | Mod: HCNC,CPTII,S$GLB, | Performed by: NURSE PRACTITIONER

## 2020-11-16 PROCEDURE — 3051F PR MOST RECENT HEMOGLOBIN A1C LEVEL 7.0 - < 8.0%: ICD-10-PCS | Mod: HCNC,CPTII,S$GLB, | Performed by: NURSE PRACTITIONER

## 2020-11-16 PROCEDURE — 99214 PR OFFICE/OUTPT VISIT, EST, LEVL IV, 30-39 MIN: ICD-10-PCS | Mod: HCNC,S$GLB,, | Performed by: NURSE PRACTITIONER

## 2020-11-16 PROCEDURE — 99999 PR PBB SHADOW E&M-EST. PATIENT-LVL V: ICD-10-PCS | Mod: PBBFAC,HCNC,, | Performed by: NURSE PRACTITIONER

## 2020-11-16 PROCEDURE — 3079F PR MOST RECENT DIASTOLIC BLOOD PRESSURE 80-89 MM HG: ICD-10-PCS | Mod: HCNC,CPTII,S$GLB, | Performed by: NURSE PRACTITIONER

## 2020-11-16 PROCEDURE — 3077F PR MOST RECENT SYSTOLIC BLOOD PRESSURE >= 140 MM HG: ICD-10-PCS | Mod: HCNC,CPTII,S$GLB, | Performed by: NURSE PRACTITIONER

## 2020-11-16 PROCEDURE — 99999 PR PBB SHADOW E&M-EST. PATIENT-LVL V: CPT | Mod: PBBFAC,HCNC,, | Performed by: NURSE PRACTITIONER

## 2020-11-16 PROCEDURE — 1159F PR MEDICATION LIST DOCUMENTED IN MEDICAL RECORD: ICD-10-PCS | Mod: HCNC,S$GLB,, | Performed by: NURSE PRACTITIONER

## 2020-11-16 PROCEDURE — 1159F MED LIST DOCD IN RCRD: CPT | Mod: HCNC,S$GLB,, | Performed by: NURSE PRACTITIONER

## 2020-11-16 PROCEDURE — 3077F SYST BP >= 140 MM HG: CPT | Mod: HCNC,CPTII,S$GLB, | Performed by: NURSE PRACTITIONER

## 2020-11-16 PROCEDURE — 1126F PR PAIN SEVERITY QUANTIFIED, NO PAIN PRESENT: ICD-10-PCS | Mod: HCNC,S$GLB,, | Performed by: NURSE PRACTITIONER

## 2020-11-16 PROCEDURE — 3079F DIAST BP 80-89 MM HG: CPT | Mod: HCNC,CPTII,S$GLB, | Performed by: NURSE PRACTITIONER

## 2020-11-16 NOTE — ASSESSMENT & PLAN NOTE
-- Labs prior to follow up.  -- Attempted to get the patient enrolled in PAP but the patient is not following up as directed. Again, provided the number to contact them.   -- A1c goal <7.5%.  -- Medications discussed:  MFM - stopped due to CKD  GLP1-DPP4   REYNOLDS   SGLT2  Insulin   -- Patient has been out of her DM medication for one month.  -- Reviewed logs/CGM:  Fasting hyperglycemia.  Instructed to send glucose logs in 14 days.  Reach out to me sooner for any glucose <70 or consistently >200.  -- Medication Changes:   CONTINUE:  Lantus 14units nightly  Trulicity 0.75mg weekly    Medications are cost prohibitive.  Patient would like to contact [facility] to try to get her medications from them before starting vial and syringe. In depth discussion that she has to call them today and notify me ASAP. If unable to get her medication through [facility], we will start [facility] - Insulin NPH 6units in the morning and 5units in the evening. Discussed MOA, timing of administration and SE.  Provided vial and syringe education with return demonstration by the patient.     -- Reviewed goals of therapy are to get the best control we can without hypoglycemia.  -- Reviewed patient's current insulin regimen. Clarified proper insulin dose and timing in relation to meals, etc. Insulin injection sites and proper rotation instructed.    -- Advised frequent self blood glucose monitoring.  Patient encouraged to document glucose results and bring them to every clinic visit.  -- Hypoglycemia precautions discussed. Instructed on precautions before driving.    -- Call for Bg repeatedly < 90 or > 180.   -- Close adherence to lifestyle changes recommended.   -- Periodic follow ups for eye evaluations, foot care and dental care suggested.

## 2020-11-16 NOTE — PATIENT INSTRUCTIONS
Contact patient assistance.  Then contact me.  If you get your medication from them, restart at prescribed dosage.  If you do not get your medication from them, we will start insulin NPH.

## 2020-11-16 NOTE — ASSESSMENT & PLAN NOTE
-- Elevated today. Patient reports she did not take her medication.   -- Discussed medication compliance.  -- BP goals discussed.

## 2020-11-18 ENCOUNTER — TELEPHONE (OUTPATIENT)
Dept: ENDOCRINOLOGY | Facility: CLINIC | Age: 75
End: 2020-11-18

## 2020-11-18 ENCOUNTER — TELEPHONE (OUTPATIENT)
Dept: INTERNAL MEDICINE | Facility: CLINIC | Age: 75
End: 2020-11-18

## 2020-11-18 NOTE — TELEPHONE ENCOUNTER
lvm for pt to call office back in regards of     Bernarda Pizraro    This is Ceyonne from Dr. Albin Cazares MD office. We are reaching out because Dr. Albin Cazares MD would like for you to come in for a follow up appointment to check your blood pressure. We can also offer you to come in for an blood pressure check with our nurses. This appointment will be free to you. Please call the office back to schedule you an appointment.

## 2020-11-18 NOTE — TELEPHONE ENCOUNTER
----- Message from Almita Urbano sent at 11/18/2020  2:14 PM CST -----  Contact: Tel:        [phone]  Caller says she needs you to tell her what is it, that you want her to do about the medication.    Pls call to discuss this ref. The proof of income letter, so that she can get the medication free.   It. Will take 5-7 business days before she can get the letter from .   Doesn't know the name of the medication nor how much it costs.  Pls call with this info.

## 2020-11-23 ENCOUNTER — TELEPHONE (OUTPATIENT)
Dept: ENDOCRINOLOGY | Facility: CLINIC | Age: 75
End: 2020-11-23

## 2020-11-23 NOTE — TELEPHONE ENCOUNTER
----- Message from Carolina Moran NP sent at 2020  7:23 AM CST -----  Please call the patient with the followin. Did she submit her paperwork to patient assistance?  Thank you,   Carolina  ----- Message -----  From: Carolina Moran NP  Sent: 2020  To: Carolina Moran NP    Did she submit her stuff to [facility]?       Discharged

## 2020-11-23 NOTE — TELEPHONE ENCOUNTER
----- Message from Carolina Moran NP sent at 2020  7:23 AM CST -----  Please call the patient with the followin. Did she submit her paperwork to patient assistance?  Thank you,   Carolina  ----- Message -----  From: Carolina Moran NP  Sent: 2020  To: Carolina Moran NP    Did she submit her stuff to [facility]?

## 2020-11-23 NOTE — TELEPHONE ENCOUNTER
Spoke with pt and she stated that she did received a paer work. Advise pt to mail out paper work to [facility] .

## 2020-11-27 ENCOUNTER — HOSPITAL ENCOUNTER (OUTPATIENT)
Dept: RADIOLOGY | Facility: HOSPITAL | Age: 75
Discharge: HOME | End: 2020-11-27
Attending: NURSE PRACTITIONER
Payer: MEDICARE

## 2020-11-27 DIAGNOSIS — R94.6 ABNORMAL THYROID FUNCTION TEST: ICD-10-CM

## 2020-11-27 PROCEDURE — 76536 US EXAM OF HEAD AND NECK: CPT | Mod: 26,HCNC,, | Performed by: RADIOLOGY

## 2020-11-27 PROCEDURE — 76536 US EXAM OF HEAD AND NECK: CPT | Mod: TC,HCNC

## 2020-11-27 PROCEDURE — 76536 US SOFT TISSUE HEAD NECK THYROID: ICD-10-PCS | Mod: 26,HCNC,, | Performed by: RADIOLOGY

## 2020-12-01 ENCOUNTER — TELEPHONE (OUTPATIENT)
Dept: ENDOCRINOLOGY | Facility: CLINIC | Age: 75
End: 2020-12-01

## 2020-12-02 PROBLEM — E04.1 THYROID NODULE: Status: ACTIVE | Noted: 2020-12-02

## 2020-12-10 ENCOUNTER — PES CALL (OUTPATIENT)
Dept: ADMINISTRATIVE | Facility: CLINIC | Age: 75
End: 2020-12-10

## 2020-12-11 ENCOUNTER — PATIENT OUTREACH (OUTPATIENT)
Dept: ADMINISTRATIVE | Facility: HOSPITAL | Age: 75
End: 2020-12-11

## 2020-12-28 ENCOUNTER — OFFICE VISIT (OUTPATIENT)
Dept: ENDOCRINOLOGY | Facility: CLINIC | Age: 75
End: 2020-12-28
Payer: MEDICARE

## 2020-12-28 ENCOUNTER — PATIENT OUTREACH (OUTPATIENT)
Dept: ADMINISTRATIVE | Facility: OTHER | Age: 75
End: 2020-12-28

## 2020-12-28 VITALS
HEART RATE: 92 BPM | WEIGHT: 142.19 LBS | HEIGHT: 63 IN | SYSTOLIC BLOOD PRESSURE: 164 MMHG | BODY MASS INDEX: 25.2 KG/M2 | OXYGEN SATURATION: 97 % | DIASTOLIC BLOOD PRESSURE: 90 MMHG | RESPIRATION RATE: 19 BRPM

## 2020-12-28 DIAGNOSIS — E11.65 UNCONTROLLED TYPE 2 DIABETES MELLITUS WITH HYPERGLYCEMIA: ICD-10-CM

## 2020-12-28 DIAGNOSIS — E55.9 VITAMIN D DEFICIENCY: ICD-10-CM

## 2020-12-28 DIAGNOSIS — M85.80 OSTEOPENIA AFTER MENOPAUSE: ICD-10-CM

## 2020-12-28 DIAGNOSIS — R94.6 ABNORMAL THYROID FUNCTION TEST: ICD-10-CM

## 2020-12-28 DIAGNOSIS — Z79.4 TYPE 2 DIABETES MELLITUS WITH STAGE 3 CHRONIC KIDNEY DISEASE, WITH LONG-TERM CURRENT USE OF INSULIN, UNSPECIFIED WHETHER STAGE 3A OR 3B CKD: Primary | ICD-10-CM

## 2020-12-28 DIAGNOSIS — E04.1 THYROID NODULE: ICD-10-CM

## 2020-12-28 DIAGNOSIS — I10 ESSENTIAL HYPERTENSION: ICD-10-CM

## 2020-12-28 DIAGNOSIS — N18.30 TYPE 2 DIABETES MELLITUS WITH STAGE 3 CHRONIC KIDNEY DISEASE, WITH LONG-TERM CURRENT USE OF INSULIN, UNSPECIFIED WHETHER STAGE 3A OR 3B CKD: Primary | ICD-10-CM

## 2020-12-28 DIAGNOSIS — E11.22 TYPE 2 DIABETES MELLITUS WITH STAGE 3 CHRONIC KIDNEY DISEASE, WITH LONG-TERM CURRENT USE OF INSULIN, UNSPECIFIED WHETHER STAGE 3A OR 3B CKD: Primary | ICD-10-CM

## 2020-12-28 DIAGNOSIS — Z78.0 OSTEOPENIA AFTER MENOPAUSE: ICD-10-CM

## 2020-12-28 DIAGNOSIS — N18.30 STAGE 3 CHRONIC KIDNEY DISEASE, UNSPECIFIED WHETHER STAGE 3A OR 3B CKD: ICD-10-CM

## 2020-12-28 PROCEDURE — 3051F HG A1C>EQUAL 7.0%<8.0%: CPT | Mod: HCNC,CPTII,S$GLB, | Performed by: NURSE PRACTITIONER

## 2020-12-28 PROCEDURE — 3077F SYST BP >= 140 MM HG: CPT | Mod: HCNC,CPTII,S$GLB, | Performed by: NURSE PRACTITIONER

## 2020-12-28 PROCEDURE — 1125F AMNT PAIN NOTED PAIN PRSNT: CPT | Mod: HCNC,S$GLB,, | Performed by: NURSE PRACTITIONER

## 2020-12-28 PROCEDURE — 1101F PT FALLS ASSESS-DOCD LE1/YR: CPT | Mod: HCNC,CPTII,S$GLB, | Performed by: NURSE PRACTITIONER

## 2020-12-28 PROCEDURE — 99214 OFFICE O/P EST MOD 30 MIN: CPT | Mod: HCNC,S$GLB,, | Performed by: NURSE PRACTITIONER

## 2020-12-28 PROCEDURE — 99999 PR PBB SHADOW E&M-EST. PATIENT-LVL V: ICD-10-PCS | Mod: PBBFAC,HCNC,, | Performed by: NURSE PRACTITIONER

## 2020-12-28 PROCEDURE — 99499 RISK ADDL DX/OHS AUDIT: ICD-10-PCS | Mod: S$GLB,,, | Performed by: NURSE PRACTITIONER

## 2020-12-28 PROCEDURE — 3077F PR MOST RECENT SYSTOLIC BLOOD PRESSURE >= 140 MM HG: ICD-10-PCS | Mod: HCNC,CPTII,S$GLB, | Performed by: NURSE PRACTITIONER

## 2020-12-28 PROCEDURE — 3288F FALL RISK ASSESSMENT DOCD: CPT | Mod: HCNC,CPTII,S$GLB, | Performed by: NURSE PRACTITIONER

## 2020-12-28 PROCEDURE — 3080F DIAST BP >= 90 MM HG: CPT | Mod: HCNC,CPTII,S$GLB, | Performed by: NURSE PRACTITIONER

## 2020-12-28 PROCEDURE — 1159F PR MEDICATION LIST DOCUMENTED IN MEDICAL RECORD: ICD-10-PCS | Mod: HCNC,S$GLB,, | Performed by: NURSE PRACTITIONER

## 2020-12-28 PROCEDURE — 3008F PR BODY MASS INDEX (BMI) DOCUMENTED: ICD-10-PCS | Mod: HCNC,CPTII,S$GLB, | Performed by: NURSE PRACTITIONER

## 2020-12-28 PROCEDURE — 3008F BODY MASS INDEX DOCD: CPT | Mod: HCNC,CPTII,S$GLB, | Performed by: NURSE PRACTITIONER

## 2020-12-28 PROCEDURE — 1125F PR PAIN SEVERITY QUANTIFIED, PAIN PRESENT: ICD-10-PCS | Mod: HCNC,S$GLB,, | Performed by: NURSE PRACTITIONER

## 2020-12-28 PROCEDURE — 3288F PR FALLS RISK ASSESSMENT DOCUMENTED: ICD-10-PCS | Mod: HCNC,CPTII,S$GLB, | Performed by: NURSE PRACTITIONER

## 2020-12-28 PROCEDURE — 3080F PR MOST RECENT DIASTOLIC BLOOD PRESSURE >= 90 MM HG: ICD-10-PCS | Mod: HCNC,CPTII,S$GLB, | Performed by: NURSE PRACTITIONER

## 2020-12-28 PROCEDURE — 3051F PR MOST RECENT HEMOGLOBIN A1C LEVEL 7.0 - < 8.0%: ICD-10-PCS | Mod: HCNC,CPTII,S$GLB, | Performed by: NURSE PRACTITIONER

## 2020-12-28 PROCEDURE — 99499 UNLISTED E&M SERVICE: CPT | Mod: S$GLB,,, | Performed by: NURSE PRACTITIONER

## 2020-12-28 PROCEDURE — 99214 PR OFFICE/OUTPT VISIT, EST, LEVL IV, 30-39 MIN: ICD-10-PCS | Mod: HCNC,S$GLB,, | Performed by: NURSE PRACTITIONER

## 2020-12-28 PROCEDURE — 1159F MED LIST DOCD IN RCRD: CPT | Mod: HCNC,S$GLB,, | Performed by: NURSE PRACTITIONER

## 2020-12-28 PROCEDURE — 1101F PR PT FALLS ASSESS DOC 0-1 FALLS W/OUT INJ PAST YR: ICD-10-PCS | Mod: HCNC,CPTII,S$GLB, | Performed by: NURSE PRACTITIONER

## 2020-12-28 PROCEDURE — 99999 PR PBB SHADOW E&M-EST. PATIENT-LVL V: CPT | Mod: PBBFAC,HCNC,, | Performed by: NURSE PRACTITIONER

## 2020-12-28 RX ORDER — INSULIN GLARGINE 100 [IU]/ML
10 INJECTION, SOLUTION SUBCUTANEOUS NIGHTLY
Qty: 5 ML | Refills: 3
Start: 2020-12-28 | End: 2021-02-25

## 2020-12-28 NOTE — ASSESSMENT & PLAN NOTE
-- Elevated today. Patient reports she did not take her medication.   -- Discussed medication compliance, again.  -- BP goals discussed.

## 2020-12-28 NOTE — PROGRESS NOTES
Care Everywhere: updated  Immunization: updated, links delay   Health Maintenance: updated  Media Review: review for outside mammogram and eye exam report   Legacy Review:   Order placed:   Upcoming appts:

## 2020-12-28 NOTE — ASSESSMENT & PLAN NOTE
-- I have reviewed management options including observation, FNA or surgery.  -- FNA procedure explained in depth.  -- Discussed indications for repeat FNA as well as surgical indications (abnormal FNA, compressive symptoms or interval change).  -- Discussed possible results of FNA- Benign, Malignant, FLUS, or insufficient cells.  Discussed results auto released to patients, but advised the ordering provider will also contact to discuss.   -- Patient is not on any blood thinners.  -- If FNA is negative then plan RTO in 1 year with TSH and thyroid US prior.  -- All of the patients questions were answered.  -- Schedule FNA 1.8 TR 4 nodule in the midpole of the right lobe.

## 2020-12-28 NOTE — PROGRESS NOTES
Subjective:      Patient ID: Keely Pizarro is a 75 y.o. female.    Chief Complaint:  Thyroid Problem    History of Present Illness  Keely Pizarro is here for follow up of T2DM, osteopenia, vit D def, abnormal TFTs, and thyroid nodule.  Previously seen by me on 11/16/2020.    With regards to diabetes:    Diagnosed: 2001  Known complications:  DKA -  RN -  PN -  Nephropathy +  CAD -    Current regimen: PAP  Lantus 10units nightly    Other medications tried:  MFM  Glipizide  Novolog - stopped end of August 2020 due to running out and not being able to afford it  Trulicity - ran out    Glucose Monitor:   2 times a day testing  Log reviewed: log provided, scanned in media tab. Noting that she reports the dates on the log are wrong.     Hypoglycemia:  Denies.  Knows how to correct with 15 grams of carbs- juice, coke, or a peppermint.     Diet/Exercise:  Eats 3 meals a day.   B: toast, egg, yogurt  L: tuna sandwich  D: fried pork chop, rice and gravy, green beans   Snacks : watermelon, apples  Drinks : water.   Exercise: walking.       Education - last visit: 12/2019  Eye Exam: 9/2019  Podiatry: None    Diabetes Management Status    Hemoglobin A1C   Date Value Ref Range Status   11/13/2020 7.4 (H) 4.0 - 5.6 % Final     Comment:     ADA Screening Guidelines:  5.7-6.4%  Consistent with prediabetes  >or=6.5%  Consistent with diabetes  High levels of fetal hemoglobin interfere with the HbA1C  assay. Heterozygous hemoglobin variants (HbS, HgC, etc)do  not significantly interfere with this assay.   However, presence of multiple variants may affect accuracy.     07/17/2020 6.8 (H) 4.0 - 5.6 % Final     Comment:     ADA Screening Guidelines:  5.7-6.4%  Consistent with prediabetes  >or=6.5%  Consistent with diabetes  High levels of fetal hemoglobin interfere with the HbA1C  assay. Heterozygous hemoglobin variants (HbS, HgC, etc)do  not significantly interfere with this assay.   However, presence of multiple variants may  affect accuracy.     03/05/2020 7.1 (H) 4.0 - 5.6 % Final     Comment:     ADA Screening Guidelines:  5.7-6.4%  Consistent with prediabetes  >or=6.5%  Consistent with diabetes  High levels of fetal hemoglobin interfere with the HbA1C  assay. Heterozygous hemoglobin variants (HbS, HgC, etc)do  not significantly interfere with this assay.   However, presence of multiple variants may affect accuracy.       Statin: Not taking  ACE/ARB: Not taking  Screening or Prevention Patient's value Goal Complete/Controlled?   HgA1C Testing and Control   Lab Results   Component Value Date    HGBA1C 7.4 (H) 11/13/2020      Annually/Less than 8% Yes   Lipid profile : 03/05/2020 Annually Yes   LDL control Lab Results   Component Value Date    LDLCALC 91.8 03/05/2020    Annually/Less than 100 mg/dl  Yes   Nephropathy screening No results found for: LABMICR  No results found for: PROTEINUA Annually No   Blood pressure BP Readings from Last 1 Encounters:   12/28/20 (!) 164/90    Less than 140/90 No   Dilated retinal exam Most Recent Eye Exam Date: Not Found Annually Yes   Foot exam   : 12/13/2019 Annually Yes     With regards to osteopenia:    BMD 11/4/2019  1.  Mild osteopenia multiple sites.  2.  FRAX calculations do not support medical treatment for osteopenia.  RECOMMENDATIONS of [facility]:  1.  Calcium 9700-5765 mg daily and vitamin D 800-1000 units daily, adequate exercise  2.  No additional pharmacologic therapy recommended at this time.   3.  Consider repeat BMD in 2-4 years.    Fractures : Denies    Calcium : None  Vitamin D : OTC Vit D3 1000iu daily    With regards to Vitamin D Deficiency:    Vit D, 25-Hydroxy   Date Value Ref Range Status   07/17/2020 23 (L) 30 - 96 ng/mL Final     Comment:     Vitamin D deficiency.........<10 ng/mL                              Vitamin D insufficiency......10-29 ng/mL       Vitamin D sufficiency........> or equal to 30 ng/mL  Vitamin D  "toxicity............>100 ng/mL       Current Meds: OTC VIt D3 1000iu daily.     With regards to subclinical hyperthyroidism:    Lab Results   Component Value Date    TSH 0.485 07/17/2020    FREET4 0.98 03/05/2020     Current Symptoms:   - Palpations  - Weight loss  - Diarrhea  - Hair loss  - Brittle nails  - Skin changes  - Tremor   - Anxiety    Current medication:  None.    Any recent (3-6 months) iodine or contrast?  Denies    Smoke: Denies    Thyroid tenderness?   Denies    [facility] Clinical Activity: 3/7=Active  Eye pain? Denies  Eye pain with movement? Denies  Eyelid erythema? Denies  Erythema of conjunctiva? Denies  Caruncle inflammation? Denies  Eyelid swelling? Denies    With regards to thyroid nodule:     Thyroid US: 11/27/2020  Thyroid is enlarged in size.  Right lobe of thyroid measures 5.7 x 2.3 x 2.6 cm.  Left lobe of the thyroid measures 5.6 x 2.0 x 2.2 cm.  Heterogeneous thyroid parenchyma with increased perfusion which may be seen with thyroiditis.  Heterogeneous solid-appearing nodule in the right lobe measures 1.8 x 1.1 x 1.5 cm.  Hypoechoic nodule in the superior pole of the right lobe measures 1.2 x 0.6 x 0.9 cm consistent with a TR 4 nodule.  A few additional nodules are visualized bilaterally, none of which meet criteria for fine-needle aspiration or follow-up.  No suspicious lymphadenopathy.  Impression:  Findings consistent with thyroiditis.  1.8 TR 4 nodule in the midpole of the right lobe which meets criteria for fine-needle aspiration if not already performed.    FNA: None    Signs or Symptoms:   Difficulty breathing: Denies  Difficulty swallowing: "Occasionally"  Voice Changes: Denies  FH of thyroid cancer: Denies  Personal history of radiation treatment or exposure: Denies      Review of Systems   Constitutional: Negative for fatigue.   Eyes: Negative for visual disturbance.   Respiratory: Negative for shortness of breath.    Cardiovascular: Negative for chest pain.   Gastrointestinal: " "Negative for abdominal pain.   Musculoskeletal: Negative for arthralgias.   Skin: Negative for wound.   Neurological: Negative for headaches.   Hematological: Does not bruise/bleed easily.   Psychiatric/Behavioral: Negative for sleep disturbance.     Objective:   Physical Exam  Vitals signs reviewed.   Neck:      Thyroid: No thyromegaly (irregular shaped gland).   Cardiovascular:      Rate and Rhythm: Normal rate.      Comments: No edema present  Pulmonary:      Effort: Pulmonary effort is normal.   Abdominal:      Palpations: Abdomen is soft.       Appropriate footwear, foot exam deferred, done 12/2019.  Injection sites are without edema or erythema. No lipo hypertropthy or atrophy.    Visit Vitals  BP (!) 164/90 (BP Location: Right arm, Patient Position: Sitting, BP Method: Medium (Manual))   Pulse 92   Resp 19   Ht 5' 3" (1.6 m)   Wt 64.5 kg (142 lb 3.2 oz)   SpO2 97%   BMI 25.19 kg/m²       Body mass index is 25.19 kg/m².    Lab Review:   Lab Results   Component Value Date    HGBA1C 7.4 (H) 11/13/2020    HGBA1C 6.8 (H) 07/17/2020    HGBA1C 7.1 (H) 03/05/2020       Lab Results   Component Value Date    CHOL 155 03/05/2020    HDL 53 03/05/2020    LDLCALC 91.8 03/05/2020    TRIG 51 03/05/2020    CHOLHDL 34.2 03/05/2020     Lab Results   Component Value Date     11/13/2020    K 4.4 11/13/2020     (H) 11/13/2020    CO2 19 (L) 11/13/2020     11/13/2020    BUN 24 (H) 11/13/2020    CREATININE 1.4 11/13/2020    CALCIUM 8.7 11/13/2020    PROT 5.6 (L) 11/13/2020    ALBUMIN 2.9 (L) 11/13/2020    BILITOT 0.3 11/13/2020    ALKPHOS 94 11/13/2020    AST 18 11/13/2020    ALT 17 11/13/2020    ANIONGAP 11 11/13/2020    ESTGFRAFRICA 43.0 (A) 11/13/2020    EGFRNONAA 37.3 (A) 11/13/2020    TSH 0.485 07/17/2020     Vit D, 25-Hydroxy   Date Value Ref Range Status   07/17/2020 23 (L) 30 - 96 ng/mL Final     Comment:     Vitamin D deficiency.........<10 ng/mL                              Vitamin D " insufficiency......10-29 ng/mL       Vitamin D sufficiency........> or equal to 30 ng/mL  Vitamin D toxicity............>100 ng/mL       Assessment and Plan     1. Type 2 diabetes mellitus with stage 3 chronic kidney disease, with long-term current use of insulin, unspecified whether stage 3a or 3b CKD  Hemoglobin A1C    Comprehensive Metabolic Panel   2. Abnormal thyroid function test     3. Vitamin D deficiency  Vitamin D   4. Osteopenia after menopause     5. Thyroid nodule  US FNA Thyroid, 1st Lesion   6. Essential hypertension     7. Stage 3 chronic kidney disease, unspecified whether stage 3a or 3b CKD     8. Uncontrolled type 2 diabetes mellitus with hyperglycemia  insulin (LANTUS SOLOSTAR U-100 INSULIN) glargine 100 units/mL (3mL) SubQ pen     Type 2 diabetes mellitus with stage 3 chronic kidney disease, with long-term current use of insulin  -- Labs prior to follow up.  -- Attempted to get the patient enrolled in PAP but the patient is not following up as directed. She reports she has the papers and is going to bring it to them today.  -- A1c goal <7.5%.  -- Medications discussed:  MFM - stopped due to CKD  GLP1-DPP4   REYNOLDS   SGLT2  Insulin   -- Reviewed logs/CGM:  Documented glucose unremarkable.  Instructed to send glucose logs in 14 days.  Reach out to me sooner for any glucose <70 or consistently >200.  -- Medication Changes:   CONTINUE:  Lantus 10units nightly    Medications are cost prohibitive.  Patient would like to contact PAP to try to get her medications from them before starting vial and syringe. In depth discussion that she has to call them today and notify me ASAP.    -- Reviewed goals of therapy are to get the best control we can without hypoglycemia.  -- Reviewed patient's current insulin regimen. Clarified proper insulin dose and timing in relation to meals, etc. Insulin injection sites and proper rotation instructed.    -- Advised frequent self blood glucose monitoring.  Patient encouraged  to document glucose results and bring them to every clinic visit.  -- Hypoglycemia precautions discussed. Instructed on precautions before driving.    -- Call for Bg repeatedly < 90 or > 180.   -- Close adherence to lifestyle changes recommended.   -- Periodic follow ups for eye evaluations, foot care and dental care suggested.    Abnormal thyroid function test  -- TFTs currently unremarkable.    Vitamin D deficiency  -- Check vitamin D.  -- CONTINUE: OTC Vit D3 1000iu daily.    Osteopenia after menopause  -- BMD due 9/2021.  -- Reassuring she is not fracturing.    Thyroid nodule  -- I have reviewed management options including observation, FNA or surgery.  -- FNA procedure explained in depth.  -- Discussed indications for repeat FNA as well as surgical indications (abnormal FNA, compressive symptoms or interval change).  -- Discussed possible results of FNA- Benign, Malignant, FLUS, or insufficient cells.  Discussed results auto released to patients, but advised the ordering provider will also contact to discuss.   -- Patient is not on any blood thinners.  -- If FNA is negative then plan RTO in 1 year with TSH and thyroid US prior.  -- All of the patients questions were answered.  -- Schedule FNA 1.8 TR 4 nodule in the midpole of the right lobe.    Essential hypertension  -- Elevated today. Patient reports she did not take her medication.   -- Discussed medication compliance, again.  -- BP goals discussed.    CKD (chronic kidney disease) stage 3, GFR 30-59 ml/min  -- Continue following with nephrology.    Follow up in about 4 months (around 4/28/2021).

## 2020-12-28 NOTE — ASSESSMENT & PLAN NOTE
-- Labs prior to follow up.  -- Attempted to get the patient enrolled in PAP but the patient is not following up as directed. She reports she has the papers and is going to bring it to them today.  -- A1c goal <7.5%.  -- Medications discussed:  MFM - stopped due to CKD  GLP1-DPP4   REYNOLDS   SGLT2  Insulin   -- Reviewed logs/CGM:  Documented glucose unremarkable.  Instructed to send glucose logs in 14 days.  Reach out to me sooner for any glucose <70 or consistently >200.  -- Medication Changes:   CONTINUE:  Lantus 10units nightly    Medications are cost prohibitive.  Patient would like to contact PAP to try to get her medications from them before starting vial and syringe. In depth discussion that she has to call them today and notify me ASAP.    -- Reviewed goals of therapy are to get the best control we can without hypoglycemia.  -- Reviewed patient's current insulin regimen. Clarified proper insulin dose and timing in relation to meals, etc. Insulin injection sites and proper rotation instructed.    -- Advised frequent self blood glucose monitoring.  Patient encouraged to document glucose results and bring them to every clinic visit.  -- Hypoglycemia precautions discussed. Instructed on precautions before driving.    -- Call for Bg repeatedly < 90 or > 180.   -- Close adherence to lifestyle changes recommended.   -- Periodic follow ups for eye evaluations, foot care and dental care suggested.

## 2020-12-31 DIAGNOSIS — E11.65 UNCONTROLLED TYPE 2 DIABETES MELLITUS WITH HYPERGLYCEMIA: ICD-10-CM

## 2021-01-20 ENCOUNTER — TELEPHONE (OUTPATIENT)
Dept: PAIN MEDICINE | Facility: CLINIC | Age: 76
End: 2021-01-20

## 2021-01-22 ENCOUNTER — TELEPHONE (OUTPATIENT)
Dept: PAIN MEDICINE | Facility: CLINIC | Age: 76
End: 2021-01-22

## 2021-01-25 ENCOUNTER — TELEPHONE (OUTPATIENT)
Dept: ENDOCRINOLOGY | Facility: CLINIC | Age: 76
End: 2021-01-25

## 2021-01-27 ENCOUNTER — PATIENT OUTREACH (OUTPATIENT)
Dept: ADMINISTRATIVE | Facility: HOSPITAL | Age: 76
End: 2021-01-27

## 2021-01-27 DIAGNOSIS — Z12.31 BREAST CANCER SCREENING BY MAMMOGRAM: ICD-10-CM

## 2021-01-27 DIAGNOSIS — E11.65 UNCONTROLLED TYPE 2 DIABETES MELLITUS WITH HYPERGLYCEMIA: ICD-10-CM

## 2021-02-05 ENCOUNTER — PATIENT OUTREACH (OUTPATIENT)
Dept: ADMINISTRATIVE | Facility: HOSPITAL | Age: 76
End: 2021-02-05

## 2021-02-08 ENCOUNTER — TELEPHONE (OUTPATIENT)
Dept: HEMATOLOGY/ONCOLOGY | Facility: CLINIC | Age: 76
End: 2021-02-08

## 2021-02-22 ENCOUNTER — TELEPHONE (OUTPATIENT)
Dept: INTERNAL MEDICINE | Facility: CLINIC | Age: 76
End: 2021-02-22

## 2021-02-22 ENCOUNTER — HOSPITAL ENCOUNTER (OUTPATIENT)
Dept: RADIOLOGY | Facility: OTHER | Age: 76
Discharge: HOME | End: 2021-02-22
Attending: FAMILY MEDICINE
Payer: MEDICARE

## 2021-02-22 DIAGNOSIS — Z12.31 BREAST CANCER SCREENING BY MAMMOGRAM: ICD-10-CM

## 2021-02-22 PROCEDURE — 77067 SCR MAMMO BI INCL CAD: CPT | Mod: TC

## 2021-02-22 PROCEDURE — 77067 MAMMO DIGITAL SCREENING BILAT WITH TOMO: ICD-10-PCS | Mod: 26,,, | Performed by: RADIOLOGY

## 2021-02-22 PROCEDURE — 77063 MAMMO DIGITAL SCREENING BILAT WITH TOMO: ICD-10-PCS | Mod: 26,,, | Performed by: RADIOLOGY

## 2021-02-22 PROCEDURE — 77063 BREAST TOMOSYNTHESIS BI: CPT | Mod: 26,,, | Performed by: RADIOLOGY

## 2021-02-22 PROCEDURE — 77067 SCR MAMMO BI INCL CAD: CPT | Mod: 26,,, | Performed by: RADIOLOGY

## 2021-02-23 ENCOUNTER — LAB VISIT (OUTPATIENT)
Dept: LAB | Facility: HOSPITAL | Age: 76
End: 2021-02-23
Payer: MEDICARE

## 2021-02-23 DIAGNOSIS — E11.22 TYPE 2 DIABETES MELLITUS WITH STAGE 3 CHRONIC KIDNEY DISEASE, WITH LONG-TERM CURRENT USE OF INSULIN, UNSPECIFIED WHETHER STAGE 3A OR 3B CKD: ICD-10-CM

## 2021-02-23 DIAGNOSIS — Z79.4 TYPE 2 DIABETES MELLITUS WITH STAGE 3 CHRONIC KIDNEY DISEASE, WITH LONG-TERM CURRENT USE OF INSULIN, UNSPECIFIED WHETHER STAGE 3A OR 3B CKD: ICD-10-CM

## 2021-02-23 DIAGNOSIS — E55.9 VITAMIN D DEFICIENCY: ICD-10-CM

## 2021-02-23 DIAGNOSIS — N18.30 TYPE 2 DIABETES MELLITUS WITH STAGE 3 CHRONIC KIDNEY DISEASE, WITH LONG-TERM CURRENT USE OF INSULIN, UNSPECIFIED WHETHER STAGE 3A OR 3B CKD: ICD-10-CM

## 2021-02-23 DIAGNOSIS — E11.65 UNCONTROLLED TYPE 2 DIABETES MELLITUS WITH HYPERGLYCEMIA: ICD-10-CM

## 2021-02-23 LAB
ESTIMATED AVG GLUCOSE: 146 MG/DL (ref 68–131)
HBA1C MFR BLD: 6.7 % (ref 4–5.6)

## 2021-02-23 PROCEDURE — 82306 VITAMIN D 25 HYDROXY: CPT

## 2021-02-23 PROCEDURE — 83036 HEMOGLOBIN GLYCOSYLATED A1C: CPT

## 2021-02-23 PROCEDURE — 36415 COLL VENOUS BLD VENIPUNCTURE: CPT

## 2021-02-23 PROCEDURE — 80053 COMPREHEN METABOLIC PANEL: CPT

## 2021-02-24 ENCOUNTER — PATIENT OUTREACH (OUTPATIENT)
Dept: ADMINISTRATIVE | Facility: OTHER | Age: 76
End: 2021-02-24

## 2021-02-24 LAB
25(OH)D3+25(OH)D2 SERPL-MCNC: 25 NG/ML (ref 30–96)
ALBUMIN SERPL BCP-MCNC: 3.6 G/DL (ref 3.5–5.2)
ALP SERPL-CCNC: 88 U/L (ref 55–135)
ALT SERPL W/O P-5'-P-CCNC: 15 U/L (ref 10–44)
ANION GAP SERPL CALC-SCNC: 9 MMOL/L (ref 8–16)
AST SERPL-CCNC: 19 U/L (ref 10–40)
BILIRUB SERPL-MCNC: 0.4 MG/DL (ref 0.1–1)
BUN SERPL-MCNC: 26 MG/DL (ref 8–23)
CALCIUM SERPL-MCNC: 9.2 MG/DL (ref 8.7–10.5)
CHLORIDE SERPL-SCNC: 111 MMOL/L (ref 95–110)
CO2 SERPL-SCNC: 22 MMOL/L (ref 23–29)
CREAT SERPL-MCNC: 1.5 MG/DL (ref 0.5–1.4)
EST. GFR  (AFRICAN AMERICAN): 39.3 ML/MIN/1.73 M^2
EST. GFR  (NON AFRICAN AMERICAN): 34.1 ML/MIN/1.73 M^2
GLUCOSE SERPL-MCNC: 157 MG/DL (ref 70–110)
POTASSIUM SERPL-SCNC: 4.6 MMOL/L (ref 3.5–5.1)
PROT SERPL-MCNC: 6.2 G/DL (ref 6–8.4)
SODIUM SERPL-SCNC: 142 MMOL/L (ref 136–145)

## 2021-02-25 ENCOUNTER — OFFICE VISIT (OUTPATIENT)
Dept: ENDOCRINOLOGY | Facility: CLINIC | Age: 76
End: 2021-02-25
Payer: MEDICARE

## 2021-02-25 VITALS
OXYGEN SATURATION: 92 % | SYSTOLIC BLOOD PRESSURE: 160 MMHG | DIASTOLIC BLOOD PRESSURE: 90 MMHG | BODY MASS INDEX: 25.78 KG/M2 | WEIGHT: 145.5 LBS | HEART RATE: 72 BPM | HEIGHT: 63 IN

## 2021-02-25 DIAGNOSIS — E11.65 UNCONTROLLED TYPE 2 DIABETES MELLITUS WITH HYPERGLYCEMIA: Primary | ICD-10-CM

## 2021-02-25 DIAGNOSIS — M85.80 OSTEOPENIA AFTER MENOPAUSE: ICD-10-CM

## 2021-02-25 DIAGNOSIS — N18.30 STAGE 3 CHRONIC KIDNEY DISEASE, UNSPECIFIED WHETHER STAGE 3A OR 3B CKD: ICD-10-CM

## 2021-02-25 DIAGNOSIS — Z78.0 OSTEOPENIA AFTER MENOPAUSE: ICD-10-CM

## 2021-02-25 DIAGNOSIS — C82.08 FOLLICULAR LYMPHOMA GRADE I, LYMPH NODES OF MULTIPLE SITES: ICD-10-CM

## 2021-02-25 DIAGNOSIS — I10 ESSENTIAL HYPERTENSION: ICD-10-CM

## 2021-02-25 DIAGNOSIS — E55.9 VITAMIN D DEFICIENCY: ICD-10-CM

## 2021-02-25 DIAGNOSIS — R94.6 ABNORMAL THYROID FUNCTION TEST: ICD-10-CM

## 2021-02-25 DIAGNOSIS — E04.1 THYROID NODULE: ICD-10-CM

## 2021-02-25 PROCEDURE — 99999 PR PBB SHADOW E&M-EST. PATIENT-LVL IV: CPT | Mod: PBBFAC,,, | Performed by: NURSE PRACTITIONER

## 2021-02-25 PROCEDURE — 3080F PR MOST RECENT DIASTOLIC BLOOD PRESSURE >= 90 MM HG: ICD-10-PCS | Mod: CPTII,S$GLB,, | Performed by: NURSE PRACTITIONER

## 2021-02-25 PROCEDURE — 3044F PR MOST RECENT HEMOGLOBIN A1C LEVEL <7.0%: ICD-10-PCS | Mod: CPTII,S$GLB,, | Performed by: NURSE PRACTITIONER

## 2021-02-25 PROCEDURE — 3077F PR MOST RECENT SYSTOLIC BLOOD PRESSURE >= 140 MM HG: ICD-10-PCS | Mod: CPTII,S$GLB,, | Performed by: NURSE PRACTITIONER

## 2021-02-25 PROCEDURE — 99999 PR PBB SHADOW E&M-EST. PATIENT-LVL IV: ICD-10-PCS | Mod: PBBFAC,,, | Performed by: NURSE PRACTITIONER

## 2021-02-25 PROCEDURE — 3008F PR BODY MASS INDEX (BMI) DOCUMENTED: ICD-10-PCS | Mod: CPTII,S$GLB,, | Performed by: NURSE PRACTITIONER

## 2021-02-25 PROCEDURE — 99214 PR OFFICE/OUTPT VISIT, EST, LEVL IV, 30-39 MIN: ICD-10-PCS | Mod: S$GLB,,, | Performed by: NURSE PRACTITIONER

## 2021-02-25 PROCEDURE — 3077F SYST BP >= 140 MM HG: CPT | Mod: CPTII,S$GLB,, | Performed by: NURSE PRACTITIONER

## 2021-02-25 PROCEDURE — 99499 UNLISTED E&M SERVICE: CPT | Mod: S$GLB,,, | Performed by: NURSE PRACTITIONER

## 2021-02-25 PROCEDURE — 3008F BODY MASS INDEX DOCD: CPT | Mod: CPTII,S$GLB,, | Performed by: NURSE PRACTITIONER

## 2021-02-25 PROCEDURE — 3044F HG A1C LEVEL LT 7.0%: CPT | Mod: CPTII,S$GLB,, | Performed by: NURSE PRACTITIONER

## 2021-02-25 PROCEDURE — 99499 RISK ADDL DX/OHS AUDIT: ICD-10-PCS | Mod: S$GLB,,, | Performed by: NURSE PRACTITIONER

## 2021-02-25 PROCEDURE — 1159F MED LIST DOCD IN RCRD: CPT | Mod: S$GLB,,, | Performed by: NURSE PRACTITIONER

## 2021-02-25 PROCEDURE — 99214 OFFICE O/P EST MOD 30 MIN: CPT | Mod: S$GLB,,, | Performed by: NURSE PRACTITIONER

## 2021-02-25 PROCEDURE — 1126F PR PAIN SEVERITY QUANTIFIED, NO PAIN PRESENT: ICD-10-PCS | Mod: S$GLB,,, | Performed by: NURSE PRACTITIONER

## 2021-02-25 PROCEDURE — 1159F PR MEDICATION LIST DOCUMENTED IN MEDICAL RECORD: ICD-10-PCS | Mod: S$GLB,,, | Performed by: NURSE PRACTITIONER

## 2021-02-25 PROCEDURE — 1126F AMNT PAIN NOTED NONE PRSNT: CPT | Mod: S$GLB,,, | Performed by: NURSE PRACTITIONER

## 2021-02-25 PROCEDURE — 3080F DIAST BP >= 90 MM HG: CPT | Mod: CPTII,S$GLB,, | Performed by: NURSE PRACTITIONER

## 2021-02-25 RX ORDER — LINAGLIPTIN 5 MG/1
5 TABLET, FILM COATED ORAL DAILY
Qty: 90 TABLET | Refills: 3 | Status: SHIPPED | OUTPATIENT
Start: 2021-02-25 | End: 2021-04-22 | Stop reason: SDUPTHER

## 2021-03-04 ENCOUNTER — OFFICE VISIT (OUTPATIENT)
Dept: HEMATOLOGY/ONCOLOGY | Facility: CLINIC | Age: 76
End: 2021-03-04
Payer: MEDICARE

## 2021-03-04 VITALS
HEART RATE: 90 BPM | TEMPERATURE: 98 F | BODY MASS INDEX: 25.32 KG/M2 | WEIGHT: 142.88 LBS | OXYGEN SATURATION: 99 % | RESPIRATION RATE: 16 BRPM | DIASTOLIC BLOOD PRESSURE: 81 MMHG | HEIGHT: 63 IN | SYSTOLIC BLOOD PRESSURE: 182 MMHG

## 2021-03-04 DIAGNOSIS — C82.08 FOLLICULAR LYMPHOMA GRADE I, LYMPH NODES OF MULTIPLE SITES: Primary | ICD-10-CM

## 2021-03-04 PROCEDURE — 1159F PR MEDICATION LIST DOCUMENTED IN MEDICAL RECORD: ICD-10-PCS | Mod: S$GLB,,, | Performed by: INTERNAL MEDICINE

## 2021-03-04 PROCEDURE — 1126F AMNT PAIN NOTED NONE PRSNT: CPT | Mod: S$GLB,,, | Performed by: INTERNAL MEDICINE

## 2021-03-04 PROCEDURE — 1126F PR PAIN SEVERITY QUANTIFIED, NO PAIN PRESENT: ICD-10-PCS | Mod: S$GLB,,, | Performed by: INTERNAL MEDICINE

## 2021-03-04 PROCEDURE — 1101F PR PT FALLS ASSESS DOC 0-1 FALLS W/OUT INJ PAST YR: ICD-10-PCS | Mod: CPTII,S$GLB,, | Performed by: INTERNAL MEDICINE

## 2021-03-04 PROCEDURE — 3008F BODY MASS INDEX DOCD: CPT | Mod: CPTII,S$GLB,, | Performed by: INTERNAL MEDICINE

## 2021-03-04 PROCEDURE — 3079F PR MOST RECENT DIASTOLIC BLOOD PRESSURE 80-89 MM HG: ICD-10-PCS | Mod: CPTII,S$GLB,, | Performed by: INTERNAL MEDICINE

## 2021-03-04 PROCEDURE — 3077F SYST BP >= 140 MM HG: CPT | Mod: CPTII,S$GLB,, | Performed by: INTERNAL MEDICINE

## 2021-03-04 PROCEDURE — 99499 RISK ADDL DX/OHS AUDIT: ICD-10-PCS | Mod: S$GLB,,, | Performed by: INTERNAL MEDICINE

## 2021-03-04 PROCEDURE — 1101F PT FALLS ASSESS-DOCD LE1/YR: CPT | Mod: CPTII,S$GLB,, | Performed by: INTERNAL MEDICINE

## 2021-03-04 PROCEDURE — 3288F PR FALLS RISK ASSESSMENT DOCUMENTED: ICD-10-PCS | Mod: CPTII,S$GLB,, | Performed by: INTERNAL MEDICINE

## 2021-03-04 PROCEDURE — 3077F PR MOST RECENT SYSTOLIC BLOOD PRESSURE >= 140 MM HG: ICD-10-PCS | Mod: CPTII,S$GLB,, | Performed by: INTERNAL MEDICINE

## 2021-03-04 PROCEDURE — 3288F FALL RISK ASSESSMENT DOCD: CPT | Mod: CPTII,S$GLB,, | Performed by: INTERNAL MEDICINE

## 2021-03-04 PROCEDURE — 99999 PR PBB SHADOW E&M-EST. PATIENT-LVL IV: CPT | Mod: PBBFAC,,, | Performed by: INTERNAL MEDICINE

## 2021-03-04 PROCEDURE — 99215 PR OFFICE/OUTPT VISIT, EST, LEVL V, 40-54 MIN: ICD-10-PCS | Mod: S$GLB,,, | Performed by: INTERNAL MEDICINE

## 2021-03-04 PROCEDURE — 99499 UNLISTED E&M SERVICE: CPT | Mod: S$GLB,,, | Performed by: INTERNAL MEDICINE

## 2021-03-04 PROCEDURE — 99999 PR PBB SHADOW E&M-EST. PATIENT-LVL IV: ICD-10-PCS | Mod: PBBFAC,,, | Performed by: INTERNAL MEDICINE

## 2021-03-04 PROCEDURE — 1159F MED LIST DOCD IN RCRD: CPT | Mod: S$GLB,,, | Performed by: INTERNAL MEDICINE

## 2021-03-04 PROCEDURE — 99215 OFFICE O/P EST HI 40 MIN: CPT | Mod: S$GLB,,, | Performed by: INTERNAL MEDICINE

## 2021-03-04 PROCEDURE — 3079F DIAST BP 80-89 MM HG: CPT | Mod: CPTII,S$GLB,, | Performed by: INTERNAL MEDICINE

## 2021-03-04 PROCEDURE — 3008F PR BODY MASS INDEX (BMI) DOCUMENTED: ICD-10-PCS | Mod: CPTII,S$GLB,, | Performed by: INTERNAL MEDICINE

## 2021-03-09 ENCOUNTER — LAB VISIT (OUTPATIENT)
Dept: LAB | Facility: HOSPITAL | Age: 76
End: 2021-03-09
Payer: MEDICARE

## 2021-03-09 DIAGNOSIS — E04.1 THYROID NODULE: ICD-10-CM

## 2021-03-09 DIAGNOSIS — R94.6 ABNORMAL THYROID FUNCTION TEST: ICD-10-CM

## 2021-03-09 DIAGNOSIS — C82.08 FOLLICULAR LYMPHOMA GRADE I, LYMPH NODES OF MULTIPLE SITES: ICD-10-CM

## 2021-03-09 LAB
LDH SERPL L TO P-CCNC: 227 U/L (ref 110–260)
TSH SERPL DL<=0.005 MIU/L-ACNC: 0.85 UIU/ML (ref 0.4–4)

## 2021-03-09 PROCEDURE — 84443 ASSAY THYROID STIM HORMONE: CPT | Performed by: NURSE PRACTITIONER

## 2021-03-09 PROCEDURE — 83615 LACTATE (LD) (LDH) ENZYME: CPT | Performed by: INTERNAL MEDICINE

## 2021-03-09 PROCEDURE — 36415 COLL VENOUS BLD VENIPUNCTURE: CPT | Performed by: INTERNAL MEDICINE

## 2021-03-10 ENCOUNTER — PATIENT OUTREACH (OUTPATIENT)
Dept: ADMINISTRATIVE | Facility: HOSPITAL | Age: 76
End: 2021-03-10

## 2021-03-10 DIAGNOSIS — E11.9 DIABETES MELLITUS WITHOUT COMPLICATION: Primary | ICD-10-CM

## 2021-03-16 ENCOUNTER — HOSPITAL ENCOUNTER (OUTPATIENT)
Dept: ENDOCRINOLOGY | Facility: CLINIC | Age: 76
Discharge: HOME | End: 2021-03-16
Attending: NURSE PRACTITIONER
Payer: MEDICARE

## 2021-03-16 ENCOUNTER — PES CALL (OUTPATIENT)
Dept: ADMINISTRATIVE | Facility: CLINIC | Age: 76
End: 2021-03-16

## 2021-03-16 DIAGNOSIS — E04.1 THYROID NODULE: ICD-10-CM

## 2021-03-16 PROCEDURE — 10005 FNA BX W/US GDN 1ST LES: CPT | Mod: S$GLB,,, | Performed by: INTERNAL MEDICINE

## 2021-03-16 PROCEDURE — 88173 CYTOPATH EVAL FNA REPORT: CPT | Mod: 26,,, | Performed by: PATHOLOGY

## 2021-03-16 PROCEDURE — 10005 US FINE NEEDLE ASPIRATION THYROID, FIRST LESION: ICD-10-PCS | Mod: S$GLB,,, | Performed by: INTERNAL MEDICINE

## 2021-03-16 PROCEDURE — 88173 PR  INTERPRETATION OF FNA SMEAR: ICD-10-PCS | Mod: 26,,, | Performed by: PATHOLOGY

## 2021-03-16 PROCEDURE — 88173 CYTOPATH EVAL FNA REPORT: CPT | Performed by: PATHOLOGY

## 2021-03-19 ENCOUNTER — PATIENT MESSAGE (OUTPATIENT)
Dept: ENDOCRINOLOGY | Facility: CLINIC | Age: 76
End: 2021-03-19

## 2021-03-19 ENCOUNTER — TELEPHONE (OUTPATIENT)
Dept: ENDOCRINOLOGY | Facility: CLINIC | Age: 76
End: 2021-03-19

## 2021-03-19 LAB
FINAL PATHOLOGIC DIAGNOSIS: NORMAL
Lab: NORMAL

## 2021-04-22 DIAGNOSIS — E11.65 UNCONTROLLED TYPE 2 DIABETES MELLITUS WITH HYPERGLYCEMIA: ICD-10-CM

## 2021-04-22 DIAGNOSIS — E55.9 VITAMIN D DEFICIENCY: ICD-10-CM

## 2021-04-22 RX ORDER — VIT C/E/ZN/COPPR/LUTEIN/ZEAXAN 250MG-90MG
1000 CAPSULE ORAL DAILY
Qty: 30 CAPSULE | Refills: 3 | Status: SHIPPED | OUTPATIENT
Start: 2021-04-22 | End: 2022-02-18

## 2021-04-22 RX ORDER — LINAGLIPTIN 5 MG/1
5 TABLET, FILM COATED ORAL DAILY
Qty: 90 TABLET | Refills: 3 | Status: SHIPPED | OUTPATIENT
Start: 2021-04-22 | End: 2021-11-24 | Stop reason: SDUPTHER

## 2021-04-27 ENCOUNTER — OFFICE VISIT (OUTPATIENT)
Dept: HOME HEALTH SERVICES | Facility: CLINIC | Age: 76
End: 2021-04-27
Payer: MEDICARE

## 2021-04-27 DIAGNOSIS — E11.65 UNCONTROLLED TYPE 2 DIABETES MELLITUS WITH HYPERGLYCEMIA: ICD-10-CM

## 2021-04-27 DIAGNOSIS — Z79.4 TYPE 2 DIABETES MELLITUS WITH STAGE 3B CHRONIC KIDNEY DISEASE, WITH LONG-TERM CURRENT USE OF INSULIN: ICD-10-CM

## 2021-04-27 DIAGNOSIS — C82.08 FOLLICULAR LYMPHOMA GRADE I, LYMPH NODES OF MULTIPLE SITES: ICD-10-CM

## 2021-04-27 DIAGNOSIS — I10 ESSENTIAL HYPERTENSION: ICD-10-CM

## 2021-04-27 DIAGNOSIS — N18.32 STAGE 3B CHRONIC KIDNEY DISEASE: ICD-10-CM

## 2021-04-27 DIAGNOSIS — E11.22 TYPE 2 DIABETES MELLITUS WITH STAGE 3B CHRONIC KIDNEY DISEASE, WITH LONG-TERM CURRENT USE OF INSULIN: ICD-10-CM

## 2021-04-27 DIAGNOSIS — N18.32 TYPE 2 DIABETES MELLITUS WITH STAGE 3B CHRONIC KIDNEY DISEASE, WITH LONG-TERM CURRENT USE OF INSULIN: ICD-10-CM

## 2021-04-27 DIAGNOSIS — E04.1 THYROID NODULE: ICD-10-CM

## 2021-04-27 DIAGNOSIS — G89.29 OTHER CHRONIC PAIN: ICD-10-CM

## 2021-04-27 DIAGNOSIS — Z00.00 ENCOUNTER FOR PREVENTIVE HEALTH EXAMINATION: Primary | ICD-10-CM

## 2021-04-27 PROCEDURE — 1126F PR PAIN SEVERITY QUANTIFIED, NO PAIN PRESENT: ICD-10-PCS | Mod: S$GLB,,, | Performed by: NURSE PRACTITIONER

## 2021-04-27 PROCEDURE — 1126F AMNT PAIN NOTED NONE PRSNT: CPT | Mod: S$GLB,,, | Performed by: NURSE PRACTITIONER

## 2021-04-27 PROCEDURE — 3288F FALL RISK ASSESSMENT DOCD: CPT | Mod: CPTII,S$GLB,, | Performed by: NURSE PRACTITIONER

## 2021-04-27 PROCEDURE — 3008F PR BODY MASS INDEX (BMI) DOCUMENTED: ICD-10-PCS | Mod: CPTII,S$GLB,, | Performed by: NURSE PRACTITIONER

## 2021-04-27 PROCEDURE — G0439 PPPS, SUBSEQ VISIT: HCPCS | Mod: S$GLB,,, | Performed by: NURSE PRACTITIONER

## 2021-04-27 PROCEDURE — 99499 RISK ADDL DX/OHS AUDIT: ICD-10-PCS | Mod: S$GLB,,, | Performed by: NURSE PRACTITIONER

## 2021-04-27 PROCEDURE — 3008F BODY MASS INDEX DOCD: CPT | Mod: CPTII,S$GLB,, | Performed by: NURSE PRACTITIONER

## 2021-04-27 PROCEDURE — 1101F PR PT FALLS ASSESS DOC 0-1 FALLS W/OUT INJ PAST YR: ICD-10-PCS | Mod: CPTII,S$GLB,, | Performed by: NURSE PRACTITIONER

## 2021-04-27 PROCEDURE — 3288F PR FALLS RISK ASSESSMENT DOCUMENTED: ICD-10-PCS | Mod: CPTII,S$GLB,, | Performed by: NURSE PRACTITIONER

## 2021-04-27 PROCEDURE — G0439 PR MEDICARE ANNUAL WELLNESS SUBSEQUENT VISIT: ICD-10-PCS | Mod: S$GLB,,, | Performed by: NURSE PRACTITIONER

## 2021-04-27 PROCEDURE — 1101F PT FALLS ASSESS-DOCD LE1/YR: CPT | Mod: CPTII,S$GLB,, | Performed by: NURSE PRACTITIONER

## 2021-04-27 PROCEDURE — 1158F ADVNC CARE PLAN TLK DOCD: CPT | Mod: S$GLB,,, | Performed by: NURSE PRACTITIONER

## 2021-04-27 PROCEDURE — 99499 UNLISTED E&M SERVICE: CPT | Mod: S$GLB,,, | Performed by: NURSE PRACTITIONER

## 2021-04-27 PROCEDURE — 1158F PR ADVANCE CARE PLANNING DISCUSS DOCUMENTED IN MEDICAL RECORD: ICD-10-PCS | Mod: S$GLB,,, | Performed by: NURSE PRACTITIONER

## 2021-04-27 RX ORDER — METHOCARBAMOL 500 MG/1
500 TABLET, FILM COATED ORAL 2 TIMES DAILY PRN
COMMUNITY

## 2021-04-28 ENCOUNTER — TELEPHONE (OUTPATIENT)
Dept: INTERNAL MEDICINE | Facility: CLINIC | Age: 76
End: 2021-04-28

## 2021-04-28 VITALS
HEIGHT: 63 IN | SYSTOLIC BLOOD PRESSURE: 168 MMHG | DIASTOLIC BLOOD PRESSURE: 92 MMHG | TEMPERATURE: 98 F | HEART RATE: 98 BPM | BODY MASS INDEX: 25.52 KG/M2 | WEIGHT: 144 LBS

## 2021-05-24 ENCOUNTER — PATIENT OUTREACH (OUTPATIENT)
Dept: ADMINISTRATIVE | Facility: HOSPITAL | Age: 76
End: 2021-05-24

## 2021-05-24 DIAGNOSIS — E11.9 DIABETES MELLITUS WITHOUT COMPLICATION: Primary | ICD-10-CM

## 2021-05-31 ENCOUNTER — PATIENT OUTREACH (OUTPATIENT)
Dept: ADMINISTRATIVE | Facility: OTHER | Age: 76
End: 2021-05-31

## 2021-06-09 ENCOUNTER — LAB VISIT (OUTPATIENT)
Dept: LAB | Facility: OTHER | Age: 76
End: 2021-06-09
Attending: FAMILY MEDICINE
Payer: MEDICARE

## 2021-06-09 ENCOUNTER — TELEPHONE (OUTPATIENT)
Dept: INTERNAL MEDICINE | Facility: CLINIC | Age: 76
End: 2021-06-09

## 2021-06-09 DIAGNOSIS — E11.9 TYPE 2 DIABETES MELLITUS WITHOUT COMPLICATION: ICD-10-CM

## 2021-06-09 DIAGNOSIS — E11.9 DIABETES MELLITUS WITHOUT COMPLICATION: ICD-10-CM

## 2021-06-09 LAB
CHOLEST SERPL-MCNC: 156 MG/DL (ref 120–199)
CHOLEST/HDLC SERPL: 2.6 {RATIO} (ref 2–5)
ESTIMATED AVG GLUCOSE: 157 MG/DL (ref 68–131)
HBA1C MFR BLD: 7.1 % (ref 4–5.6)
HDLC SERPL-MCNC: 60 MG/DL (ref 40–75)
HDLC SERPL: 38.5 % (ref 20–50)
LDLC SERPL CALC-MCNC: 82.4 MG/DL (ref 63–159)
NONHDLC SERPL-MCNC: 96 MG/DL
TRIGL SERPL-MCNC: 68 MG/DL (ref 30–150)

## 2021-06-09 PROCEDURE — 83036 HEMOGLOBIN GLYCOSYLATED A1C: CPT | Performed by: FAMILY MEDICINE

## 2021-06-09 PROCEDURE — 36415 COLL VENOUS BLD VENIPUNCTURE: CPT | Performed by: FAMILY MEDICINE

## 2021-06-09 PROCEDURE — 80061 LIPID PANEL: CPT | Performed by: FAMILY MEDICINE

## 2021-07-07 ENCOUNTER — OFFICE VISIT (OUTPATIENT)
Dept: HEMATOLOGY/ONCOLOGY | Facility: CLINIC | Age: 76
End: 2021-07-07
Payer: MEDICARE

## 2021-07-07 ENCOUNTER — OFFICE VISIT (OUTPATIENT)
Dept: INTERNAL MEDICINE | Facility: CLINIC | Age: 76
End: 2021-07-07
Payer: MEDICARE

## 2021-07-07 ENCOUNTER — LAB VISIT (OUTPATIENT)
Dept: LAB | Facility: HOSPITAL | Age: 76
End: 2021-07-07
Attending: INTERNAL MEDICINE
Payer: MEDICARE

## 2021-07-07 VITALS
HEIGHT: 63 IN | WEIGHT: 149.13 LBS | TEMPERATURE: 98 F | OXYGEN SATURATION: 99 % | BODY MASS INDEX: 26.42 KG/M2 | SYSTOLIC BLOOD PRESSURE: 178 MMHG | DIASTOLIC BLOOD PRESSURE: 73 MMHG | HEART RATE: 77 BPM | RESPIRATION RATE: 16 BRPM

## 2021-07-07 VITALS
OXYGEN SATURATION: 100 % | WEIGHT: 148.38 LBS | HEART RATE: 80 BPM | SYSTOLIC BLOOD PRESSURE: 157 MMHG | HEIGHT: 63 IN | DIASTOLIC BLOOD PRESSURE: 85 MMHG | BODY MASS INDEX: 26.29 KG/M2

## 2021-07-07 DIAGNOSIS — N18.32 STAGE 3B CHRONIC KIDNEY DISEASE: ICD-10-CM

## 2021-07-07 DIAGNOSIS — I10 ESSENTIAL HYPERTENSION: ICD-10-CM

## 2021-07-07 DIAGNOSIS — C82.08 FOLLICULAR LYMPHOMA GRADE I, LYMPH NODES OF MULTIPLE SITES: ICD-10-CM

## 2021-07-07 DIAGNOSIS — Z00.00 ANNUAL PHYSICAL EXAM: Primary | ICD-10-CM

## 2021-07-07 DIAGNOSIS — Z78.0 OSTEOPENIA AFTER MENOPAUSE: ICD-10-CM

## 2021-07-07 DIAGNOSIS — M85.80 OSTEOPENIA AFTER MENOPAUSE: ICD-10-CM

## 2021-07-07 DIAGNOSIS — E11.65 UNCONTROLLED TYPE 2 DIABETES MELLITUS WITH HYPERGLYCEMIA: ICD-10-CM

## 2021-07-07 DIAGNOSIS — C82.08 FOLLICULAR LYMPHOMA GRADE I, LYMPH NODES OF MULTIPLE SITES: Primary | ICD-10-CM

## 2021-07-07 DIAGNOSIS — E11.9 ENCOUNTER FOR DIABETIC FOOT EXAM: ICD-10-CM

## 2021-07-07 LAB
ALBUMIN SERPL BCP-MCNC: 3.3 G/DL (ref 3.5–5.2)
ALP SERPL-CCNC: 87 U/L (ref 55–135)
ALT SERPL W/O P-5'-P-CCNC: 20 U/L (ref 10–44)
ANION GAP SERPL CALC-SCNC: 8 MMOL/L (ref 8–16)
AST SERPL-CCNC: 15 U/L (ref 10–40)
BASOPHILS # BLD AUTO: 0.02 K/UL (ref 0–0.2)
BASOPHILS NFR BLD: 0.3 % (ref 0–1.9)
BILIRUB SERPL-MCNC: 0.3 MG/DL (ref 0.1–1)
BUN SERPL-MCNC: 35 MG/DL (ref 8–23)
CALCIUM SERPL-MCNC: 9.5 MG/DL (ref 8.7–10.5)
CHLORIDE SERPL-SCNC: 111 MMOL/L (ref 95–110)
CO2 SERPL-SCNC: 20 MMOL/L (ref 23–29)
CREAT SERPL-MCNC: 1.6 MG/DL (ref 0.5–1.4)
DIFFERENTIAL METHOD: ABNORMAL
EOSINOPHIL # BLD AUTO: 0.3 K/UL (ref 0–0.5)
EOSINOPHIL NFR BLD: 3.9 % (ref 0–8)
ERYTHROCYTE [DISTWIDTH] IN BLOOD BY AUTOMATED COUNT: 13.5 % (ref 11.5–14.5)
EST. GFR  (AFRICAN AMERICAN): 36.3 ML/MIN/1.73 M^2
EST. GFR  (NON AFRICAN AMERICAN): 31.5 ML/MIN/1.73 M^2
GLUCOSE SERPL-MCNC: 166 MG/DL (ref 70–110)
HCT VFR BLD AUTO: 31.9 % (ref 37–48.5)
HGB BLD-MCNC: 10 G/DL (ref 12–16)
IMM GRANULOCYTES # BLD AUTO: 0.04 K/UL (ref 0–0.04)
IMM GRANULOCYTES NFR BLD AUTO: 0.6 % (ref 0–0.5)
LDH SERPL L TO P-CCNC: 216 U/L (ref 110–260)
LYMPHOCYTES # BLD AUTO: 1.5 K/UL (ref 1–4.8)
LYMPHOCYTES NFR BLD: 21.5 % (ref 18–48)
MCH RBC QN AUTO: 30.2 PG (ref 27–31)
MCHC RBC AUTO-ENTMCNC: 31.3 G/DL (ref 32–36)
MCV RBC AUTO: 96 FL (ref 82–98)
MONOCYTES # BLD AUTO: 0.5 K/UL (ref 0.3–1)
MONOCYTES NFR BLD: 7.2 % (ref 4–15)
NEUTROPHILS # BLD AUTO: 4.6 K/UL (ref 1.8–7.7)
NEUTROPHILS NFR BLD: 66.5 % (ref 38–73)
NRBC BLD-RTO: 0 /100 WBC
PLATELET # BLD AUTO: 198 K/UL (ref 150–450)
PMV BLD AUTO: 9.7 FL (ref 9.2–12.9)
POTASSIUM SERPL-SCNC: 4.3 MMOL/L (ref 3.5–5.1)
PROT SERPL-MCNC: 6.1 G/DL (ref 6–8.4)
RBC # BLD AUTO: 3.31 M/UL (ref 4–5.4)
SODIUM SERPL-SCNC: 139 MMOL/L (ref 136–145)
WBC # BLD AUTO: 6.93 K/UL (ref 3.9–12.7)

## 2021-07-07 PROCEDURE — 99214 PR OFFICE/OUTPT VISIT, EST, LEVL IV, 30-39 MIN: ICD-10-PCS | Mod: S$GLB,,, | Performed by: PHYSICIAN ASSISTANT

## 2021-07-07 PROCEDURE — 99999 PR PBB SHADOW E&M-EST. PATIENT-LVL IV: CPT | Mod: PBBFAC,,, | Performed by: PHYSICIAN ASSISTANT

## 2021-07-07 PROCEDURE — 1126F PR PAIN SEVERITY QUANTIFIED, NO PAIN PRESENT: ICD-10-PCS | Mod: CPTII,S$GLB,, | Performed by: PHYSICIAN ASSISTANT

## 2021-07-07 PROCEDURE — 99999 PR PBB SHADOW E&M-EST. PATIENT-LVL III: CPT | Mod: PBBFAC,,, | Performed by: INTERNAL MEDICINE

## 2021-07-07 PROCEDURE — 99999 PR PBB SHADOW E&M-EST. PATIENT-LVL IV: ICD-10-PCS | Mod: PBBFAC,,, | Performed by: PHYSICIAN ASSISTANT

## 2021-07-07 PROCEDURE — 3008F BODY MASS INDEX DOCD: CPT | Mod: CPTII,S$GLB,, | Performed by: INTERNAL MEDICINE

## 2021-07-07 PROCEDURE — 1101F PT FALLS ASSESS-DOCD LE1/YR: CPT | Mod: CPTII,S$GLB,, | Performed by: PHYSICIAN ASSISTANT

## 2021-07-07 PROCEDURE — 1159F PR MEDICATION LIST DOCUMENTED IN MEDICAL RECORD: ICD-10-PCS | Mod: S$GLB,,, | Performed by: INTERNAL MEDICINE

## 2021-07-07 PROCEDURE — 3288F PR FALLS RISK ASSESSMENT DOCUMENTED: ICD-10-PCS | Mod: CPTII,S$GLB,, | Performed by: INTERNAL MEDICINE

## 2021-07-07 PROCEDURE — 3079F DIAST BP 80-89 MM HG: CPT | Mod: CPTII,S$GLB,, | Performed by: PHYSICIAN ASSISTANT

## 2021-07-07 PROCEDURE — 85025 COMPLETE CBC W/AUTO DIFF WBC: CPT | Performed by: INTERNAL MEDICINE

## 2021-07-07 PROCEDURE — 99999 PR PBB SHADOW E&M-EST. PATIENT-LVL III: ICD-10-PCS | Mod: PBBFAC,,, | Performed by: INTERNAL MEDICINE

## 2021-07-07 PROCEDURE — 1126F AMNT PAIN NOTED NONE PRSNT: CPT | Mod: S$GLB,,, | Performed by: INTERNAL MEDICINE

## 2021-07-07 PROCEDURE — 36415 COLL VENOUS BLD VENIPUNCTURE: CPT | Performed by: INTERNAL MEDICINE

## 2021-07-07 PROCEDURE — 99215 OFFICE O/P EST HI 40 MIN: CPT | Mod: S$GLB,,, | Performed by: INTERNAL MEDICINE

## 2021-07-07 PROCEDURE — 99215 PR OFFICE/OUTPT VISIT, EST, LEVL V, 40-54 MIN: ICD-10-PCS | Mod: S$GLB,,, | Performed by: INTERNAL MEDICINE

## 2021-07-07 PROCEDURE — 99214 OFFICE O/P EST MOD 30 MIN: CPT | Mod: S$GLB,,, | Performed by: PHYSICIAN ASSISTANT

## 2021-07-07 PROCEDURE — 3288F PR FALLS RISK ASSESSMENT DOCUMENTED: ICD-10-PCS | Mod: CPTII,S$GLB,, | Performed by: PHYSICIAN ASSISTANT

## 2021-07-07 PROCEDURE — 3288F FALL RISK ASSESSMENT DOCD: CPT | Mod: CPTII,S$GLB,, | Performed by: INTERNAL MEDICINE

## 2021-07-07 PROCEDURE — 1159F MED LIST DOCD IN RCRD: CPT | Mod: CPTII,S$GLB,, | Performed by: PHYSICIAN ASSISTANT

## 2021-07-07 PROCEDURE — 3051F PR MOST RECENT HEMOGLOBIN A1C LEVEL 7.0 - < 8.0%: ICD-10-PCS | Mod: CPTII,S$GLB,, | Performed by: PHYSICIAN ASSISTANT

## 2021-07-07 PROCEDURE — 99499 UNLISTED E&M SERVICE: CPT | Mod: S$GLB,,, | Performed by: INTERNAL MEDICINE

## 2021-07-07 PROCEDURE — 3008F PR BODY MASS INDEX (BMI) DOCUMENTED: ICD-10-PCS | Mod: CPTII,S$GLB,, | Performed by: INTERNAL MEDICINE

## 2021-07-07 PROCEDURE — 99499 RISK ADDL DX/OHS AUDIT: ICD-10-PCS | Mod: S$GLB,,, | Performed by: INTERNAL MEDICINE

## 2021-07-07 PROCEDURE — 3079F PR MOST RECENT DIASTOLIC BLOOD PRESSURE 80-89 MM HG: ICD-10-PCS | Mod: CPTII,S$GLB,, | Performed by: PHYSICIAN ASSISTANT

## 2021-07-07 PROCEDURE — 1159F PR MEDICATION LIST DOCUMENTED IN MEDICAL RECORD: ICD-10-PCS | Mod: CPTII,S$GLB,, | Performed by: PHYSICIAN ASSISTANT

## 2021-07-07 PROCEDURE — 3077F PR MOST RECENT SYSTOLIC BLOOD PRESSURE >= 140 MM HG: ICD-10-PCS | Mod: CPTII,S$GLB,, | Performed by: PHYSICIAN ASSISTANT

## 2021-07-07 PROCEDURE — 83615 LACTATE (LD) (LDH) ENZYME: CPT | Performed by: INTERNAL MEDICINE

## 2021-07-07 PROCEDURE — 3051F HG A1C>EQUAL 7.0%<8.0%: CPT | Mod: CPTII,S$GLB,, | Performed by: PHYSICIAN ASSISTANT

## 2021-07-07 PROCEDURE — 1126F PR PAIN SEVERITY QUANTIFIED, NO PAIN PRESENT: ICD-10-PCS | Mod: S$GLB,,, | Performed by: INTERNAL MEDICINE

## 2021-07-07 PROCEDURE — 1159F MED LIST DOCD IN RCRD: CPT | Mod: S$GLB,,, | Performed by: INTERNAL MEDICINE

## 2021-07-07 PROCEDURE — 1101F PR PT FALLS ASSESS DOC 0-1 FALLS W/OUT INJ PAST YR: ICD-10-PCS | Mod: CPTII,S$GLB,, | Performed by: PHYSICIAN ASSISTANT

## 2021-07-07 PROCEDURE — 3288F FALL RISK ASSESSMENT DOCD: CPT | Mod: CPTII,S$GLB,, | Performed by: PHYSICIAN ASSISTANT

## 2021-07-07 PROCEDURE — 3008F BODY MASS INDEX DOCD: CPT | Mod: CPTII,S$GLB,, | Performed by: PHYSICIAN ASSISTANT

## 2021-07-07 PROCEDURE — 1126F AMNT PAIN NOTED NONE PRSNT: CPT | Mod: CPTII,S$GLB,, | Performed by: PHYSICIAN ASSISTANT

## 2021-07-07 PROCEDURE — 3008F PR BODY MASS INDEX (BMI) DOCUMENTED: ICD-10-PCS | Mod: CPTII,S$GLB,, | Performed by: PHYSICIAN ASSISTANT

## 2021-07-07 PROCEDURE — 80053 COMPREHEN METABOLIC PANEL: CPT | Performed by: INTERNAL MEDICINE

## 2021-07-07 PROCEDURE — 1101F PR PT FALLS ASSESS DOC 0-1 FALLS W/OUT INJ PAST YR: ICD-10-PCS | Mod: CPTII,S$GLB,, | Performed by: INTERNAL MEDICINE

## 2021-07-07 PROCEDURE — 1101F PT FALLS ASSESS-DOCD LE1/YR: CPT | Mod: CPTII,S$GLB,, | Performed by: INTERNAL MEDICINE

## 2021-07-07 PROCEDURE — 3077F SYST BP >= 140 MM HG: CPT | Mod: CPTII,S$GLB,, | Performed by: PHYSICIAN ASSISTANT

## 2021-07-08 ENCOUNTER — PATIENT OUTREACH (OUTPATIENT)
Dept: ADMINISTRATIVE | Facility: OTHER | Age: 76
End: 2021-07-08

## 2021-07-08 ENCOUNTER — TELEPHONE (OUTPATIENT)
Dept: PAIN MEDICINE | Facility: CLINIC | Age: 76
End: 2021-07-08

## 2021-07-09 ENCOUNTER — OFFICE VISIT (OUTPATIENT)
Dept: PAIN MEDICINE | Facility: CLINIC | Age: 76
End: 2021-07-09
Payer: MEDICARE

## 2021-07-09 VITALS
BODY MASS INDEX: 25.69 KG/M2 | WEIGHT: 145 LBS | SYSTOLIC BLOOD PRESSURE: 156 MMHG | HEIGHT: 63 IN | TEMPERATURE: 98 F | RESPIRATION RATE: 18 BRPM | DIASTOLIC BLOOD PRESSURE: 88 MMHG | HEART RATE: 82 BPM

## 2021-07-09 DIAGNOSIS — M70.61 GREATER TROCHANTERIC BURSITIS OF BOTH HIPS: ICD-10-CM

## 2021-07-09 DIAGNOSIS — G89.4 CHRONIC PAIN DISORDER: Primary | ICD-10-CM

## 2021-07-09 DIAGNOSIS — M16.0 PRIMARY OSTEOARTHRITIS OF BOTH HIPS: ICD-10-CM

## 2021-07-09 DIAGNOSIS — M70.62 GREATER TROCHANTERIC BURSITIS OF BOTH HIPS: ICD-10-CM

## 2021-07-09 DIAGNOSIS — M79.18 MYOFASCIAL PAIN: ICD-10-CM

## 2021-07-09 DIAGNOSIS — M25.552 CHRONIC PAIN OF BOTH HIPS: ICD-10-CM

## 2021-07-09 DIAGNOSIS — M25.551 CHRONIC PAIN OF BOTH HIPS: ICD-10-CM

## 2021-07-09 DIAGNOSIS — G89.29 CHRONIC PAIN OF BOTH HIPS: ICD-10-CM

## 2021-07-09 PROCEDURE — 1159F PR MEDICATION LIST DOCUMENTED IN MEDICAL RECORD: ICD-10-PCS | Mod: S$GLB,,, | Performed by: NURSE PRACTITIONER

## 2021-07-09 PROCEDURE — 3288F FALL RISK ASSESSMENT DOCD: CPT | Mod: CPTII,S$GLB,, | Performed by: NURSE PRACTITIONER

## 2021-07-09 PROCEDURE — 99213 PR OFFICE/OUTPT VISIT, EST, LEVL III, 20-29 MIN: ICD-10-PCS | Mod: S$GLB,,, | Performed by: NURSE PRACTITIONER

## 2021-07-09 PROCEDURE — 3288F PR FALLS RISK ASSESSMENT DOCUMENTED: ICD-10-PCS | Mod: CPTII,S$GLB,, | Performed by: NURSE PRACTITIONER

## 2021-07-09 PROCEDURE — 3008F PR BODY MASS INDEX (BMI) DOCUMENTED: ICD-10-PCS | Mod: CPTII,S$GLB,, | Performed by: NURSE PRACTITIONER

## 2021-07-09 PROCEDURE — 99213 OFFICE O/P EST LOW 20 MIN: CPT | Mod: S$GLB,,, | Performed by: NURSE PRACTITIONER

## 2021-07-09 PROCEDURE — 1101F PR PT FALLS ASSESS DOC 0-1 FALLS W/OUT INJ PAST YR: ICD-10-PCS | Mod: CPTII,S$GLB,, | Performed by: NURSE PRACTITIONER

## 2021-07-09 PROCEDURE — 1125F AMNT PAIN NOTED PAIN PRSNT: CPT | Mod: S$GLB,,, | Performed by: NURSE PRACTITIONER

## 2021-07-09 PROCEDURE — 99999 PR PBB SHADOW E&M-EST. PATIENT-LVL IV: CPT | Mod: PBBFAC,,, | Performed by: NURSE PRACTITIONER

## 2021-07-09 PROCEDURE — 1125F PR PAIN SEVERITY QUANTIFIED, PAIN PRESENT: ICD-10-PCS | Mod: S$GLB,,, | Performed by: NURSE PRACTITIONER

## 2021-07-09 PROCEDURE — 99999 PR PBB SHADOW E&M-EST. PATIENT-LVL IV: ICD-10-PCS | Mod: PBBFAC,,, | Performed by: NURSE PRACTITIONER

## 2021-07-09 PROCEDURE — 1101F PT FALLS ASSESS-DOCD LE1/YR: CPT | Mod: CPTII,S$GLB,, | Performed by: NURSE PRACTITIONER

## 2021-07-09 PROCEDURE — 3008F BODY MASS INDEX DOCD: CPT | Mod: CPTII,S$GLB,, | Performed by: NURSE PRACTITIONER

## 2021-07-09 PROCEDURE — 1159F MED LIST DOCD IN RCRD: CPT | Mod: S$GLB,,, | Performed by: NURSE PRACTITIONER

## 2021-07-26 ENCOUNTER — TELEPHONE (OUTPATIENT)
Dept: INTERNAL MEDICINE | Facility: CLINIC | Age: 76
End: 2021-07-26

## 2021-07-29 ENCOUNTER — TELEPHONE (OUTPATIENT)
Dept: PAIN MEDICINE | Facility: CLINIC | Age: 76
End: 2021-07-29

## 2021-08-02 ENCOUNTER — TELEPHONE (OUTPATIENT)
Dept: PAIN MEDICINE | Facility: CLINIC | Age: 76
End: 2021-08-02

## 2021-08-03 ENCOUNTER — PATIENT OUTREACH (OUTPATIENT)
Dept: ADMINISTRATIVE | Facility: HOSPITAL | Age: 76
End: 2021-08-03

## 2021-08-03 ENCOUNTER — OFFICE VISIT (OUTPATIENT)
Dept: PAIN MEDICINE | Facility: CLINIC | Age: 76
End: 2021-08-03
Payer: MEDICARE

## 2021-08-03 VITALS
RESPIRATION RATE: 18 BRPM | HEIGHT: 63 IN | HEART RATE: 82 BPM | BODY MASS INDEX: 25.69 KG/M2 | DIASTOLIC BLOOD PRESSURE: 85 MMHG | WEIGHT: 145 LBS | SYSTOLIC BLOOD PRESSURE: 135 MMHG | TEMPERATURE: 99 F

## 2021-08-03 DIAGNOSIS — G89.29 CHRONIC PAIN OF BOTH HIPS: Primary | ICD-10-CM

## 2021-08-03 DIAGNOSIS — M25.551 CHRONIC PAIN OF BOTH HIPS: Primary | ICD-10-CM

## 2021-08-03 DIAGNOSIS — M25.552 CHRONIC PAIN OF BOTH HIPS: Primary | ICD-10-CM

## 2021-08-03 PROCEDURE — 1125F AMNT PAIN NOTED PAIN PRSNT: CPT | Mod: CPTII,S$GLB,, | Performed by: NURSE PRACTITIONER

## 2021-08-03 PROCEDURE — 1160F PR REVIEW ALL MEDS BY PRESCRIBER/CLIN PHARMACIST DOCUMENTED: ICD-10-PCS | Mod: CPTII,S$GLB,, | Performed by: NURSE PRACTITIONER

## 2021-08-03 PROCEDURE — 3075F SYST BP GE 130 - 139MM HG: CPT | Mod: CPTII,S$GLB,, | Performed by: NURSE PRACTITIONER

## 2021-08-03 PROCEDURE — 3008F BODY MASS INDEX DOCD: CPT | Mod: CPTII,S$GLB,, | Performed by: NURSE PRACTITIONER

## 2021-08-03 PROCEDURE — 1125F PR PAIN SEVERITY QUANTIFIED, PAIN PRESENT: ICD-10-PCS | Mod: CPTII,S$GLB,, | Performed by: NURSE PRACTITIONER

## 2021-08-03 PROCEDURE — 99999 PR PBB SHADOW E&M-EST. PATIENT-LVL III: CPT | Mod: PBBFAC,,, | Performed by: NURSE PRACTITIONER

## 2021-08-03 PROCEDURE — 1160F RVW MEDS BY RX/DR IN RCRD: CPT | Mod: CPTII,S$GLB,, | Performed by: NURSE PRACTITIONER

## 2021-08-03 PROCEDURE — 3075F PR MOST RECENT SYSTOLIC BLOOD PRESS GE 130-139MM HG: ICD-10-PCS | Mod: CPTII,S$GLB,, | Performed by: NURSE PRACTITIONER

## 2021-08-03 PROCEDURE — 3288F PR FALLS RISK ASSESSMENT DOCUMENTED: ICD-10-PCS | Mod: CPTII,S$GLB,, | Performed by: NURSE PRACTITIONER

## 2021-08-03 PROCEDURE — 3008F PR BODY MASS INDEX (BMI) DOCUMENTED: ICD-10-PCS | Mod: CPTII,S$GLB,, | Performed by: NURSE PRACTITIONER

## 2021-08-03 PROCEDURE — 3288F FALL RISK ASSESSMENT DOCD: CPT | Mod: CPTII,S$GLB,, | Performed by: NURSE PRACTITIONER

## 2021-08-03 PROCEDURE — 1159F PR MEDICATION LIST DOCUMENTED IN MEDICAL RECORD: ICD-10-PCS | Mod: CPTII,S$GLB,, | Performed by: NURSE PRACTITIONER

## 2021-08-03 PROCEDURE — 99499 NO LOS: ICD-10-PCS | Mod: S$GLB,,, | Performed by: NURSE PRACTITIONER

## 2021-08-03 PROCEDURE — 99499 UNLISTED E&M SERVICE: CPT | Mod: S$GLB,,, | Performed by: NURSE PRACTITIONER

## 2021-08-03 PROCEDURE — 3051F PR MOST RECENT HEMOGLOBIN A1C LEVEL 7.0 - < 8.0%: ICD-10-PCS | Mod: CPTII,S$GLB,, | Performed by: NURSE PRACTITIONER

## 2021-08-03 PROCEDURE — 1159F MED LIST DOCD IN RCRD: CPT | Mod: CPTII,S$GLB,, | Performed by: NURSE PRACTITIONER

## 2021-08-03 PROCEDURE — 1101F PR PT FALLS ASSESS DOC 0-1 FALLS W/OUT INJ PAST YR: ICD-10-PCS | Mod: CPTII,S$GLB,, | Performed by: NURSE PRACTITIONER

## 2021-08-03 PROCEDURE — 3051F HG A1C>EQUAL 7.0%<8.0%: CPT | Mod: CPTII,S$GLB,, | Performed by: NURSE PRACTITIONER

## 2021-08-03 PROCEDURE — 3079F PR MOST RECENT DIASTOLIC BLOOD PRESSURE 80-89 MM HG: ICD-10-PCS | Mod: CPTII,S$GLB,, | Performed by: NURSE PRACTITIONER

## 2021-08-03 PROCEDURE — 3079F DIAST BP 80-89 MM HG: CPT | Mod: CPTII,S$GLB,, | Performed by: NURSE PRACTITIONER

## 2021-08-03 PROCEDURE — 1101F PT FALLS ASSESS-DOCD LE1/YR: CPT | Mod: CPTII,S$GLB,, | Performed by: NURSE PRACTITIONER

## 2021-08-03 PROCEDURE — 99999 PR PBB SHADOW E&M-EST. PATIENT-LVL III: ICD-10-PCS | Mod: PBBFAC,,, | Performed by: NURSE PRACTITIONER

## 2021-08-05 ENCOUNTER — CLINICAL SUPPORT (OUTPATIENT)
Dept: INTERNAL MEDICINE | Facility: CLINIC | Age: 76
End: 2021-08-05
Payer: MEDICARE

## 2021-08-05 VITALS — DIASTOLIC BLOOD PRESSURE: 72 MMHG | HEART RATE: 84 BPM | SYSTOLIC BLOOD PRESSURE: 124 MMHG

## 2021-08-05 PROCEDURE — 99999 PR PBB SHADOW E&M-EST. PATIENT-LVL I: ICD-10-PCS | Mod: PBBFAC,,,

## 2021-08-05 PROCEDURE — 99999 PR PBB SHADOW E&M-EST. PATIENT-LVL I: CPT | Mod: PBBFAC,,,

## 2021-08-06 ENCOUNTER — OFFICE VISIT (OUTPATIENT)
Dept: NEPHROLOGY | Facility: CLINIC | Age: 76
End: 2021-08-06
Payer: MEDICARE

## 2021-08-06 VITALS
SYSTOLIC BLOOD PRESSURE: 110 MMHG | BODY MASS INDEX: 25.7 KG/M2 | DIASTOLIC BLOOD PRESSURE: 60 MMHG | OXYGEN SATURATION: 99 % | WEIGHT: 145.06 LBS | HEIGHT: 63 IN | HEART RATE: 86 BPM

## 2021-08-06 DIAGNOSIS — N18.9 ANEMIA OF CHRONIC RENAL FAILURE, UNSPECIFIED CKD STAGE: ICD-10-CM

## 2021-08-06 DIAGNOSIS — E11.65 UNCONTROLLED TYPE 2 DIABETES MELLITUS WITH HYPERGLYCEMIA: ICD-10-CM

## 2021-08-06 DIAGNOSIS — D63.1 ANEMIA OF CHRONIC RENAL FAILURE, UNSPECIFIED CKD STAGE: ICD-10-CM

## 2021-08-06 DIAGNOSIS — R35.1 NOCTURIA: ICD-10-CM

## 2021-08-06 DIAGNOSIS — Z00.00 ANNUAL PHYSICAL EXAM: ICD-10-CM

## 2021-08-06 DIAGNOSIS — N18.32 STAGE 3B CHRONIC KIDNEY DISEASE: Primary | ICD-10-CM

## 2021-08-06 PROCEDURE — 3288F PR FALLS RISK ASSESSMENT DOCUMENTED: ICD-10-PCS | Mod: CPTII,S$GLB,, | Performed by: INTERNAL MEDICINE

## 2021-08-06 PROCEDURE — 3051F HG A1C>EQUAL 7.0%<8.0%: CPT | Mod: CPTII,S$GLB,, | Performed by: INTERNAL MEDICINE

## 2021-08-06 PROCEDURE — 3008F BODY MASS INDEX DOCD: CPT | Mod: CPTII,S$GLB,, | Performed by: INTERNAL MEDICINE

## 2021-08-06 PROCEDURE — 1160F PR REVIEW ALL MEDS BY PRESCRIBER/CLIN PHARMACIST DOCUMENTED: ICD-10-PCS | Mod: CPTII,S$GLB,, | Performed by: INTERNAL MEDICINE

## 2021-08-06 PROCEDURE — 1126F PR PAIN SEVERITY QUANTIFIED, NO PAIN PRESENT: ICD-10-PCS | Mod: CPTII,S$GLB,, | Performed by: INTERNAL MEDICINE

## 2021-08-06 PROCEDURE — 99999 PR PBB SHADOW E&M-EST. PATIENT-LVL V: CPT | Mod: PBBFAC,,, | Performed by: INTERNAL MEDICINE

## 2021-08-06 PROCEDURE — 3074F SYST BP LT 130 MM HG: CPT | Mod: CPTII,S$GLB,, | Performed by: INTERNAL MEDICINE

## 2021-08-06 PROCEDURE — 3078F PR MOST RECENT DIASTOLIC BLOOD PRESSURE < 80 MM HG: ICD-10-PCS | Mod: CPTII,S$GLB,, | Performed by: INTERNAL MEDICINE

## 2021-08-06 PROCEDURE — 99499 RISK ADDL DX/OHS AUDIT: ICD-10-PCS | Mod: S$GLB,,, | Performed by: INTERNAL MEDICINE

## 2021-08-06 PROCEDURE — 99204 OFFICE O/P NEW MOD 45 MIN: CPT | Mod: S$GLB,,, | Performed by: INTERNAL MEDICINE

## 2021-08-06 PROCEDURE — 1126F AMNT PAIN NOTED NONE PRSNT: CPT | Mod: CPTII,S$GLB,, | Performed by: INTERNAL MEDICINE

## 2021-08-06 PROCEDURE — 3078F DIAST BP <80 MM HG: CPT | Mod: CPTII,S$GLB,, | Performed by: INTERNAL MEDICINE

## 2021-08-06 PROCEDURE — 1101F PR PT FALLS ASSESS DOC 0-1 FALLS W/OUT INJ PAST YR: ICD-10-PCS | Mod: CPTII,S$GLB,, | Performed by: INTERNAL MEDICINE

## 2021-08-06 PROCEDURE — 99204 PR OFFICE/OUTPT VISIT, NEW, LEVL IV, 45-59 MIN: ICD-10-PCS | Mod: S$GLB,,, | Performed by: INTERNAL MEDICINE

## 2021-08-06 PROCEDURE — 99499 UNLISTED E&M SERVICE: CPT | Mod: S$GLB,,, | Performed by: INTERNAL MEDICINE

## 2021-08-06 PROCEDURE — 1159F MED LIST DOCD IN RCRD: CPT | Mod: CPTII,S$GLB,, | Performed by: INTERNAL MEDICINE

## 2021-08-06 PROCEDURE — 1101F PT FALLS ASSESS-DOCD LE1/YR: CPT | Mod: CPTII,S$GLB,, | Performed by: INTERNAL MEDICINE

## 2021-08-06 PROCEDURE — 99999 PR PBB SHADOW E&M-EST. PATIENT-LVL V: ICD-10-PCS | Mod: PBBFAC,,, | Performed by: INTERNAL MEDICINE

## 2021-08-06 PROCEDURE — 3074F PR MOST RECENT SYSTOLIC BLOOD PRESSURE < 130 MM HG: ICD-10-PCS | Mod: CPTII,S$GLB,, | Performed by: INTERNAL MEDICINE

## 2021-08-06 PROCEDURE — 1160F RVW MEDS BY RX/DR IN RCRD: CPT | Mod: CPTII,S$GLB,, | Performed by: INTERNAL MEDICINE

## 2021-08-06 PROCEDURE — 3051F PR MOST RECENT HEMOGLOBIN A1C LEVEL 7.0 - < 8.0%: ICD-10-PCS | Mod: CPTII,S$GLB,, | Performed by: INTERNAL MEDICINE

## 2021-08-06 PROCEDURE — 3008F PR BODY MASS INDEX (BMI) DOCUMENTED: ICD-10-PCS | Mod: CPTII,S$GLB,, | Performed by: INTERNAL MEDICINE

## 2021-08-06 PROCEDURE — 1159F PR MEDICATION LIST DOCUMENTED IN MEDICAL RECORD: ICD-10-PCS | Mod: CPTII,S$GLB,, | Performed by: INTERNAL MEDICINE

## 2021-08-06 PROCEDURE — 3288F FALL RISK ASSESSMENT DOCD: CPT | Mod: CPTII,S$GLB,, | Performed by: INTERNAL MEDICINE

## 2021-08-06 RX ORDER — SODIUM BICARBONATE 650 MG/1
650 TABLET ORAL 2 TIMES DAILY
Qty: 180 TABLET | Refills: 3 | Status: SHIPPED | OUTPATIENT
Start: 2021-08-06 | End: 2022-08-26

## 2021-08-10 ENCOUNTER — TELEPHONE (OUTPATIENT)
Dept: ENDOCRINOLOGY | Facility: CLINIC | Age: 76
End: 2021-08-10

## 2021-08-13 ENCOUNTER — HOSPITAL ENCOUNTER (OUTPATIENT)
Dept: RADIOLOGY | Facility: OTHER | Age: 76
Discharge: HOME | End: 2021-08-13
Attending: INTERNAL MEDICINE
Payer: MEDICARE

## 2021-08-13 DIAGNOSIS — N18.32 STAGE 3B CHRONIC KIDNEY DISEASE: ICD-10-CM

## 2021-08-13 PROCEDURE — 76770 US EXAM ABDO BACK WALL COMP: CPT | Mod: 26,,, | Performed by: INTERNAL MEDICINE

## 2021-08-13 PROCEDURE — 76770 US EXAM ABDO BACK WALL COMP: CPT | Mod: TC

## 2021-08-13 PROCEDURE — 76770 US RETROPERITONEAL COMPLETE: ICD-10-PCS | Mod: 26,,, | Performed by: INTERNAL MEDICINE

## 2021-08-23 ENCOUNTER — HOSPITAL ENCOUNTER (OUTPATIENT)
Facility: OTHER | Age: 76
Discharge: HOME | End: 2021-08-23
Attending: ANESTHESIOLOGY | Admitting: ANESTHESIOLOGY
Payer: MEDICARE

## 2021-08-23 VITALS
HEART RATE: 73 BPM | TEMPERATURE: 99 F | SYSTOLIC BLOOD PRESSURE: 138 MMHG | OXYGEN SATURATION: 100 % | WEIGHT: 145 LBS | DIASTOLIC BLOOD PRESSURE: 70 MMHG | BODY MASS INDEX: 25.69 KG/M2 | HEIGHT: 63 IN | RESPIRATION RATE: 14 BRPM

## 2021-08-23 DIAGNOSIS — G89.29 CHRONIC PAIN: ICD-10-CM

## 2021-08-23 DIAGNOSIS — M16.9 OSTEOARTHRITIS OF HIP, UNSPECIFIED LATERALITY, UNSPECIFIED OSTEOARTHRITIS TYPE: Primary | ICD-10-CM

## 2021-08-23 LAB — POCT GLUCOSE: 193 MG/DL (ref 70–110)

## 2021-08-23 PROCEDURE — 25500020 PHARM REV CODE 255: Performed by: ANESTHESIOLOGY

## 2021-08-23 PROCEDURE — 20610 PR DRAIN/INJECT LARGE JOINT/BURSA: ICD-10-PCS | Mod: 50,,, | Performed by: ANESTHESIOLOGY

## 2021-08-23 PROCEDURE — 25000003 PHARM REV CODE 250: Performed by: ANESTHESIOLOGY

## 2021-08-23 PROCEDURE — 82947 ASSAY GLUCOSE BLOOD QUANT: CPT | Performed by: ANESTHESIOLOGY

## 2021-08-23 PROCEDURE — 77002 PR FLUOROSCOPIC GUIDANCE NEEDLE PLACEMENT: ICD-10-PCS | Mod: 26,,, | Performed by: ANESTHESIOLOGY

## 2021-08-23 PROCEDURE — 20610 DRAIN/INJ JOINT/BURSA W/O US: CPT | Mod: 50 | Performed by: ANESTHESIOLOGY

## 2021-08-23 PROCEDURE — 63600175 PHARM REV CODE 636 W HCPCS: Performed by: ANESTHESIOLOGY

## 2021-08-23 PROCEDURE — 20610 DRAIN/INJ JOINT/BURSA W/O US: CPT | Mod: 50,,, | Performed by: ANESTHESIOLOGY

## 2021-08-23 PROCEDURE — 77002 NEEDLE LOCALIZATION BY XRAY: CPT | Mod: 26,,, | Performed by: ANESTHESIOLOGY

## 2021-08-23 RX ORDER — BUPIVACAINE HYDROCHLORIDE 2.5 MG/ML
INJECTION, SOLUTION EPIDURAL; INFILTRATION; INTRACAUDAL
Status: DISCONTINUED | OUTPATIENT
Start: 2021-08-23 | End: 2021-08-23 | Stop reason: HOSPADM

## 2021-08-23 RX ORDER — TRIAMCINOLONE ACETONIDE 40 MG/ML
INJECTION, SUSPENSION INTRA-ARTICULAR; INTRAMUSCULAR
Status: DISCONTINUED | OUTPATIENT
Start: 2021-08-23 | End: 2021-08-23 | Stop reason: HOSPADM

## 2021-08-23 RX ORDER — LIDOCAINE HYDROCHLORIDE 10 MG/ML
INJECTION INFILTRATION; PERINEURAL
Status: DISCONTINUED | OUTPATIENT
Start: 2021-08-23 | End: 2021-08-23 | Stop reason: HOSPADM

## 2021-08-23 RX ORDER — ALPRAZOLAM 0.5 MG/1
0.5 TABLET ORAL ONCE
Status: COMPLETED | OUTPATIENT
Start: 2021-08-23 | End: 2021-08-23

## 2021-08-23 RX ORDER — SODIUM CHLORIDE 9 MG/ML
INJECTION, SOLUTION INTRAVENOUS CONTINUOUS
Status: DISCONTINUED | OUTPATIENT
Start: 2021-08-23 | End: 2021-08-23 | Stop reason: HOSPADM

## 2021-08-23 RX ADMIN — ALPRAZOLAM 0.5 MG: 0.5 TABLET ORAL at 10:08

## 2021-09-08 ENCOUNTER — TELEPHONE (OUTPATIENT)
Dept: PAIN MEDICINE | Facility: CLINIC | Age: 76
End: 2021-09-08

## 2021-10-11 LAB
LEFT EYE DM RETINOPATHY: NEGATIVE
RIGHT EYE DM RETINOPATHY: NEGATIVE

## 2021-10-27 ENCOUNTER — TELEPHONE (OUTPATIENT)
Dept: HEMATOLOGY/ONCOLOGY | Facility: CLINIC | Age: 76
End: 2021-10-27
Payer: MEDICARE

## 2021-11-08 ENCOUNTER — TELEPHONE (OUTPATIENT)
Dept: INTERNAL MEDICINE | Facility: CLINIC | Age: 76
End: 2021-11-08
Payer: MEDICARE

## 2021-11-09 ENCOUNTER — PATIENT OUTREACH (OUTPATIENT)
Dept: ADMINISTRATIVE | Facility: OTHER | Age: 76
End: 2021-11-09
Payer: MEDICARE

## 2021-11-09 DIAGNOSIS — Z79.4 TYPE 2 DIABETES MELLITUS WITHOUT COMPLICATION, WITH LONG-TERM CURRENT USE OF INSULIN: Primary | ICD-10-CM

## 2021-11-09 DIAGNOSIS — E11.9 TYPE 2 DIABETES MELLITUS WITHOUT COMPLICATION, WITH LONG-TERM CURRENT USE OF INSULIN: Primary | ICD-10-CM

## 2021-11-10 ENCOUNTER — LAB VISIT (OUTPATIENT)
Dept: LAB | Facility: OTHER | Age: 76
End: 2021-11-10
Attending: INTERNAL MEDICINE
Payer: MEDICARE

## 2021-11-10 ENCOUNTER — OFFICE VISIT (OUTPATIENT)
Dept: INTERNAL MEDICINE | Facility: CLINIC | Age: 76
End: 2021-11-10
Attending: FAMILY MEDICINE
Payer: MEDICARE

## 2021-11-10 ENCOUNTER — TELEPHONE (OUTPATIENT)
Dept: HEMATOLOGY/ONCOLOGY | Facility: CLINIC | Age: 76
End: 2021-11-10
Payer: MEDICARE

## 2021-11-10 VITALS
HEART RATE: 89 BPM | BODY MASS INDEX: 24.72 KG/M2 | OXYGEN SATURATION: 100 % | WEIGHT: 139.56 LBS | DIASTOLIC BLOOD PRESSURE: 76 MMHG | SYSTOLIC BLOOD PRESSURE: 158 MMHG

## 2021-11-10 DIAGNOSIS — I10 ESSENTIAL HYPERTENSION: ICD-10-CM

## 2021-11-10 DIAGNOSIS — N18.32 STAGE 3B CHRONIC KIDNEY DISEASE: ICD-10-CM

## 2021-11-10 DIAGNOSIS — Z98.890 H/O ABDOMINAL SURGERY: ICD-10-CM

## 2021-11-10 DIAGNOSIS — N18.9 ANEMIA OF CHRONIC RENAL FAILURE, UNSPECIFIED CKD STAGE: ICD-10-CM

## 2021-11-10 DIAGNOSIS — Z79.4 TYPE 2 DIABETES MELLITUS WITHOUT COMPLICATION, WITH LONG-TERM CURRENT USE OF INSULIN: ICD-10-CM

## 2021-11-10 DIAGNOSIS — E11.65 UNCONTROLLED TYPE 2 DIABETES MELLITUS WITH HYPERGLYCEMIA: ICD-10-CM

## 2021-11-10 DIAGNOSIS — E11.9 TYPE 2 DIABETES MELLITUS WITHOUT COMPLICATION, WITH LONG-TERM CURRENT USE OF INSULIN: ICD-10-CM

## 2021-11-10 DIAGNOSIS — Z09 HOSPITAL DISCHARGE FOLLOW-UP: Primary | ICD-10-CM

## 2021-11-10 DIAGNOSIS — D63.1 ANEMIA OF CHRONIC RENAL FAILURE, UNSPECIFIED CKD STAGE: ICD-10-CM

## 2021-11-10 LAB
25(OH)D3+25(OH)D2 SERPL-MCNC: 33 NG/ML (ref 30–96)
ALBUMIN SERPL BCP-MCNC: 3.3 G/DL (ref 3.5–5.2)
ANION GAP SERPL CALC-SCNC: 13 MMOL/L (ref 8–16)
BASOPHILS # BLD AUTO: 0.03 K/UL (ref 0–0.2)
BASOPHILS NFR BLD: 0.5 % (ref 0–1.9)
BUN SERPL-MCNC: 24 MG/DL (ref 8–23)
CALCIUM SERPL-MCNC: 9.3 MG/DL (ref 8.7–10.5)
CHLORIDE SERPL-SCNC: 112 MMOL/L (ref 95–110)
CK SERPL-CCNC: 97 U/L (ref 20–180)
CO2 SERPL-SCNC: 19 MMOL/L (ref 23–29)
CREAT SERPL-MCNC: 1.2 MG/DL (ref 0.5–1.4)
DIFFERENTIAL METHOD: ABNORMAL
EOSINOPHIL # BLD AUTO: 0.2 K/UL (ref 0–0.5)
EOSINOPHIL NFR BLD: 2.4 % (ref 0–8)
ERYTHROCYTE [DISTWIDTH] IN BLOOD BY AUTOMATED COUNT: 15.3 % (ref 11.5–14.5)
EST. GFR  (AFRICAN AMERICAN): 51 ML/MIN/1.73 M^2
EST. GFR  (NON AFRICAN AMERICAN): 45 ML/MIN/1.73 M^2
ESTIMATED AVG GLUCOSE: 171 MG/DL (ref 68–131)
FERRITIN SERPL-MCNC: 220 NG/ML (ref 20–300)
GLUCOSE SERPL-MCNC: 165 MG/DL (ref 70–110)
HBA1C MFR BLD: 7.6 % (ref 4–5.6)
HCT VFR BLD AUTO: 29.3 % (ref 37–48.5)
HGB BLD-MCNC: 8.9 G/DL (ref 12–16)
IMM GRANULOCYTES # BLD AUTO: 0.02 K/UL (ref 0–0.04)
IMM GRANULOCYTES NFR BLD AUTO: 0.3 % (ref 0–0.5)
IRON SERPL-MCNC: 36 UG/DL (ref 30–160)
LYMPHOCYTES # BLD AUTO: 2.2 K/UL (ref 1–4.8)
LYMPHOCYTES NFR BLD: 35.1 % (ref 18–48)
MCH RBC QN AUTO: 27.6 PG (ref 27–31)
MCHC RBC AUTO-ENTMCNC: 30.4 G/DL (ref 32–36)
MCV RBC AUTO: 91 FL (ref 82–98)
MONOCYTES # BLD AUTO: 0.4 K/UL (ref 0.3–1)
MONOCYTES NFR BLD: 6.5 % (ref 4–15)
NEUTROPHILS # BLD AUTO: 3.5 K/UL (ref 1.8–7.7)
NEUTROPHILS NFR BLD: 55.2 % (ref 38–73)
NRBC BLD-RTO: 0 /100 WBC
PHOSPHATE SERPL-MCNC: 2.6 MG/DL (ref 2.7–4.5)
PLATELET # BLD AUTO: 229 K/UL (ref 150–450)
PMV BLD AUTO: 9.6 FL (ref 9.2–12.9)
POTASSIUM SERPL-SCNC: 3.5 MMOL/L (ref 3.5–5.1)
PTH-INTACT SERPL-MCNC: 106.5 PG/ML (ref 9–77)
RBC # BLD AUTO: 3.22 M/UL (ref 4–5.4)
RPR SER QL: NORMAL
SATURATED IRON: 9 % (ref 20–50)
SODIUM SERPL-SCNC: 144 MMOL/L (ref 136–145)
TOTAL IRON BINDING CAPACITY: 403 UG/DL (ref 250–450)
TRANSFERRIN SERPL-MCNC: 272 MG/DL (ref 200–375)
URATE SERPL-MCNC: 6.1 MG/DL (ref 2.4–5.7)
WBC # BLD AUTO: 6.29 K/UL (ref 3.9–12.7)

## 2021-11-10 PROCEDURE — 84466 ASSAY OF TRANSFERRIN: CPT | Performed by: INTERNAL MEDICINE

## 2021-11-10 PROCEDURE — 1160F RVW MEDS BY RX/DR IN RCRD: CPT | Mod: CPTII,S$GLB,, | Performed by: PHYSICIAN ASSISTANT

## 2021-11-10 PROCEDURE — 1101F PR PT FALLS ASSESS DOC 0-1 FALLS W/OUT INJ PAST YR: ICD-10-PCS | Mod: CPTII,S$GLB,, | Performed by: PHYSICIAN ASSISTANT

## 2021-11-10 PROCEDURE — 1159F MED LIST DOCD IN RCRD: CPT | Mod: CPTII,S$GLB,, | Performed by: PHYSICIAN ASSISTANT

## 2021-11-10 PROCEDURE — 86038 ANTINUCLEAR ANTIBODIES: CPT | Performed by: INTERNAL MEDICINE

## 2021-11-10 PROCEDURE — 3288F PR FALLS RISK ASSESSMENT DOCUMENTED: ICD-10-PCS | Mod: CPTII,S$GLB,, | Performed by: PHYSICIAN ASSISTANT

## 2021-11-10 PROCEDURE — 84165 PROTEIN E-PHORESIS SERUM: CPT | Performed by: INTERNAL MEDICINE

## 2021-11-10 PROCEDURE — 85025 COMPLETE CBC W/AUTO DIFF WBC: CPT | Performed by: INTERNAL MEDICINE

## 2021-11-10 PROCEDURE — 3078F DIAST BP <80 MM HG: CPT | Mod: CPTII,S$GLB,, | Performed by: PHYSICIAN ASSISTANT

## 2021-11-10 PROCEDURE — 1160F PR REVIEW ALL MEDS BY PRESCRIBER/CLIN PHARMACIST DOCUMENTED: ICD-10-PCS | Mod: CPTII,S$GLB,, | Performed by: PHYSICIAN ASSISTANT

## 2021-11-10 PROCEDURE — 82306 VITAMIN D 25 HYDROXY: CPT | Performed by: INTERNAL MEDICINE

## 2021-11-10 PROCEDURE — 1126F AMNT PAIN NOTED NONE PRSNT: CPT | Mod: CPTII,S$GLB,, | Performed by: PHYSICIAN ASSISTANT

## 2021-11-10 PROCEDURE — 3077F PR MOST RECENT SYSTOLIC BLOOD PRESSURE >= 140 MM HG: ICD-10-PCS | Mod: CPTII,S$GLB,, | Performed by: PHYSICIAN ASSISTANT

## 2021-11-10 PROCEDURE — 82550 ASSAY OF CK (CPK): CPT | Performed by: INTERNAL MEDICINE

## 2021-11-10 PROCEDURE — 86334 PATHOLOGIST INTERPRETATION IFE: ICD-10-PCS | Mod: 26,,, | Performed by: PATHOLOGY

## 2021-11-10 PROCEDURE — 84550 ASSAY OF BLOOD/URIC ACID: CPT | Performed by: INTERNAL MEDICINE

## 2021-11-10 PROCEDURE — 86334 IMMUNOFIX E-PHORESIS SERUM: CPT | Mod: 26,,, | Performed by: PATHOLOGY

## 2021-11-10 PROCEDURE — 3008F BODY MASS INDEX DOCD: CPT | Mod: CPTII,S$GLB,, | Performed by: PHYSICIAN ASSISTANT

## 2021-11-10 PROCEDURE — 99214 OFFICE O/P EST MOD 30 MIN: CPT | Mod: S$GLB,,, | Performed by: PHYSICIAN ASSISTANT

## 2021-11-10 PROCEDURE — 3077F SYST BP >= 140 MM HG: CPT | Mod: CPTII,S$GLB,, | Performed by: PHYSICIAN ASSISTANT

## 2021-11-10 PROCEDURE — 99999 PR PBB SHADOW E&M-EST. PATIENT-LVL IV: ICD-10-PCS | Mod: PBBFAC,,, | Performed by: PHYSICIAN ASSISTANT

## 2021-11-10 PROCEDURE — 3066F NEPHROPATHY DOC TX: CPT | Mod: CPTII,S$GLB,, | Performed by: PHYSICIAN ASSISTANT

## 2021-11-10 PROCEDURE — 3008F PR BODY MASS INDEX (BMI) DOCUMENTED: ICD-10-PCS | Mod: CPTII,S$GLB,, | Performed by: PHYSICIAN ASSISTANT

## 2021-11-10 PROCEDURE — 87389 HIV-1 AG W/HIV-1&-2 AB AG IA: CPT | Performed by: INTERNAL MEDICINE

## 2021-11-10 PROCEDURE — 86592 SYPHILIS TEST NON-TREP QUAL: CPT | Performed by: INTERNAL MEDICINE

## 2021-11-10 PROCEDURE — 1126F PR PAIN SEVERITY QUANTIFIED, NO PAIN PRESENT: ICD-10-PCS | Mod: CPTII,S$GLB,, | Performed by: PHYSICIAN ASSISTANT

## 2021-11-10 PROCEDURE — 3288F FALL RISK ASSESSMENT DOCD: CPT | Mod: CPTII,S$GLB,, | Performed by: PHYSICIAN ASSISTANT

## 2021-11-10 PROCEDURE — 80069 RENAL FUNCTION PANEL: CPT | Performed by: INTERNAL MEDICINE

## 2021-11-10 PROCEDURE — 3061F NEG MICROALBUMINURIA REV: CPT | Mod: CPTII,S$GLB,, | Performed by: PHYSICIAN ASSISTANT

## 2021-11-10 PROCEDURE — 36415 COLL VENOUS BLD VENIPUNCTURE: CPT | Performed by: INTERNAL MEDICINE

## 2021-11-10 PROCEDURE — 83520 IMMUNOASSAY QUANT NOS NONAB: CPT | Mod: 59 | Performed by: INTERNAL MEDICINE

## 2021-11-10 PROCEDURE — 3078F PR MOST RECENT DIASTOLIC BLOOD PRESSURE < 80 MM HG: ICD-10-PCS | Mod: CPTII,S$GLB,, | Performed by: PHYSICIAN ASSISTANT

## 2021-11-10 PROCEDURE — 3061F PR NEG MICROALBUMINURIA RESULT DOCUMENTED/REVIEW: ICD-10-PCS | Mod: CPTII,S$GLB,, | Performed by: PHYSICIAN ASSISTANT

## 2021-11-10 PROCEDURE — 83036 HEMOGLOBIN GLYCOSYLATED A1C: CPT | Performed by: FAMILY MEDICINE

## 2021-11-10 PROCEDURE — 3051F HG A1C>EQUAL 7.0%<8.0%: CPT | Mod: CPTII,S$GLB,, | Performed by: PHYSICIAN ASSISTANT

## 2021-11-10 PROCEDURE — 1101F PT FALLS ASSESS-DOCD LE1/YR: CPT | Mod: CPTII,S$GLB,, | Performed by: PHYSICIAN ASSISTANT

## 2021-11-10 PROCEDURE — 84165 PATHOLOGIST INTERPRETATION SPE: ICD-10-PCS | Mod: 26,,, | Performed by: PATHOLOGY

## 2021-11-10 PROCEDURE — 84165 PROTEIN E-PHORESIS SERUM: CPT | Mod: 26,,, | Performed by: PATHOLOGY

## 2021-11-10 PROCEDURE — 99999 PR PBB SHADOW E&M-EST. PATIENT-LVL IV: CPT | Mod: PBBFAC,,, | Performed by: PHYSICIAN ASSISTANT

## 2021-11-10 PROCEDURE — 83970 ASSAY OF PARATHORMONE: CPT | Performed by: INTERNAL MEDICINE

## 2021-11-10 PROCEDURE — 99214 PR OFFICE/OUTPT VISIT, EST, LEVL IV, 30-39 MIN: ICD-10-PCS | Mod: S$GLB,,, | Performed by: PHYSICIAN ASSISTANT

## 2021-11-10 PROCEDURE — 86334 IMMUNOFIX E-PHORESIS SERUM: CPT | Performed by: INTERNAL MEDICINE

## 2021-11-10 PROCEDURE — 3066F PR DOCUMENTATION OF TREATMENT FOR NEPHROPATHY: ICD-10-PCS | Mod: CPTII,S$GLB,, | Performed by: PHYSICIAN ASSISTANT

## 2021-11-10 PROCEDURE — 82728 ASSAY OF FERRITIN: CPT | Performed by: INTERNAL MEDICINE

## 2021-11-10 PROCEDURE — 3051F PR MOST RECENT HEMOGLOBIN A1C LEVEL 7.0 - < 8.0%: ICD-10-PCS | Mod: CPTII,S$GLB,, | Performed by: PHYSICIAN ASSISTANT

## 2021-11-10 PROCEDURE — 1159F PR MEDICATION LIST DOCUMENTED IN MEDICAL RECORD: ICD-10-PCS | Mod: CPTII,S$GLB,, | Performed by: PHYSICIAN ASSISTANT

## 2021-11-11 LAB
ALBUMIN SERPL ELPH-MCNC: 3.75 G/DL (ref 3.35–5.55)
ALPHA1 GLOB SERPL ELPH-MCNC: 0.52 G/DL (ref 0.17–0.41)
ALPHA2 GLOB SERPL ELPH-MCNC: 1.32 G/DL (ref 0.43–0.99)
ANA SER QL IF: NORMAL
B-GLOBULIN SERPL ELPH-MCNC: 0.86 G/DL (ref 0.5–1.1)
GAMMA GLOB SERPL ELPH-MCNC: 0.36 G/DL (ref 0.67–1.58)
HIV 1+2 AB+HIV1 P24 AG SERPL QL IA: NEGATIVE
INTERPRETATION SERPL IFE-IMP: NORMAL
KAPPA LC SER QL IA: 1.97 MG/DL (ref 0.33–1.94)
KAPPA LC/LAMBDA SER IA: 1.3 (ref 0.26–1.65)
LAMBDA LC SER QL IA: 1.51 MG/DL (ref 0.57–2.63)
PATHOLOGIST INTERPRETATION IFE: NORMAL
PATHOLOGIST INTERPRETATION SPE: NORMAL
PROT SERPL-MCNC: 6.8 G/DL (ref 6–8.4)

## 2021-11-23 ENCOUNTER — TELEPHONE (OUTPATIENT)
Dept: INTERNAL MEDICINE | Facility: CLINIC | Age: 76
End: 2021-11-23
Payer: MEDICARE

## 2021-11-24 ENCOUNTER — PATIENT OUTREACH (OUTPATIENT)
Dept: ADMINISTRATIVE | Facility: HOSPITAL | Age: 76
End: 2021-11-24
Payer: MEDICARE

## 2021-11-24 DIAGNOSIS — E11.65 UNCONTROLLED TYPE 2 DIABETES MELLITUS WITH HYPERGLYCEMIA: ICD-10-CM

## 2021-11-24 RX ORDER — DEXTROSE 4 G
TABLET,CHEWABLE ORAL
Qty: 1 EACH | Refills: 0 | Status: ON HOLD | OUTPATIENT
Start: 2021-11-24 | End: 2023-01-06 | Stop reason: HOSPADM

## 2021-11-24 RX ORDER — LINAGLIPTIN 5 MG/1
5 TABLET, FILM COATED ORAL DAILY
Qty: 90 TABLET | Refills: 3 | Status: SHIPPED | OUTPATIENT
Start: 2021-11-24 | End: 2023-07-25

## 2021-11-29 ENCOUNTER — OFFICE VISIT (OUTPATIENT)
Dept: INTERNAL MEDICINE | Facility: CLINIC | Age: 76
End: 2021-11-29
Payer: MEDICARE

## 2021-11-29 ENCOUNTER — LAB VISIT (OUTPATIENT)
Dept: LAB | Facility: OTHER | Age: 76
End: 2021-11-29
Attending: PHYSICIAN ASSISTANT
Payer: MEDICARE

## 2021-11-29 VITALS
OXYGEN SATURATION: 98 % | SYSTOLIC BLOOD PRESSURE: 140 MMHG | BODY MASS INDEX: 24.53 KG/M2 | DIASTOLIC BLOOD PRESSURE: 86 MMHG | HEART RATE: 84 BPM | WEIGHT: 138.44 LBS

## 2021-11-29 DIAGNOSIS — N18.32 STAGE 3B CHRONIC KIDNEY DISEASE: Primary | ICD-10-CM

## 2021-11-29 DIAGNOSIS — T45.1X5A ANEMIA ASSOCIATED WITH CHEMOTHERAPY: ICD-10-CM

## 2021-11-29 DIAGNOSIS — E04.1 THYROID NODULE: ICD-10-CM

## 2021-11-29 DIAGNOSIS — E55.9 VITAMIN D DEFICIENCY: ICD-10-CM

## 2021-11-29 DIAGNOSIS — D64.81 ANEMIA ASSOCIATED WITH CHEMOTHERAPY: ICD-10-CM

## 2021-11-29 DIAGNOSIS — I10 ESSENTIAL HYPERTENSION: ICD-10-CM

## 2021-11-29 DIAGNOSIS — R59.1 LYMPHADENOPATHY: ICD-10-CM

## 2021-11-29 DIAGNOSIS — E11.65 UNCONTROLLED TYPE 2 DIABETES MELLITUS WITH HYPERGLYCEMIA: ICD-10-CM

## 2021-11-29 DIAGNOSIS — R94.6 ABNORMAL THYROID FUNCTION TEST: ICD-10-CM

## 2021-11-29 DIAGNOSIS — D72.819 LEUKOPENIA, UNSPECIFIED TYPE: ICD-10-CM

## 2021-11-29 LAB
BASOPHILS # BLD AUTO: 0.03 K/UL (ref 0–0.2)
BASOPHILS NFR BLD: 0.9 % (ref 0–1.9)
DIFFERENTIAL METHOD: ABNORMAL
EOSINOPHIL # BLD AUTO: 0.1 K/UL (ref 0–0.5)
EOSINOPHIL NFR BLD: 3.4 % (ref 0–8)
ERYTHROCYTE [DISTWIDTH] IN BLOOD BY AUTOMATED COUNT: 14.5 % (ref 11.5–14.5)
HCT VFR BLD AUTO: 37 % (ref 37–48.5)
HGB BLD-MCNC: 11 G/DL (ref 12–16)
IMM GRANULOCYTES # BLD AUTO: 0.01 K/UL (ref 0–0.04)
IMM GRANULOCYTES NFR BLD AUTO: 0.3 % (ref 0–0.5)
LYMPHOCYTES # BLD AUTO: 2.4 K/UL (ref 1–4.8)
LYMPHOCYTES NFR BLD: 75.9 % (ref 18–48)
MCH RBC QN AUTO: 27.3 PG (ref 27–31)
MCHC RBC AUTO-ENTMCNC: 29.7 G/DL (ref 32–36)
MCV RBC AUTO: 92 FL (ref 82–98)
MONOCYTES # BLD AUTO: 0.4 K/UL (ref 0.3–1)
MONOCYTES NFR BLD: 13.5 % (ref 4–15)
NEUTROPHILS # BLD AUTO: 0.2 K/UL (ref 1.8–7.7)
NEUTROPHILS NFR BLD: 6 % (ref 38–73)
NRBC BLD-RTO: 0 /100 WBC
PLATELET # BLD AUTO: 216 K/UL (ref 150–450)
PLATELET BLD QL SMEAR: ABNORMAL
PMV BLD AUTO: 10.5 FL (ref 9.2–12.9)
RBC # BLD AUTO: 4.03 M/UL (ref 4–5.4)
WBC # BLD AUTO: 3.19 K/UL (ref 3.9–12.7)

## 2021-11-29 PROCEDURE — 4010F ACE/ARB THERAPY RXD/TAKEN: CPT | Mod: CPTII,S$GLB,, | Performed by: PHYSICIAN ASSISTANT

## 2021-11-29 PROCEDURE — 99999 PR PBB SHADOW E&M-EST. PATIENT-LVL IV: CPT | Mod: PBBFAC,,, | Performed by: PHYSICIAN ASSISTANT

## 2021-11-29 PROCEDURE — 36415 COLL VENOUS BLD VENIPUNCTURE: CPT | Performed by: PHYSICIAN ASSISTANT

## 2021-11-29 PROCEDURE — 3061F PR NEG MICROALBUMINURIA RESULT DOCUMENTED/REVIEW: ICD-10-PCS | Mod: CPTII,S$GLB,, | Performed by: PHYSICIAN ASSISTANT

## 2021-11-29 PROCEDURE — 3061F NEG MICROALBUMINURIA REV: CPT | Mod: CPTII,S$GLB,, | Performed by: PHYSICIAN ASSISTANT

## 2021-11-29 PROCEDURE — 99999 PR PBB SHADOW E&M-EST. PATIENT-LVL IV: ICD-10-PCS | Mod: PBBFAC,,, | Performed by: PHYSICIAN ASSISTANT

## 2021-11-29 PROCEDURE — 85025 COMPLETE CBC W/AUTO DIFF WBC: CPT | Performed by: PHYSICIAN ASSISTANT

## 2021-11-29 PROCEDURE — 3066F NEPHROPATHY DOC TX: CPT | Mod: CPTII,S$GLB,, | Performed by: PHYSICIAN ASSISTANT

## 2021-11-29 PROCEDURE — 99215 OFFICE O/P EST HI 40 MIN: CPT | Mod: S$GLB,,, | Performed by: PHYSICIAN ASSISTANT

## 2021-11-29 PROCEDURE — 4010F PR ACE/ARB THEARPY RXD/TAKEN: ICD-10-PCS | Mod: CPTII,S$GLB,, | Performed by: PHYSICIAN ASSISTANT

## 2021-11-29 PROCEDURE — 99215 PR OFFICE/OUTPT VISIT, EST, LEVL V, 40-54 MIN: ICD-10-PCS | Mod: S$GLB,,, | Performed by: PHYSICIAN ASSISTANT

## 2021-11-29 PROCEDURE — 3066F PR DOCUMENTATION OF TREATMENT FOR NEPHROPATHY: ICD-10-PCS | Mod: CPTII,S$GLB,, | Performed by: PHYSICIAN ASSISTANT

## 2021-12-01 ENCOUNTER — TELEPHONE (OUTPATIENT)
Dept: HEMATOLOGY/ONCOLOGY | Facility: CLINIC | Age: 76
End: 2021-12-01
Payer: MEDICARE

## 2021-12-01 ENCOUNTER — OFFICE VISIT (OUTPATIENT)
Dept: NEPHROLOGY | Facility: CLINIC | Age: 76
End: 2021-12-01
Payer: MEDICARE

## 2021-12-01 VITALS
DIASTOLIC BLOOD PRESSURE: 80 MMHG | SYSTOLIC BLOOD PRESSURE: 140 MMHG | OXYGEN SATURATION: 99 % | WEIGHT: 136.25 LBS | BODY MASS INDEX: 24.14 KG/M2 | HEIGHT: 63 IN | HEART RATE: 89 BPM

## 2021-12-01 DIAGNOSIS — I10 HYPERTENSION, UNSPECIFIED TYPE: Primary | ICD-10-CM

## 2021-12-01 PROCEDURE — 4010F ACE/ARB THERAPY RXD/TAKEN: CPT | Mod: CPTII,S$GLB,, | Performed by: INTERNAL MEDICINE

## 2021-12-01 PROCEDURE — 99215 OFFICE O/P EST HI 40 MIN: CPT | Mod: S$GLB,,, | Performed by: INTERNAL MEDICINE

## 2021-12-01 PROCEDURE — 3061F NEG MICROALBUMINURIA REV: CPT | Mod: CPTII,S$GLB,, | Performed by: INTERNAL MEDICINE

## 2021-12-01 PROCEDURE — 4010F PR ACE/ARB THEARPY RXD/TAKEN: ICD-10-PCS | Mod: CPTII,S$GLB,, | Performed by: INTERNAL MEDICINE

## 2021-12-01 PROCEDURE — 3066F NEPHROPATHY DOC TX: CPT | Mod: CPTII,S$GLB,, | Performed by: INTERNAL MEDICINE

## 2021-12-01 PROCEDURE — 3061F PR NEG MICROALBUMINURIA RESULT DOCUMENTED/REVIEW: ICD-10-PCS | Mod: CPTII,S$GLB,, | Performed by: INTERNAL MEDICINE

## 2021-12-01 PROCEDURE — 99215 PR OFFICE/OUTPT VISIT, EST, LEVL V, 40-54 MIN: ICD-10-PCS | Mod: S$GLB,,, | Performed by: INTERNAL MEDICINE

## 2021-12-01 PROCEDURE — 3066F PR DOCUMENTATION OF TREATMENT FOR NEPHROPATHY: ICD-10-PCS | Mod: CPTII,S$GLB,, | Performed by: INTERNAL MEDICINE

## 2021-12-01 PROCEDURE — 99999 PR PBB SHADOW E&M-EST. PATIENT-LVL IV: ICD-10-PCS | Mod: PBBFAC,,, | Performed by: INTERNAL MEDICINE

## 2021-12-01 PROCEDURE — 99999 PR PBB SHADOW E&M-EST. PATIENT-LVL IV: CPT | Mod: PBBFAC,,, | Performed by: INTERNAL MEDICINE

## 2021-12-01 RX ORDER — LOSARTAN POTASSIUM 25 MG/1
25 TABLET ORAL DAILY
Qty: 90 TABLET | Refills: 0 | Status: SHIPPED | OUTPATIENT
Start: 2021-12-01 | End: 2022-02-11 | Stop reason: SDUPTHER

## 2021-12-02 ENCOUNTER — LAB VISIT (OUTPATIENT)
Dept: LAB | Facility: HOSPITAL | Age: 76
End: 2021-12-02
Attending: INTERNAL MEDICINE
Payer: MEDICARE

## 2021-12-02 ENCOUNTER — OFFICE VISIT (OUTPATIENT)
Dept: HEMATOLOGY/ONCOLOGY | Facility: CLINIC | Age: 76
End: 2021-12-02
Payer: MEDICARE

## 2021-12-02 VITALS
RESPIRATION RATE: 20 BRPM | TEMPERATURE: 98 F | OXYGEN SATURATION: 100 % | BODY MASS INDEX: 24.55 KG/M2 | DIASTOLIC BLOOD PRESSURE: 76 MMHG | WEIGHT: 138.56 LBS | SYSTOLIC BLOOD PRESSURE: 180 MMHG | HEIGHT: 63 IN | HEART RATE: 82 BPM

## 2021-12-02 DIAGNOSIS — C82.08 FOLLICULAR LYMPHOMA GRADE I, LYMPH NODES OF MULTIPLE SITES: Primary | ICD-10-CM

## 2021-12-02 DIAGNOSIS — C82.08 FOLLICULAR LYMPHOMA GRADE I, LYMPH NODES OF MULTIPLE SITES: ICD-10-CM

## 2021-12-02 LAB
ALBUMIN SERPL BCP-MCNC: 3.5 G/DL (ref 3.5–5.2)
ALP SERPL-CCNC: 83 U/L (ref 55–135)
ALT SERPL W/O P-5'-P-CCNC: 13 U/L (ref 10–44)
ANION GAP SERPL CALC-SCNC: 10 MMOL/L (ref 8–16)
AST SERPL-CCNC: 12 U/L (ref 10–40)
BASOPHILS # BLD AUTO: 0.02 K/UL (ref 0–0.2)
BASOPHILS NFR BLD: 0.8 % (ref 0–1.9)
BILIRUB SERPL-MCNC: 0.4 MG/DL (ref 0.1–1)
BUN SERPL-MCNC: 28 MG/DL (ref 8–23)
CALCIUM SERPL-MCNC: 9.3 MG/DL (ref 8.7–10.5)
CHLORIDE SERPL-SCNC: 109 MMOL/L (ref 95–110)
CO2 SERPL-SCNC: 22 MMOL/L (ref 23–29)
CREAT SERPL-MCNC: 1.5 MG/DL (ref 0.5–1.4)
DIFFERENTIAL METHOD: ABNORMAL
EOSINOPHIL # BLD AUTO: 0.2 K/UL (ref 0–0.5)
EOSINOPHIL NFR BLD: 6.2 % (ref 0–8)
ERYTHROCYTE [DISTWIDTH] IN BLOOD BY AUTOMATED COUNT: 14.6 % (ref 11.5–14.5)
EST. GFR  (AFRICAN AMERICAN): 39.3 ML/MIN/1.73 M^2
EST. GFR  (NON AFRICAN AMERICAN): 34.1 ML/MIN/1.73 M^2
GLUCOSE SERPL-MCNC: 246 MG/DL (ref 70–110)
HCT VFR BLD AUTO: 33.4 % (ref 37–48.5)
HGB BLD-MCNC: 9.8 G/DL (ref 12–16)
IMM GRANULOCYTES # BLD AUTO: 0 K/UL (ref 0–0.04)
IMM GRANULOCYTES NFR BLD AUTO: 0 % (ref 0–0.5)
LDH SERPL L TO P-CCNC: 220 U/L (ref 110–260)
LYMPHOCYTES # BLD AUTO: 1.8 K/UL (ref 1–4.8)
LYMPHOCYTES NFR BLD: 69.1 % (ref 18–48)
MCH RBC QN AUTO: 27.5 PG (ref 27–31)
MCHC RBC AUTO-ENTMCNC: 29.3 G/DL (ref 32–36)
MCV RBC AUTO: 94 FL (ref 82–98)
MONOCYTES # BLD AUTO: 0.5 K/UL (ref 0.3–1)
MONOCYTES NFR BLD: 18.5 % (ref 4–15)
NEUTROPHILS # BLD AUTO: 0.1 K/UL (ref 1.8–7.7)
NEUTROPHILS NFR BLD: 5.4 % (ref 38–73)
NRBC BLD-RTO: 0 /100 WBC
PLATELET # BLD AUTO: 200 K/UL (ref 150–450)
PMV BLD AUTO: 10.2 FL (ref 9.2–12.9)
POTASSIUM SERPL-SCNC: 3.6 MMOL/L (ref 3.5–5.1)
PROT SERPL-MCNC: 5.8 G/DL (ref 6–8.4)
RBC # BLD AUTO: 3.57 M/UL (ref 4–5.4)
SODIUM SERPL-SCNC: 141 MMOL/L (ref 136–145)
WBC # BLD AUTO: 2.59 K/UL (ref 3.9–12.7)

## 2021-12-02 PROCEDURE — 36415 COLL VENOUS BLD VENIPUNCTURE: CPT | Performed by: INTERNAL MEDICINE

## 2021-12-02 PROCEDURE — 99215 OFFICE O/P EST HI 40 MIN: CPT | Mod: S$GLB,,, | Performed by: INTERNAL MEDICINE

## 2021-12-02 PROCEDURE — 80053 COMPREHEN METABOLIC PANEL: CPT | Performed by: INTERNAL MEDICINE

## 2021-12-02 PROCEDURE — 85025 COMPLETE CBC W/AUTO DIFF WBC: CPT | Performed by: INTERNAL MEDICINE

## 2021-12-02 PROCEDURE — 99999 PR PBB SHADOW E&M-EST. PATIENT-LVL IV: CPT | Mod: PBBFAC,,, | Performed by: INTERNAL MEDICINE

## 2021-12-02 PROCEDURE — 4010F PR ACE/ARB THEARPY RXD/TAKEN: ICD-10-PCS | Mod: CPTII,S$GLB,, | Performed by: INTERNAL MEDICINE

## 2021-12-02 PROCEDURE — 3066F NEPHROPATHY DOC TX: CPT | Mod: CPTII,S$GLB,, | Performed by: INTERNAL MEDICINE

## 2021-12-02 PROCEDURE — 83615 LACTATE (LD) (LDH) ENZYME: CPT | Performed by: INTERNAL MEDICINE

## 2021-12-02 PROCEDURE — 99215 PR OFFICE/OUTPT VISIT, EST, LEVL V, 40-54 MIN: ICD-10-PCS | Mod: S$GLB,,, | Performed by: INTERNAL MEDICINE

## 2021-12-02 PROCEDURE — 3066F PR DOCUMENTATION OF TREATMENT FOR NEPHROPATHY: ICD-10-PCS | Mod: CPTII,S$GLB,, | Performed by: INTERNAL MEDICINE

## 2021-12-02 PROCEDURE — 99999 PR PBB SHADOW E&M-EST. PATIENT-LVL IV: ICD-10-PCS | Mod: PBBFAC,,, | Performed by: INTERNAL MEDICINE

## 2021-12-02 PROCEDURE — 3061F PR NEG MICROALBUMINURIA RESULT DOCUMENTED/REVIEW: ICD-10-PCS | Mod: CPTII,S$GLB,, | Performed by: INTERNAL MEDICINE

## 2021-12-02 PROCEDURE — 4010F ACE/ARB THERAPY RXD/TAKEN: CPT | Mod: CPTII,S$GLB,, | Performed by: INTERNAL MEDICINE

## 2021-12-02 PROCEDURE — 3061F NEG MICROALBUMINURIA REV: CPT | Mod: CPTII,S$GLB,, | Performed by: INTERNAL MEDICINE

## 2022-01-03 ENCOUNTER — OFFICE VISIT (OUTPATIENT)
Dept: INTERNAL MEDICINE | Facility: CLINIC | Age: 77
End: 2022-01-03
Attending: FAMILY MEDICINE
Payer: MEDICARE

## 2022-01-03 ENCOUNTER — TELEPHONE (OUTPATIENT)
Dept: INTERNAL MEDICINE | Facility: CLINIC | Age: 77
End: 2022-01-03

## 2022-01-03 VITALS
HEART RATE: 70 BPM | WEIGHT: 140.63 LBS | DIASTOLIC BLOOD PRESSURE: 90 MMHG | SYSTOLIC BLOOD PRESSURE: 230 MMHG | OXYGEN SATURATION: 100 % | HEIGHT: 63 IN | BODY MASS INDEX: 24.92 KG/M2

## 2022-01-03 DIAGNOSIS — C82.08 FOLLICULAR LYMPHOMA GRADE I, LYMPH NODES OF MULTIPLE SITES: ICD-10-CM

## 2022-01-03 DIAGNOSIS — I10 HYPERTENSION, ESSENTIAL: ICD-10-CM

## 2022-01-03 DIAGNOSIS — I10 ESSENTIAL HYPERTENSION: Primary | ICD-10-CM

## 2022-01-03 DIAGNOSIS — J06.9 VIRAL UPPER RESPIRATORY TRACT INFECTION: ICD-10-CM

## 2022-01-03 PROCEDURE — 3080F PR MOST RECENT DIASTOLIC BLOOD PRESSURE >= 90 MM HG: ICD-10-PCS | Mod: CPTII,S$GLB,, | Performed by: FAMILY MEDICINE

## 2022-01-03 PROCEDURE — 1125F PR PAIN SEVERITY QUANTIFIED, PAIN PRESENT: ICD-10-PCS | Mod: CPTII,S$GLB,, | Performed by: FAMILY MEDICINE

## 2022-01-03 PROCEDURE — 1160F PR REVIEW ALL MEDS BY PRESCRIBER/CLIN PHARMACIST DOCUMENTED: ICD-10-PCS | Mod: CPTII,S$GLB,, | Performed by: FAMILY MEDICINE

## 2022-01-03 PROCEDURE — 1101F PT FALLS ASSESS-DOCD LE1/YR: CPT | Mod: CPTII,S$GLB,, | Performed by: FAMILY MEDICINE

## 2022-01-03 PROCEDURE — 99999 PR PBB SHADOW E&M-EST. PATIENT-LVL IV: ICD-10-PCS | Mod: PBBFAC,,, | Performed by: FAMILY MEDICINE

## 2022-01-03 PROCEDURE — 3080F DIAST BP >= 90 MM HG: CPT | Mod: CPTII,S$GLB,, | Performed by: FAMILY MEDICINE

## 2022-01-03 PROCEDURE — 99214 OFFICE O/P EST MOD 30 MIN: CPT | Mod: S$GLB,,, | Performed by: FAMILY MEDICINE

## 2022-01-03 PROCEDURE — 3008F PR BODY MASS INDEX (BMI) DOCUMENTED: ICD-10-PCS | Mod: CPTII,S$GLB,, | Performed by: FAMILY MEDICINE

## 2022-01-03 PROCEDURE — 1125F AMNT PAIN NOTED PAIN PRSNT: CPT | Mod: CPTII,S$GLB,, | Performed by: FAMILY MEDICINE

## 2022-01-03 PROCEDURE — 1101F PR PT FALLS ASSESS DOC 0-1 FALLS W/OUT INJ PAST YR: ICD-10-PCS | Mod: CPTII,S$GLB,, | Performed by: FAMILY MEDICINE

## 2022-01-03 PROCEDURE — 3008F BODY MASS INDEX DOCD: CPT | Mod: CPTII,S$GLB,, | Performed by: FAMILY MEDICINE

## 2022-01-03 PROCEDURE — 1159F PR MEDICATION LIST DOCUMENTED IN MEDICAL RECORD: ICD-10-PCS | Mod: CPTII,S$GLB,, | Performed by: FAMILY MEDICINE

## 2022-01-03 PROCEDURE — 1159F MED LIST DOCD IN RCRD: CPT | Mod: CPTII,S$GLB,, | Performed by: FAMILY MEDICINE

## 2022-01-03 PROCEDURE — 3288F FALL RISK ASSESSMENT DOCD: CPT | Mod: CPTII,S$GLB,, | Performed by: FAMILY MEDICINE

## 2022-01-03 PROCEDURE — 1160F RVW MEDS BY RX/DR IN RCRD: CPT | Mod: CPTII,S$GLB,, | Performed by: FAMILY MEDICINE

## 2022-01-03 PROCEDURE — 99999 PR PBB SHADOW E&M-EST. PATIENT-LVL IV: CPT | Mod: PBBFAC,,, | Performed by: FAMILY MEDICINE

## 2022-01-03 PROCEDURE — 3077F SYST BP >= 140 MM HG: CPT | Mod: CPTII,S$GLB,, | Performed by: FAMILY MEDICINE

## 2022-01-03 PROCEDURE — 99214 PR OFFICE/OUTPT VISIT, EST, LEVL IV, 30-39 MIN: ICD-10-PCS | Mod: S$GLB,,, | Performed by: FAMILY MEDICINE

## 2022-01-03 PROCEDURE — 3288F PR FALLS RISK ASSESSMENT DOCUMENTED: ICD-10-PCS | Mod: CPTII,S$GLB,, | Performed by: FAMILY MEDICINE

## 2022-01-03 PROCEDURE — 3077F PR MOST RECENT SYSTOLIC BLOOD PRESSURE >= 140 MM HG: ICD-10-PCS | Mod: CPTII,S$GLB,, | Performed by: FAMILY MEDICINE

## 2022-01-03 RX ORDER — LIDOCAINE HYDROCHLORIDE 20 MG/ML
SOLUTION OROPHARYNGEAL
Qty: 200 ML | Refills: 2 | Status: ON HOLD | OUTPATIENT
Start: 2022-01-03 | End: 2022-11-25

## 2022-01-03 RX ORDER — AMLODIPINE BESYLATE 10 MG/1
10 TABLET ORAL DAILY
Qty: 90 TABLET | Refills: 3 | Status: SHIPPED | OUTPATIENT
Start: 2022-01-03

## 2022-01-03 NOTE — PROGRESS NOTES
"CHIEF COMPLAINT:  Elevated BP, Several months of nicola hip pain and lymphoma    HISTORY OF PRESENT ILLNESS: The patient is a 76 year-old BF.  The patient is  to another patient of mine.      She has follicular lymphoma.  Did well on CTX.    She has had a left knee replacement.    The patient has a history of diabetes.  Recent blood sugars have been less than 300.  There have been no episodes of hypoglycemia.    The patient has a history of hypertension on current medications.  Patient denies chest pain or shortness of breath today.  BP way up today.    REVIEW OF SYSTEMS:  GENERAL: No fever, chills, fatigability or weight loss.  SKIN: No rashes, itching or changes in color or texture of skin.  HEAD: No headaches or recent head trauma.  EYES: Visual acuity fine. No photophobia, ocular pain or diplopia.  EARS: Denies ear pain, discharge or vertigo.  NOSE: No loss of smell, no epistaxis or postnasal drip.  MOUTH & THROAT: No hoarseness or change in voice. No excessive gum bleeding.  NODES: Denies swollen glands.  CHEST: Denies HULL, cyanosis, wheezing and sputum production.  CARDIOVASCULAR: Denies chest pain, PND, orthopnea or reduced exercise tolerance.  ABDOMEN:  Denies diarrhea, abdominal pain, hematemesis or blood in stool.  URINARY: No flank pain, dysuria or hematuria.  PERIPHERAL VASCULAR: No claudication or cyanosis.  MUSCULOSKELETAL: No joint stiffness or swelling. Denies back pain.Except as noted above.  NEUROLOGIC: No history of seizures, paralysis, alteration of gait or coordination.    SOCIAL HISTORY: The patient does not smoke.  The patient consumes alcohol socially.  The patient is happily  to another patient of mine.    PHYSICAL EXAMINATION:   Blood pressure (!) 230/90, pulse 70, height 5' 3" (1.6 m), weight 63.8 kg (140 lb 10.5 oz), SpO2 100 %.    APPEARANCE: Well nourished, well developed, in no acute distress.    HEAD: Normocephalic, atraumatic.  EYES: PERRL. EOMI.  Conjunctivae without " injection and  anicteric  NOSE: Mucosa pink. Airway clear.  MOUTH & THROAT: No tonsillar enlargement. No pharyngeal erythema or exudate. No stridor.  NECK: Supple.   NODES: There is no lymph node enlargement.  CHEST: Lungs clear to auscultation.  No retractions are noted.  No rales or rhonchi are present.  CARDIOVASCULAR: Normal S1, S2. No rubs, murmurs or gallops.  ABDOMEN: Bowel sounds normal. Not distended. Soft. No tenderness or masses.  No ascites is noted.  MUSCULOSKELETAL:  There is no clubbing, cyanosis, or edema of the extremities x4.  There is full range of motion of the lumbar spine.  There is full range of motion of the extremities x4.  There is no deformity noted.    NEUROLOGIC:       Normal speech development.      Hearing normal.      Slightly antalgic gait.      Motor and sensory exams grossly normal.  PSYCHIATRIC: Patient is alert and oriented x3.  Thought processes are all normal.  There is no homicidality.  There is no suicidality.  There is no evidence of psychosis.    LABORATORY/RADIOLOGY:   Chart reviewed.      ASSESSMENT:   Pharyngitis  Several months of nicola hip pain    Hypertension, not well controlled  Follicular lymphoma   Type 2 diabetes on insulin, not well controlled   Chronic right hip pain    PLAN:  Visc lido  Restart Amlodipine  Losartan  Follow-up in 2 weeks nurse visit

## 2022-01-04 NOTE — TELEPHONE ENCOUNTER
----- Message from Albin Cazares MD sent at 1/3/2022  6:10 PM CST -----  Please remind patient to  new amlodipine prescription.

## 2022-01-06 ENCOUNTER — TELEPHONE (OUTPATIENT)
Dept: HEMATOLOGY/ONCOLOGY | Facility: CLINIC | Age: 77
End: 2022-01-06
Payer: MEDICARE

## 2022-01-06 NOTE — TELEPHONE ENCOUNTER
Attempting to call pt to schedule labs per Dr. Rodríguez and Genny (Pharmacist). Pt has not been answering/returning calls. Scheduled pt today for labs. Will retry again this afternoon.

## 2022-02-02 ENCOUNTER — LAB VISIT (OUTPATIENT)
Dept: LAB | Facility: OTHER | Age: 77
End: 2022-02-02
Attending: PHYSICIAN ASSISTANT
Payer: MEDICARE

## 2022-02-02 ENCOUNTER — OFFICE VISIT (OUTPATIENT)
Dept: INTERNAL MEDICINE | Facility: CLINIC | Age: 77
End: 2022-02-02
Payer: MEDICARE

## 2022-02-02 VITALS
BODY MASS INDEX: 22.27 KG/M2 | OXYGEN SATURATION: 98 % | WEIGHT: 125.69 LBS | HEART RATE: 89 BPM | DIASTOLIC BLOOD PRESSURE: 82 MMHG | SYSTOLIC BLOOD PRESSURE: 164 MMHG | HEIGHT: 63 IN

## 2022-02-02 DIAGNOSIS — R10.9 ABDOMINAL PAIN, UNSPECIFIED ABDOMINAL LOCATION: ICD-10-CM

## 2022-02-02 DIAGNOSIS — R19.7 DIARRHEA, UNSPECIFIED TYPE: Primary | ICD-10-CM

## 2022-02-02 LAB
ALBUMIN SERPL BCP-MCNC: 2.5 G/DL (ref 3.5–5.2)
ALP SERPL-CCNC: 104 U/L (ref 55–135)
ALT SERPL W/O P-5'-P-CCNC: 20 U/L (ref 10–44)
ANION GAP SERPL CALC-SCNC: 13 MMOL/L (ref 8–16)
AST SERPL-CCNC: 20 U/L (ref 10–40)
BASOPHILS # BLD AUTO: 0.02 K/UL (ref 0–0.2)
BASOPHILS NFR BLD: 0.3 % (ref 0–1.9)
BILIRUB SERPL-MCNC: 0.6 MG/DL (ref 0.1–1)
BUN SERPL-MCNC: 17 MG/DL (ref 8–23)
CALCIUM SERPL-MCNC: 8.6 MG/DL (ref 8.7–10.5)
CHLORIDE SERPL-SCNC: 109 MMOL/L (ref 95–110)
CO2 SERPL-SCNC: 22 MMOL/L (ref 23–29)
CREAT SERPL-MCNC: 1.5 MG/DL (ref 0.5–1.4)
DIFFERENTIAL METHOD: ABNORMAL
EOSINOPHIL # BLD AUTO: 0.1 K/UL (ref 0–0.5)
EOSINOPHIL NFR BLD: 1.1 % (ref 0–8)
ERYTHROCYTE [DISTWIDTH] IN BLOOD BY AUTOMATED COUNT: 14.6 % (ref 11.5–14.5)
EST. GFR  (AFRICAN AMERICAN): 39 ML/MIN/1.73 M^2
EST. GFR  (NON AFRICAN AMERICAN): 34 ML/MIN/1.73 M^2
GLUCOSE SERPL-MCNC: 187 MG/DL (ref 70–110)
HCT VFR BLD AUTO: 28.7 % (ref 37–48.5)
HGB BLD-MCNC: 8.5 G/DL (ref 12–16)
IMM GRANULOCYTES # BLD AUTO: 0.13 K/UL (ref 0–0.04)
IMM GRANULOCYTES NFR BLD AUTO: 2.1 % (ref 0–0.5)
LYMPHOCYTES # BLD AUTO: 1.1 K/UL (ref 1–4.8)
LYMPHOCYTES NFR BLD: 18 % (ref 18–48)
MCH RBC QN AUTO: 26.1 PG (ref 27–31)
MCHC RBC AUTO-ENTMCNC: 29.6 G/DL (ref 32–36)
MCV RBC AUTO: 88 FL (ref 82–98)
MONOCYTES # BLD AUTO: 0.9 K/UL (ref 0.3–1)
MONOCYTES NFR BLD: 13.9 % (ref 4–15)
NEUTROPHILS # BLD AUTO: 4.1 K/UL (ref 1.8–7.7)
NEUTROPHILS NFR BLD: 64.6 % (ref 38–73)
NRBC BLD-RTO: 0 /100 WBC
PLATELET # BLD AUTO: 244 K/UL (ref 150–450)
PMV BLD AUTO: 10.5 FL (ref 9.2–12.9)
POTASSIUM SERPL-SCNC: 2.8 MMOL/L (ref 3.5–5.1)
PROT SERPL-MCNC: 5.6 G/DL (ref 6–8.4)
RBC # BLD AUTO: 3.26 M/UL (ref 4–5.4)
SODIUM SERPL-SCNC: 144 MMOL/L (ref 136–145)
WBC # BLD AUTO: 6.27 K/UL (ref 3.9–12.7)

## 2022-02-02 PROCEDURE — 1160F RVW MEDS BY RX/DR IN RCRD: CPT | Mod: CPTII,S$GLB,, | Performed by: PHYSICIAN ASSISTANT

## 2022-02-02 PROCEDURE — 3288F FALL RISK ASSESSMENT DOCD: CPT | Mod: CPTII,S$GLB,, | Performed by: PHYSICIAN ASSISTANT

## 2022-02-02 PROCEDURE — 3008F BODY MASS INDEX DOCD: CPT | Mod: CPTII,S$GLB,, | Performed by: PHYSICIAN ASSISTANT

## 2022-02-02 PROCEDURE — 80053 COMPREHEN METABOLIC PANEL: CPT | Performed by: PHYSICIAN ASSISTANT

## 2022-02-02 PROCEDURE — 3079F PR MOST RECENT DIASTOLIC BLOOD PRESSURE 80-89 MM HG: ICD-10-PCS | Mod: CPTII,S$GLB,, | Performed by: PHYSICIAN ASSISTANT

## 2022-02-02 PROCEDURE — 85025 COMPLETE CBC W/AUTO DIFF WBC: CPT | Performed by: PHYSICIAN ASSISTANT

## 2022-02-02 PROCEDURE — 1101F PR PT FALLS ASSESS DOC 0-1 FALLS W/OUT INJ PAST YR: ICD-10-PCS | Mod: CPTII,S$GLB,, | Performed by: PHYSICIAN ASSISTANT

## 2022-02-02 PROCEDURE — 36415 COLL VENOUS BLD VENIPUNCTURE: CPT | Performed by: PHYSICIAN ASSISTANT

## 2022-02-02 PROCEDURE — 1159F MED LIST DOCD IN RCRD: CPT | Mod: CPTII,S$GLB,, | Performed by: PHYSICIAN ASSISTANT

## 2022-02-02 PROCEDURE — 1160F PR REVIEW ALL MEDS BY PRESCRIBER/CLIN PHARMACIST DOCUMENTED: ICD-10-PCS | Mod: CPTII,S$GLB,, | Performed by: PHYSICIAN ASSISTANT

## 2022-02-02 PROCEDURE — 99999 PR PBB SHADOW E&M-EST. PATIENT-LVL IV: ICD-10-PCS | Mod: PBBFAC,,, | Performed by: PHYSICIAN ASSISTANT

## 2022-02-02 PROCEDURE — 3008F PR BODY MASS INDEX (BMI) DOCUMENTED: ICD-10-PCS | Mod: CPTII,S$GLB,, | Performed by: PHYSICIAN ASSISTANT

## 2022-02-02 PROCEDURE — 3077F PR MOST RECENT SYSTOLIC BLOOD PRESSURE >= 140 MM HG: ICD-10-PCS | Mod: CPTII,S$GLB,, | Performed by: PHYSICIAN ASSISTANT

## 2022-02-02 PROCEDURE — 1125F PR PAIN SEVERITY QUANTIFIED, PAIN PRESENT: ICD-10-PCS | Mod: CPTII,S$GLB,, | Performed by: PHYSICIAN ASSISTANT

## 2022-02-02 PROCEDURE — 99214 PR OFFICE/OUTPT VISIT, EST, LEVL IV, 30-39 MIN: ICD-10-PCS | Mod: S$GLB,,, | Performed by: PHYSICIAN ASSISTANT

## 2022-02-02 PROCEDURE — 3079F DIAST BP 80-89 MM HG: CPT | Mod: CPTII,S$GLB,, | Performed by: PHYSICIAN ASSISTANT

## 2022-02-02 PROCEDURE — 1125F AMNT PAIN NOTED PAIN PRSNT: CPT | Mod: CPTII,S$GLB,, | Performed by: PHYSICIAN ASSISTANT

## 2022-02-02 PROCEDURE — 3077F SYST BP >= 140 MM HG: CPT | Mod: CPTII,S$GLB,, | Performed by: PHYSICIAN ASSISTANT

## 2022-02-02 PROCEDURE — 99214 OFFICE O/P EST MOD 30 MIN: CPT | Mod: S$GLB,,, | Performed by: PHYSICIAN ASSISTANT

## 2022-02-02 PROCEDURE — 1159F PR MEDICATION LIST DOCUMENTED IN MEDICAL RECORD: ICD-10-PCS | Mod: CPTII,S$GLB,, | Performed by: PHYSICIAN ASSISTANT

## 2022-02-02 PROCEDURE — 3288F PR FALLS RISK ASSESSMENT DOCUMENTED: ICD-10-PCS | Mod: CPTII,S$GLB,, | Performed by: PHYSICIAN ASSISTANT

## 2022-02-02 PROCEDURE — 99999 PR PBB SHADOW E&M-EST. PATIENT-LVL IV: CPT | Mod: PBBFAC,,, | Performed by: PHYSICIAN ASSISTANT

## 2022-02-02 PROCEDURE — 1101F PT FALLS ASSESS-DOCD LE1/YR: CPT | Mod: CPTII,S$GLB,, | Performed by: PHYSICIAN ASSISTANT

## 2022-02-02 RX ORDER — DICYCLOMINE HYDROCHLORIDE 10 MG/1
10 CAPSULE ORAL
Qty: 120 CAPSULE | Refills: 0 | Status: SHIPPED | OUTPATIENT
Start: 2022-02-02 | End: 2022-03-06

## 2022-02-02 NOTE — PROGRESS NOTES
"INTERNAL MEDICINE URGENT VISIT NOTE    CHIEF COMPLAINT     Chief Complaint   Patient presents with    Abdominal Pain       HPI     Keely Pizarro is a 76 y.o. female who presents for an urgent visit today.    PCP is Albin Cazares MD, patient is known to me.     Patient presents with complaints of stomach pain. She had a hemicolectomy and appendectomy in [place] due to ischemic bowel in 11/2021. Today she presents with  Abdominal pain and diarrhea. For the past three weeks. She suspects that symptoms started because she ate a "bad boiled pigs foot". She reports that initially she had no appetite and admits that every thing she eats or drinks "goes straight through me". She reports abd pain after eating -relieved after bowel movements. No nausea or vomiting. Has not been taking any medications to help with symptoms.   She presents today because she is feeling weak but admits that her appetite has been much better and that her pain is decreasing.     Past Medical History:  Past Medical History:   Diagnosis Date    CVA (cerebral vascular accident) 2011    Diabetes mellitus     Hypertension     Renal manifestation of secondary diabetes mellitus     S/P ORIF (open reduction internal fixation) fracture     Uterine cancer 2012       Home Medications:  Prior to Admission medications    Medication Sig Start Date End Date Taking? Authorizing Provider   amLODIPine (NORVASC) 10 MG tablet Take 1 tablet (10 mg total) by mouth once daily. 1/3/22   Albin Cazares MD   BD ALCOHOL SWABS PadM  9/25/17   Historical Provider   BD ULTRA-FINE BRIGITTE PEN NEEDLE 32 gauge x 5/32" Ndle To use with Insulin pens 12/13/19   Jamal Park MD   blood sugar diagnostic Strp To check BG 4 times daily, to use with insurance preferred meter 12/13/19   Jamal Park MD   blood sugar diagnostic Strp 1 each by Misc.(Non-Drug; Combo Route) route 3 (three) times daily. 11/24/21   Carolina Moran NP   blood-glucose meter Misc Use as " directed 11/24/21   Carolina Moran NP   cholecalciferol, vitamin D3, (VITAMIN D3) 25 mcg (1,000 unit) capsule Take 1 capsule (1,000 Units total) by mouth once daily. 4/22/21   Carolina Moran NP   lancets Misc 1 each by Misc.(Non-Drug; Combo Route) route 3 (three) times daily. 12/13/19   Jamal Park MD   lancets Misc To check blood glucose 4 times daily, to use with insurance preferred meter  Patient taking differently: To check blood glucose 4 times daily, to use with insurance preferred meter 12/13/19   Jamal Park MD   LIDOcaine HCl 2% (LIDOCAINE VISCOUS) 2 % Soln Swish and spit 10 mL every 3 hours as needed for pain 1/3/22   Albin Cazares MD   linaGLIPtin (TRADJENTA) 5 mg Tab tablet Take 1 tablet (5 mg total) by mouth once daily. 11/24/21 11/24/22  Carolina Moran NP   losartan (COZAAR) 25 MG tablet Take 1 tablet (25 mg total) by mouth once daily. 12/1/21 12/1/22  Gilles Hutton MD   methocarbamoL (ROBAXIN) 500 MG Tab Take 500 mg by mouth 3 (three) times daily.    Historical Provider   sodium bicarbonate 650 MG tablet Take 1 tablet (650 mg total) by mouth 2 (two) times daily. 8/6/21 8/6/22  Gilles Hutton MD       Review of Systems:  Review of Systems   Constitutional: Negative for chills and fever.   HENT: Negative for sore throat and trouble swallowing.    Eyes: Negative for visual disturbance.   Respiratory: Negative for cough and shortness of breath.    Cardiovascular: Negative for chest pain.   Gastrointestinal: Positive for abdominal pain and diarrhea. Negative for constipation, nausea and vomiting.   Genitourinary: Negative for dysuria and flank pain.   Musculoskeletal: Negative for back pain, neck pain and neck stiffness.   Skin: Negative for rash.   Neurological: Positive for weakness. Negative for dizziness, syncope and headaches.   Psychiatric/Behavioral: Negative for confusion.       Health Maintainence:   Immunizations:  Health Maintenance       Date Due Completion Date     "TETANUS VACCINE Never done ---    Shingles Vaccine (1 of 2) Never done ---    Mammogram 02/22/2022 2/22/2021    Colorectal Cancer Screening 03/31/2022 3/31/2017    Hemoglobin A1c 02/10/2022 11/10/2021    Diabetes Urine Screening 06/09/2022 6/9/2021    Foot Exam 07/07/2022 7/7/2021    Lipid Panel 09/10/2022 9/10/2021    Eye Exam 10/11/2022 10/11/2021    DEXA SCAN 11/04/2022 11/4/2019           PHYSICAL EXAM     BP (!) 164/82   Pulse 89   Ht 5' 3" (1.6 m)   Wt 57 kg (125 lb 10.6 oz)   SpO2 98%   BMI 22.26 kg/m²     Physical Exam  Vitals and nursing note reviewed.   Constitutional:       Appearance: Normal appearance.      Comments: Elderly frail appearing female in NAD or apparent pain. She appears fatigued on interview and exam.    HENT:      Head: Normocephalic and atraumatic.      Nose: Nose normal.      Mouth/Throat:      Pharynx: Oropharynx is clear.   Eyes:      Conjunctiva/sclera: Conjunctivae normal.   Cardiovascular:      Rate and Rhythm: Normal rate and regular rhythm.      Pulses: Normal pulses.   Pulmonary:      Effort: No respiratory distress.   Abdominal:      Tenderness: There is no abdominal tenderness.      Comments: Generalized lower abd TTP without acute peritoneal signs.    Musculoskeletal:         General: Normal range of motion.      Cervical back: No rigidity.   Skin:     General: Skin is warm and dry.      Capillary Refill: Capillary refill takes less than 2 seconds.      Findings: No rash.   Neurological:      General: No focal deficit present.      Mental Status: She is alert.      Gait: Gait normal.   Psychiatric:         Mood and Affect: Mood normal.         LABS     Lab Results   Component Value Date    HGBA1C 7.6 (H) 11/10/2021     CMP  Sodium   Date Value Ref Range Status   12/02/2021 141 136 - 145 mmol/L Final     Potassium   Date Value Ref Range Status   12/02/2021 3.6 3.5 - 5.1 mmol/L Final     Chloride   Date Value Ref Range Status   12/02/2021 109 95 - 110 mmol/L Final     CO2 "   Date Value Ref Range Status   12/02/2021 22 (L) 23 - 29 mmol/L Final     Glucose   Date Value Ref Range Status   12/02/2021 246 (H) 70 - 110 mg/dL Final     BUN   Date Value Ref Range Status   12/02/2021 28 (H) 8 - 23 mg/dL Final     Creatinine   Date Value Ref Range Status   12/02/2021 1.5 (H) 0.5 - 1.4 mg/dL Final     Calcium   Date Value Ref Range Status   12/02/2021 9.3 8.7 - 10.5 mg/dL Final     Total Protein   Date Value Ref Range Status   12/02/2021 5.8 (L) 6.0 - 8.4 g/dL Final     Albumin   Date Value Ref Range Status   12/02/2021 3.5 3.5 - 5.2 g/dL Final     Total Bilirubin   Date Value Ref Range Status   12/02/2021 0.4 0.1 - 1.0 mg/dL Final     Comment:     For infants and newborns, interpretation of results should be based  on gestational age, weight and in agreement with clinical  observations.    Premature Infant recommended reference ranges:  Up to 24 hours.............<8.0 mg/dL  Up to 48 hours............<12.0 mg/dL  3-5 days..................<15.0 mg/dL  6-29 days.................<15.0 mg/dL       Alkaline Phosphatase   Date Value Ref Range Status   12/02/2021 83 55 - 135 U/L Final     AST   Date Value Ref Range Status   12/02/2021 12 10 - 40 U/L Final     ALT   Date Value Ref Range Status   12/02/2021 13 10 - 44 U/L Final     Anion Gap   Date Value Ref Range Status   12/02/2021 10 8 - 16 mmol/L Final     eGFR if    Date Value Ref Range Status   12/02/2021 39.3 (A) >60 mL/min/1.73 m^2 Final     eGFR if non    Date Value Ref Range Status   12/02/2021 34.1 (A) >60 mL/min/1.73 m^2 Final     Comment:     Calculation used to obtain the estimated glomerular filtration  rate (eGFR) is the CKD-EPI equation.        Lab Results   Component Value Date    WBC 2.59 (L) 12/02/2021    HGB 9.8 (L) 12/02/2021    HCT 33.4 (L) 12/02/2021    MCV 94 12/02/2021     12/02/2021     Lab Results   Component Value Date    CHOL 156 06/09/2021    CHOL 155 03/05/2020    CHOL 141  03/03/2017     Lab Results   Component Value Date    HDL 60 06/09/2021    HDL 53 03/05/2020    HDL 45 03/03/2017     Lab Results   Component Value Date    LDLCALC 82.4 06/09/2021    LDLCALC 91.8 03/05/2020    LDLCALC 80.8 03/03/2017     Lab Results   Component Value Date    TRIG 68 06/09/2021    TRIG 51 03/05/2020    TRIG 76 03/03/2017     Lab Results   Component Value Date    CHOLHDL 38.5 06/09/2021    CHOLHDL 34.2 03/05/2020    CHOLHDL 31.9 03/03/2017     Lab Results   Component Value Date    TSH 0.851 03/09/2021       ASSESSMENT/PLAN     Keely Pizarro is a 76 y.o. female     Keely was seen today for abdominal pain and diarrhea without clear etiology at this time. She has a non-acute abdomen but appears dehydration. Will check labs and CT abd/pelvis. ED prompts discussed. Will start bentyl for symptom mgmt at this time.     Diagnoses and all orders for this visit:    Diarrhea, unspecified type    Abdominal pain, unspecified abdominal location  -     CBC Auto Differential; Future  -     Comprehensive Metabolic Panel; Future  -     Stool culture; Future  -     CT Abdomen Pelvis With Contrast; Future    Other orders  -     dicyclomine (BENTYL) 10 MG capsule; Take 1 capsule (10 mg total) by mouth 4 (four) times daily before meals and nightly.      RTC in 1-2 weeks for symptom re-check, sooner if needed.     Patient education provided from [facility]. Patient was counseled on when and how to seek emergent care.       Alejandra Peter PA-C   Department of Internal Medicine - [facility]   1:13 PM

## 2022-02-03 NOTE — TELEPHONE ENCOUNTER
11:44 AM pt has hypokalemia and dehydration from three weeks of diarrhea reported at clinic visit yesterday. I tried to call to check on symptoms. She did not answer. Will recommend that she get to the ER if she has any persistent or worsening symptoms. She will needed slow K repletion and IV hydration. She may also need abdominal imaging. RAISA CLAUDIO

## 2022-02-03 NOTE — TELEPHONE ENCOUNTER
Left message for patient to call . Need to give the listed message.    Left message to Sabas Pizarro to have wife call               Please attempt to contact the patient. She will need to take K supplement if she is tolerating PO well, if not she will need to get to an ER for urgent eval and IV repletion and hydration. I have called the patient twice now and left VM with no response. Pt does not use the patient portal. ACM    Routing comment        11:44 AM pt has hypokalemia and dehydration from three weeks of diarrhea reported at clinic visit yesterday. I tried to call to check on symptoms. She did not answer. Will recommend that she get to the ER if she has any persistent or worsening symptoms. She will needed slow K repletion and IV hydration. She may also need abdominal imaging. RAISA CLAUDIO

## 2022-02-04 ENCOUNTER — TELEPHONE (OUTPATIENT)
Dept: INTERNAL MEDICINE | Facility: CLINIC | Age: 77
End: 2022-02-04
Payer: MEDICARE

## 2022-02-04 DIAGNOSIS — R19.7 DIARRHEA, UNSPECIFIED TYPE: Primary | ICD-10-CM

## 2022-02-04 RX ORDER — POTASSIUM CHLORIDE 750 MG/1
10 TABLET, EXTENDED RELEASE ORAL 2 TIMES DAILY
Qty: 10 TABLET | Refills: 0 | Status: SHIPPED | OUTPATIENT
Start: 2022-02-04 | End: 2022-02-09

## 2022-02-04 NOTE — TELEPHONE ENCOUNTER
On calling  a female answering the telephone stated that I had the wrong number when asking for Ms. Pizarro      Left message  At   for Ms. Pizarro to call     Left message at  for Ms. Pizarro to call     The above is 3+ attempts to reach the patient                    2/2/22  Please attempt to contact the patient. She will need to take K supplement if she is tolerating PO well, if not she will need to get to an ER for urgent eval and IV repletion and hydration. I have called the patient twice now and left VM with no response. Pt does not use the patient portal. ACM    Routing comment             2/2/22 11:44 AM pt has hypokalemia and dehydration from three weeks of diarrhea reported at clinic visit yesterday. I tried to call to check on symptoms. She did not answer. Will recommend that she get to the ER if she has any persistent or worsening symptoms. She will needed slow K repletion and IV hydration. She may also need abdominal imaging. RAISA CLAUDIO

## 2022-02-04 NOTE — TELEPHONE ENCOUNTER
Please attempt to contact the patient. She will need to take K supplement if she is tolerating PO well, if not she will need to get to an ER for urgent eval and IV repletion and hydration. I have called the patient twice now and left VM with no response. Pt does not use the patient portal. ACM    Routing comment

## 2022-02-04 NOTE — TELEPHONE ENCOUNTER
Left message for patient to call . Need to give the listed message.     Left message to Sabas Pizarro to have wife call                     Please attempt to contact the patient. She will need to take K supplement if she is tolerating PO well, if not she will need to get to an ER for urgent eval and IV repletion and hydration. I have called the patient twice now and left VM with no response. Pt does not use the patient portal. ACM    Routing comment          11:44 AM pt has hypokalemia and dehydration from three weeks of diarrhea reported at clinic visit yesterday. I tried to call to check on symptoms. She did not answer. Will recommend that she get to the ER if she has any persistent or worsening symptoms. She will needed slow K repletion and IV hydration. She may also need abdominal imaging. [facility]               Documentation      You  Keely Pizarro      Left message for patient to call . Need to give her the above message

## 2022-02-04 NOTE — TELEPHONE ENCOUNTER
Called pt to make F/U appt. Pt can only come in on 2/11 as she already has CT scheduled. Pt to come in at 9:40 to see Dr. Anne as PCP and TROY Christy are out that day.   Needs appt over ride - sent message to Supervisor to add to appt schedule.

## 2022-02-04 NOTE — TELEPHONE ENCOUNTER
----- Message from Siena Casey sent at 2/4/2022  1:49 PM CST -----  Regarding: call back  Type: Patient Call Back    Who called:Keely     What is the request in detail: the patient is returning a call back to , nurse of Dr. Cazares. Please return call at earliest convenience.    Can the clinic reply by [facility]?no    Would the patient rather a call back or a response via [facility]? Call back     Best call back number:[phone]

## 2022-02-04 NOTE — TELEPHONE ENCOUNTER
"Called pt and relayed Provider message: "Please attempt to contact the patient. She will need to take K supplement if she is tolerating PO well, if not she will need to get to an ER for urgent eval and IV repletion and hydration. I have called the patient twice now and left VM with no response. Pt does not use the patient portal. ACM"    Pt states she is feeling a bit better and is keeping food and fluids down much better.    Routing to provider for K+ supplement. Pt uses [facility].     She asked about Potassium rich foods which we discussed briefly.    "

## 2022-02-10 ENCOUNTER — TELEPHONE (OUTPATIENT)
Dept: INTERNAL MEDICINE | Facility: CLINIC | Age: 77
End: 2022-02-10
Payer: MEDICARE

## 2022-02-10 NOTE — TELEPHONE ENCOUNTER
----- Message from Suma Allen sent at 2/10/2022  9:12 AM CST -----  Regarding: Requesting a appt  Name of Who is Calling:PHUONG FRAIRE [[id number]]          What is the request in detail: Pt states she needs an appt, she miss her f/u appt today and needs another. Pt is requesting call back. Please advise           Can the clinic reply by [facility]:  No         What Number to Call Back if not in [facility]:[phone]

## 2022-02-11 ENCOUNTER — HOSPITAL ENCOUNTER (OUTPATIENT)
Dept: RADIOLOGY | Facility: OTHER | Age: 77
Discharge: HOME | End: 2022-02-11
Attending: PHYSICIAN ASSISTANT
Payer: MEDICARE

## 2022-02-11 ENCOUNTER — OFFICE VISIT (OUTPATIENT)
Dept: INTERNAL MEDICINE | Facility: CLINIC | Age: 77
End: 2022-02-11
Attending: INTERNAL MEDICINE
Payer: MEDICARE

## 2022-02-11 VITALS
HEART RATE: 96 BPM | BODY MASS INDEX: 23.68 KG/M2 | OXYGEN SATURATION: 97 % | DIASTOLIC BLOOD PRESSURE: 70 MMHG | HEIGHT: 63 IN | SYSTOLIC BLOOD PRESSURE: 140 MMHG | WEIGHT: 133.63 LBS

## 2022-02-11 DIAGNOSIS — N18.32 TYPE 2 DIABETES MELLITUS WITH STAGE 3B CHRONIC KIDNEY DISEASE, WITH LONG-TERM CURRENT USE OF INSULIN: ICD-10-CM

## 2022-02-11 DIAGNOSIS — Z79.4 TYPE 2 DIABETES MELLITUS WITH STAGE 3B CHRONIC KIDNEY DISEASE, WITH LONG-TERM CURRENT USE OF INSULIN: ICD-10-CM

## 2022-02-11 DIAGNOSIS — I10 ESSENTIAL HYPERTENSION: ICD-10-CM

## 2022-02-11 DIAGNOSIS — R10.9 ABDOMINAL PAIN, UNSPECIFIED ABDOMINAL LOCATION: ICD-10-CM

## 2022-02-11 DIAGNOSIS — E87.6 HYPOKALEMIA: Primary | ICD-10-CM

## 2022-02-11 DIAGNOSIS — I10 HYPERTENSION, ESSENTIAL: ICD-10-CM

## 2022-02-11 DIAGNOSIS — E11.22 TYPE 2 DIABETES MELLITUS WITH STAGE 3B CHRONIC KIDNEY DISEASE, WITH LONG-TERM CURRENT USE OF INSULIN: ICD-10-CM

## 2022-02-11 DIAGNOSIS — I10 HYPERTENSION, UNSPECIFIED TYPE: ICD-10-CM

## 2022-02-11 PROCEDURE — 3051F PR MOST RECENT HEMOGLOBIN A1C LEVEL 7.0 - < 8.0%: ICD-10-PCS | Mod: CPTII,S$GLB,, | Performed by: INTERNAL MEDICINE

## 2022-02-11 PROCEDURE — 3008F BODY MASS INDEX DOCD: CPT | Mod: CPTII,S$GLB,, | Performed by: INTERNAL MEDICINE

## 2022-02-11 PROCEDURE — 74176 CT ABDOMEN PELVIS WITHOUT CONTRAST: ICD-10-PCS | Mod: 26,,, | Performed by: RADIOLOGY

## 2022-02-11 PROCEDURE — 3078F PR MOST RECENT DIASTOLIC BLOOD PRESSURE < 80 MM HG: ICD-10-PCS | Mod: CPTII,S$GLB,, | Performed by: INTERNAL MEDICINE

## 2022-02-11 PROCEDURE — 1159F MED LIST DOCD IN RCRD: CPT | Mod: CPTII,S$GLB,, | Performed by: INTERNAL MEDICINE

## 2022-02-11 PROCEDURE — 99214 PR OFFICE/OUTPT VISIT, EST, LEVL IV, 30-39 MIN: ICD-10-PCS | Mod: S$GLB,,, | Performed by: INTERNAL MEDICINE

## 2022-02-11 PROCEDURE — 3077F SYST BP >= 140 MM HG: CPT | Mod: CPTII,S$GLB,, | Performed by: INTERNAL MEDICINE

## 2022-02-11 PROCEDURE — 4010F ACE/ARB THERAPY RXD/TAKEN: CPT | Mod: CPTII,S$GLB,, | Performed by: INTERNAL MEDICINE

## 2022-02-11 PROCEDURE — 1101F PT FALLS ASSESS-DOCD LE1/YR: CPT | Mod: CPTII,S$GLB,, | Performed by: INTERNAL MEDICINE

## 2022-02-11 PROCEDURE — 1160F RVW MEDS BY RX/DR IN RCRD: CPT | Mod: CPTII,S$GLB,, | Performed by: INTERNAL MEDICINE

## 2022-02-11 PROCEDURE — 1159F PR MEDICATION LIST DOCUMENTED IN MEDICAL RECORD: ICD-10-PCS | Mod: CPTII,S$GLB,, | Performed by: INTERNAL MEDICINE

## 2022-02-11 PROCEDURE — 3051F HG A1C>EQUAL 7.0%<8.0%: CPT | Mod: CPTII,S$GLB,, | Performed by: INTERNAL MEDICINE

## 2022-02-11 PROCEDURE — 1126F AMNT PAIN NOTED NONE PRSNT: CPT | Mod: CPTII,S$GLB,, | Performed by: INTERNAL MEDICINE

## 2022-02-11 PROCEDURE — 4010F PR ACE/ARB THEARPY RXD/TAKEN: ICD-10-PCS | Mod: CPTII,S$GLB,, | Performed by: INTERNAL MEDICINE

## 2022-02-11 PROCEDURE — 1160F PR REVIEW ALL MEDS BY PRESCRIBER/CLIN PHARMACIST DOCUMENTED: ICD-10-PCS | Mod: CPTII,S$GLB,, | Performed by: INTERNAL MEDICINE

## 2022-02-11 PROCEDURE — 1126F PR PAIN SEVERITY QUANTIFIED, NO PAIN PRESENT: ICD-10-PCS | Mod: CPTII,S$GLB,, | Performed by: INTERNAL MEDICINE

## 2022-02-11 PROCEDURE — 3077F PR MOST RECENT SYSTOLIC BLOOD PRESSURE >= 140 MM HG: ICD-10-PCS | Mod: CPTII,S$GLB,, | Performed by: INTERNAL MEDICINE

## 2022-02-11 PROCEDURE — 74176 CT ABD & PELVIS W/O CONTRAST: CPT | Mod: TC

## 2022-02-11 PROCEDURE — 3288F PR FALLS RISK ASSESSMENT DOCUMENTED: ICD-10-PCS | Mod: CPTII,S$GLB,, | Performed by: INTERNAL MEDICINE

## 2022-02-11 PROCEDURE — 3288F FALL RISK ASSESSMENT DOCD: CPT | Mod: CPTII,S$GLB,, | Performed by: INTERNAL MEDICINE

## 2022-02-11 PROCEDURE — 74176 CT ABD & PELVIS W/O CONTRAST: CPT | Mod: 26,,, | Performed by: RADIOLOGY

## 2022-02-11 PROCEDURE — 25500020 PHARM REV CODE 255: Performed by: PHYSICIAN ASSISTANT

## 2022-02-11 PROCEDURE — 99214 OFFICE O/P EST MOD 30 MIN: CPT | Mod: S$GLB,,, | Performed by: INTERNAL MEDICINE

## 2022-02-11 PROCEDURE — 1101F PR PT FALLS ASSESS DOC 0-1 FALLS W/OUT INJ PAST YR: ICD-10-PCS | Mod: CPTII,S$GLB,, | Performed by: INTERNAL MEDICINE

## 2022-02-11 PROCEDURE — 99999 PR PBB SHADOW E&M-EST. PATIENT-LVL III: ICD-10-PCS | Mod: PBBFAC,,, | Performed by: INTERNAL MEDICINE

## 2022-02-11 PROCEDURE — 3008F PR BODY MASS INDEX (BMI) DOCUMENTED: ICD-10-PCS | Mod: CPTII,S$GLB,, | Performed by: INTERNAL MEDICINE

## 2022-02-11 PROCEDURE — 99999 PR PBB SHADOW E&M-EST. PATIENT-LVL III: CPT | Mod: PBBFAC,,, | Performed by: INTERNAL MEDICINE

## 2022-02-11 PROCEDURE — A9698 NON-RAD CONTRAST MATERIALNOC: HCPCS | Performed by: PHYSICIAN ASSISTANT

## 2022-02-11 PROCEDURE — 3078F DIAST BP <80 MM HG: CPT | Mod: CPTII,S$GLB,, | Performed by: INTERNAL MEDICINE

## 2022-02-11 RX ORDER — LOSARTAN POTASSIUM 25 MG/1
25 TABLET ORAL DAILY
Qty: 90 TABLET | Refills: 0 | Status: SHIPPED | OUTPATIENT
Start: 2022-02-11 | End: 2022-08-15 | Stop reason: ALTCHOICE

## 2022-02-11 RX ADMIN — IOHEXOL 1000 ML: 9 SOLUTION ORAL at 07:02

## 2022-02-11 NOTE — PROGRESS NOTES
"Subjective:       Patient ID: Keely Pizarro is a 76 y.o. female.    Chief Complaint: No chief complaint on file.    Here for f/u    Recently seen by Alejandra Peter for resolving abdominal pain and diarrhea and associated weakness.  Found to be hypokalemic 2.5.  Prescribed replacement for which she has just completed.  She reports a several month history of generalized weakness with exertion.  States she frequently headaches to take a rest due to diffuse exertional fatigue that occurs after a few minutes.  Discussion reveals she may not have been taking her losartan for the past several months.  She is prescribed him presumably taking sodium bicarbonate the setting of CKD.  Recent diarrheal illness and decreased appetite have resolved and weight is increasing.  Energy levels and fatigue have improved but still persist.       Review of Systems   Constitutional: Negative for chills, fatigue, fever and unexpected weight change.   HENT: Negative for ear pain, hearing loss, postnasal drip, tinnitus, trouble swallowing and voice change.    Respiratory: Negative for cough, chest tightness, shortness of breath and wheezing.    Cardiovascular: Negative for chest pain, palpitations and leg swelling.   Gastrointestinal: Negative for abdominal pain, blood in stool, diarrhea, nausea and vomiting.   Endocrine: Negative for polydipsia, polyphagia and polyuria.   Genitourinary: Negative for difficulty urinating, dysuria, hematuria and vaginal bleeding.   Skin: Negative for rash.   Allergic/Immunologic: Negative for food allergies.   Neurological: Negative for dizziness, numbness and headaches.   Hematological: Does not bruise/bleed easily.   Psychiatric/Behavioral: The patient is not nervous/anxious.        Objective:      Vitals:    02/11/22 0937   BP: (!) 140/70   Pulse: 96   SpO2: 97%   Weight: 60.6 kg (133 lb 9.6 oz)   Height: 5' 3" (1.6 m)      Physical Exam  Constitutional:       General: She is not in acute " distress.     Appearance: She is well-developed and well-nourished.   HENT:      Head: Normocephalic and atraumatic.      Mouth/Throat:      Mouth: Oropharynx is clear and moist.      Pharynx: No oropharyngeal exudate.   Eyes:      General: No scleral icterus.     Extraocular Movements: EOM normal.      Conjunctiva/sclera: Conjunctivae normal.      Pupils: Pupils are equal, round, and reactive to light.   Neck:      Thyroid: No thyromegaly.   Cardiovascular:      Rate and Rhythm: Normal rate and regular rhythm.      Heart sounds: Normal heart sounds. No murmur heard.      Pulmonary:      Effort: Pulmonary effort is normal.      Breath sounds: Normal breath sounds. No wheezing or rales.   Abdominal:      General: There is no distension.      Palpations: Abdomen is soft.      Tenderness: There is no abdominal tenderness.   Musculoskeletal:         General: No tenderness or edema.   Lymphadenopathy:      Cervical: No cervical adenopathy.   Skin:     General: Skin is warm and dry.   Neurological:      Mental Status: She is alert and oriented to person, place, and time.   Psychiatric:         Mood and Affect: Mood and affect normal.         Behavior: Behavior normal.         Assessment:       1. Hypokalemia    2. Essential hypertension    3. Type 2 diabetes mellitus with stage 3b chronic kidney disease, with long-term current use of insulin    4. Hypertension, essential    5. Hypertension, unspecified type        Plan:       Diagnoses and all orders for this visit:    Hypokalemia  -     Basic Metabolic Panel; Future   Update BMP and hopefully resuming her Arb will assist with likely recurrent hypokalemia as well as her near goal blood pressure.  Follow-up with PCP in 2-4 weeks.    Essential hypertension   RESUME-     losartan (COZAAR) 25 MG tablet; Take 1 tablet (25 mg total) by mouth once daily.  Type 2 diabetes mellitus with stage 3b chronic kidney disease, with long-term current use of insulin               Vishal  Miguelito LLANOS  Internal Medicine-[facility]        Side effects of medication(s) were discussed in detail and patient voiced understanding.  Patient will call back for any issues or complications.

## 2022-02-15 ENCOUNTER — TELEPHONE (OUTPATIENT)
Dept: INTERNAL MEDICINE | Facility: CLINIC | Age: 77
End: 2022-02-15
Payer: MEDICARE

## 2022-02-15 NOTE — TELEPHONE ENCOUNTER
----- Message from Alejandra Peter PA-C sent at 2/15/2022  1:37 PM CST -----  Please call the patient to let her know that the CT shows some colitis which is inflammation of the large intestine. This is likely the the cause of her recent report of diarrhea and abdominal pain. According to her last visit with Dr. Cazares, her symptoms were improving. Please ensure she is having continued resolution of symptoms. Please let me know if she has any questions or concerns. RAISA CLAUDIO

## 2022-02-15 NOTE — TELEPHONE ENCOUNTER
Spoke with the pt and informed her of Alejandra Peter advice. Pt stated she is not having any symptoms and she is continuing to improve.

## 2022-02-22 DIAGNOSIS — D84.9 IMMUNOSUPPRESSED STATUS: ICD-10-CM

## 2022-03-10 DIAGNOSIS — E11.9 TYPE 2 DIABETES MELLITUS WITHOUT COMPLICATION: ICD-10-CM

## 2022-03-11 ENCOUNTER — PATIENT OUTREACH (OUTPATIENT)
Dept: INTERNAL MEDICINE | Facility: CLINIC | Age: 77
End: 2022-03-11
Payer: MEDICARE

## 2022-03-11 DIAGNOSIS — Z12.11 ENCOUNTER FOR COLORECTAL CANCER SCREENING: ICD-10-CM

## 2022-03-11 DIAGNOSIS — K63.5 POLYP OF COLON, UNSPECIFIED PART OF COLON, UNSPECIFIED TYPE: ICD-10-CM

## 2022-03-11 DIAGNOSIS — Z12.31 ENCOUNTER FOR SCREENING MAMMOGRAM FOR BREAST CANCER: Primary | ICD-10-CM

## 2022-03-11 DIAGNOSIS — Z12.12 ENCOUNTER FOR COLORECTAL CANCER SCREENING: ICD-10-CM

## 2022-03-30 ENCOUNTER — OFFICE VISIT (OUTPATIENT)
Dept: INTERNAL MEDICINE | Facility: CLINIC | Age: 77
End: 2022-03-30
Attending: FAMILY MEDICINE
Payer: MEDICARE

## 2022-03-30 VITALS
WEIGHT: 130.94 LBS | HEIGHT: 63 IN | BODY MASS INDEX: 23.2 KG/M2 | DIASTOLIC BLOOD PRESSURE: 70 MMHG | HEART RATE: 89 BPM | OXYGEN SATURATION: 99 % | SYSTOLIC BLOOD PRESSURE: 138 MMHG

## 2022-03-30 DIAGNOSIS — N18.32 STAGE 3B CHRONIC KIDNEY DISEASE: ICD-10-CM

## 2022-03-30 DIAGNOSIS — I10 ESSENTIAL HYPERTENSION: ICD-10-CM

## 2022-03-30 DIAGNOSIS — E11.9 TYPE 2 DIABETES MELLITUS WITHOUT COMPLICATION, WITHOUT LONG-TERM CURRENT USE OF INSULIN: ICD-10-CM

## 2022-03-30 DIAGNOSIS — R19.7 DIARRHEA, UNSPECIFIED TYPE: Primary | ICD-10-CM

## 2022-03-30 PROCEDURE — 4010F PR ACE/ARB THEARPY RXD/TAKEN: ICD-10-PCS | Mod: CPTII,S$GLB,, | Performed by: FAMILY MEDICINE

## 2022-03-30 PROCEDURE — 1159F MED LIST DOCD IN RCRD: CPT | Mod: CPTII,S$GLB,, | Performed by: FAMILY MEDICINE

## 2022-03-30 PROCEDURE — 1160F RVW MEDS BY RX/DR IN RCRD: CPT | Mod: CPTII,S$GLB,, | Performed by: FAMILY MEDICINE

## 2022-03-30 PROCEDURE — 3051F HG A1C>EQUAL 7.0%<8.0%: CPT | Mod: CPTII,S$GLB,, | Performed by: FAMILY MEDICINE

## 2022-03-30 PROCEDURE — 3075F SYST BP GE 130 - 139MM HG: CPT | Mod: CPTII,S$GLB,, | Performed by: FAMILY MEDICINE

## 2022-03-30 PROCEDURE — 1126F AMNT PAIN NOTED NONE PRSNT: CPT | Mod: CPTII,S$GLB,, | Performed by: FAMILY MEDICINE

## 2022-03-30 PROCEDURE — 3075F PR MOST RECENT SYSTOLIC BLOOD PRESS GE 130-139MM HG: ICD-10-PCS | Mod: CPTII,S$GLB,, | Performed by: FAMILY MEDICINE

## 2022-03-30 PROCEDURE — 99214 OFFICE O/P EST MOD 30 MIN: CPT | Mod: S$GLB,,, | Performed by: FAMILY MEDICINE

## 2022-03-30 PROCEDURE — 1101F PT FALLS ASSESS-DOCD LE1/YR: CPT | Mod: CPTII,S$GLB,, | Performed by: FAMILY MEDICINE

## 2022-03-30 PROCEDURE — 4010F ACE/ARB THERAPY RXD/TAKEN: CPT | Mod: CPTII,S$GLB,, | Performed by: FAMILY MEDICINE

## 2022-03-30 PROCEDURE — 3078F DIAST BP <80 MM HG: CPT | Mod: CPTII,S$GLB,, | Performed by: FAMILY MEDICINE

## 2022-03-30 PROCEDURE — 1160F PR REVIEW ALL MEDS BY PRESCRIBER/CLIN PHARMACIST DOCUMENTED: ICD-10-PCS | Mod: CPTII,S$GLB,, | Performed by: FAMILY MEDICINE

## 2022-03-30 PROCEDURE — 99214 PR OFFICE/OUTPT VISIT, EST, LEVL IV, 30-39 MIN: ICD-10-PCS | Mod: S$GLB,,, | Performed by: FAMILY MEDICINE

## 2022-03-30 PROCEDURE — 3288F PR FALLS RISK ASSESSMENT DOCUMENTED: ICD-10-PCS | Mod: CPTII,S$GLB,, | Performed by: FAMILY MEDICINE

## 2022-03-30 PROCEDURE — 99999 PR PBB SHADOW E&M-EST. PATIENT-LVL IV: CPT | Mod: PBBFAC,,, | Performed by: FAMILY MEDICINE

## 2022-03-30 PROCEDURE — 3051F PR MOST RECENT HEMOGLOBIN A1C LEVEL 7.0 - < 8.0%: ICD-10-PCS | Mod: CPTII,S$GLB,, | Performed by: FAMILY MEDICINE

## 2022-03-30 PROCEDURE — 3078F PR MOST RECENT DIASTOLIC BLOOD PRESSURE < 80 MM HG: ICD-10-PCS | Mod: CPTII,S$GLB,, | Performed by: FAMILY MEDICINE

## 2022-03-30 PROCEDURE — 1126F PR PAIN SEVERITY QUANTIFIED, NO PAIN PRESENT: ICD-10-PCS | Mod: CPTII,S$GLB,, | Performed by: FAMILY MEDICINE

## 2022-03-30 PROCEDURE — 1159F PR MEDICATION LIST DOCUMENTED IN MEDICAL RECORD: ICD-10-PCS | Mod: CPTII,S$GLB,, | Performed by: FAMILY MEDICINE

## 2022-03-30 PROCEDURE — 1101F PR PT FALLS ASSESS DOC 0-1 FALLS W/OUT INJ PAST YR: ICD-10-PCS | Mod: CPTII,S$GLB,, | Performed by: FAMILY MEDICINE

## 2022-03-30 PROCEDURE — 99999 PR PBB SHADOW E&M-EST. PATIENT-LVL IV: ICD-10-PCS | Mod: PBBFAC,,, | Performed by: FAMILY MEDICINE

## 2022-03-30 PROCEDURE — 3288F FALL RISK ASSESSMENT DOCD: CPT | Mod: CPTII,S$GLB,, | Performed by: FAMILY MEDICINE

## 2022-03-30 NOTE — PROGRESS NOTES
"CHIEF COMPLAINT:  Elevated BP, Several months of diarrhea and s/p lymphoma    HISTORY OF PRESENT ILLNESS: The patient is a 76 year-old BF.  The patient is  to another patient of mine.  She is having 2-3 episodes of painless nonbloody diarrhea for several months.    She has follicular lymphoma.  Did well on CTX.    She has had a left knee replacement.    The patient has a history of diabetes.  Recent blood sugars have been less than 300.  There have been no episodes of hypoglycemia.    The patient has a history of hypertension on current medications.  Patient denies chest pain or shortness of breath today.  BP way up today.    REVIEW OF SYSTEMS:  GENERAL: No fever, chills, fatigability or weight loss.  SKIN: No rashes, itching or changes in color or texture of skin.  HEAD: No headaches or recent head trauma.  EYES: Visual acuity fine. No photophobia, ocular pain or diplopia.  EARS: Denies ear pain, discharge or vertigo.  NOSE: No loss of smell, no epistaxis or postnasal drip.  MOUTH & THROAT: No hoarseness or change in voice. No excessive gum bleeding.  NODES: Denies swollen glands.  CHEST: Denies HULL, cyanosis, wheezing and sputum production.  CARDIOVASCULAR: Denies chest pain, PND, orthopnea or reduced exercise tolerance.  ABDOMEN:  Denies abdominal pain, hematemesis or blood in stool.  URINARY: No flank pain, dysuria or hematuria.  PERIPHERAL VASCULAR: No claudication or cyanosis.  MUSCULOSKELETAL: No joint stiffness or swelling. Denies back pain.Except as noted above.  NEUROLOGIC: No history of seizures, paralysis, alteration of gait or coordination.    SOCIAL HISTORY: The patient does not smoke.  The patient consumes alcohol socially.  The patient is happily  to another patient of mine.    PHYSICAL EXAMINATION:   Blood pressure 138/70, pulse 89, height 5' 3" (1.6 m), weight 59.4 kg (130 lb 15.3 oz), SpO2 99 %.    APPEARANCE: Well nourished, well developed, in no acute distress.    HEAD: " Normocephalic, atraumatic.  EYES: PERRL. EOMI.  Conjunctivae without injection and  anicteric  NOSE: Mucosa pink. Airway clear.  MOUTH & THROAT: No tonsillar enlargement. No pharyngeal erythema or exudate. No stridor.  NECK: Supple.   NODES: There is no lymph node enlargement.  CHEST: Lungs clear to auscultation.  No retractions are noted.  No rales or rhonchi are present.  CARDIOVASCULAR: Normal S1, S2. No rubs, murmurs or gallops.  ABDOMEN: Bowel sounds normal. Not distended. Soft. No tenderness or masses.  No ascites is noted.  MUSCULOSKELETAL:  There is no clubbing, cyanosis, or edema of the extremities x4.  There is full range of motion of the lumbar spine.  There is full range of motion of the extremities x4.  There is no deformity noted.    NEUROLOGIC:       Normal speech development.      Hearing normal.      Slightly antalgic gait.      Motor and sensory exams grossly normal.  PSYCHIATRIC: Patient is alert and oriented x3.  Thought processes are all normal.  There is no homicidality.  There is no suicidality.  There is no evidence of psychosis.    LABORATORY/RADIOLOGY:   Chart reviewed.      ASSESSMENT:   Several months of diarrhea  nicola hip pain  Hypertension, well controlled  Follicular lymphoma   Type 2 diabetes on insulin, well controlled   Chronic right hip pain    PLAN:  Increase Imodium use  Amlodipine  Losartan  Follow-up in 6 months

## 2022-05-06 ENCOUNTER — HOSPITAL ENCOUNTER (OUTPATIENT)
Dept: RADIOLOGY | Facility: OTHER | Age: 77
Discharge: HOME | End: 2022-05-06
Attending: FAMILY MEDICINE
Payer: MEDICARE

## 2022-05-06 DIAGNOSIS — Z12.31 ENCOUNTER FOR SCREENING MAMMOGRAM FOR BREAST CANCER: ICD-10-CM

## 2022-05-06 PROCEDURE — 77067 MAMMO DIGITAL SCREENING BILAT WITH TOMO: ICD-10-PCS | Mod: 26,,, | Performed by: RADIOLOGY

## 2022-05-06 PROCEDURE — 77063 MAMMO DIGITAL SCREENING BILAT WITH TOMO: ICD-10-PCS | Mod: 26,,, | Performed by: RADIOLOGY

## 2022-05-06 PROCEDURE — 77063 BREAST TOMOSYNTHESIS BI: CPT | Mod: 26,,, | Performed by: RADIOLOGY

## 2022-05-06 PROCEDURE — 77063 BREAST TOMOSYNTHESIS BI: CPT | Mod: TC

## 2022-05-06 PROCEDURE — 77067 SCR MAMMO BI INCL CAD: CPT | Mod: 26,,, | Performed by: RADIOLOGY

## 2022-05-13 ENCOUNTER — TELEPHONE (OUTPATIENT)
Dept: HEMATOLOGY/ONCOLOGY | Facility: CLINIC | Age: 77
End: 2022-05-13
Payer: MEDICARE

## 2022-05-13 DIAGNOSIS — C82.08 FOLLICULAR LYMPHOMA GRADE I, LYMPH NODES OF MULTIPLE SITES: Primary | ICD-10-CM

## 2022-05-23 ENCOUNTER — PES CALL (OUTPATIENT)
Dept: ADMINISTRATIVE | Facility: CLINIC | Age: 77
End: 2022-05-23
Payer: MEDICARE

## 2022-07-05 ENCOUNTER — LAB VISIT (OUTPATIENT)
Dept: LAB | Facility: HOSPITAL | Age: 77
End: 2022-07-05
Attending: INTERNAL MEDICINE
Payer: MEDICARE

## 2022-07-05 DIAGNOSIS — C82.08 FOLLICULAR LYMPHOMA GRADE I, LYMPH NODES OF MULTIPLE SITES: ICD-10-CM

## 2022-07-05 LAB
ALBUMIN SERPL BCP-MCNC: 3.6 G/DL (ref 3.5–5.2)
ALP SERPL-CCNC: 85 U/L (ref 55–135)
ALT SERPL W/O P-5'-P-CCNC: 19 U/L (ref 10–44)
ANION GAP SERPL CALC-SCNC: 8 MMOL/L (ref 8–16)
AST SERPL-CCNC: 17 U/L (ref 10–40)
BASOPHILS # BLD AUTO: 0.03 K/UL (ref 0–0.2)
BASOPHILS NFR BLD: 0.6 % (ref 0–1.9)
BILIRUB SERPL-MCNC: 0.5 MG/DL (ref 0.1–1)
BUN SERPL-MCNC: 36 MG/DL (ref 8–23)
CALCIUM SERPL-MCNC: 9.6 MG/DL (ref 8.7–10.5)
CHLORIDE SERPL-SCNC: 107 MMOL/L (ref 95–110)
CO2 SERPL-SCNC: 24 MMOL/L (ref 23–29)
CREAT SERPL-MCNC: 1.7 MG/DL (ref 0.5–1.4)
DIFFERENTIAL METHOD: ABNORMAL
EOSINOPHIL # BLD AUTO: 0.3 K/UL (ref 0–0.5)
EOSINOPHIL NFR BLD: 4.7 % (ref 0–8)
ERYTHROCYTE [DISTWIDTH] IN BLOOD BY AUTOMATED COUNT: 13.1 % (ref 11.5–14.5)
EST. GFR  (AFRICAN AMERICAN): 33.5 ML/MIN/1.73 M^2
EST. GFR  (NON AFRICAN AMERICAN): 29.1 ML/MIN/1.73 M^2
GLUCOSE SERPL-MCNC: 130 MG/DL (ref 70–110)
HCT VFR BLD AUTO: 35.4 % (ref 37–48.5)
HGB BLD-MCNC: 10.9 G/DL (ref 12–16)
IMM GRANULOCYTES # BLD AUTO: 0.01 K/UL (ref 0–0.04)
IMM GRANULOCYTES NFR BLD AUTO: 0.2 % (ref 0–0.5)
LDH SERPL L TO P-CCNC: 215 U/L (ref 110–260)
LYMPHOCYTES # BLD AUTO: 1.8 K/UL (ref 1–4.8)
LYMPHOCYTES NFR BLD: 34.9 % (ref 18–48)
MCH RBC QN AUTO: 27.7 PG (ref 27–31)
MCHC RBC AUTO-ENTMCNC: 30.8 G/DL (ref 32–36)
MCV RBC AUTO: 90 FL (ref 82–98)
MONOCYTES # BLD AUTO: 0.4 K/UL (ref 0.3–1)
MONOCYTES NFR BLD: 7.6 % (ref 4–15)
NEUTROPHILS # BLD AUTO: 2.7 K/UL (ref 1.8–7.7)
NEUTROPHILS NFR BLD: 52 % (ref 38–73)
NRBC BLD-RTO: 0 /100 WBC
PLATELET # BLD AUTO: 202 K/UL (ref 150–450)
PMV BLD AUTO: 9.6 FL (ref 9.2–12.9)
POTASSIUM SERPL-SCNC: 4.8 MMOL/L (ref 3.5–5.1)
PROT SERPL-MCNC: 6.1 G/DL (ref 6–8.4)
RBC # BLD AUTO: 3.93 M/UL (ref 4–5.4)
SODIUM SERPL-SCNC: 139 MMOL/L (ref 136–145)
WBC # BLD AUTO: 5.27 K/UL (ref 3.9–12.7)

## 2022-07-05 PROCEDURE — 83615 LACTATE (LD) (LDH) ENZYME: CPT | Performed by: INTERNAL MEDICINE

## 2022-07-05 PROCEDURE — 85025 COMPLETE CBC W/AUTO DIFF WBC: CPT | Performed by: INTERNAL MEDICINE

## 2022-07-05 PROCEDURE — 80053 COMPREHEN METABOLIC PANEL: CPT | Performed by: INTERNAL MEDICINE

## 2022-07-05 PROCEDURE — 36415 COLL VENOUS BLD VENIPUNCTURE: CPT | Performed by: INTERNAL MEDICINE

## 2022-07-07 ENCOUNTER — OFFICE VISIT (OUTPATIENT)
Dept: HEMATOLOGY/ONCOLOGY | Facility: CLINIC | Age: 77
End: 2022-07-07
Payer: MEDICARE

## 2022-07-07 VITALS
TEMPERATURE: 98 F | SYSTOLIC BLOOD PRESSURE: 162 MMHG | OXYGEN SATURATION: 100 % | RESPIRATION RATE: 20 BRPM | WEIGHT: 123.44 LBS | DIASTOLIC BLOOD PRESSURE: 77 MMHG | HEIGHT: 63 IN | HEART RATE: 85 BPM | BODY MASS INDEX: 21.87 KG/M2

## 2022-07-07 DIAGNOSIS — C82.08 FOLLICULAR LYMPHOMA GRADE I, LYMPH NODES OF MULTIPLE SITES: Primary | ICD-10-CM

## 2022-07-07 PROCEDURE — 1159F MED LIST DOCD IN RCRD: CPT | Mod: CPTII,S$GLB,, | Performed by: INTERNAL MEDICINE

## 2022-07-07 PROCEDURE — 3078F PR MOST RECENT DIASTOLIC BLOOD PRESSURE < 80 MM HG: ICD-10-PCS | Mod: CPTII,S$GLB,, | Performed by: INTERNAL MEDICINE

## 2022-07-07 PROCEDURE — 3078F DIAST BP <80 MM HG: CPT | Mod: CPTII,S$GLB,, | Performed by: INTERNAL MEDICINE

## 2022-07-07 PROCEDURE — 99999 PR PBB SHADOW E&M-EST. PATIENT-LVL IV: CPT | Mod: PBBFAC,,, | Performed by: INTERNAL MEDICINE

## 2022-07-07 PROCEDURE — 3288F FALL RISK ASSESSMENT DOCD: CPT | Mod: CPTII,S$GLB,, | Performed by: INTERNAL MEDICINE

## 2022-07-07 PROCEDURE — 3288F PR FALLS RISK ASSESSMENT DOCUMENTED: ICD-10-PCS | Mod: CPTII,S$GLB,, | Performed by: INTERNAL MEDICINE

## 2022-07-07 PROCEDURE — 1101F PT FALLS ASSESS-DOCD LE1/YR: CPT | Mod: CPTII,S$GLB,, | Performed by: INTERNAL MEDICINE

## 2022-07-07 PROCEDURE — 3077F PR MOST RECENT SYSTOLIC BLOOD PRESSURE >= 140 MM HG: ICD-10-PCS | Mod: CPTII,S$GLB,, | Performed by: INTERNAL MEDICINE

## 2022-07-07 PROCEDURE — 1126F PR PAIN SEVERITY QUANTIFIED, NO PAIN PRESENT: ICD-10-PCS | Mod: CPTII,S$GLB,, | Performed by: INTERNAL MEDICINE

## 2022-07-07 PROCEDURE — 1159F PR MEDICATION LIST DOCUMENTED IN MEDICAL RECORD: ICD-10-PCS | Mod: CPTII,S$GLB,, | Performed by: INTERNAL MEDICINE

## 2022-07-07 PROCEDURE — 3077F SYST BP >= 140 MM HG: CPT | Mod: CPTII,S$GLB,, | Performed by: INTERNAL MEDICINE

## 2022-07-07 PROCEDURE — 4010F PR ACE/ARB THEARPY RXD/TAKEN: ICD-10-PCS | Mod: CPTII,S$GLB,, | Performed by: INTERNAL MEDICINE

## 2022-07-07 PROCEDURE — 99215 OFFICE O/P EST HI 40 MIN: CPT | Mod: S$GLB,,, | Performed by: INTERNAL MEDICINE

## 2022-07-07 PROCEDURE — 99215 PR OFFICE/OUTPT VISIT, EST, LEVL V, 40-54 MIN: ICD-10-PCS | Mod: S$GLB,,, | Performed by: INTERNAL MEDICINE

## 2022-07-07 PROCEDURE — 99999 PR PBB SHADOW E&M-EST. PATIENT-LVL IV: ICD-10-PCS | Mod: PBBFAC,,, | Performed by: INTERNAL MEDICINE

## 2022-07-07 PROCEDURE — 1101F PR PT FALLS ASSESS DOC 0-1 FALLS W/OUT INJ PAST YR: ICD-10-PCS | Mod: CPTII,S$GLB,, | Performed by: INTERNAL MEDICINE

## 2022-07-07 PROCEDURE — 4010F ACE/ARB THERAPY RXD/TAKEN: CPT | Mod: CPTII,S$GLB,, | Performed by: INTERNAL MEDICINE

## 2022-07-07 PROCEDURE — 1126F AMNT PAIN NOTED NONE PRSNT: CPT | Mod: CPTII,S$GLB,, | Performed by: INTERNAL MEDICINE

## 2022-07-07 NOTE — PROGRESS NOTES
Route Chart for Scheduling    BMT Chart Routing      Follow up with physician 6 months.   Follow up with [facility]    Infusion scheduling note    Injection scheduling note    Labs CBC, CMP and LDH   Lab interval:     Imaging    Pharmacy appointment    Other referrals                 Subjective:       Patient ID: Keely Pizarro is a 77 y.o. female.    Chief Complaint: No chief complaint on file.    HPI   Onc Hx:  Keely presents with her  for evaluation of newly diagnosed grade 1-2 follicular lymphoma diagnosed by excisional biopsy of a right cervical lymph node 8/1/2017.  Pathologic diagnosis was received by [facility].  Keely reports feeling fatigue since March 2017. At the time she had an infected tooth and developed cervical lymphadenopathy. She started loosing weight, total quantity to date is unknown. Appetite remains normal and she denies night sweats. She then developed shortness of breath worse with activity and left lower lung pleural effusion was noted. The received thoracentesis twice with negative pleural fluid flow cytometry. Cytology was positive for lymphocytes, but was not diagnostic for follicular lymphoma. CT imaging demonstrates diffuse, bilateral submandibular, cervical, axillary, supraclavicular, and intrathoracic lymphadenopathy.     Keely has a history of uterine cancer in 2012 treated with radiation and chemotherapy. She had a port-a-cath at that time for treatment. She has insulin dependent diabetes mellitus with poorly controlled blood sugar, reporting both high and lows. She has hypertension, and blood pressure was elevated at intake today.      Follow-up Visit 9/1/17  PET imaging shows stage 4 follicular lymphoma, large left side pleural effusion     Follow-up visit 3/15/18  Return visit to review results of post-treatment staging scan that shows complete remission after 6 cycles of BR chemotherapy. She continues to have weight loss- 7lbs since last visit with  waxing/waning appetite.     Follow-up Visit 11/9/18   Interval follow-up for follicular lymphoma. Currently on maintenance Rituxan after BR for 6 cycles with complete response.    Follow-up Visit 7/7/2021  Interval follow-up for follicular lymphoma after  Rituxan after BR for 6 cycles with CR and Cycle 12 maintenance (every 2 months) Hycela finished July 2020.  CBC stable, CMP and LDH pending  Interval thyroid biopsy benign   has appointments today to assess new mass on abdomen    Follow-up Visit 12/2/2021  Interval follow-up for FL after Rituxan BR and 2 years of maintenance Rituxan completed July 2020  CBC, CMP, and LDH stable  Notable acute interval even is hemicolectomy and appendectomy last month in [place] due to ischemic bowel.  Was unable to walk after surgery due to extended bed rest. Ambulating independently in clinic today.  Some trouble with step on exam table, but reports she is able to get up 4 steps with handrail at home.  No falls.    Follow-up Visit 7/7/2022  CBC, CMP, and LDH stable  Interval events = uncontrolled hypertension, severe colitis with hypokalemia, acute on chronic anemia, and CKD  CT imaging of abdomen 2/2022 = colitis of large intestine, no lymphadenopathy  Mammogram  5/2022 = Birads 1       Review of Systems   Constitutional: Negative.    HENT: Negative.    Eyes: Negative.    Respiratory: Negative.    Cardiovascular: Negative.    Gastrointestinal: Negative.    Endocrine: Negative.    Genitourinary: Negative.    Musculoskeletal: Negative.    Integumentary:  Negative.   Allergic/Immunologic: Negative for environmental allergies, food allergies and immunocompromised state.   Neurological: Negative.    Hematological: Negative for adenopathy. Does not bruise/bleed easily.   Psychiatric/Behavioral: Negative.          Objective:      Physical Exam  Vitals and nursing note reviewed.   Constitutional:       Appearance: She is well-developed.   HENT:      Head: Normocephalic and  atraumatic.      Right Ear: External ear normal.      Left Ear: External ear normal.      Nose: Nose normal.      Mouth/Throat:      Pharynx: No oropharyngeal exudate.   Eyes:      General: No scleral icterus.     Conjunctiva/sclera: Conjunctivae normal.      Pupils: Pupils are equal, round, and reactive to light.   Neck:      Thyroid: No thyromegaly.      Vascular: No JVD.      Trachea: No tracheal deviation.   Cardiovascular:      Rate and Rhythm: Normal rate and regular rhythm.      Heart sounds: Normal heart sounds. No murmur heard.  Pulmonary:      Effort: Pulmonary effort is normal. No respiratory distress.      Breath sounds: Normal breath sounds.   Chest:   Breasts:      Right: No supraclavicular adenopathy.      Left: No supraclavicular adenopathy.       Abdominal:      General: Bowel sounds are normal. There is no distension.      Palpations: Abdomen is soft. There is no mass.      Tenderness: There is no abdominal tenderness. There is no guarding or rebound.   Musculoskeletal:         General: Normal range of motion.      Cervical back: Normal range of motion and neck supple.   Lymphadenopathy:      Cervical: No cervical adenopathy.      Upper Body:      Right upper body: No supraclavicular adenopathy.      Left upper body: No supraclavicular adenopathy.      Lower Body: No right inguinal adenopathy. No left inguinal adenopathy.   Skin:     General: Skin is warm and dry.      Findings: No rash.      Nails: There is no clubbing.   Neurological:      Mental Status: She is alert and oriented to person, place, and time.      Cranial Nerves: No cranial nerve deficit.   Psychiatric:         Behavior: Behavior normal.         Thought Content: Thought content normal.         Judgment: Judgment normal.         Assessment:       No diagnosis found.    Plan:       Now on observation after completing BR induction and 2 years Rituxan maintenance    Return visit in 6 months with CBC, CMP and LDH    A total of 20  minutes was spent in pre-visit chart review, personal interpretation of labs and imaging, and medication review. Total visit time 30 minutes, >50 % counseling.

## 2022-07-14 ENCOUNTER — OFFICE VISIT (OUTPATIENT)
Dept: INTERNAL MEDICINE | Facility: CLINIC | Age: 77
End: 2022-07-14
Attending: FAMILY MEDICINE
Payer: MEDICARE

## 2022-07-14 VITALS
DIASTOLIC BLOOD PRESSURE: 72 MMHG | OXYGEN SATURATION: 92 % | HEART RATE: 77 BPM | BODY MASS INDEX: 21.84 KG/M2 | SYSTOLIC BLOOD PRESSURE: 138 MMHG | HEIGHT: 63 IN | WEIGHT: 123.25 LBS

## 2022-07-14 DIAGNOSIS — Z79.4 TYPE 2 DIABETES MELLITUS WITH STAGE 3B CHRONIC KIDNEY DISEASE, WITH LONG-TERM CURRENT USE OF INSULIN: ICD-10-CM

## 2022-07-14 DIAGNOSIS — I10 HYPERTENSION, UNSPECIFIED TYPE: Primary | ICD-10-CM

## 2022-07-14 DIAGNOSIS — E11.22 TYPE 2 DIABETES MELLITUS WITH STAGE 3B CHRONIC KIDNEY DISEASE, WITH LONG-TERM CURRENT USE OF INSULIN: ICD-10-CM

## 2022-07-14 DIAGNOSIS — Z12.11 SCREENING FOR COLON CANCER: ICD-10-CM

## 2022-07-14 DIAGNOSIS — N18.32 TYPE 2 DIABETES MELLITUS WITH STAGE 3B CHRONIC KIDNEY DISEASE, WITH LONG-TERM CURRENT USE OF INSULIN: ICD-10-CM

## 2022-07-14 PROCEDURE — 1101F PR PT FALLS ASSESS DOC 0-1 FALLS W/OUT INJ PAST YR: ICD-10-PCS | Mod: CPTII,S$GLB,, | Performed by: FAMILY MEDICINE

## 2022-07-14 PROCEDURE — 1126F PR PAIN SEVERITY QUANTIFIED, NO PAIN PRESENT: ICD-10-PCS | Mod: CPTII,S$GLB,, | Performed by: FAMILY MEDICINE

## 2022-07-14 PROCEDURE — 1159F MED LIST DOCD IN RCRD: CPT | Mod: CPTII,S$GLB,, | Performed by: FAMILY MEDICINE

## 2022-07-14 PROCEDURE — 99214 OFFICE O/P EST MOD 30 MIN: CPT | Mod: S$GLB,,, | Performed by: FAMILY MEDICINE

## 2022-07-14 PROCEDURE — 1126F AMNT PAIN NOTED NONE PRSNT: CPT | Mod: CPTII,S$GLB,, | Performed by: FAMILY MEDICINE

## 2022-07-14 PROCEDURE — 3078F PR MOST RECENT DIASTOLIC BLOOD PRESSURE < 80 MM HG: ICD-10-PCS | Mod: CPTII,S$GLB,, | Performed by: FAMILY MEDICINE

## 2022-07-14 PROCEDURE — 99999 PR PBB SHADOW E&M-EST. PATIENT-LVL IV: ICD-10-PCS | Mod: PBBFAC,,, | Performed by: FAMILY MEDICINE

## 2022-07-14 PROCEDURE — 3075F PR MOST RECENT SYSTOLIC BLOOD PRESS GE 130-139MM HG: ICD-10-PCS | Mod: CPTII,S$GLB,, | Performed by: FAMILY MEDICINE

## 2022-07-14 PROCEDURE — 1101F PT FALLS ASSESS-DOCD LE1/YR: CPT | Mod: CPTII,S$GLB,, | Performed by: FAMILY MEDICINE

## 2022-07-14 PROCEDURE — 99214 PR OFFICE/OUTPT VISIT, EST, LEVL IV, 30-39 MIN: ICD-10-PCS | Mod: S$GLB,,, | Performed by: FAMILY MEDICINE

## 2022-07-14 PROCEDURE — 3288F PR FALLS RISK ASSESSMENT DOCUMENTED: ICD-10-PCS | Mod: CPTII,S$GLB,, | Performed by: FAMILY MEDICINE

## 2022-07-14 PROCEDURE — 99999 PR PBB SHADOW E&M-EST. PATIENT-LVL IV: CPT | Mod: PBBFAC,,, | Performed by: FAMILY MEDICINE

## 2022-07-14 PROCEDURE — 3288F FALL RISK ASSESSMENT DOCD: CPT | Mod: CPTII,S$GLB,, | Performed by: FAMILY MEDICINE

## 2022-07-14 PROCEDURE — 1160F RVW MEDS BY RX/DR IN RCRD: CPT | Mod: CPTII,S$GLB,, | Performed by: FAMILY MEDICINE

## 2022-07-14 PROCEDURE — 3078F DIAST BP <80 MM HG: CPT | Mod: CPTII,S$GLB,, | Performed by: FAMILY MEDICINE

## 2022-07-14 PROCEDURE — 4010F PR ACE/ARB THEARPY RXD/TAKEN: ICD-10-PCS | Mod: CPTII,S$GLB,, | Performed by: FAMILY MEDICINE

## 2022-07-14 PROCEDURE — 1159F PR MEDICATION LIST DOCUMENTED IN MEDICAL RECORD: ICD-10-PCS | Mod: CPTII,S$GLB,, | Performed by: FAMILY MEDICINE

## 2022-07-14 PROCEDURE — 1160F PR REVIEW ALL MEDS BY PRESCRIBER/CLIN PHARMACIST DOCUMENTED: ICD-10-PCS | Mod: CPTII,S$GLB,, | Performed by: FAMILY MEDICINE

## 2022-07-14 PROCEDURE — 4010F ACE/ARB THERAPY RXD/TAKEN: CPT | Mod: CPTII,S$GLB,, | Performed by: FAMILY MEDICINE

## 2022-07-14 PROCEDURE — 3075F SYST BP GE 130 - 139MM HG: CPT | Mod: CPTII,S$GLB,, | Performed by: FAMILY MEDICINE

## 2022-07-14 RX ORDER — LOSARTAN POTASSIUM 25 MG/1
25 TABLET ORAL DAILY
Qty: 90 TABLET | Refills: 3 | Status: CANCELLED | OUTPATIENT
Start: 2022-07-14 | End: 2023-07-14

## 2022-07-14 RX ORDER — CYPROHEPTADINE HYDROCHLORIDE 4 MG/1
4 TABLET ORAL 3 TIMES DAILY
Qty: 90 TABLET | Refills: 5 | Status: ON HOLD | OUTPATIENT
Start: 2022-07-14 | End: 2022-11-30 | Stop reason: SDUPTHER

## 2022-07-14 NOTE — PROGRESS NOTES
"CHIEF COMPLAINT:  Elevated BP and s/p lymphoma    HISTORY OF PRESENT ILLNESS: The patient is a 77 year-old BF.  The patient is  to another patient of mine.  She is having 2-3 episodes of painless nonbloody diarrhea for several months.    She has follicular lymphoma.  Did well on CTX.    She has had a left knee replacement.    The patient has a history of diabetes.  Recent blood sugars have been less than 300.  There have been no episodes of hypoglycemia.    The patient has a history of hypertension on current medications.  Patient denies chest pain or shortness of breath today.  BP way up today.    REVIEW OF SYSTEMS:  GENERAL: No fever, chills, fatigability.  SKIN: No rashes, itching or changes in color or texture of skin.  HEAD: No headaches or recent head trauma.  EYES: Visual acuity fine. No photophobia, ocular pain or diplopia.  EARS: Denies ear pain, discharge or vertigo.  NOSE: No loss of smell, no epistaxis or postnasal drip.  MOUTH & THROAT: No hoarseness or change in voice. No excessive gum bleeding.  NODES: Denies swollen glands.  CHEST: Denies HULL, cyanosis, wheezing and sputum production.  CARDIOVASCULAR: Denies chest pain, PND, orthopnea or reduced exercise tolerance.  ABDOMEN:  Denies abdominal pain, hematemesis or blood in stool.  URINARY: No flank pain, dysuria or hematuria.  PERIPHERAL VASCULAR: No claudication or cyanosis.  MUSCULOSKELETAL: No joint stiffness or swelling. Denies back pain.Except as noted above.  NEUROLOGIC: No history of seizures, paralysis, alteration of gait or coordination.    SOCIAL HISTORY: The patient does not smoke.  The patient consumes alcohol socially.  The patient is happily  to another patient of mine.    PHYSICAL EXAMINATION:   Blood pressure 138/72, pulse 77, height 5' 3" (1.6 m), weight 55.9 kg (123 lb 3.8 oz), SpO2 (!) 92 %.    APPEARANCE: Well nourished, well developed, in no acute distress.    HEAD: Normocephalic, atraumatic.  EYES: PERRL. EOMI.  " Conjunctivae without injection and  anicteric  NOSE: Mucosa pink. Airway clear.  MOUTH & THROAT: No tonsillar enlargement. No pharyngeal erythema or exudate. No stridor.  NECK: Supple.   NODES: There is no lymph node enlargement.  CHEST: Lungs clear to auscultation.  No retractions are noted.  No rales or rhonchi are present.  CARDIOVASCULAR: Normal S1, S2. No rubs, murmurs or gallops.  ABDOMEN: Bowel sounds normal. Not distended. Soft. No tenderness or masses.  No ascites is noted.  MUSCULOSKELETAL:  There is no clubbing, cyanosis, or edema of the extremities x4.  There is full range of motion of the lumbar spine.  There is full range of motion of the extremities x4.  There is no deformity noted.    NEUROLOGIC:       Normal speech development.      Hearing normal.      Slightly antalgic gait.      Motor and sensory exams grossly normal.  PSYCHIATRIC: Patient is alert and oriented x3.  Thought processes are all normal.  There is no homicidality.  There is no suicidality.  There is no evidence of psychosis.    LABORATORY/RADIOLOGY:   Chart reviewed.      ASSESSMENT:   nicola hip pain  Hypertension, well controlled  Follicular lymphoma   Type 2 diabetes on insulin, well controlled   Chronic right hip pain    PLAN:  Imodium  Amlodipine  Losartan  Follow-up in 6 months

## 2022-07-16 ENCOUNTER — PES CALL (OUTPATIENT)
Dept: ADMINISTRATIVE | Facility: CLINIC | Age: 77
End: 2022-07-16
Payer: MEDICARE

## 2022-07-20 ENCOUNTER — TELEPHONE (OUTPATIENT)
Dept: NEPHROLOGY | Facility: CLINIC | Age: 77
End: 2022-07-20
Payer: MEDICARE

## 2022-08-15 ENCOUNTER — OFFICE VISIT (OUTPATIENT)
Dept: NEPHROLOGY | Facility: CLINIC | Age: 77
End: 2022-08-15
Payer: MEDICARE

## 2022-08-15 ENCOUNTER — LAB VISIT (OUTPATIENT)
Dept: LAB | Facility: OTHER | Age: 77
End: 2022-08-15
Attending: INTERNAL MEDICINE
Payer: MEDICARE

## 2022-08-15 VITALS
HEIGHT: 63 IN | DIASTOLIC BLOOD PRESSURE: 62 MMHG | OXYGEN SATURATION: 100 % | WEIGHT: 129.44 LBS | BODY MASS INDEX: 22.93 KG/M2 | HEART RATE: 92 BPM | SYSTOLIC BLOOD PRESSURE: 140 MMHG

## 2022-08-15 DIAGNOSIS — D50.9 IRON DEFICIENCY ANEMIA, UNSPECIFIED IRON DEFICIENCY ANEMIA TYPE: ICD-10-CM

## 2022-08-15 DIAGNOSIS — N18.32 STAGE 3B CHRONIC KIDNEY DISEASE: ICD-10-CM

## 2022-08-15 DIAGNOSIS — K52.9 COLITIS: ICD-10-CM

## 2022-08-15 DIAGNOSIS — N25.81 SECONDARY HYPERPARATHYROIDISM OF RENAL ORIGIN: ICD-10-CM

## 2022-08-15 DIAGNOSIS — N17.9 AKI (ACUTE KIDNEY INJURY): Primary | ICD-10-CM

## 2022-08-15 LAB
ALBUMIN SERPL BCP-MCNC: 3.7 G/DL (ref 3.5–5.2)
ALP SERPL-CCNC: 96 U/L (ref 55–135)
ALT SERPL W/O P-5'-P-CCNC: 17 U/L (ref 10–44)
ANION GAP SERPL CALC-SCNC: 11 MMOL/L (ref 8–16)
AST SERPL-CCNC: 14 U/L (ref 10–40)
BILIRUB SERPL-MCNC: 0.4 MG/DL (ref 0.1–1)
BUN SERPL-MCNC: 44 MG/DL (ref 8–23)
C4 SERPL-MCNC: 41 MG/DL (ref 11–44)
CALCIUM SERPL-MCNC: 9.3 MG/DL (ref 8.7–10.5)
CHLORIDE SERPL-SCNC: 111 MMOL/L (ref 95–110)
CO2 SERPL-SCNC: 20 MMOL/L (ref 23–29)
CREAT SERPL-MCNC: 2 MG/DL (ref 0.5–1.4)
EST. GFR  (NO RACE VARIABLE): 26 ML/MIN/1.73 M^2
FERRITIN SERPL-MCNC: 165 NG/ML (ref 20–300)
GLUCOSE SERPL-MCNC: 128 MG/DL (ref 70–110)
IRON SERPL-MCNC: 76 UG/DL (ref 30–160)
POTASSIUM SERPL-SCNC: 4.6 MMOL/L (ref 3.5–5.1)
PROT SERPL-MCNC: 6 G/DL (ref 6–8.4)
PTH-INTACT SERPL-MCNC: 184.2 PG/ML (ref 9–77)
SATURATED IRON: 17 % (ref 20–50)
SODIUM SERPL-SCNC: 142 MMOL/L (ref 136–145)
TOTAL IRON BINDING CAPACITY: 453 UG/DL (ref 250–450)
TRANSFERRIN SERPL-MCNC: 306 MG/DL (ref 200–375)
URATE SERPL-MCNC: 6.6 MG/DL (ref 2.4–5.7)

## 2022-08-15 PROCEDURE — 1160F RVW MEDS BY RX/DR IN RCRD: CPT | Mod: CPTII,S$GLB,, | Performed by: INTERNAL MEDICINE

## 2022-08-15 PROCEDURE — 83970 ASSAY OF PARATHORMONE: CPT | Performed by: INTERNAL MEDICINE

## 2022-08-15 PROCEDURE — 99999 PR PBB SHADOW E&M-EST. PATIENT-LVL IV: ICD-10-PCS | Mod: PBBFAC,,, | Performed by: INTERNAL MEDICINE

## 2022-08-15 PROCEDURE — 1101F PR PT FALLS ASSESS DOC 0-1 FALLS W/OUT INJ PAST YR: ICD-10-PCS | Mod: CPTII,S$GLB,, | Performed by: INTERNAL MEDICINE

## 2022-08-15 PROCEDURE — 3066F PR DOCUMENTATION OF TREATMENT FOR NEPHROPATHY: ICD-10-PCS | Mod: CPTII,S$GLB,, | Performed by: INTERNAL MEDICINE

## 2022-08-15 PROCEDURE — 1159F PR MEDICATION LIST DOCUMENTED IN MEDICAL RECORD: ICD-10-PCS | Mod: CPTII,S$GLB,, | Performed by: INTERNAL MEDICINE

## 2022-08-15 PROCEDURE — 83520 IMMUNOASSAY QUANT NOS NONAB: CPT | Performed by: INTERNAL MEDICINE

## 2022-08-15 PROCEDURE — 99215 PR OFFICE/OUTPT VISIT, EST, LEVL V, 40-54 MIN: ICD-10-PCS | Mod: S$GLB,,, | Performed by: INTERNAL MEDICINE

## 2022-08-15 PROCEDURE — 84550 ASSAY OF BLOOD/URIC ACID: CPT | Performed by: INTERNAL MEDICINE

## 2022-08-15 PROCEDURE — 1160F PR REVIEW ALL MEDS BY PRESCRIBER/CLIN PHARMACIST DOCUMENTED: ICD-10-PCS | Mod: CPTII,S$GLB,, | Performed by: INTERNAL MEDICINE

## 2022-08-15 PROCEDURE — 3078F DIAST BP <80 MM HG: CPT | Mod: CPTII,S$GLB,, | Performed by: INTERNAL MEDICINE

## 2022-08-15 PROCEDURE — 3288F PR FALLS RISK ASSESSMENT DOCUMENTED: ICD-10-PCS | Mod: CPTII,S$GLB,, | Performed by: INTERNAL MEDICINE

## 2022-08-15 PROCEDURE — 1126F AMNT PAIN NOTED NONE PRSNT: CPT | Mod: CPTII,S$GLB,, | Performed by: INTERNAL MEDICINE

## 2022-08-15 PROCEDURE — 3077F SYST BP >= 140 MM HG: CPT | Mod: CPTII,S$GLB,, | Performed by: INTERNAL MEDICINE

## 2022-08-15 PROCEDURE — 86160 COMPLEMENT ANTIGEN: CPT | Performed by: INTERNAL MEDICINE

## 2022-08-15 PROCEDURE — 3288F FALL RISK ASSESSMENT DOCD: CPT | Mod: CPTII,S$GLB,, | Performed by: INTERNAL MEDICINE

## 2022-08-15 PROCEDURE — 86161 COMPLEMENT/FUNCTION ACTIVITY: CPT | Performed by: INTERNAL MEDICINE

## 2022-08-15 PROCEDURE — 80053 COMPREHEN METABOLIC PANEL: CPT | Performed by: INTERNAL MEDICINE

## 2022-08-15 PROCEDURE — 3077F PR MOST RECENT SYSTOLIC BLOOD PRESSURE >= 140 MM HG: ICD-10-PCS | Mod: CPTII,S$GLB,, | Performed by: INTERNAL MEDICINE

## 2022-08-15 PROCEDURE — 4010F PR ACE/ARB THEARPY RXD/TAKEN: ICD-10-PCS | Mod: CPTII,S$GLB,, | Performed by: INTERNAL MEDICINE

## 2022-08-15 PROCEDURE — 84466 ASSAY OF TRANSFERRIN: CPT | Performed by: INTERNAL MEDICINE

## 2022-08-15 PROCEDURE — 1101F PT FALLS ASSESS-DOCD LE1/YR: CPT | Mod: CPTII,S$GLB,, | Performed by: INTERNAL MEDICINE

## 2022-08-15 PROCEDURE — 36415 COLL VENOUS BLD VENIPUNCTURE: CPT | Performed by: INTERNAL MEDICINE

## 2022-08-15 PROCEDURE — 99215 OFFICE O/P EST HI 40 MIN: CPT | Mod: S$GLB,,, | Performed by: INTERNAL MEDICINE

## 2022-08-15 PROCEDURE — 1159F MED LIST DOCD IN RCRD: CPT | Mod: CPTII,S$GLB,, | Performed by: INTERNAL MEDICINE

## 2022-08-15 PROCEDURE — 99999 PR PBB SHADOW E&M-EST. PATIENT-LVL IV: CPT | Mod: PBBFAC,,, | Performed by: INTERNAL MEDICINE

## 2022-08-15 PROCEDURE — 3078F PR MOST RECENT DIASTOLIC BLOOD PRESSURE < 80 MM HG: ICD-10-PCS | Mod: CPTII,S$GLB,, | Performed by: INTERNAL MEDICINE

## 2022-08-15 PROCEDURE — 4010F ACE/ARB THERAPY RXD/TAKEN: CPT | Mod: CPTII,S$GLB,, | Performed by: INTERNAL MEDICINE

## 2022-08-15 PROCEDURE — 82728 ASSAY OF FERRITIN: CPT | Performed by: INTERNAL MEDICINE

## 2022-08-15 PROCEDURE — 86160 COMPLEMENT ANTIGEN: CPT | Mod: 59 | Performed by: INTERNAL MEDICINE

## 2022-08-15 PROCEDURE — 3066F NEPHROPATHY DOC TX: CPT | Mod: CPTII,S$GLB,, | Performed by: INTERNAL MEDICINE

## 2022-08-15 PROCEDURE — 1126F PR PAIN SEVERITY QUANTIFIED, NO PAIN PRESENT: ICD-10-PCS | Mod: CPTII,S$GLB,, | Performed by: INTERNAL MEDICINE

## 2022-08-15 RX ORDER — CARVEDILOL 6.25 MG/1
6.25 TABLET ORAL 2 TIMES DAILY WITH MEALS
Qty: 60 TABLET | Refills: 1 | Status: SHIPPED | OUTPATIENT
Start: 2022-08-15 | End: 2022-12-27 | Stop reason: SDUPTHER

## 2022-08-15 RX ORDER — CARVEDILOL 6.25 MG/1
6.25 TABLET ORAL 2 TIMES DAILY WITH MEALS
Qty: 60 TABLET | Refills: 1 | Status: SHIPPED | OUTPATIENT
Start: 2022-08-15 | End: 2022-08-15 | Stop reason: SDUPTHER

## 2022-08-15 NOTE — PATIENT INSTRUCTIONS
- Please stop taking Losartan 25 mg daily  - Please start taking Carvedilol 6.25 mg twice a day   - Please bring all your pill bottles.  - Please cut down your salt intake.  - Please check your blood pressures daily, write on a paper with date and time and bring me a log.  - Please donot take NSAID's (Motrin, Advil, Aleve, Ibuprofen, BC powder, Goody Powder, Diclofenac, Naproxen, Meloxicam/MOBIC, Celebrex etc), Ok to take baby aspirin.  - Follow up in 3-4 weeks or early if needed

## 2022-08-15 NOTE — PROGRESS NOTES
REASON FOR CONSULT/CHIEF COMPLAIN: Renal dysfunction    REFERRING PHYSICIAN: Albin Cazares MD    HISTORY OF PRESENT ILLNESS: 77 y.o. female patient was referred here for abnormal renal function.   No chronic NSAID use (occasionally), no known exposure to lithium, lead. No family history of Lupus, kidney disease or deafness. No ingestion of licorice. No kidney stones. DM since 2000, HTN since 2000. Uterine cancer 2012 - surgery+chemotherapy+radiation. Leukemia-2016- Chemotherapy for an year.   Nocturia. Denied any other issues with urination including dysuria, hematuria, urgency, hesitancy, incomplete voiding. Denied chest pain, abd pain, nausea, vomiting, diarrhea, shortness of breath, pedal edema, Orthopnea, PND.  Home Blood pressures are checked and stay around 140 mmhg.  Home Blood sugars are checked and stay around 120-135 mg/dl    ROS:  General: negative for chills, or fatigue  Respiratory: No cough, shortness of breath, or wheezing  Cardiovascular: No chest pain or dyspnea   Gastrointestinal: No abdominal pain, change in bowel habits  Neurological: No new focal weakness, no numbness  Dermatological: No rash or ulcers.    PAST MEDICAL HISTORY:  Past Medical History:   Diagnosis Date    CVA (cerebral vascular accident) 2011    Diabetes mellitus     Hypertension     Renal manifestation of secondary diabetes mellitus     S/P ORIF (open reduction internal fixation) fracture     Uterine cancer 2012       PAST SURGICAL HISTORY:  Past Surgical History:   Procedure Laterality Date    COLONOSCOPY N/A 3/31/2017    Procedure: COLONOSCOPY;  Surgeon: Chip Pak MD;  Location: [facility] ENDO [address];  Service: Endoscopy;  Laterality: N/A;    FRACTURE SURGERY      right arm    HYSTERECTOMY      INJECTION OF JOINT Right 10/8/2020    Procedure: INJECTION, JOINT RIGHT HIP;  Surgeon: Echo Hebert MD;  Location: [facility] [place];  Service: Pain Management;  Laterality: Right;  INJECTION, JOINT RIGHT HIP     "INJECTION OF JOINT Bilateral 2021    Procedure: INJECTION, JOINT, HIP;  Surgeon: Echo Hebert MD;  Location[facility];  Service: Pain Management;  Laterality: Bilateral;    THORACENTESIS Left     TOTAL KNEE ARTHROPLASTY Left 2012       FAMILY HISTORY:   Family History   Problem Relation Age of Onset    Cancer Brother 67        colon    Aneurysm Mother     Stroke Father     Cancer Sister         pancreatic    No Known Problems Daughter     No Known Problems Son     Cancer Brother         colon    Hypertension Brother     Liver disease Sister     Alcohol abuse Sister        SOCIAL HISTORY:  Social History     Socioeconomic History    Marital status:    Tobacco Use    Smoking status: Former Smoker     Packs/day: 1.00     Years: 10.00     Pack years: 10.00     Types: Cigarettes     Quit date: 2010     Years since quittin.0    Smokeless tobacco: Never Used   Substance and Sexual Activity    Alcohol use: No    Drug use: No    Sexual activity: Yes     Partners: Male       ALLERGIES:  Review of patient's allergies indicates:   Allergen Reactions    Tomato (solanum lycopersicum) Itching       MEDICATIONS:    Current Outpatient Medications:     amLODIPine (NORVASC) 10 MG tablet, Take 1 tablet (10 mg total) by mouth once daily., Disp: 90 tablet, Rfl: 3    BD ALCOHOL SWABS PadM, , Disp: , Rfl:     BD ULTRA-FINE BRIGITTE PEN NEEDLE 32 gauge x 5/32" Ndle, To use with Insulin pens, Disp: 100 each, Rfl: 3    blood sugar diagnostic Strp, To check BG 4 times daily, to use with insurance preferred meter, Disp: 120 strip, Rfl: 11    blood sugar diagnostic Strp, 1 each by Misc.(Non-Drug; Combo Route) route 3 (three) times daily., Disp: 300 each, Rfl: 3    blood-glucose meter Misc, Use as directed, Disp: 1 each, Rfl: 0    cyproheptadine (PERIACTIN) 4 mg tablet, Take 1 tablet (4 mg total) by mouth 3 (three) times daily., Disp: 90 tablet, Rfl: 5    lancets Misc, 1 each by Misc.(Non-Drug; " "Combo Route) route 3 (three) times daily., Disp: 300 each, Rfl: 3    lancets Misc, To check blood glucose 4 times daily, to use with insurance preferred meter (Patient taking differently: To check blood glucose 4 times daily, to use with insurance preferred meter), Disp: 120 each, Rfl: 11    LIDOcaine HCl 2% (LIDOCAINE VISCOUS) 2 % Soln, Swish and spit 10 mL every 3 hours as needed for pain, Disp: 200 mL, Rfl: 2    linaGLIPtin (TRADJENTA) 5 mg Tab tablet, Take 1 tablet (5 mg total) by mouth once daily., Disp: 90 tablet, Rfl: 3    losartan (COZAAR) 25 MG tablet, Take 1 tablet (25 mg total) by mouth once daily., Disp: 90 tablet, Rfl: 0    methocarbamoL (ROBAXIN) 500 MG Tab, Take 500 mg by mouth 3 (three) times daily., Disp: , Rfl:     sodium bicarbonate 650 MG tablet, Take 1 tablet (650 mg total) by mouth 2 (two) times daily., Disp: 180 tablet, Rfl: 3    vitamin D (VITAMIN D3) 1000 units Tab, Take 1 tablet (1,000 Units total) by mouth once daily., Disp: 90 tablet, Rfl: 0    Current Facility-Administered Medications:     betamethasone acetate-betamethasone sodium phosphate injection 3 mg, 3 mg, Intramuscular, 1 time in Clinic/HOD, Echo Hebert MD    bupivacaine (PF) 0.25% (2.5 mg/ml) injection 25 mg, 10 mL, Intramuscular, 1 time in Clinic/HOD, Echo Hebert MD   Medication List with Changes/Refills   Current Medications    AMLODIPINE (NORVASC) 10 MG TABLET    Take 1 tablet (10 mg total) by mouth once daily.    BD ALCOHOL SWABS PADM        BD ULTRA-FINE BRIGITTE PEN NEEDLE 32 GAUGE X 5/32" NDLE    To use with Insulin pens    BLOOD SUGAR DIAGNOSTIC STRP    To check BG 4 times daily, to use with insurance preferred meter    BLOOD SUGAR DIAGNOSTIC STRP    1 each by Misc.(Non-Drug; Combo Route) route 3 (three) times daily.    BLOOD-GLUCOSE METER MISC    Use as directed    CYPROHEPTADINE (PERIACTIN) 4 MG TABLET    Take 1 tablet (4 mg total) by mouth 3 (three) times daily.    LANCETS MISC    1 each by Misc.(Non-Drug; " "Combo Route) route 3 (three) times daily.    LANCETS MISC    To check blood glucose 4 times daily, to use with insurance preferred meter    LIDOCAINE HCL 2% (LIDOCAINE VISCOUS) 2 % SOLN    Swish and spit 10 mL every 3 hours as needed for pain    LINAGLIPTIN (TRADJENTA) 5 MG TAB TABLET    Take 1 tablet (5 mg total) by mouth once daily.    LOSARTAN (COZAAR) 25 MG TABLET    Take 1 tablet (25 mg total) by mouth once daily.    METHOCARBAMOL (ROBAXIN) 500 MG TAB    Take 500 mg by mouth 3 (three) times daily.    SODIUM BICARBONATE 650 MG TABLET    Take 1 tablet (650 mg total) by mouth 2 (two) times daily.    VITAMIN D (VITAMIN D3) 1000 UNITS TAB    Take 1 tablet (1,000 Units total) by mouth once daily.        PHYSICAL EXAM:  BP (!) 140/62   Pulse 92   Ht 5' 3" (1.6 m)   Wt 58.7 kg (129 lb 6.6 oz)   SpO2 100%   BMI 22.92 kg/m²     General: No distress, No fever or chills  Neck: No adenopathy,no carotid bruit,no JVD  Lungs:Clear to auscultation bilaterally, No Crackles  Heart: Regular rate and rhythm, no murmur, gallops or rubs  Abdomen: Soft, no tenderness, bowel sounds normal  Extremities: Atraumatic, no edema in LE  Skin: Turgor normal. No rashes or ulcers  Neurologic: No focal weakness, oriented.  Dialysis Access: Non applicable        LABS:  Lab Results   Component Value Date    HGB 10.9 (L) 07/05/2022        Lab Results   Component Value Date    CREATININE 1.7 (H) 07/05/2022       Prot/Creat Ratio, Urine   Date Value Ref Range Status   12/02/2021 Unable to calculate 0.00 - 0.20 Final       Lab Results   Component Value Date     07/05/2022    K 4.8 07/05/2022    CO2 24 07/05/2022       last PTH   Lab Results   Component Value Date    .5 (H) 11/10/2021    CALCIUM 9.6 07/05/2022    PHOS 2.6 (L) 11/10/2021       Lab Results   Component Value Date    HGBA1C 7.6 (H) 11/10/2021       Lab Results   Component Value Date    LDLCALC 82.4 06/09/2021         Creatinine, Urine   Date Value Ref Range Status "   12/02/2021 81.0 15.0 - 325.0 mg/dL Final   06/09/2021 100.7 15.0 - 325.0 mg/dL Final       Protein, Urine Random   Date Value Ref Range Status   12/02/2021 <7 0 - 15 mg/dL Final       Prot/Creat Ratio, Urine   Date Value Ref Range Status   12/02/2021 Unable to calculate 0.00 - 0.20 Final         ASSESSMENT:    1) ERENDIRA on Chronic Kidney disease stage 3b  2) Anemia  3) Metabolic acidosis  4) h/o Leukemia  5) H/O DM and HTN  6) Secondary hyperparathyroidism of renal origin   Patients creatinine has slowly increased since 2018, and the current baseline is around 1.5 mg/dl. Urine analysis is negative for proteinuria and hematuria. Renal ultrasound showed 8.5 and 9.4 cm kidneys. HIV, SPEP, IF, Hepatitis B and C are negative. Most likely cause of CKD is age related nephron loss, HTN and DM.   Electrolytes in acceptable range. Anemia could be secondary to his history of leukemia, Continue sodium bicarbonate BID. PTH elevated monitor for now.    - Discontinue Losartan as she started having abd symptoms around the time she started taking losartan. Starting low dose Coreg in its place.  - Repeat labs in a week to ten days  - Educated patient about CKD  - Low sodium, low animal protein diet.  - Continue current BP meds regimen  - Avoid NSAIDs intake    RTC in 3 months    Diagnosis and plan of care explained to the patient.  Pt Verbalized understanding. Answered all questions.  Thanks for allowing me to participate in the care of this patient.     9:18 AM    Gilles Hutton MD  Nephrology & Critical Care      Called pt on Aug 23, she reports doing well. No complains.

## 2022-08-16 LAB — TRYPTASE LEVEL: 12.4 NG/ML

## 2022-08-18 DIAGNOSIS — E11.9 TYPE 2 DIABETES MELLITUS WITHOUT COMPLICATION: ICD-10-CM

## 2022-08-18 LAB — C1INH SERPL-MCNC: 41 MG/DL (ref 21–39)

## 2022-08-23 PROBLEM — N25.81 SECONDARY HYPERPARATHYROIDISM OF RENAL ORIGIN: Status: ACTIVE | Noted: 2022-08-23

## 2022-08-23 LAB — C1INH FUNCTIONAL/C1INH TOTAL MFR SERPL: >100 %

## 2022-08-30 ENCOUNTER — PES CALL (OUTPATIENT)
Dept: ADMINISTRATIVE | Facility: CLINIC | Age: 77
End: 2022-08-30
Payer: MEDICARE

## 2022-09-14 DIAGNOSIS — E11.9 TYPE 2 DIABETES MELLITUS WITHOUT COMPLICATION: ICD-10-CM

## 2022-09-30 ENCOUNTER — PATIENT OUTREACH (OUTPATIENT)
Dept: INTERNAL MEDICINE | Facility: CLINIC | Age: 77
End: 2022-09-30
Payer: MEDICARE

## 2022-09-30 ENCOUNTER — TELEPHONE (OUTPATIENT)
Dept: INTERNAL MEDICINE | Facility: CLINIC | Age: 77
End: 2022-09-30
Payer: MEDICARE

## 2022-09-30 NOTE — TELEPHONE ENCOUNTER
----- Message from Suma Allen sent at 9/30/2022  1:26 PM CDT -----  Regarding: Returning a call  Contact: KEELY FRAIRE [[id number]]  Type:  Patient Returning Call    Who Called:Keely Fraire    Who Left Message for Patient:n/a  Does the patient know what this is regarding?:Not sure   Would the patient rather a call back or a response via [employer]? Call back   Best Call Back Number:[phone]  Additional Information:

## 2022-10-13 DIAGNOSIS — K63.5 POLYP OF COLON, UNSPECIFIED PART OF COLON, UNSPECIFIED TYPE: Primary | ICD-10-CM

## 2022-11-21 ENCOUNTER — TELEPHONE (OUTPATIENT)
Dept: INTERNAL MEDICINE | Facility: CLINIC | Age: 77
End: 2022-11-21
Payer: MEDICARE

## 2022-11-21 ENCOUNTER — PATIENT OUTREACH (OUTPATIENT)
Dept: INTERNAL MEDICINE | Facility: CLINIC | Age: 77
End: 2022-11-21
Payer: MEDICARE

## 2022-11-21 VITALS — SYSTOLIC BLOOD PRESSURE: 126 MMHG | DIASTOLIC BLOOD PRESSURE: 84 MMHG

## 2022-11-23 ENCOUNTER — HOSPITAL ENCOUNTER (INPATIENT)
Facility: HOSPITAL | Age: 77
LOS: 7 days | Discharge: HOME | DRG: 280 | End: 2022-11-30
Attending: STUDENT IN AN ORGANIZED HEALTH CARE EDUCATION/TRAINING PROGRAM | Admitting: INTERNAL MEDICINE
Payer: MEDICARE

## 2022-11-23 DIAGNOSIS — J81.0 ACUTE PULMONARY EDEMA: Primary | ICD-10-CM

## 2022-11-23 DIAGNOSIS — I44.7 LBBB (LEFT BUNDLE BRANCH BLOCK): ICD-10-CM

## 2022-11-23 DIAGNOSIS — R06.02 SHORTNESS OF BREATH: ICD-10-CM

## 2022-11-23 DIAGNOSIS — R79.89 ELEVATED TROPONIN: ICD-10-CM

## 2022-11-23 DIAGNOSIS — I21.4 NSTEMI (NON-ST ELEVATED MYOCARDIAL INFARCTION): ICD-10-CM

## 2022-11-23 DIAGNOSIS — I16.0 HYPERTENSIVE URGENCY: ICD-10-CM

## 2022-11-23 DIAGNOSIS — I16.1 HYPERTENSIVE EMERGENCY: ICD-10-CM

## 2022-11-23 DIAGNOSIS — N18.32 STAGE 3B CHRONIC KIDNEY DISEASE: ICD-10-CM

## 2022-11-23 PROBLEM — J96.00 ACUTE RESPIRATORY FAILURE: Status: ACTIVE | Noted: 2022-11-23

## 2022-11-23 PROBLEM — N17.9 AKI (ACUTE KIDNEY INJURY): Status: ACTIVE | Noted: 2022-11-23

## 2022-11-23 LAB
ALBUMIN SERPL BCP-MCNC: 3.8 G/DL (ref 3.5–5.2)
ALP SERPL-CCNC: 115 U/L (ref 55–135)
ALT SERPL W/O P-5'-P-CCNC: 47 U/L (ref 10–44)
ANION GAP SERPL CALC-SCNC: 11 MMOL/L (ref 8–16)
APTT BLDCRRT: <21 SEC (ref 21–32)
AST SERPL-CCNC: 43 U/L (ref 10–40)
BACTERIA #/AREA URNS AUTO: ABNORMAL /HPF
BASOPHILS # BLD AUTO: 0.03 K/UL (ref 0–0.2)
BASOPHILS NFR BLD: 0.5 % (ref 0–1.9)
BILIRUB SERPL-MCNC: 0.6 MG/DL (ref 0.1–1)
BILIRUB UR QL STRIP: NEGATIVE
BNP SERPL-MCNC: 1333 PG/ML (ref 0–99)
BUN SERPL-MCNC: 24 MG/DL (ref 8–23)
CALCIUM SERPL-MCNC: 9.5 MG/DL (ref 8.7–10.5)
CHLORIDE SERPL-SCNC: 111 MMOL/L (ref 95–110)
CLARITY UR REFRACT.AUTO: ABNORMAL
CO2 SERPL-SCNC: 18 MMOL/L (ref 23–29)
COLOR UR AUTO: YELLOW
CREAT SERPL-MCNC: 1.9 MG/DL (ref 0.5–1.4)
DIFFERENTIAL METHOD: ABNORMAL
EOSINOPHIL # BLD AUTO: 0.1 K/UL (ref 0–0.5)
EOSINOPHIL NFR BLD: 2.1 % (ref 0–8)
ERYTHROCYTE [DISTWIDTH] IN BLOOD BY AUTOMATED COUNT: 13.3 % (ref 11.5–14.5)
EST. GFR  (NO RACE VARIABLE): 27.2 ML/MIN/1.73 M^2
ESTIMATED AVG GLUCOSE: 134 MG/DL (ref 68–131)
ESTIMATED AVG GLUCOSE: 134 MG/DL (ref 68–131)
GLUCOSE SERPL-MCNC: 142 MG/DL (ref 70–110)
GLUCOSE UR QL STRIP: NEGATIVE
HBA1C MFR BLD: 6.3 % (ref 4–5.6)
HBA1C MFR BLD: 6.3 % (ref 4–5.6)
HCT VFR BLD AUTO: 37.4 % (ref 37–48.5)
HCV AB SERPL QL IA: NORMAL
HGB BLD-MCNC: 11.3 G/DL (ref 12–16)
HGB UR QL STRIP: NEGATIVE
HIV 1+2 AB+HIV1 P24 AG SERPL QL IA: NORMAL
IMM GRANULOCYTES # BLD AUTO: 0.01 K/UL (ref 0–0.04)
IMM GRANULOCYTES NFR BLD AUTO: 0.2 % (ref 0–0.5)
INFLUENZA A, MOLECULAR: NOT DETECTED
INFLUENZA B, MOLECULAR: NOT DETECTED
INR PPP: 1.1 (ref 0.8–1.2)
KETONES UR QL STRIP: NEGATIVE
LEUKOCYTE ESTERASE UR QL STRIP: ABNORMAL
LYMPHOCYTES # BLD AUTO: 1.8 K/UL (ref 1–4.8)
LYMPHOCYTES NFR BLD: 28.2 % (ref 18–48)
MCH RBC QN AUTO: 29.4 PG (ref 27–31)
MCHC RBC AUTO-ENTMCNC: 30.2 G/DL (ref 32–36)
MCV RBC AUTO: 97 FL (ref 82–98)
MICROSCOPIC COMMENT: ABNORMAL
MONOCYTES # BLD AUTO: 0.4 K/UL (ref 0.3–1)
MONOCYTES NFR BLD: 6.1 % (ref 4–15)
NEUTROPHILS # BLD AUTO: 3.9 K/UL (ref 1.8–7.7)
NEUTROPHILS NFR BLD: 62.9 % (ref 38–73)
NITRITE UR QL STRIP: NEGATIVE
NRBC BLD-RTO: 0 /100 WBC
PH UR STRIP: 6 [PH] (ref 5–8)
PLATELET # BLD AUTO: 219 K/UL (ref 150–450)
PMV BLD AUTO: 9.6 FL (ref 9.2–12.9)
POCT GLUCOSE: 184 MG/DL (ref 70–110)
POTASSIUM SERPL-SCNC: 5 MMOL/L (ref 3.5–5.1)
PROCALCITONIN SERPL IA-MCNC: 0.04 NG/ML
PROT SERPL-MCNC: 7 G/DL (ref 6–8.4)
PROT UR QL STRIP: NEGATIVE
PROTHROMBIN TIME: 11.3 SEC (ref 9–12.5)
RBC # BLD AUTO: 3.84 M/UL (ref 4–5.4)
RBC #/AREA URNS AUTO: 2 /HPF (ref 0–4)
RSV AG BY MOLECULAR METHOD: NOT DETECTED
SARS-COV-2 RNA RESP QL NAA+PROBE: NOT DETECTED
SODIUM SERPL-SCNC: 140 MMOL/L (ref 136–145)
SP GR UR STRIP: 1.01 (ref 1–1.03)
SQUAMOUS #/AREA URNS AUTO: 14 /HPF
TROPONIN I SERPL DL<=0.01 NG/ML-MCNC: 0.1 NG/ML (ref 0–0.03)
TROPONIN I SERPL DL<=0.01 NG/ML-MCNC: 0.29 NG/ML (ref 0–0.03)
TROPONIN I SERPL DL<=0.01 NG/ML-MCNC: 0.3 NG/ML (ref 0–0.03)
URN SPEC COLLECT METH UR: ABNORMAL
WBC # BLD AUTO: 6.2 K/UL (ref 3.9–12.7)
WBC #/AREA URNS AUTO: 4 /HPF (ref 0–5)

## 2022-11-23 PROCEDURE — 93010 ELECTROCARDIOGRAM REPORT: CPT | Mod: 76,,, | Performed by: INTERNAL MEDICINE

## 2022-11-23 PROCEDURE — 85730 THROMBOPLASTIN TIME PARTIAL: CPT | Performed by: STUDENT IN AN ORGANIZED HEALTH CARE EDUCATION/TRAINING PROGRAM

## 2022-11-23 PROCEDURE — 84484 ASSAY OF TROPONIN QUANT: CPT | Performed by: PHYSICIAN ASSISTANT

## 2022-11-23 PROCEDURE — 20000000 HC ICU ROOM

## 2022-11-23 PROCEDURE — 25000003 PHARM REV CODE 250: Performed by: EMERGENCY MEDICINE

## 2022-11-23 PROCEDURE — 93005 ELECTROCARDIOGRAM TRACING: CPT

## 2022-11-23 PROCEDURE — 27000221 HC OXYGEN, UP TO 24 HOURS

## 2022-11-23 PROCEDURE — 86803 HEPATITIS C AB TEST: CPT | Performed by: PHYSICIAN ASSISTANT

## 2022-11-23 PROCEDURE — 96375 TX/PRO/DX INJ NEW DRUG ADDON: CPT

## 2022-11-23 PROCEDURE — 94660 CPAP INITIATION&MGMT: CPT

## 2022-11-23 PROCEDURE — 93010 EKG 12-LEAD: ICD-10-PCS | Mod: ,,, | Performed by: INTERNAL MEDICINE

## 2022-11-23 PROCEDURE — 63600175 PHARM REV CODE 636 W HCPCS: Performed by: STUDENT IN AN ORGANIZED HEALTH CARE EDUCATION/TRAINING PROGRAM

## 2022-11-23 PROCEDURE — 25000003 PHARM REV CODE 250: Performed by: PHYSICIAN ASSISTANT

## 2022-11-23 PROCEDURE — 99285 PR EMERGENCY DEPT VISIT,LEVEL V: ICD-10-PCS | Mod: CS,,, | Performed by: EMERGENCY MEDICINE

## 2022-11-23 PROCEDURE — 99285 EMERGENCY DEPT VISIT HI MDM: CPT | Mod: CS,,, | Performed by: EMERGENCY MEDICINE

## 2022-11-23 PROCEDURE — 84145 PROCALCITONIN (PCT): CPT | Performed by: STUDENT IN AN ORGANIZED HEALTH CARE EDUCATION/TRAINING PROGRAM

## 2022-11-23 PROCEDURE — 87389 HIV-1 AG W/HIV-1&-2 AB AG IA: CPT | Performed by: PHYSICIAN ASSISTANT

## 2022-11-23 PROCEDURE — 83036 HEMOGLOBIN GLYCOSYLATED A1C: CPT | Performed by: STUDENT IN AN ORGANIZED HEALTH CARE EDUCATION/TRAINING PROGRAM

## 2022-11-23 PROCEDURE — 96365 THER/PROPH/DIAG IV INF INIT: CPT

## 2022-11-23 PROCEDURE — 63600175 PHARM REV CODE 636 W HCPCS: Performed by: PHYSICIAN ASSISTANT

## 2022-11-23 PROCEDURE — 99900035 HC TECH TIME PER 15 MIN (STAT)

## 2022-11-23 PROCEDURE — 93010 ELECTROCARDIOGRAM REPORT: CPT | Mod: ,,, | Performed by: INTERNAL MEDICINE

## 2022-11-23 PROCEDURE — 25000003 PHARM REV CODE 250: Performed by: STUDENT IN AN ORGANIZED HEALTH CARE EDUCATION/TRAINING PROGRAM

## 2022-11-23 PROCEDURE — 83880 ASSAY OF NATRIURETIC PEPTIDE: CPT | Performed by: PHYSICIAN ASSISTANT

## 2022-11-23 PROCEDURE — 27000190 HC CPAP FULL FACE MASK W/VALVE

## 2022-11-23 PROCEDURE — 93010 EKG 12-LEAD: ICD-10-PCS | Mod: 76,,, | Performed by: INTERNAL MEDICINE

## 2022-11-23 PROCEDURE — 94761 N-INVAS EAR/PLS OXIMETRY MLT: CPT

## 2022-11-23 PROCEDURE — 85025 COMPLETE CBC W/AUTO DIFF WBC: CPT | Performed by: PHYSICIAN ASSISTANT

## 2022-11-23 PROCEDURE — 87040 BLOOD CULTURE FOR BACTERIA: CPT | Performed by: STUDENT IN AN ORGANIZED HEALTH CARE EDUCATION/TRAINING PROGRAM

## 2022-11-23 PROCEDURE — 99291 PR CRITICAL CARE, E/M 30-74 MINUTES: ICD-10-PCS | Mod: GC,,, | Performed by: INTERNAL MEDICINE

## 2022-11-23 PROCEDURE — 80053 COMPREHEN METABOLIC PANEL: CPT | Performed by: PHYSICIAN ASSISTANT

## 2022-11-23 PROCEDURE — 0241U SARS-COV2 (COVID) WITH FLU/RSV BY PCR: CPT | Performed by: PHYSICIAN ASSISTANT

## 2022-11-23 PROCEDURE — 99285 EMERGENCY DEPT VISIT HI MDM: CPT | Mod: 25

## 2022-11-23 PROCEDURE — 85610 PROTHROMBIN TIME: CPT | Performed by: STUDENT IN AN ORGANIZED HEALTH CARE EDUCATION/TRAINING PROGRAM

## 2022-11-23 PROCEDURE — 81001 URINALYSIS AUTO W/SCOPE: CPT | Performed by: PHYSICIAN ASSISTANT

## 2022-11-23 PROCEDURE — 84484 ASSAY OF TROPONIN QUANT: CPT | Mod: 91 | Performed by: STUDENT IN AN ORGANIZED HEALTH CARE EDUCATION/TRAINING PROGRAM

## 2022-11-23 PROCEDURE — 99291 CRITICAL CARE FIRST HOUR: CPT | Mod: GC,,, | Performed by: INTERNAL MEDICINE

## 2022-11-23 RX ORDER — SODIUM CHLORIDE 0.9 % (FLUSH) 0.9 %
10 SYRINGE (ML) INJECTION
Status: DISCONTINUED | OUTPATIENT
Start: 2022-11-23 | End: 2022-11-30 | Stop reason: HOSPADM

## 2022-11-23 RX ORDER — FUROSEMIDE 10 MG/ML
60 INJECTION INTRAMUSCULAR; INTRAVENOUS
Status: COMPLETED | OUTPATIENT
Start: 2022-11-23 | End: 2022-11-23

## 2022-11-23 RX ORDER — ASPIRIN 81 MG/1
81 TABLET ORAL DAILY
Status: DISCONTINUED | OUTPATIENT
Start: 2022-11-24 | End: 2022-11-30 | Stop reason: HOSPADM

## 2022-11-23 RX ORDER — AMLODIPINE BESYLATE 10 MG/1
10 TABLET ORAL
Status: COMPLETED | OUTPATIENT
Start: 2022-11-23 | End: 2022-11-23

## 2022-11-23 RX ORDER — FUROSEMIDE 10 MG/ML
40 INJECTION INTRAMUSCULAR; INTRAVENOUS
Status: DISCONTINUED | OUTPATIENT
Start: 2022-11-23 | End: 2022-11-25

## 2022-11-23 RX ORDER — AMLODIPINE BESYLATE 10 MG/1
10 TABLET ORAL DAILY
Status: DISCONTINUED | OUTPATIENT
Start: 2022-11-24 | End: 2022-11-30 | Stop reason: HOSPADM

## 2022-11-23 RX ORDER — ASPIRIN 325 MG
325 TABLET ORAL
Status: COMPLETED | OUTPATIENT
Start: 2022-11-23 | End: 2022-11-23

## 2022-11-23 RX ORDER — CARVEDILOL 3.12 MG/1
6.25 TABLET ORAL
Status: COMPLETED | OUTPATIENT
Start: 2022-11-23 | End: 2022-11-23

## 2022-11-23 RX ORDER — GLUCAGON 1 MG
1 KIT INJECTION
Status: DISCONTINUED | OUTPATIENT
Start: 2022-11-23 | End: 2022-11-25

## 2022-11-23 RX ORDER — INSULIN ASPART 100 [IU]/ML
0-5 INJECTION, SOLUTION INTRAVENOUS; SUBCUTANEOUS EVERY 6 HOURS PRN
Status: DISCONTINUED | OUTPATIENT
Start: 2022-11-23 | End: 2022-11-25

## 2022-11-23 RX ORDER — CARVEDILOL 3.12 MG/1
6.25 TABLET ORAL 2 TIMES DAILY WITH MEALS
Status: DISCONTINUED | OUTPATIENT
Start: 2022-11-23 | End: 2022-11-23

## 2022-11-23 RX ORDER — CARVEDILOL 6.25 MG/1
6.25 TABLET ORAL 2 TIMES DAILY WITH MEALS
Status: DISCONTINUED | OUTPATIENT
Start: 2022-11-23 | End: 2022-11-24

## 2022-11-23 RX ORDER — HEPARIN SODIUM,PORCINE/D5W 25000/250
0-40 INTRAVENOUS SOLUTION INTRAVENOUS CONTINUOUS
Status: DISCONTINUED | OUTPATIENT
Start: 2022-11-23 | End: 2022-11-24

## 2022-11-23 RX ORDER — NITROGLYCERIN 20 MG/100ML
0-200 INJECTION INTRAVENOUS CONTINUOUS
Status: DISCONTINUED | OUTPATIENT
Start: 2022-11-23 | End: 2022-11-24

## 2022-11-23 RX ORDER — CLOPIDOGREL 300 MG/1
300 TABLET, FILM COATED ORAL ONCE
Status: COMPLETED | OUTPATIENT
Start: 2022-11-23 | End: 2022-11-23

## 2022-11-23 RX ORDER — NITROGLYCERIN 20 MG/100ML
0-200 INJECTION INTRAVENOUS CONTINUOUS
Status: DISCONTINUED | OUTPATIENT
Start: 2022-11-23 | End: 2022-11-23

## 2022-11-23 RX ORDER — CLOPIDOGREL BISULFATE 75 MG/1
75 TABLET ORAL DAILY
Status: DISCONTINUED | OUTPATIENT
Start: 2022-11-24 | End: 2022-11-26

## 2022-11-23 RX ADMIN — CLOPIDOGREL BISULFATE 300 MG: 300 TABLET, FILM COATED ORAL at 07:11

## 2022-11-23 RX ADMIN — HEPARIN SODIUM 12 UNITS/KG/HR: 10000 INJECTION, SOLUTION INTRAVENOUS at 06:11

## 2022-11-23 RX ADMIN — NITROGLYCERIN 33.33 MCG/MIN: 20 INJECTION INTRAVENOUS at 03:11

## 2022-11-23 RX ADMIN — FUROSEMIDE 40 MG: 10 INJECTION, SOLUTION INTRAVENOUS at 10:11

## 2022-11-23 RX ADMIN — FUROSEMIDE 60 MG: 10 INJECTION, SOLUTION INTRAMUSCULAR; INTRAVENOUS at 12:11

## 2022-11-23 RX ADMIN — NITROGLYCERIN 1 INCH: 20 OINTMENT TOPICAL at 12:11

## 2022-11-23 RX ADMIN — CARVEDILOL 6.25 MG: 3.12 TABLET, FILM COATED ORAL at 12:11

## 2022-11-23 RX ADMIN — CARVEDILOL 6.25 MG: 6.25 TABLET, FILM COATED ORAL at 10:11

## 2022-11-23 RX ADMIN — NITROGLYCERIN 50 MCG/MIN: 20 INJECTION INTRAVENOUS at 02:11

## 2022-11-23 RX ADMIN — AMLODIPINE BESYLATE 10 MG: 10 TABLET ORAL at 12:11

## 2022-11-23 RX ADMIN — ASPIRIN 325 MG ORAL TABLET 325 MG: 325 PILL ORAL at 01:11

## 2022-11-23 NOTE — HPI
Pt is a 77 year old female with PMH of HTN, DM2, and CKD3 that presented to the ED with SOB for the last 3 days that has progressively worsened and elevated BP. Pt stated it was worse with exertion.  Pt also stated she felt like she had a chest cold or mucus in her chest, but never coughed any phlegm up. Pt in the ED had elevated BP at 246/129. Pt had chest pain two days ago, but was not experiencing any now. Pt's EKG in the ED showed a new LBBB, but with no active chest pain, code STEMI was not called. Pt's troponin was elevated at 0.105 and BNP 1,333. Pt placed on a Nitro drip and BP began to improve. Pt placed on BiPAP due to decreased sats and increased respiratory rate. Pt admitted to the CCU for Cardiac workup and ACS protocol.

## 2022-11-23 NOTE — SUBJECTIVE & OBJECTIVE
"Past Medical History:   Diagnosis Date    CVA (cerebral vascular accident) 2011    Diabetes mellitus     Hypertension     Renal manifestation of secondary diabetes mellitus     S/P ORIF (open reduction internal fixation) fracture     Uterine cancer 2012       Past Surgical History:   Procedure Laterality Date    COLONOSCOPY N/A 3/31/2017    Procedure: COLONOSCOPY;  Surgeon: Chip Pak MD;  Location: [facility] ENDO (4TH FLR);  Service: Endoscopy;  Laterality: N/A;    FRACTURE SURGERY      right arm    HYSTERECTOMY      INJECTION OF JOINT Right 10/8/2020    Procedure: INJECTION, JOINT RIGHT HIP;  Surgeon: Echo Hebert MD;  Location: [facility] PAIN MGT;  Service: Pain Management;  Laterality: Right;  INJECTION, JOINT RIGHT HIP    INJECTION OF JOINT Bilateral 8/23/2021    Procedure: INJECTION, JOINT, HIP;  Surgeon: Echo Hebert MD;  Location: [facility] PAIN MGT;  Service: Pain Management;  Laterality: Bilateral;    THORACENTESIS Left     TOTAL KNEE ARTHROPLASTY Left 2012       Review of patient's allergies indicates:   Allergen Reactions    Tomato (solanum lycopersicum) Itching       Current Facility-Administered Medications on File Prior to Encounter   Medication    betamethasone acetate-betamethasone sodium phosphate injection 3 mg    bupivacaine (PF) 0.25% (2.5 mg/ml) injection 25 mg     Current Outpatient Medications on File Prior to Encounter   Medication Sig    amLODIPine (NORVASC) 10 MG tablet Take 1 tablet (10 mg total) by mouth once daily.    BD ALCOHOL SWABS PadM     BD ULTRA-FINE BRIGITTE PEN NEEDLE 32 gauge x 5/32" Ndle To use with Insulin pens    blood sugar diagnostic Strp To check BG 4 times daily, to use with insurance preferred meter    blood sugar diagnostic Strp 1 each by Misc.(Non-Drug; Combo Route) route 3 (three) times daily.    blood-glucose meter Misc Use as directed    carvediloL (COREG) 6.25 MG tablet Take 1 tablet (6.25 mg total) by mouth 2 (two) times daily with meals.    cyproheptadine (PERIACTIN) 4 mg " tablet Take 1 tablet (4 mg total) by mouth 3 (three) times daily.    lancets Misc 1 each by Misc.(Non-Drug; Combo Route) route 3 (three) times daily.    lancets Misc To check blood glucose 4 times daily, to use with insurance preferred meter (Patient taking differently: To check blood glucose 4 times daily, to use with insurance preferred meter)    LIDOcaine HCl 2% (LIDOCAINE VISCOUS) 2 % Soln Swish and spit 10 mL every 3 hours as needed for pain    linaGLIPtin (TRADJENTA) 5 mg Tab tablet Take 1 tablet (5 mg total) by mouth once daily.    methocarbamoL (ROBAXIN) 500 MG Tab Take 500 mg by mouth 3 (three) times daily.    sodium bicarbonate 650 MG tablet Take 1 tablet (650 mg total) by mouth 3 (three) times daily.    vitamin D (VITAMIN D3) 1000 units Tab Take 1 tablet (1,000 Units total) by mouth once daily.     Family History       Problem Relation (Age of Onset)    Alcohol abuse Sister    Aneurysm Mother    Cancer Brother (67), Sister, Brother    Hypertension Brother    Liver disease Sister    No Known Problems Daughter, Son    Stroke Father          Tobacco Use    Smoking status: Former     Packs/day: 1.00     Years: 10.00     Pack years: 10.00     Types: Cigarettes     Quit date: 2010     Years since quittin.3    Smokeless tobacco: Never   Substance and Sexual Activity    Alcohol use: No    Drug use: No    Sexual activity: Yes     Partners: Male     Review of Systems   Constitutional: Positive for malaise/fatigue. Negative for chills and fever.   Eyes:  Negative for double vision.   Cardiovascular:  Positive for dyspnea on exertion and orthopnea. Negative for chest pain and palpitations.   Respiratory:  Positive for shortness of breath.    Gastrointestinal:  Negative for abdominal pain.   Neurological:  Negative for focal weakness and headaches.   Objective:     Vital Signs (Most Recent):  Temp: 98.3 °F (36.8 °C) (22 1015)  Pulse: 78 (22 1425)  Resp: (!) 32 (22 1345)  BP: (!) 195/94  (11/23/22 1425)  SpO2: 99 % (11/23/22 1425)   Vital Signs (24h Range):  Temp:  [98.3 °F (36.8 °C)] 98.3 °F (36.8 °C)  Pulse:  [] 78  Resp:  [18-46] 32  SpO2:  [78 %-99 %] 99 %  BP: (195-246)/() 195/94     Weight: 58.5 kg (129 lb)  Body mass index is 22.85 kg/m².    SpO2: 99 %  O2 Device (Oxygen Therapy): BiPAP    No intake or output data in the 24 hours ending 11/23/22 1614    Lines/Drains/Airways       Peripheral Intravenous Line  Duration                  Peripheral IV - Single Lumen 11/23/22 1200 20 G Anterior;Right Forearm <1 day         Peripheral IV - Single Lumen 11/23/22 1500 20 G;1 3/4 in Anterior;Left;Proximal Forearm <1 day                    Physical Exam  Vitals reviewed.   Constitutional:       Appearance: Normal appearance. She is normal weight.   HENT:      Head: Normocephalic and atraumatic.      Mouth/Throat:      Comments: Pt wearing BiPAP did not exam  Eyes:      General: Lids are normal. No scleral icterus.     Extraocular Movements: Extraocular movements intact.      Pupils: Pupils are equal, round, and reactive to light.   Neck:      Vascular: JVD present.   Cardiovascular:      Rate and Rhythm: Normal rate and regular rhythm.      Pulses: Normal pulses.   Pulmonary:      Effort: No respiratory distress.      Breath sounds: No wheezing or rales.   Abdominal:      General: Abdomen is flat. There is no distension.      Palpations: Abdomen is soft.      Tenderness: There is no abdominal tenderness.   Musculoskeletal:      Right lower leg: No edema.      Left lower leg: No edema.      Comments: Pt was able to move all extremities and feel when I was touching her.   Skin:     General: Skin is warm and dry.      Findings: No rash or wound.   Neurological:      Mental Status: She is alert.      Cranial Nerves: No cranial nerve deficit.      Motor: No weakness.   Psychiatric:         Mood and Affect: Mood normal.         Behavior: Behavior is cooperative.         Thought Content: Thought  content normal.         Judgment: Judgment normal.       Significant Labs: All pertinent lab results from the last 24 hours have been reviewed.    Significant Imaging:  review all imaging

## 2022-11-23 NOTE — ASSESSMENT & PLAN NOTE
Pt presented with elevated creatinine at 1.9. pt has variable creatinine over the past 9 months. Was lowest at 1.2. Most recent nephrology note in 8/15/22 says her baseline is 1.5. ERENDIRA is most likely due to HTN emergency. Pt was also fluid overloaded on arrival    Plan  Will diurese her with lasix 40mg  Daily CMPs

## 2022-11-23 NOTE — H&P
[facility] - Emergency Dept  Cardiology  History and Physical     Patient Name: Keely Pizarro  MRN: [id number]  Admission Date: 11/23/2022  Code Status: Full Code   Attending Provider: Juan Alvarenga MD   Primary Care Physician: Albin Cazares MD  Principal Problem:<principal problem not specified>    Patient information was obtained from patient and ER records.     Subjective:     Chief Complaint:  SOB    HPI:  Pt is a 77 year old female with PMH of HTN, DM2, and CKD3 that presented to the ED with SOB for the last 3 days that has progressively worsened and elevated BP. Pt stated it was worse with exertion.  Pt also stated she felt like she had a chest cold or mucus in her chest, but never coughed any phlegm up. Pt in the ED had elevated BP at 246/129. Pt had chest pain two days ago, but was not experiencing any now. Pt's EKG in the ED showed a new LBBB, but with no active chest pain, code STEMI was not called. Pt's troponin was elevated at 0.105 and BNP 1,333. Pt placed on a Nitro drip and BP began to improve. Pt placed on BiPAP due to decreased sats and increased respiratory rate. Pt admitted to the CCU for Cardiac workup and ACS protocol.      Past Medical History:   Diagnosis Date    CVA (cerebral vascular accident) 2011    Diabetes mellitus     Hypertension     Renal manifestation of secondary diabetes mellitus     S/P ORIF (open reduction internal fixation) fracture     Uterine cancer 2012       Past Surgical History:   Procedure Laterality Date    COLONOSCOPY N/A 3/31/2017    Procedure: COLONOSCOPY;  Surgeon: Chip Pak MD;  Location: [address]);  Service: Endoscopy;  Laterality: N/A;    FRACTURE SURGERY      right arm    HYSTERECTOMY      INJECTION OF JOINT Right 10/8/2020    Procedure: INJECTION, JOINT RIGHT HIP;  Surgeon: Echo Hebert MD;  Location: [facility] PAIN [facility];  Service: Pain Management;  Laterality: Right;  INJECTION, JOINT RIGHT HIP    INJECTION OF  "JOINT Bilateral 8/23/2021    Procedure: INJECTION, JOINT, HIP;  Surgeon: Echo Hebert MD;  Location[facility];  Service: Pain Management;  Laterality: Bilateral;    THORACENTESIS Left     TOTAL KNEE ARTHROPLASTY Left 2012       Review of patient's allergies indicates:   Allergen Reactions    Tomato (solanum lycopersicum) Itching       Current Facility-Administered Medications on File Prior to Encounter   Medication    betamethasone acetate-betamethasone sodium phosphate injection 3 mg    bupivacaine (PF) 0.25% (2.5 mg/ml) injection 25 mg     Current Outpatient Medications on File Prior to Encounter   Medication Sig    amLODIPine (NORVASC) 10 MG tablet Take 1 tablet (10 mg total) by mouth once daily.    BD ALCOHOL SWABS PadM     BD ULTRA-FINE BRIGITTE PEN NEEDLE 32 gauge x 5/32" Ndle To use with Insulin pens    blood sugar diagnostic Strp To check BG 4 times daily, to use with insurance preferred meter    blood sugar diagnostic Strp 1 each by Misc.(Non-Drug; Combo Route) route 3 (three) times daily.    blood-glucose meter Misc Use as directed    carvediloL (COREG) 6.25 MG tablet Take 1 tablet (6.25 mg total) by mouth 2 (two) times daily with meals.    cyproheptadine (PERIACTIN) 4 mg tablet Take 1 tablet (4 mg total) by mouth 3 (three) times daily.    lancets Misc 1 each by Misc.(Non-Drug; Combo Route) route 3 (three) times daily.    lancets Misc To check blood glucose 4 times daily, to use with insurance preferred meter (Patient taking differently: To check blood glucose 4 times daily, to use with insurance preferred meter)    LIDOcaine HCl 2% (LIDOCAINE VISCOUS) 2 % Soln Swish and spit 10 mL every 3 hours as needed for pain    linaGLIPtin (TRADJENTA) 5 mg Tab tablet Take 1 tablet (5 mg total) by mouth once daily.    methocarbamoL (ROBAXIN) 500 MG Tab Take 500 mg by mouth 3 (three) times daily.    sodium bicarbonate 650 MG tablet Take 1 tablet (650 mg total) by mouth 3 (three) times daily.    " vitamin D (VITAMIN D3) 1000 units Tab Take 1 tablet (1,000 Units total) by mouth once daily.     Family History       Problem Relation (Age of Onset)    Alcohol abuse Sister    Aneurysm Mother    Cancer Brother (67), Sister, Brother    Hypertension Brother    Liver disease Sister    No Known Problems Daughter, Son    Stroke Father          Tobacco Use    Smoking status: Former     Packs/day: 1.00     Years: 10.00     Pack years: 10.00     Types: Cigarettes     Quit date: 2010     Years since quittin.3    Smokeless tobacco: Never   Substance and Sexual Activity    Alcohol use: No    Drug use: No    Sexual activity: Yes     Partners: Male     Review of Systems   Constitutional: Positive for malaise/fatigue. Negative for chills and fever.   Eyes:  Negative for double vision.   Cardiovascular:  Positive for dyspnea on exertion and orthopnea. Negative for chest pain and palpitations.   Respiratory:  Positive for shortness of breath.    Gastrointestinal:  Negative for abdominal pain.   Neurological:  Negative for focal weakness and headaches.   Objective:     Vital Signs (Most Recent):  Temp: 98.3 °F (36.8 °C) (22 1015)  Pulse: 78 (22 1425)  Resp: (!) 32 (22 1345)  BP: (!) 195/94 (22 1425)  SpO2: 99 % (22 1425)   Vital Signs (24h Range):  Temp:  [98.3 °F (36.8 °C)] 98.3 °F (36.8 °C)  Pulse:  [] 78  Resp:  [18-46] 32  SpO2:  [78 %-99 %] 99 %  BP: (195-246)/() 195/94     Weight: 58.5 kg (129 lb)  Body mass index is 22.85 kg/m².    SpO2: 99 %  O2 Device (Oxygen Therapy): BiPAP    No intake or output data in the 24 hours ending 22 1614    Lines/Drains/Airways       Peripheral Intravenous Line  Duration                  Peripheral IV - Single Lumen 22 1200 20 G Anterior;Right Forearm <1 day         Peripheral IV - Single Lumen 22 1500 20 G;1 3/4 in Anterior;Left;Proximal Forearm <1 day                    Physical Exam  Vitals reviewed.   Constitutional:        Appearance: Normal appearance. She is normal weight.   HENT:      Head: Normocephalic and atraumatic.      Mouth/Throat:      Comments: Pt wearing BiPAP did not exam  Eyes:      General: Lids are normal. No scleral icterus.     Extraocular Movements: Extraocular movements intact.      Pupils: Pupils are equal, round, and reactive to light.   Neck:      Vascular: JVD present.   Cardiovascular:      Rate and Rhythm: Normal rate and regular rhythm.      Pulses: Normal pulses.   Pulmonary:      Effort: No respiratory distress.      Breath sounds: No wheezing or rales.   Abdominal:      General: Abdomen is flat. There is no distension.      Palpations: Abdomen is soft.      Tenderness: There is no abdominal tenderness.   Musculoskeletal:      Right lower leg: No edema.      Left lower leg: No edema.      Comments: Pt was able to move all extremities and feel when I was touching her.   Skin:     General: Skin is warm and dry.      Findings: No rash or wound.   Neurological:      Mental Status: She is alert.      Cranial Nerves: No cranial nerve deficit.      Motor: No weakness.   Psychiatric:         Mood and Affect: Mood normal.         Behavior: Behavior is cooperative.         Thought Content: Thought content normal.         Judgment: Judgment normal.       Significant Labs: All pertinent lab results from the last 24 hours have been reviewed.    Significant Imaging:  review all imaging    Assessment and Plan:     Acute respiratory failure  Pt presented in respiratory distress with sats at 92%. Chest x-ray showed pulmonary edema. Most likely due to HTN emergency. BNP was elevated at 1333    Plan   Pt placed on BiPAP, given lasix 40mg IV,   Will wean as tolerate.      ERENDIRA (acute kidney injury)  Pt presented with elevated creatinine at 1.9. pt has variable creatinine over the past 9 months. Was lowest at 1.2. Most recent nephrology note in 8/15/22 says her baseline is 1.5. ERENDIRA is most likely due to HTN emergency. Pt  was also fluid overloaded on arrival    Plan  Will diurese her with lasix 40mg  Daily CMPs    NSTEMI (non-ST elevated myocardial infarction)  Pt came in not complaining of chest pain, but had elevated troponin at 0.105 and EKG changes with new LBBB. STEMI procedure not called due to the lack of chest pain for the last few days.    Plan  Begin ACS protocol   Placed on heparin, plavix, aspirin  Plan for ECHO and will trend patient's troponin to peak.  Daily CBC, CMP, Mg, Phos, APTT    Hypertensive emergency  Pt presented with SOB, ERENDIRA, EKG changes, and her  BP elevated in the 246/129    Plan  Pt placed on nitro drip. Pt's BP has responded need to not drop her BP too quickly. Adjust nitro drip as needed  Placed on lasix 40mg IV  Given her home dose amlodipine 10mg         VTE Risk Mitigation (From admission, onward)         Ordered     heparin 25,000 units in dextrose 5% (100 units/ml) IV bolus from bag - ADDITIONAL PRN BOLUS - 60 units/kg (max bolus 4000 units)  As needed (PRN)        Question:  Heparin Infusion Adjustment (DO NOT MODIFY ANSWER)  Answer:  \\[email or web address]\epic\Images\Pharmacy\HeparinInfusions\heparin LOW INTENSITY nomogram for [facility] [id number]    11/23/22 1548     heparin 25,000 units in dextrose 5% (100 units/ml) IV bolus from bag - ADDITIONAL PRN BOLUS - 30 units/kg (max bolus 4000 units)  As needed (PRN)        Question:  Heparin Infusion Adjustment (DO NOT MODIFY ANSWER)  Answer:  \[email or web address]\epic\Images\Pharmacy\HeparinInfusions\heparin LOW INTENSITY nomogram for [facility] [id number]    11/23/22 1548     heparin 25,000 units in dextrose 5% 250 mL (100 units/mL) infusion LOW INTENSITY nomogram - [facility]  Continuous        Question Answer Comment   Heparin Infusion Adjustment (DO NOT MODIFY ANSWER) \\[email or web address]\epic\Images\Pharmacy\HeparinInfusions\heparin LOW INTENSITY nomogram for [facility] [id number].pdf    Begin at (in units/kg/hr) 12        11/23/22 1548     heparin 25,000 units in dextrose 5% (100 units/ml) IV  bolus from bag INITIAL BOLUS (max bolus 4000 units)  Once        Question:  Heparin Infusion Adjustment (DO NOT MODIFY ANSWER)  Answer:  \\[email or web address]\epic\Images\Pharmacy\HeparinInfusions\heparin LOW INTENSITY nomogram for [facility] [id number].pdf    11/23/22 1548     IP VTE HIGH RISK PATIENT  Once         11/23/22 1512     Place sequential compression device  Until discontinued         11/23/22 1512                Omar Richards,   Cardiology   [facility] - Emergency Dept

## 2022-11-23 NOTE — ASSESSMENT & PLAN NOTE
Pt presented in respiratory distress with sats at 92%. Chest x-ray showed pulmonary edema. Most likely due to HTN emergency. BNP was elevated at 1333    Plan   Pt placed on BiPAP, given lasix 40mg IV,   Will wean as tolerate.

## 2022-11-23 NOTE — ED PROVIDER NOTES
Encounter Date: 11/23/2022       History     Chief Complaint   Patient presents with    Shortness of Breath     Onset Monday endorses cough     77-year-old female with history of hypertension, diabetes, stroke, uterine cancer presents to the ED complaining of shortness of breath for the past 3 days that has been progressively worsening since onset.  She reports dyspnea on exertion and orthopnea.  States she has not slept for the past few nights due to shortness of breath.  She has a cough, rhinorrhea, back pain with coughing.  She denies any known sick contacts.  She is not on any blood thinners.  No prior known history of CHF or COPD.  No history of PE or DVT, no recent travel or surgeries, not on HRT.  She denies fever, chills, chest pain, abdominal pain, lower extremity edema, headache, lightheadedness.  She did not take her blood pressure medication this morning.  She quit smoking 15 years ago.    The history is provided by the patient.   Review of patient's allergies indicates:   Allergen Reactions    Tomato (solanum lycopersicum) Itching     Past Medical History:   Diagnosis Date    CVA (cerebral vascular accident) 2011    Diabetes mellitus     Hypertension     NSTEMI (non-ST elevated myocardial infarction) 11/23/2022    Renal manifestation of secondary diabetes mellitus     S/P ORIF (open reduction internal fixation) fracture     Uterine cancer 2012     Past Surgical History:   Procedure Laterality Date    COLONOSCOPY N/A 3/31/2017    Procedure: COLONOSCOPY;  Surgeon: Chip Pak MD;  Location: [facility] ENDO [address];  Service: Endoscopy;  Laterality: N/A;    FRACTURE SURGERY      right arm    HYSTERECTOMY      INJECTION OF JOINT Right 10/8/2020    Procedure: INJECTION, JOINT RIGHT HIP;  Surgeon: Echo Hebert MD;  Location: [facility];  Service: Pain Management;  Laterality: Right;  INJECTION, JOINT RIGHT HIP    INJECTION OF JOINT Bilateral 8/23/2021    Procedure: INJECTION, JOINT, HIP;  Surgeon: Echo  MD Emilee;  Location: [facility] PAIN MGT;  Service: Pain Management;  Laterality: Bilateral;    THORACENTESIS Left     TOTAL KNEE ARTHROPLASTY Left 2012     Family History   Problem Relation Age of Onset    Cancer Brother 67        colon    Aneurysm Mother     Stroke Father     Cancer Sister         pancreatic    No Known Problems Daughter     No Known Problems Son     Cancer Brother         colon    Hypertension Brother     Liver disease Sister     Alcohol abuse Sister      Social History     Tobacco Use    Smoking status: Former     Packs/day: 1.00     Years: 10.00     Pack years: 10.00     Types: Cigarettes     Quit date: 2010     Years since quittin.3    Smokeless tobacco: Never   Substance Use Topics    Alcohol use: No    Drug use: No     Review of Systems   Constitutional:  Negative for chills and fever.   HENT:  Positive for rhinorrhea. Negative for congestion and sore throat.    Respiratory:  Positive for cough and shortness of breath.         +orthopnea   Cardiovascular:  Negative for chest pain.   Gastrointestinal:  Negative for abdominal pain, constipation, diarrhea, nausea and vomiting.   Genitourinary:  Negative for dysuria and hematuria.   Musculoskeletal:  Positive for back pain.   Skin:  Negative for rash.   Neurological:  Negative for weakness and headaches.   Psychiatric/Behavioral:  Negative for confusion.      Physical Exam     Initial Vitals [22 1015]   BP Pulse Resp Temp SpO2   (!) 230/100 100 18 98.3 °F (36.8 °C) 97 %      MAP       --         Physical Exam    Nursing note and vitals reviewed.  Constitutional: She appears well-developed and well-nourished. She is not diaphoretic. No distress.   HENT:   Head: Normocephalic and atraumatic.   Neck: Neck supple.   Normal range of motion.  Cardiovascular:  Regular rhythm and normal heart sounds.   Tachycardia present.   Exam reveals no gallop and no friction rub.       No murmur heard.  No LE edema   Pulmonary/Chest: Breath sounds normal.  She has no wheezes. She has no rhonchi. She has no rales.   Decreased breath sounds to bilateral lower lungs   Abdominal: Abdomen is soft. Bowel sounds are normal. There is no abdominal tenderness. There is no rebound and no guarding.   Musculoskeletal:         General: Normal range of motion.      Cervical back: Normal range of motion and neck supple.     Neurological: She is alert and oriented to person, place, and time. No sensory deficit.   Skin: Skin is warm and dry. No rash noted. No erythema.   Psychiatric: She has a normal mood and affect.       ED Course   Critical Care    Date/Time: 11/23/2022 7:00 PM  Performed by: Selam Burgess MD  Authorized by: Selam Burgess MD   Total critical care time (exclusive of procedural time) : 0 minutes      Labs Reviewed   CBC W/ AUTO DIFFERENTIAL - Abnormal; Notable for the following components:       Result Value    RBC 3.84 (*)     Hemoglobin 11.3 (*)     MCHC 30.2 (*)     All other components within normal limits   COMPREHENSIVE METABOLIC PANEL - Abnormal; Notable for the following components:    Chloride 111 (*)     CO2 18 (*)     Glucose 142 (*)     BUN 24 (*)     Creatinine 1.9 (*)     AST 43 (*)     ALT 47 (*)     eGFR 27.2 (*)     All other components within normal limits   TROPONIN I - Abnormal; Notable for the following components:    Troponin I 0.105 (*)     All other components within normal limits   B-TYPE NATRIURETIC PEPTIDE - Abnormal; Notable for the following components:    BNP 1,333 (*)     All other components within normal limits   URINALYSIS, REFLEX TO URINE CULTURE - Abnormal; Notable for the following components:    Appearance, UA Hazy (*)     Leukocytes, UA 1+ (*)     All other components within normal limits    Narrative:     Specimen Source->Urine   URINALYSIS MICROSCOPIC - Abnormal; Notable for the following components:    Bacteria Few (*)     All other components within normal limits    Narrative:     Specimen Source->Urine   HEMOGLOBIN  A1C - Abnormal; Notable for the following components:    Hemoglobin A1C 6.3 (*)     Estimated Avg Glucose 134 (*)     All other components within normal limits   HEMOGLOBIN A1C - Abnormal; Notable for the following components:    Hemoglobin A1C 6.3 (*)     Estimated Avg Glucose 134 (*)     All other components within normal limits   TROPONIN I - Abnormal; Notable for the following components:    Troponin I 0.290 (*)     All other components within normal limits    Narrative:     Draw baseline aPTT prior to starting the heparin bolus or  infusion  (if patient is on warfarin prior to heparin therapy)   POCT GLUCOSE - Abnormal; Notable for the following components:    POCT Glucose 184 (*)     All other components within normal limits   CULTURE, BLOOD   CULTURE, BLOOD   HIV 1 / 2 ANTIBODY    Narrative:     Release to patient->Immediate   HEPATITIS C ANTIBODY    Narrative:     Release to patient->Immediate   SARS-COV2 (COVID) WITH FLU/RSV BY PCR   PROCALCITONIN   APTT    Narrative:     Draw baseline aPTT prior to starting the heparin bolus or  infusion  (if patient is on warfarin prior to heparin therapy)   PROTIME-INR    Narrative:     Draw baseline aPTT prior to starting the heparin bolus or  infusion  (if patient is on warfarin prior to heparin therapy)   TROPONIN I   POCT GLUCOSE MONITORING CONTINUOUS        ECG Results              EKG 12-lead (Final result)  Result time 11/23/22 17:57:59      Final result by Interface, Lab In [facility] (11/23/22 17:57:59)                   Narrative:    Test Reason : I21.4,    Vent. Rate : 072 BPM     Atrial Rate : 072 BPM     P-R Int : 152 ms          QRS Dur : 124 ms      QT Int : 436 ms       P-R-T Axes : -02 011 150 degrees     QTc Int : 477 ms    Normal sinus rhythm  Left bundle branch block  Abnormal ECG  When compared with ECG of 23-NOV-2022 11:32,  No significant change was found  Confirmed by Mateus Chawla MD ([id number]) on 11/23/2022 5:57:46 PM    Referred By: [email or web address]   SELF            Confirmed By:Mateus Chawla MD                                     EKG 12-lead (Final result)  Result time 11/23/22 13:12:24      Final result by Interface, Lab In [facility] (11/23/22 13:12:24)                   Narrative:    Test Reason : R06.02,    Vent. Rate : 093 BPM     Atrial Rate : 093 BPM     P-R Int : 174 ms          QRS Dur : 130 ms      QT Int : 380 ms       P-R-T Axes : 070 022 186 degrees     QTc Int : 472 ms    Normal sinus rhythm  Possible Left atrial enlargement  Left bundle branch block  Abnormal ECG  When compared with ECG of 12-JUL-2017 10:42,  Left bundle branch block is now Present  Confirmed by JUAN FRANCISCO SAMUEL MD ([id number]) on 11/23/2022 1:12:11 PM    Referred By: FRACISCO   SELF           Confirmed By:JUAN FRANCISCO SAMUEL MD                                  Imaging Results              X-Ray Chest 1 View (Final result)  Result time 11/23/22 12:15:44   Procedure changed from X-Ray Chest PA And Lateral     Final result by Alfredito Samuel III, MD (11/23/22 12:15:44)                   Impression:      Pulmonary edema pneumonia aspiration or sepsis.      Electronically signed by: Alfredito Samuel MD  Date:    11/23/2022  Time:    12:15               Narrative:    EXAMINATION:  XR CHEST 1 VIEW    CLINICAL HISTORY:  cough, sob;    FINDINGS:  Chest one view: There is cardiomegaly mild edema and worsening.                                       Medications   sodium chloride 0.9% flush 10 mL (has no administration in time range)   amLODIPine tablet 10 mg (has no administration in time range)   dextrose 10% bolus 125 mL (has no administration in time range)   glucagon (human recombinant) injection 1 mg (has no administration in time range)   dextrose 10% bolus 125 mL (has no administration in time range)   dextrose 10% bolus 250 mL (has no administration in time range)   dextrose 10% bolus 125 mL (has no administration in time range)   dextrose 10% bolus 250 mL (has no administration in time range)   insulin  aspart U-100 pen 0-5 Units (has no administration in time range)   carvediloL tablet 6.25 mg (has no administration in time range)   furosemide injection 40 mg (has no administration in time range)   nitroGLYCERIN in 5 % dextrose 50 mg/250 mL (200 mcg/mL) infusion (33.33 mcg/min Intravenous New Bag 11/23/22 1545)   aspirin EC tablet 81 mg (has no administration in time range)   clopidogreL tablet 75 mg (has no administration in time range)   heparin 25,000 units in dextrose 5% 250 mL (100 units/mL) infusion LOW INTENSITY nomogram - [facility] (12 Units/kg/hr × 54.8 kg (Adjusted) Intravenous New Bag 11/23/22 1859)   heparin 25,000 units in dextrose 5% (100 units/ml) IV bolus from bag - ADDITIONAL PRN BOLUS - 60 units/kg (max bolus 4000 units) (has no administration in time range)   heparin 25,000 units in dextrose 5% (100 units/ml) IV bolus from bag - ADDITIONAL PRN BOLUS - 30 units/kg (max bolus 4000 units) (has no administration in time range)   amLODIPine tablet 10 mg (10 mg Oral Given 11/23/22 1209)   carvediloL tablet 6.25 mg (6.25 mg Oral Given 11/23/22 1209)   nitroGLYCERIN 2% TD oint ointment 1 inch (1 inch Transdermal Given 11/23/22 1209)   furosemide injection 60 mg (60 mg Intravenous Given 11/23/22 1216)   aspirin tablet 325 mg (325 mg Oral Given 11/23/22 1315)   clopidogreL tablet 300 mg (300 mg Oral Given 11/23/22 1943)   heparin 25,000 units in dextrose 5% (100 units/ml) IV bolus from bag INITIAL BOLUS (max bolus 4000 units) (3,290 Units Intravenous Bolus from Bag 11/23/22 1902)     Medical Decision Making:   History:   Old Medical Records: I decided to obtain old medical records.  Old Records Summarized: records from clinic visits and records from previous admission(s).  Clinical Tests:   Lab Tests: Ordered and Reviewed  Radiological Study: Ordered and Reviewed  Medical Tests: Reviewed and Ordered  Other:   I have discussed this case with another health care provider.       <> Summary of the Discussion:  Cardiology      APC / Resident Notes:   77-year-old female with history of hypertension, diabetes, stroke, uterine cancer presents to the ED complaining of shortness of breath for the past 3 days that has been progressively worsening since onset.  Hypertensive, did not take BP medication this morning.  Tachycardic.  Well-appearing.  Abdomen is soft and nontender.  Decreased breath sounds to bilateral lower lungs.  No lower extremity edema.  Differential diagnosis includes but is not limited to ACS, new onset CHF, new onset COPD, pneumonia, anemia, influenza, COVID, viral URI.  Will get labs, chest x-ray.     12:00P  Called to the room by the nurse - now tachypneic, respiratory distress, tachycardic, pulse ox 78% on RA. Placed on 5L oxygen via NC and now 93%. Placed on BiPAP due to respiratory distress. IV lasix ordered. Nitropaste placed.     No leukocytosis. Minimal anemia. Cr at baseline. BNP 1333. COVID/Flu/RSV negative. UA with no infection.     CXR consistent with pulmonary edema. No echo in our system.     1:12 PM  Remains very hypertensive. Respiratory status improved on BiPAP. Troponin 0.105.  Discussed with cardiology on-call.  Nitro drip ordered.     Cardiology evaluated in the ED and will admit to CCU. On nitro drip, heparin, drip, BiPAP.      Attending Attestation:     Physician Attestation Statement for NP/PA:   I have conducted a face to face encounter with this patient in addition to the NP/PA, due to Medical Complexity    Other NP/PA Attestation Additions:      Medical Decision Making: EKG shows left bundle branch block with rate 93, new onset compared to prior 5 years ago which was normal sinus rhythm.    On exam patient is in moderate respiratory distress, tachycardic to the 120s, tachypneic to the 40s, and satting 94% on 5 L nasal cannula, hypertensive to 235/129 with diffusely coarse breath sounds concerning for flash pulmonary edema versus less likely multifocal pneumonia given significant  hypertension.  Will give 1 in nitro paste, Lasix, chest x-ray, BiPAP.    Will start on nitro drip.    Blood pressure improved to 150s, over 1 L urine output, breath sounds improving.  Patient admitted to CCU.    I discussed the patient's care with Advanced Practice Clinician, and did see this patient with the APC.  I reviewed their note and agree with the history, physical, assessment, diagnosis, treatment, and disposition plan provided by the Advanced Practice Clinician.                             Clinical Impression:   Final diagnoses:  [R06.02] Shortness of breath  [I16.1] Hypertensive emergency  [I44.7] LBBB (left bundle branch block)  [R77.8] Elevated troponin  [J81.0] Acute pulmonary edema (Primary)        ED Disposition Condition    Admit Stable                Ingrid Pimentel PA-C  11/23/22 [id number]       Selam Burgess MD  11/23/22 3967

## 2022-11-23 NOTE — ASSESSMENT & PLAN NOTE
Pt came in not complaining of chest pain, but had elevated troponin at 0.105 and EKG changes with new LBBB. STEMI procedure not called due to the lack of chest pain for the last few days.    Plan  Begin ACS protocol   Placed on heparin, plavix, aspirin  Plan for ECHO and will trend patient's troponin to peak.  Daily CBC, CMP, Mg, Phos, APTT

## 2022-11-23 NOTE — CONSULTS
Inpatient consult to Cardiology  Consult performed by: Alejandro Garcia MD  Consult ordered by: Ingrid Pimentel PA-C        Admit to CCU. Please see H&P

## 2022-11-23 NOTE — ASSESSMENT & PLAN NOTE
Pt presented with SOB, ERENDIRA, EKG changes, and her  BP elevated in the 246/129    Plan  Pt placed on nitro drip. Pt's BP has responded need to not drop her BP too quickly. Adjust nitro drip as needed  Placed on lasix 40mg IV  Given her home dose amlodipine 10mg

## 2022-11-24 PROBLEM — D64.9 ANEMIA: Status: ACTIVE | Noted: 2022-11-24

## 2022-11-24 PROBLEM — I50.9 CHF (CONGESTIVE HEART FAILURE): Status: ACTIVE | Noted: 2022-11-24

## 2022-11-24 LAB
ALBUMIN SERPL BCP-MCNC: 2.9 G/DL (ref 3.5–5.2)
ALBUMIN SERPL BCP-MCNC: 3.1 G/DL (ref 3.5–5.2)
ALP SERPL-CCNC: 88 U/L (ref 55–135)
ALP SERPL-CCNC: 94 U/L (ref 55–135)
ALT SERPL W/O P-5'-P-CCNC: 29 U/L (ref 10–44)
ALT SERPL W/O P-5'-P-CCNC: 31 U/L (ref 10–44)
ANION GAP SERPL CALC-SCNC: 10 MMOL/L (ref 8–16)
ANION GAP SERPL CALC-SCNC: 11 MMOL/L (ref 8–16)
APTT BLDCRRT: 26.8 SEC (ref 21–32)
AST SERPL-CCNC: 17 U/L (ref 10–40)
AST SERPL-CCNC: 20 U/L (ref 10–40)
BASOPHILS # BLD AUTO: 0.02 K/UL (ref 0–0.2)
BASOPHILS NFR BLD: 0.3 % (ref 0–1.9)
BASOPHILS NFR BLD: 0.3 % (ref 0–1.9)
BASOPHILS NFR BLD: 0.4 % (ref 0–1.9)
BILIRUB SERPL-MCNC: 0.6 MG/DL (ref 0.1–1)
BILIRUB SERPL-MCNC: 0.6 MG/DL (ref 0.1–1)
BUN SERPL-MCNC: 27 MG/DL (ref 8–23)
BUN SERPL-MCNC: 31 MG/DL (ref 8–23)
CALCIUM SERPL-MCNC: 8.7 MG/DL (ref 8.7–10.5)
CALCIUM SERPL-MCNC: 9 MG/DL (ref 8.7–10.5)
CHLORIDE SERPL-SCNC: 107 MMOL/L (ref 95–110)
CHLORIDE SERPL-SCNC: 108 MMOL/L (ref 95–110)
CHOLEST SERPL-MCNC: 140 MG/DL (ref 120–199)
CHOLEST/HDLC SERPL: 2.6 {RATIO} (ref 2–5)
CO2 SERPL-SCNC: 22 MMOL/L (ref 23–29)
CO2 SERPL-SCNC: 22 MMOL/L (ref 23–29)
CREAT SERPL-MCNC: 2.1 MG/DL (ref 0.5–1.4)
CREAT SERPL-MCNC: 2.5 MG/DL (ref 0.5–1.4)
DIFFERENTIAL METHOD: ABNORMAL
EOSINOPHIL # BLD AUTO: 0.1 K/UL (ref 0–0.5)
EOSINOPHIL # BLD AUTO: 0.1 K/UL (ref 0–0.5)
EOSINOPHIL # BLD AUTO: 0.2 K/UL (ref 0–0.5)
EOSINOPHIL NFR BLD: 1.3 % (ref 0–8)
EOSINOPHIL NFR BLD: 1.5 % (ref 0–8)
EOSINOPHIL NFR BLD: 2.8 % (ref 0–8)
ERYTHROCYTE [DISTWIDTH] IN BLOOD BY AUTOMATED COUNT: 13.1 % (ref 11.5–14.5)
ERYTHROCYTE [DISTWIDTH] IN BLOOD BY AUTOMATED COUNT: 13.3 % (ref 11.5–14.5)
ERYTHROCYTE [DISTWIDTH] IN BLOOD BY AUTOMATED COUNT: 13.4 % (ref 11.5–14.5)
EST. GFR  (NO RACE VARIABLE): 19.6 ML/MIN/1.73 M^2
EST. GFR  (NO RACE VARIABLE): 24.1 ML/MIN/1.73 M^2
GLUCOSE SERPL-MCNC: 147 MG/DL (ref 70–110)
GLUCOSE SERPL-MCNC: 289 MG/DL (ref 70–110)
HCT VFR BLD AUTO: 25.8 % (ref 37–48.5)
HCT VFR BLD AUTO: 27.5 % (ref 37–48.5)
HCT VFR BLD AUTO: 28.7 % (ref 37–48.5)
HDLC SERPL-MCNC: 54 MG/DL (ref 40–75)
HDLC SERPL: 38.6 % (ref 20–50)
HGB BLD-MCNC: 7.8 G/DL (ref 12–16)
HGB BLD-MCNC: 8.3 G/DL (ref 12–16)
HGB BLD-MCNC: 8.8 G/DL (ref 12–16)
IMM GRANULOCYTES # BLD AUTO: 0.01 K/UL (ref 0–0.04)
IMM GRANULOCYTES # BLD AUTO: 0.02 K/UL (ref 0–0.04)
IMM GRANULOCYTES # BLD AUTO: 0.03 K/UL (ref 0–0.04)
IMM GRANULOCYTES NFR BLD AUTO: 0.2 % (ref 0–0.5)
IMM GRANULOCYTES NFR BLD AUTO: 0.3 % (ref 0–0.5)
IMM GRANULOCYTES NFR BLD AUTO: 0.4 % (ref 0–0.5)
LDLC SERPL CALC-MCNC: 71.4 MG/DL (ref 63–159)
LYMPHOCYTES # BLD AUTO: 0.8 K/UL (ref 1–4.8)
LYMPHOCYTES # BLD AUTO: 1.3 K/UL (ref 1–4.8)
LYMPHOCYTES # BLD AUTO: 1.5 K/UL (ref 1–4.8)
LYMPHOCYTES NFR BLD: 12.2 % (ref 18–48)
LYMPHOCYTES NFR BLD: 24.5 % (ref 18–48)
LYMPHOCYTES NFR BLD: 25 % (ref 18–48)
MAGNESIUM SERPL-MCNC: 1.8 MG/DL (ref 1.6–2.6)
MCH RBC QN AUTO: 28.6 PG (ref 27–31)
MCH RBC QN AUTO: 28.9 PG (ref 27–31)
MCH RBC QN AUTO: 29.4 PG (ref 27–31)
MCHC RBC AUTO-ENTMCNC: 30.2 G/DL (ref 32–36)
MCHC RBC AUTO-ENTMCNC: 30.2 G/DL (ref 32–36)
MCHC RBC AUTO-ENTMCNC: 30.7 G/DL (ref 32–36)
MCV RBC AUTO: 95 FL (ref 82–98)
MCV RBC AUTO: 96 FL (ref 82–98)
MCV RBC AUTO: 96 FL (ref 82–98)
MONOCYTES # BLD AUTO: 0.3 K/UL (ref 0.3–1)
MONOCYTES # BLD AUTO: 0.4 K/UL (ref 0.3–1)
MONOCYTES # BLD AUTO: 0.5 K/UL (ref 0.3–1)
MONOCYTES NFR BLD: 4.9 % (ref 4–15)
MONOCYTES NFR BLD: 6.2 % (ref 4–15)
MONOCYTES NFR BLD: 8.5 % (ref 4–15)
NEUTROPHILS # BLD AUTO: 3.4 K/UL (ref 1.8–7.7)
NEUTROPHILS # BLD AUTO: 4.1 K/UL (ref 1.8–7.7)
NEUTROPHILS # BLD AUTO: 5.6 K/UL (ref 1.8–7.7)
NEUTROPHILS NFR BLD: 63.6 % (ref 38–73)
NEUTROPHILS NFR BLD: 66.7 % (ref 38–73)
NEUTROPHILS NFR BLD: 80.9 % (ref 38–73)
NONHDLC SERPL-MCNC: 86 MG/DL
NRBC BLD-RTO: 0 /100 WBC
PHOSPHATE SERPL-MCNC: 2.7 MG/DL (ref 2.7–4.5)
PLATELET # BLD AUTO: 196 K/UL (ref 150–450)
PLATELET # BLD AUTO: 204 K/UL (ref 150–450)
PLATELET # BLD AUTO: 219 K/UL (ref 150–450)
PMV BLD AUTO: 10.3 FL (ref 9.2–12.9)
PMV BLD AUTO: 9.4 FL (ref 9.2–12.9)
PMV BLD AUTO: 9.8 FL (ref 9.2–12.9)
POCT GLUCOSE: 158 MG/DL (ref 70–110)
POCT GLUCOSE: 162 MG/DL (ref 70–110)
POCT GLUCOSE: 186 MG/DL (ref 70–110)
POCT GLUCOSE: 267 MG/DL (ref 70–110)
POCT GLUCOSE: 307 MG/DL (ref 70–110)
POTASSIUM SERPL-SCNC: 3.3 MMOL/L (ref 3.5–5.1)
POTASSIUM SERPL-SCNC: 3.8 MMOL/L (ref 3.5–5.1)
PROT SERPL-MCNC: 5 G/DL (ref 6–8.4)
PROT SERPL-MCNC: 5.5 G/DL (ref 6–8.4)
RBC # BLD AUTO: 2.7 M/UL (ref 4–5.4)
RBC # BLD AUTO: 2.9 M/UL (ref 4–5.4)
RBC # BLD AUTO: 2.99 M/UL (ref 4–5.4)
SODIUM SERPL-SCNC: 139 MMOL/L (ref 136–145)
SODIUM SERPL-SCNC: 141 MMOL/L (ref 136–145)
TRIGL SERPL-MCNC: 73 MG/DL (ref 30–150)
TROPONIN I SERPL DL<=0.01 NG/ML-MCNC: 0.22 NG/ML (ref 0–0.03)
TSH SERPL DL<=0.005 MIU/L-ACNC: 0.74 UIU/ML (ref 0.4–4)
WBC # BLD AUTO: 5.39 K/UL (ref 3.9–12.7)
WBC # BLD AUTO: 6.16 K/UL (ref 3.9–12.7)
WBC # BLD AUTO: 6.87 K/UL (ref 3.9–12.7)

## 2022-11-24 PROCEDURE — 85025 COMPLETE CBC W/AUTO DIFF WBC: CPT | Performed by: STUDENT IN AN ORGANIZED HEALTH CARE EDUCATION/TRAINING PROGRAM

## 2022-11-24 PROCEDURE — 84443 ASSAY THYROID STIM HORMONE: CPT | Performed by: STUDENT IN AN ORGANIZED HEALTH CARE EDUCATION/TRAINING PROGRAM

## 2022-11-24 PROCEDURE — 99291 CRITICAL CARE FIRST HOUR: CPT | Mod: GC,,, | Performed by: INTERNAL MEDICINE

## 2022-11-24 PROCEDURE — 94761 N-INVAS EAR/PLS OXIMETRY MLT: CPT

## 2022-11-24 PROCEDURE — 20000000 HC ICU ROOM

## 2022-11-24 PROCEDURE — 84484 ASSAY OF TROPONIN QUANT: CPT | Performed by: STUDENT IN AN ORGANIZED HEALTH CARE EDUCATION/TRAINING PROGRAM

## 2022-11-24 PROCEDURE — 25000003 PHARM REV CODE 250: Performed by: STUDENT IN AN ORGANIZED HEALTH CARE EDUCATION/TRAINING PROGRAM

## 2022-11-24 PROCEDURE — 93010 ELECTROCARDIOGRAM REPORT: CPT | Mod: ,,, | Performed by: INTERNAL MEDICINE

## 2022-11-24 PROCEDURE — 93010 EKG 12-LEAD: ICD-10-PCS | Mod: ,,, | Performed by: INTERNAL MEDICINE

## 2022-11-24 PROCEDURE — 99291 PR CRITICAL CARE, E/M 30-74 MINUTES: ICD-10-PCS | Mod: GC,,, | Performed by: INTERNAL MEDICINE

## 2022-11-24 PROCEDURE — 80061 LIPID PANEL: CPT | Performed by: STUDENT IN AN ORGANIZED HEALTH CARE EDUCATION/TRAINING PROGRAM

## 2022-11-24 PROCEDURE — 99900035 HC TECH TIME PER 15 MIN (STAT)

## 2022-11-24 PROCEDURE — 63600175 PHARM REV CODE 636 W HCPCS

## 2022-11-24 PROCEDURE — C9113 INJ PANTOPRAZOLE SODIUM, VIA: HCPCS

## 2022-11-24 PROCEDURE — 27000221 HC OXYGEN, UP TO 24 HOURS

## 2022-11-24 PROCEDURE — 63600175 PHARM REV CODE 636 W HCPCS: Performed by: STUDENT IN AN ORGANIZED HEALTH CARE EDUCATION/TRAINING PROGRAM

## 2022-11-24 PROCEDURE — 93005 ELECTROCARDIOGRAM TRACING: CPT

## 2022-11-24 PROCEDURE — 84100 ASSAY OF PHOSPHORUS: CPT | Performed by: STUDENT IN AN ORGANIZED HEALTH CARE EDUCATION/TRAINING PROGRAM

## 2022-11-24 PROCEDURE — 85025 COMPLETE CBC W/AUTO DIFF WBC: CPT | Mod: 91

## 2022-11-24 PROCEDURE — 63600175 PHARM REV CODE 636 W HCPCS: Performed by: INTERNAL MEDICINE

## 2022-11-24 PROCEDURE — 85730 THROMBOPLASTIN TIME PARTIAL: CPT | Performed by: INTERNAL MEDICINE

## 2022-11-24 PROCEDURE — 80053 COMPREHEN METABOLIC PANEL: CPT | Mod: 91 | Performed by: INTERNAL MEDICINE

## 2022-11-24 PROCEDURE — 83735 ASSAY OF MAGNESIUM: CPT | Performed by: STUDENT IN AN ORGANIZED HEALTH CARE EDUCATION/TRAINING PROGRAM

## 2022-11-24 PROCEDURE — 80053 COMPREHEN METABOLIC PANEL: CPT | Performed by: STUDENT IN AN ORGANIZED HEALTH CARE EDUCATION/TRAINING PROGRAM

## 2022-11-24 PROCEDURE — 94660 CPAP INITIATION&MGMT: CPT

## 2022-11-24 RX ORDER — MAGNESIUM SULFATE HEPTAHYDRATE 40 MG/ML
2 INJECTION, SOLUTION INTRAVENOUS ONCE
Status: COMPLETED | OUTPATIENT
Start: 2022-11-24 | End: 2022-11-24

## 2022-11-24 RX ORDER — PANTOPRAZOLE SODIUM 40 MG/10ML
40 INJECTION, POWDER, LYOPHILIZED, FOR SOLUTION INTRAVENOUS 2 TIMES DAILY
Status: DISCONTINUED | OUTPATIENT
Start: 2022-11-24 | End: 2022-11-30 | Stop reason: HOSPADM

## 2022-11-24 RX ORDER — CARVEDILOL 12.5 MG/1
12.5 TABLET ORAL 2 TIMES DAILY WITH MEALS
Status: DISCONTINUED | OUTPATIENT
Start: 2022-11-24 | End: 2022-11-30 | Stop reason: HOSPADM

## 2022-11-24 RX ORDER — FUROSEMIDE 10 MG/ML
40 INJECTION INTRAMUSCULAR; INTRAVENOUS ONCE
Status: COMPLETED | OUTPATIENT
Start: 2022-11-24 | End: 2022-11-24

## 2022-11-24 RX ORDER — POTASSIUM CHLORIDE 20 MEQ/1
40 TABLET, EXTENDED RELEASE ORAL ONCE
Status: COMPLETED | OUTPATIENT
Start: 2022-11-24 | End: 2022-11-24

## 2022-11-24 RX ADMIN — ASPIRIN 81 MG: 81 TABLET, COATED ORAL at 09:11

## 2022-11-24 RX ADMIN — FUROSEMIDE 40 MG: 10 INJECTION, SOLUTION INTRAVENOUS at 08:11

## 2022-11-24 RX ADMIN — CLOPIDOGREL BISULFATE 75 MG: 75 TABLET ORAL at 09:11

## 2022-11-24 RX ADMIN — CARVEDILOL 6.25 MG: 6.25 TABLET, FILM COATED ORAL at 09:11

## 2022-11-24 RX ADMIN — MAGNESIUM SULFATE 2 G: 2 INJECTION INTRAVENOUS at 03:11

## 2022-11-24 RX ADMIN — PANTOPRAZOLE SODIUM 40 MG: 40 INJECTION, POWDER, FOR SOLUTION INTRAVENOUS at 11:11

## 2022-11-24 RX ADMIN — FUROSEMIDE 40 MG: 10 INJECTION, SOLUTION INTRAVENOUS at 09:11

## 2022-11-24 RX ADMIN — INSULIN ASPART 3 UNITS: 100 INJECTION, SOLUTION INTRAVENOUS; SUBCUTANEOUS at 08:11

## 2022-11-24 RX ADMIN — PANTOPRAZOLE SODIUM 40 MG: 40 INJECTION, POWDER, FOR SOLUTION INTRAVENOUS at 08:11

## 2022-11-24 RX ADMIN — FUROSEMIDE 40 MG: 10 INJECTION, SOLUTION INTRAVENOUS at 11:11

## 2022-11-24 RX ADMIN — INSULIN ASPART 2 UNITS: 100 INJECTION, SOLUTION INTRAVENOUS; SUBCUTANEOUS at 02:11

## 2022-11-24 RX ADMIN — POTASSIUM CHLORIDE 40 MEQ: 1500 TABLET, EXTENDED RELEASE ORAL at 03:11

## 2022-11-24 RX ADMIN — AMLODIPINE BESYLATE 10 MG: 10 TABLET ORAL at 09:11

## 2022-11-24 RX ADMIN — CARVEDILOL 12.5 MG: 12.5 TABLET, FILM COATED ORAL at 04:11

## 2022-11-24 NOTE — HOSPITAL COURSE
Pt is a 77 year old female that presented to the hospital in HTN emergency with elevated troponin,  respiratory distress, and kidney failure. Pt did not have any ST elevation, but did have a new LBBB. Pt was not having any chest pain on presentation. ACS protocol was initiated. Pt was placed on a nitro drip and BiPAP due respiratory distress. Pt's BP has responded to the nitro drip and it has been stopped since late on 11/23.

## 2022-11-24 NOTE — ASSESSMENT & PLAN NOTE
Pt presented in respiratory distress with sats at 92%. Chest x-ray showed pulmonary edema. Most likely due to HTN emergency. BNP was elevated at 1333    Plan   Pt placed on BiPAP at admission, given lasix 40mg IV,   Pt was able to be weaned off biPAP and is now on NC 1L. Will be taken off if able

## 2022-11-24 NOTE — PROGRESS NOTES
[facility] - Cardiac Intensive Care  Cardiology  Progress Note    Patient Name: Keely Pizarro  MRN: [id number]  Admission Date: 11/23/2022  Hospital Length of Stay: 1 days  Code Status: Full Code   Attending Physician: Juan Alvarenga MD   Primary Care Physician: Albin Cazares MD  Expected Discharge Date:   Principal Problem:<principal problem not specified>    Subjective:     Hospital Course:   Pt is a 77 year old female that presented to the hospital in HTN emergency with elevated troponin,  respiratory distress, and kidney failure. Pt did not have any ST elevation, but did have a new LBBB. Pt was not having any chest pain on presentation. ACS protocol was initiated. Pt was placed on a nitro drip and BiPAP due respiratory distress. Pt's BP has responded to the nitro drip and it has been stopped since late on 11/23.      No new subjective & objective note has been filed under this hospital service since the last note was generated.  Assessment and Plan:         Anemia  Pt presented with an NSTEMI. Placed on ACS protocol. Pt's CBC this morning had a drop in hemoglobin from 11.3 to 8.3 on 11/24. No active sites of bleeding noted. Pt has not had a BM.    Will recheck CBC  If repeat CBC is still abnormal, will workup anemia.    CHF (congestive heart failure)  Patient is fluid overloaded , but is improving with diuretics.    Plan  PET ECHO on 11/25  Continue diuresing      Acute respiratory failure  Pt presented in respiratory distress with sats at 92%. Chest x-ray showed pulmonary edema. Most likely due to HTN emergency. BNP was elevated at 1333    Plan   Pt placed on BiPAP at admission, given lasix 40mg IV,   Pt was able to be weaned off biPAP and is now on NC 1L. Will be taken off if able      ERENDIRA (acute kidney injury)  Pt presented with elevated creatinine at 1.9. pt has variable creatinine over the past 9 months. Was lowest at 1.2. Most recent nephrology note in 8/15/22 says her baseline is  1.5. ERENDIRA is most likely due to HTN emergency. Pt was also fluid overloaded on arrival    Plan  Will diurese her with lasix 40mg  Daily CMPs    NSTEMI (non-ST elevated myocardial infarction)  Pt came in not complaining of chest pain, but had elevated troponin at 0.105 and EKG changes with new LBBB. STEMI procedure not called due to the lack of chest pain for the last few days.    Plan  Began ACS protocol   Placed on heparin, plavix, aspirin  Plan for ECHO and patient's troponin peaked.  Daily CBC, CMP, Mg, Phos, APTT    Hypertensive emergency  Pt presented with SOB, ERENDIRA, EKG changes, and her  BP elevated in the 246/129    Plan  Pt placed on nitro drip. Pt's BP responded and pt has been in the lower range. Nitro drip D/C. Holding her home antihypertensives until BP stabilizes  Placed on lasix 40mg IV  Given her home dose amlodipine 10mg     Pt's hemoglobin dropped from 11.3 to 8.3. No signs of bleeding noticed with this patient. Pt has not had a bowel movement, will make sure no blood is noticed there.       VTE Risk Mitigation (From admission, onward)           Ordered     heparin 25,000 units in dextrose 5% (100 units/ml) IV bolus from bag - ADDITIONAL PRN BOLUS - 60 units/kg (max bolus 4000 units)  As needed (PRN)        Question:  Heparin Infusion Adjustment (DO NOT MODIFY ANSWER)  Answer:  \[employer]\Images\Pharmacy\HeparinInfusions\heparin LOW INTENSITY nomogram for [facility] [id number].pdf    11/23/22 1548     heparin 25,000 units in dextrose 5% (100 units/ml) IV bolus from bag - ADDITIONAL PRN BOLUS - 30 units/kg (max bolus 4000 units)  As needed (PRN)        Question:  Heparin Infusion Adjustment (DO NOT MODIFY ANSWER)  Answer:  \[employer]\Images\Pharmacy\HeparinInfusions\heparin LOW INTENSITY nomogram for [facility] [id number].pdf    11/23/22 1548     heparin 25,000 units in dextrose 5% 250 mL (100 units/mL) infusion LOW INTENSITY nomogram - [facility]  Continuous        Question Answer Comment   Heparin Infusion  Adjustment (DO NOT MODIFY ANSWER) [email or web address]\Images\Pharmacy\HeparinInfusions\heparin LOW INTENSITY nomogram for [facility] [id number]    Begin at (in units/kg/hr) 12        11/23/22 1548     IP VTE HIGH RISK PATIENT  Once         11/23/22 1512     Place sequential compression device  Until discontinued         11/23/22 1512                    Omar Richards,   Cardiology  [facility] - Cardiac Intensive Care

## 2022-11-24 NOTE — ASSESSMENT & PLAN NOTE
Pt presented with SOB, ERENDIRA, EKG changes, and her  BP elevated in the 246/129    Plan  Pt placed on nitro drip. Pt's BP responded and pt has been in the lower range. Nitro drip D/C. Holding her home antihypertensives until BP stabilizes  Placed on lasix 40mg IV  Given her home dose amlodipine 10mg     Pt's hemoglobin dropped from 11.3 to 8.3. No signs of bleeding noticed with this patient. Pt has not had a bowel movement, will make sure no blood is noticed there.

## 2022-11-24 NOTE — ASSESSMENT & PLAN NOTE
Pt came in not complaining of chest pain, but had elevated troponin at 0.105 and EKG changes with new LBBB. STEMI procedure not called due to the lack of chest pain for the last few days.    Plan  Began ACS protocol   Placed on heparin, plavix, aspirin  Plan for ECHO and patient's troponin peaked.  Daily CBC, CMP, Mg, Phos, APTT

## 2022-11-24 NOTE — PLAN OF CARE
Problem: Adult Inpatient Plan of Care  Goal: Plan of Care Review  Outcome: Ongoing, Progressing  Flowsheets (Taken 11/24/2022 9812)  Plan of Care Reviewed With:   patient   son  Goal: Absence of Hospital-Acquired Illness or Injury  Outcome: Ongoing, Progressing  Goal: Readiness for Transition of Care  Outcome: Ongoing, Progressing     Problem: Diabetes Comorbidity  Goal: Blood Glucose Level Within Targeted Range  Outcome: Ongoing, Progressing     Problem: Fall Injury Risk  Goal: Absence of Fall and Fall-Related Injury  Outcome: Ongoing, Progressing

## 2022-11-24 NOTE — PLAN OF CARE
Patient weaned off Nitro shortly after arrival to unit. Nitro drip has been off since 2200.  Pt also now off bipap, o2 saturations 100% on 2L NC.   Troponin now trending down.   Potassium and mag low. Replacements Initiated.     Pts MAICOL still edematous, firm, with significant bruising. Hgb dropped from 11.3 to 8.3. Discussed with Dr. Krishnamurthy. Ultrasound ordered. Pt states arm is tender, but is able to move arm and radial pulses palpable.

## 2022-11-24 NOTE — SUBJECTIVE & OBJECTIVE
Interval history. Pt's BP has responded to the nitro drip. Her BP is in the 107 systolic range and the nitro drip was stopped, pt has stayed in this range. Will hold her antihypertensives due to her BP. Pt symptoms have improved.       ROS  Objective:     Vital Signs (Most Recent):  Temp: 98.4 °F (36.9 °C) (11/24/22 0400)  Pulse: 66 (11/24/22 0715)  Resp: 18 (11/24/22 0715)  BP: 102/60 (11/24/22 0600)  SpO2: 100 % (11/24/22 0715)   Vital Signs (24h Range):  Temp:  [97.2 °F (36.2 °C)-98.4 °F (36.9 °C)] 98.4 °F (36.9 °C)  Pulse:  [] 66  Resp:  [14-46] 18  SpO2:  [78 %-100 %] 100 %  BP: (102-246)/() 102/60     Weight: 60.7 kg (133 lb 13.1 oz)  Body mass index is 23.71 kg/m².    SpO2: 100 %  O2 Device (Oxygen Therapy): nasal cannula      Intake/Output Summary (Last 24 hours) at 11/24/2022 0846  Last data filed at 11/24/2022 0600  Gross per 24 hour   Intake 139.92 ml   Output 2650 ml   Net -2510.08 ml       Lines/Drains/Airways       Drain  Duration             Female External Urinary Catheter 11/23/22 2115 <1 day              Peripheral Intravenous Line  Duration                  Peripheral IV - Single Lumen 11/23/22 1942 18 G Right Antecubital <1 day         Peripheral IV - Single Lumen 11/23/22 2218 22 G Right Wrist <1 day                    Physical Exam  Vitals reviewed.   Constitutional:       Appearance: Normal appearance. She is normal weight.   HENT:      Head: Normocephalic and atraumatic.      Mouth/Throat:      Comments: Pt wearing BiPAP did not exam  Eyes:      General: Lids are normal. No scleral icterus.     Extraocular Movements: Extraocular movements intact.      Pupils: Pupils are equal, round, and reactive to light.   Cardiovascular:      Rate and Rhythm: Normal rate and regular rhythm.      Pulses: Normal pulses.   Pulmonary:      Effort: Pulmonary effort is normal. No respiratory distress.      Breath sounds: Examination of the right-lower field reveals rales. Examination of the  left-lower field reveals rales. Rales (mild rales) present. No wheezing.   Abdominal:      General: Abdomen is flat. There is no distension.      Palpations: Abdomen is soft.      Tenderness: There is no abdominal tenderness.   Musculoskeletal:      Right lower leg: No edema.      Left lower leg: No edema.      Comments: Pt was able to move all extremities and feel when I was touching her.   Skin:     General: Skin is warm and dry.      Findings: No rash or wound.   Neurological:      Mental Status: She is alert and oriented to person, place, and time.      Cranial Nerves: No cranial nerve deficit.      Motor: No weakness.   Psychiatric:         Mood and Affect: Mood normal.         Behavior: Behavior is cooperative.         Thought Content: Thought content normal.         Judgment: Judgment normal.       Significant Labs: All pertinent lab results from the last 24 hours have been reviewed.    Significant Imaging:  review all imaging

## 2022-11-24 NOTE — ASSESSMENT & PLAN NOTE
Patient is fluid overloaded , but is improving with diuretics.    PET scan results:  The myocardial perfusion images are normal without evidence of ischemia or scar.    The whole heart absolute myocardial perfusion values averaged 1.47 cc/min/g at rest, which is elevated; 1.42 cc/min/g at stress, which is mildly reduced; and CFR is 0.98 , which is severely reduced in part due to elevated resting flow.    Absolute flow measurements are consistent with abnormal microvascular function or possibly caffeine intake..    CT attenuation images demonstrate no coronary calcifications and no aortic calcifications.    The gated perfusion images showed an ejection fraction of 56% at rest and 60% during stress. A normal ejection fraction is greater than 47%.    The wall motion is normal at rest and during stress.    The LV cavity size is normal at rest and stress.    The EKG portion of this study is uninterpretable.    There were no arrhythmias during stress.    The patient reported no chest pain during the stress test.    There are no prior studies for comparison    Plan  PET ECHO was done, and did not show any overt abnormalities or areas of stenosis

## 2022-11-24 NOTE — ASSESSMENT & PLAN NOTE
Pt presented with an NSTEMI. Placed on ACS protocol. Pt's CBC this morning had a drop in hemoglobin from 11.3 to 8.3 on 11/24. No active sites of bleeding noted. Pt has not had a BM.    CBC q8hrs  Pt's hemoglobin dropped from 11.3 to 8.3. No signs of bleeding noticed with this patient. Pt had no bloody bowel movements while she has been here.  Have not noticed any signs of bleeding  DIC workup negative, iron studies ordered. Hematology consult placed.

## 2022-11-24 NOTE — NURSING TRANSFER
Nursing Transfer Note    Pt arrived from ED. MAICOL IV site infiltrated, edematous, and hard. Pulses palpable. Pt denies any numbness or tingling.  Nitro had been infusing. Pharmacy called. Arm to be elevated and warm compresses can be applied for Nitro infiltration.   Bipap on. 12 Lead EKG completed. VS obtained and stable. Nitro restarted to R AC IV.  Pt oriented to room and pt called family members to give them update.   No needs voiced at this time.

## 2022-11-25 PROBLEM — I82.90 ACUTE DEEP VEIN THROMBOSIS (DVT) OF NON-EXTREMITY VEIN: Status: ACTIVE | Noted: 2022-11-25

## 2022-11-25 PROBLEM — L03.114 CELLULITIS OF LEFT ARM: Status: ACTIVE | Noted: 2022-11-25

## 2022-11-25 PROBLEM — I50.33 ACUTE ON CHRONIC DIASTOLIC CHF (CONGESTIVE HEART FAILURE), NYHA CLASS 3: Status: ACTIVE | Noted: 2022-11-25

## 2022-11-25 LAB
ALBUMIN SERPL BCP-MCNC: 2.8 G/DL (ref 3.5–5.2)
ALP SERPL-CCNC: 80 U/L (ref 55–135)
ALT SERPL W/O P-5'-P-CCNC: 24 U/L (ref 10–44)
ANION GAP SERPL CALC-SCNC: 9 MMOL/L (ref 8–16)
APTT BLDCRRT: 35.6 SEC (ref 21–32)
APTT BLDCRRT: 45.1 SEC (ref 21–32)
ASCENDING AORTA: 3.24 CM
AST SERPL-CCNC: 14 U/L (ref 10–40)
AV INDEX (PROSTH): 0.71
AV MEAN GRADIENT: 5 MMHG
AV PEAK GRADIENT: 9 MMHG
AV VALVE AREA: 2.37 CM2
AV VELOCITY RATIO: 0.75
BASOPHILS # BLD AUTO: 0.02 K/UL (ref 0–0.2)
BASOPHILS # BLD AUTO: 0.03 K/UL (ref 0–0.2)
BASOPHILS # BLD AUTO: 0.03 K/UL (ref 0–0.2)
BASOPHILS NFR BLD: 0.3 % (ref 0–1.9)
BASOPHILS NFR BLD: 0.6 % (ref 0–1.9)
BASOPHILS NFR BLD: 0.6 % (ref 0–1.9)
BILIRUB SERPL-MCNC: 0.4 MG/DL (ref 0.1–1)
BSA FOR ECHO PROCEDURE: 1.64 M2
BUN SERPL-MCNC: 39 MG/DL (ref 8–23)
CALCIUM SERPL-MCNC: 8.6 MG/DL (ref 8.7–10.5)
CFR FLOW - ANTERIOR: 1.03
CFR FLOW - INFERIOR: 0.95
CFR FLOW - LATERAL: 0.9
CFR FLOW - MAX: 1.16
CFR FLOW - MIN: 0.81
CFR FLOW - SEPTAL: 1.02
CFR FLOW - WHOLE HEART: 0.98
CHLORIDE SERPL-SCNC: 107 MMOL/L (ref 95–110)
CO2 SERPL-SCNC: 23 MMOL/L (ref 23–29)
CREAT SERPL-MCNC: 2.7 MG/DL (ref 0.5–1.4)
CREAT UR-MCNC: 14 MG/DL (ref 15–325)
CV ECHO LV RWT: 0.36 CM
CV PHARM DOSE: 0.4 MG
CV STRESS BASE HR: 72 BPM
DIASTOLIC BLOOD PRESSURE: 68 MMHG
DIFFERENTIAL METHOD: ABNORMAL
DOP CALC AO PEAK VEL: 1.51 M/S
DOP CALC AO VTI: 30.25 CM
DOP CALC LVOT AREA: 3.3 CM2
DOP CALC LVOT DIAMETER: 2.06 CM
DOP CALC LVOT PEAK VEL: 1.14 M/S
DOP CALC LVOT STROKE VOLUME: 71.82 CM3
DOP CALCLVOT PEAK VEL VTI: 21.56 CM
E WAVE DECELERATION TIME: 258.26 MSEC
E/A RATIO: 0.63
E/E' RATIO: 15 M/S
ECHO LV POSTERIOR WALL: 0.82 CM (ref 0.6–1.1)
EJECTION FRACTION- HIGH: 59 %
EJECTION FRACTION: 55 %
END DIASTOLIC INDEX-HIGH: 155 ML/M2
END DIASTOLIC INDEX-LOW: 91 ML/M2
END SYSTOLIC INDEX-HIGH: 78 ML/M2
END SYSTOLIC INDEX-LOW: 40 ML/M2
EOSINOPHIL # BLD AUTO: 0.2 K/UL (ref 0–0.5)
EOSINOPHIL NFR BLD: 3 % (ref 0–8)
EOSINOPHIL NFR BLD: 3.7 % (ref 0–8)
EOSINOPHIL NFR BLD: 4.7 % (ref 0–8)
ERYTHROCYTE [DISTWIDTH] IN BLOOD BY AUTOMATED COUNT: 13.4 % (ref 11.5–14.5)
ERYTHROCYTE [DISTWIDTH] IN BLOOD BY AUTOMATED COUNT: 13.5 % (ref 11.5–14.5)
ERYTHROCYTE [DISTWIDTH] IN BLOOD BY AUTOMATED COUNT: 13.5 % (ref 11.5–14.5)
EST. GFR  (NO RACE VARIABLE): 17.8 ML/MIN/1.73 M^2
FERRITIN SERPL-MCNC: 198 NG/ML (ref 20–300)
FIBRINOGEN PPP-MCNC: 319 MG/DL (ref 182–400)
FOLATE SERPL-MCNC: 10.9 NG/ML (ref 4–24)
FRACTIONAL SHORTENING: 27 % (ref 28–44)
GLUCOSE SERPL-MCNC: 180 MG/DL (ref 70–110)
HAPTOGLOB SERPL-MCNC: 179 MG/DL (ref 30–250)
HCT VFR BLD AUTO: 25.1 % (ref 37–48.5)
HCT VFR BLD AUTO: 25.6 % (ref 37–48.5)
HCT VFR BLD AUTO: 28.1 % (ref 37–48.5)
HGB BLD-MCNC: 7.7 G/DL (ref 12–16)
HGB BLD-MCNC: 7.9 G/DL (ref 12–16)
HGB BLD-MCNC: 8.5 G/DL (ref 12–16)
IMM GRANULOCYTES # BLD AUTO: 0.01 K/UL (ref 0–0.04)
IMM GRANULOCYTES # BLD AUTO: 0.02 K/UL (ref 0–0.04)
IMM GRANULOCYTES # BLD AUTO: 0.02 K/UL (ref 0–0.04)
IMM GRANULOCYTES NFR BLD AUTO: 0.2 % (ref 0–0.5)
IMM GRANULOCYTES NFR BLD AUTO: 0.3 % (ref 0–0.5)
IMM GRANULOCYTES NFR BLD AUTO: 0.4 % (ref 0–0.5)
INR PPP: 1 (ref 0.8–1.2)
INTERVENTRICULAR SEPTUM: 0.9 CM (ref 0.6–1.1)
IRON SERPL-MCNC: 63 UG/DL (ref 30–160)
LA MAJOR: 5.22 CM
LA MINOR: 5.13 CM
LA WIDTH: 3.89 CM
LDH SERPL L TO P-CCNC: 241 U/L (ref 110–260)
LEFT ATRIUM SIZE: 3.47 CM
LEFT ATRIUM VOLUME INDEX MOD: 37.4 ML/M2
LEFT ATRIUM VOLUME INDEX: 36.4 ML/M2
LEFT ATRIUM VOLUME MOD: 60.98 CM3
LEFT ATRIUM VOLUME: 59.37 CM3
LEFT INTERNAL DIMENSION IN SYSTOLE: 3.29 CM (ref 2.1–4)
LEFT VENTRICLE DIASTOLIC VOLUME INDEX: 57.55 ML/M2
LEFT VENTRICLE DIASTOLIC VOLUME: 93.8 ML
LEFT VENTRICLE MASS INDEX: 78 G/M2
LEFT VENTRICLE SYSTOLIC VOLUME INDEX: 26.9 ML/M2
LEFT VENTRICLE SYSTOLIC VOLUME: 43.9 ML
LEFT VENTRICULAR INTERNAL DIMENSION IN DIASTOLE: 4.53 CM (ref 3.5–6)
LEFT VENTRICULAR MASS: 126.38 G
LV LATERAL E/E' RATIO: 15 M/S
LV SEPTAL E/E' RATIO: 15 M/S
LYMPHOCYTES # BLD AUTO: 1.3 K/UL (ref 1–4.8)
LYMPHOCYTES # BLD AUTO: 1.4 K/UL (ref 1–4.8)
LYMPHOCYTES # BLD AUTO: 1.5 K/UL (ref 1–4.8)
LYMPHOCYTES NFR BLD: 21.7 % (ref 18–48)
LYMPHOCYTES NFR BLD: 25.9 % (ref 18–48)
LYMPHOCYTES NFR BLD: 26.8 % (ref 18–48)
MAGNESIUM SERPL-MCNC: 2.1 MG/DL (ref 1.6–2.6)
MCH RBC QN AUTO: 28.7 PG (ref 27–31)
MCH RBC QN AUTO: 28.8 PG (ref 27–31)
MCH RBC QN AUTO: 29.6 PG (ref 27–31)
MCHC RBC AUTO-ENTMCNC: 30.2 G/DL (ref 32–36)
MCHC RBC AUTO-ENTMCNC: 30.7 G/DL (ref 32–36)
MCHC RBC AUTO-ENTMCNC: 30.9 G/DL (ref 32–36)
MCV RBC AUTO: 94 FL (ref 82–98)
MCV RBC AUTO: 95 FL (ref 82–98)
MCV RBC AUTO: 96 FL (ref 82–98)
MONOCYTES # BLD AUTO: 0.5 K/UL (ref 0.3–1)
MONOCYTES # BLD AUTO: 0.5 K/UL (ref 0.3–1)
MONOCYTES # BLD AUTO: 0.6 K/UL (ref 0.3–1)
MONOCYTES NFR BLD: 8.8 % (ref 4–15)
MONOCYTES NFR BLD: 9 % (ref 4–15)
MONOCYTES NFR BLD: 9.3 % (ref 4–15)
MV A" WAVE DURATION": 16.37 MSEC
MV PEAK A VEL: 0.96 M/S
MV PEAK E VEL: 0.6 M/S
MV STENOSIS PRESSURE HALF TIME: 74.89 MS
MV VALVE AREA P 1/2 METHOD: 2.94 CM2
NEUTROPHILS # BLD AUTO: 3 K/UL (ref 1.8–7.7)
NEUTROPHILS # BLD AUTO: 3.1 K/UL (ref 1.8–7.7)
NEUTROPHILS # BLD AUTO: 4.4 K/UL (ref 1.8–7.7)
NEUTROPHILS NFR BLD: 58.5 % (ref 38–73)
NEUTROPHILS NFR BLD: 60.8 % (ref 38–73)
NEUTROPHILS NFR BLD: 65.4 % (ref 38–73)
NRBC BLD-RTO: 0 /100 WBC
NUC REST DIASTOLIC VOLUME INDEX: 99
NUC REST EJECTION FRACTION: 56
NUC REST SYSTOLIC VOLUME INDEX: 44
NUC STRESS DIASTOLIC VOLUME INDEX: 92
NUC STRESS EJECTION FRACTION: 60 %
NUC STRESS SYSTOLIC VOLUME INDEX: 37
OHS CV CPX 85 PERCENT MAX PREDICTED HEART RATE MALE: 119
OHS CV CPX MAX PREDICTED HEART RATE: 140
OHS CV CPX PATIENT IS FEMALE: 1
OHS CV CPX PATIENT IS MALE: 0
OHS CV CPX PEAK DIASTOLIC BLOOD PRESSURE: 80 MMHG
OHS CV CPX PEAK HEAR RATE: 81 BPM
OHS CV CPX PEAK RATE PRESSURE PRODUCT: NORMAL
OHS CV CPX PEAK SYSTOLIC BLOOD PRESSURE: 173 MMHG
OHS CV CPX PERCENT MAX PREDICTED HEART RATE ACHIEVED: 58
OHS CV CPX RATE PRESSURE PRODUCT PRESENTING: NORMAL
PATH REV BLD -IMP: NORMAL
PLATELET # BLD AUTO: 195 K/UL (ref 150–450)
PLATELET # BLD AUTO: 200 K/UL (ref 150–450)
PLATELET # BLD AUTO: 211 K/UL (ref 150–450)
PMV BLD AUTO: 10 FL (ref 9.2–12.9)
PMV BLD AUTO: 10.2 FL (ref 9.2–12.9)
PMV BLD AUTO: 10.2 FL (ref 9.2–12.9)
POCT GLUCOSE: 252 MG/DL (ref 70–110)
POTASSIUM SERPL-SCNC: 3.8 MMOL/L (ref 3.5–5.1)
PROT SERPL-MCNC: 4.9 G/DL (ref 6–8.4)
PROT UR-MCNC: <7 MG/DL (ref 0–15)
PROT/CREAT UR: ABNORMAL MG/G{CREAT} (ref 0–0.2)
PROTHROMBIN TIME: 10.7 SEC (ref 9–12.5)
PULM VEIN S/D RATIO: 2.14
PV PEAK D VEL: 0.36 M/S
PV PEAK S VEL: 0.77 M/S
QEF: 58 %
RA MAJOR: 4.85 CM
RA PRESSURE: 3 MMHG
RA WIDTH: 2.62 CM
RBC # BLD AUTO: 2.67 M/UL (ref 4–5.4)
RBC # BLD AUTO: 2.67 M/UL (ref 4–5.4)
RBC # BLD AUTO: 2.96 M/UL (ref 4–5.4)
REST FLOW - ANTERIOR: 1.44 CC/MIN/G
REST FLOW - INFERIOR: 1.53 CC/MIN/G
REST FLOW - LATERAL: 1.54 CC/MIN/G
REST FLOW - MAX: 1.89 CC/MIN/G
REST FLOW - MIN: 0.94 CC/MIN/G
REST FLOW - SEPTAL: 1.35 CC/MIN/G
REST FLOW - WHOLE HEART: 1.47 CC/MIN/G
RETICS/RBC NFR AUTO: 2.7 % (ref 0.5–2.5)
RETIRED EF AND QEF - SEE NOTES: 47 %
RIGHT VENTRICULAR END-DIASTOLIC DIMENSION: 2.92 CM
SATURATED IRON: 16 % (ref 20–50)
SINUS: 3.25 CM
SODIUM SERPL-SCNC: 139 MMOL/L (ref 136–145)
STJ: 2.81 CM
STRESS FLOW - ANTERIOR: 1.48 CC/MIN/G
STRESS FLOW - INFERIOR: 1.45 CC/MIN/G
STRESS FLOW - LATERAL: 1.39 CC/MIN/G
STRESS FLOW - MAX: 1.86 CC/MIN/G
STRESS FLOW - MIN: 0.95 CC/MIN/G
STRESS FLOW - SEPTAL: 1.37 CC/MIN/G
STRESS FLOW - WHOLE HEART: 1.42 CC/MIN/G
SYSTOLIC BLOOD PRESSURE: 149 MMHG
TDI LATERAL: 0.04 M/S
TDI SEPTAL: 0.04 M/S
TDI: 0.04 M/S
TOTAL IRON BINDING CAPACITY: 386 UG/DL (ref 250–450)
TRANSFERRIN SERPL-MCNC: 261 MG/DL (ref 200–375)
TRICUSPID ANNULAR PLANE SYSTOLIC EXCURSION: 1.65 CM
UUN UR-MCNC: 120 MG/DL (ref 140–1050)
VIT B12 SERPL-MCNC: 542 PG/ML (ref 210–950)
WBC # BLD AUTO: 5.12 K/UL (ref 3.9–12.7)
WBC # BLD AUTO: 5.14 K/UL (ref 3.9–12.7)
WBC # BLD AUTO: 6.74 K/UL (ref 3.9–12.7)

## 2022-11-25 PROCEDURE — 83615 LACTATE (LD) (LDH) ENZYME: CPT | Performed by: INTERNAL MEDICINE

## 2022-11-25 PROCEDURE — 94660 CPAP INITIATION&MGMT: CPT

## 2022-11-25 PROCEDURE — 85610 PROTHROMBIN TIME: CPT | Performed by: STUDENT IN AN ORGANIZED HEALTH CARE EDUCATION/TRAINING PROGRAM

## 2022-11-25 PROCEDURE — 36415 COLL VENOUS BLD VENIPUNCTURE: CPT | Performed by: INTERNAL MEDICINE

## 2022-11-25 PROCEDURE — 85025 COMPLETE CBC W/AUTO DIFF WBC: CPT | Mod: 91

## 2022-11-25 PROCEDURE — 94761 N-INVAS EAR/PLS OXIMETRY MLT: CPT

## 2022-11-25 PROCEDURE — 85025 COMPLETE CBC W/AUTO DIFF WBC: CPT | Mod: 91 | Performed by: STUDENT IN AN ORGANIZED HEALTH CARE EDUCATION/TRAINING PROGRAM

## 2022-11-25 PROCEDURE — 82607 VITAMIN B-12: CPT | Performed by: INTERNAL MEDICINE

## 2022-11-25 PROCEDURE — 25000003 PHARM REV CODE 250

## 2022-11-25 PROCEDURE — 82746 ASSAY OF FOLIC ACID SERUM: CPT | Performed by: INTERNAL MEDICINE

## 2022-11-25 PROCEDURE — 99233 PR SUBSEQUENT HOSPITAL CARE,LEVL III: ICD-10-PCS | Mod: GC,,, | Performed by: INTERNAL MEDICINE

## 2022-11-25 PROCEDURE — 99900035 HC TECH TIME PER 15 MIN (STAT)

## 2022-11-25 PROCEDURE — 99223 1ST HOSP IP/OBS HIGH 75: CPT | Mod: GC,,, | Performed by: INTERNAL MEDICINE

## 2022-11-25 PROCEDURE — 25000003 PHARM REV CODE 250: Performed by: INTERNAL MEDICINE

## 2022-11-25 PROCEDURE — 84156 ASSAY OF PROTEIN URINE: CPT

## 2022-11-25 PROCEDURE — 25500020 PHARM REV CODE 255: Performed by: INTERNAL MEDICINE

## 2022-11-25 PROCEDURE — 83010 ASSAY OF HAPTOGLOBIN QUANT: CPT | Performed by: INTERNAL MEDICINE

## 2022-11-25 PROCEDURE — 85730 THROMBOPLASTIN TIME PARTIAL: CPT | Performed by: STUDENT IN AN ORGANIZED HEALTH CARE EDUCATION/TRAINING PROGRAM

## 2022-11-25 PROCEDURE — 63600175 PHARM REV CODE 636 W HCPCS: Performed by: INTERNAL MEDICINE

## 2022-11-25 PROCEDURE — 84466 ASSAY OF TRANSFERRIN: CPT

## 2022-11-25 PROCEDURE — 99223 PR INITIAL HOSPITAL CARE,LEVL III: ICD-10-PCS | Mod: GC,,, | Performed by: INTERNAL MEDICINE

## 2022-11-25 PROCEDURE — 85025 COMPLETE CBC W/AUTO DIFF WBC: CPT | Performed by: INTERNAL MEDICINE

## 2022-11-25 PROCEDURE — 63600175 PHARM REV CODE 636 W HCPCS

## 2022-11-25 PROCEDURE — 83735 ASSAY OF MAGNESIUM: CPT | Performed by: INTERNAL MEDICINE

## 2022-11-25 PROCEDURE — 80053 COMPREHEN METABOLIC PANEL: CPT | Performed by: INTERNAL MEDICINE

## 2022-11-25 PROCEDURE — 85045 AUTOMATED RETICULOCYTE COUNT: CPT | Performed by: INTERNAL MEDICINE

## 2022-11-25 PROCEDURE — 99233 SBSQ HOSP IP/OBS HIGH 50: CPT | Mod: GC,,, | Performed by: INTERNAL MEDICINE

## 2022-11-25 PROCEDURE — 85384 FIBRINOGEN ACTIVITY: CPT | Performed by: INTERNAL MEDICINE

## 2022-11-25 PROCEDURE — 20600001 HC STEP DOWN PRIVATE ROOM

## 2022-11-25 PROCEDURE — 82728 ASSAY OF FERRITIN: CPT

## 2022-11-25 PROCEDURE — 25000003 PHARM REV CODE 250: Performed by: STUDENT IN AN ORGANIZED HEALTH CARE EDUCATION/TRAINING PROGRAM

## 2022-11-25 PROCEDURE — 87040 BLOOD CULTURE FOR BACTERIA: CPT | Mod: 59 | Performed by: INTERNAL MEDICINE

## 2022-11-25 PROCEDURE — 85730 THROMBOPLASTIN TIME PARTIAL: CPT | Mod: 91 | Performed by: INTERNAL MEDICINE

## 2022-11-25 PROCEDURE — C9113 INJ PANTOPRAZOLE SODIUM, VIA: HCPCS

## 2022-11-25 PROCEDURE — 84540 ASSAY OF URINE/UREA-N: CPT

## 2022-11-25 RX ORDER — IBUPROFEN 200 MG
24 TABLET ORAL
Status: DISCONTINUED | OUTPATIENT
Start: 2022-11-25 | End: 2022-11-30 | Stop reason: HOSPADM

## 2022-11-25 RX ORDER — ISOSORBIDE MONONITRATE 30 MG/1
30 TABLET, EXTENDED RELEASE ORAL DAILY
Status: DISCONTINUED | OUTPATIENT
Start: 2022-11-25 | End: 2022-11-30 | Stop reason: HOSPADM

## 2022-11-25 RX ORDER — REGADENOSON 0.08 MG/ML
0.4 INJECTION, SOLUTION INTRAVENOUS ONCE
Status: COMPLETED | OUTPATIENT
Start: 2022-11-25 | End: 2022-11-25

## 2022-11-25 RX ORDER — POTASSIUM CHLORIDE 20 MEQ/1
40 TABLET, EXTENDED RELEASE ORAL ONCE
Status: COMPLETED | OUTPATIENT
Start: 2022-11-25 | End: 2022-11-25

## 2022-11-25 RX ORDER — CAFFEINE CITRATE 20 MG/ML
60 SOLUTION INTRAVENOUS ONCE
Status: COMPLETED | OUTPATIENT
Start: 2022-11-25 | End: 2022-11-25

## 2022-11-25 RX ORDER — IBUPROFEN 200 MG
16 TABLET ORAL
Status: DISCONTINUED | OUTPATIENT
Start: 2022-11-25 | End: 2022-11-30 | Stop reason: HOSPADM

## 2022-11-25 RX ORDER — HEPARIN SODIUM,PORCINE/D5W 25000/250
0-40 INTRAVENOUS SOLUTION INTRAVENOUS CONTINUOUS
Status: DISCONTINUED | OUTPATIENT
Start: 2022-11-25 | End: 2022-11-25

## 2022-11-25 RX ORDER — FUROSEMIDE 40 MG/1
40 TABLET ORAL DAILY
Status: DISCONTINUED | OUTPATIENT
Start: 2022-11-25 | End: 2022-11-30 | Stop reason: HOSPADM

## 2022-11-25 RX ORDER — DOXYCYCLINE HYCLATE 100 MG
100 TABLET ORAL EVERY 12 HOURS
Status: DISCONTINUED | OUTPATIENT
Start: 2022-11-25 | End: 2022-11-25

## 2022-11-25 RX ORDER — ATORVASTATIN CALCIUM 40 MG/1
40 TABLET, FILM COATED ORAL DAILY
Status: DISCONTINUED | OUTPATIENT
Start: 2022-11-25 | End: 2022-11-30 | Stop reason: HOSPADM

## 2022-11-25 RX ORDER — GLUCAGON 1 MG
1 KIT INJECTION
Status: DISCONTINUED | OUTPATIENT
Start: 2022-11-25 | End: 2022-11-30 | Stop reason: HOSPADM

## 2022-11-25 RX ORDER — HEPARIN SODIUM,PORCINE/D5W 25000/250
0-40 INTRAVENOUS SOLUTION INTRAVENOUS CONTINUOUS
Status: DISCONTINUED | OUTPATIENT
Start: 2022-11-25 | End: 2022-11-29

## 2022-11-25 RX ORDER — ASPIRIN 81 MG/1
81 TABLET ORAL DAILY
COMMUNITY

## 2022-11-25 RX ORDER — INSULIN ASPART 100 [IU]/ML
0-5 INJECTION, SOLUTION INTRAVENOUS; SUBCUTANEOUS
Status: DISCONTINUED | OUTPATIENT
Start: 2022-11-25 | End: 2022-11-30 | Stop reason: HOSPADM

## 2022-11-25 RX ADMIN — FUROSEMIDE 40 MG: 40 TABLET ORAL at 10:11

## 2022-11-25 RX ADMIN — INSULIN ASPART 3 UNITS: 100 INJECTION, SOLUTION INTRAVENOUS; SUBCUTANEOUS at 05:11

## 2022-11-25 RX ADMIN — POTASSIUM CHLORIDE 40 MEQ: 1500 TABLET, EXTENDED RELEASE ORAL at 07:11

## 2022-11-25 RX ADMIN — ATORVASTATIN CALCIUM 40 MG: 40 TABLET, FILM COATED ORAL at 10:11

## 2022-11-25 RX ADMIN — CARVEDILOL 12.5 MG: 12.5 TABLET, FILM COATED ORAL at 08:11

## 2022-11-25 RX ADMIN — ISOSORBIDE MONONITRATE 30 MG: 30 TABLET, EXTENDED RELEASE ORAL at 12:11

## 2022-11-25 RX ADMIN — DOXYCYCLINE HYCLATE 100 MG: 100 TABLET, COATED ORAL at 07:11

## 2022-11-25 RX ADMIN — ASPIRIN 81 MG: 81 TABLET, COATED ORAL at 10:11

## 2022-11-25 RX ADMIN — REGADENOSON 0.4 MG: 0.08 INJECTION, SOLUTION INTRAVENOUS at 10:11

## 2022-11-25 RX ADMIN — CAFFEINE CITRATE 60 MG: 20 INJECTION INTRAVENOUS at 10:11

## 2022-11-25 RX ADMIN — INSULIN DETEMIR 3 UNITS: 100 INJECTION, SOLUTION SUBCUTANEOUS at 08:11

## 2022-11-25 RX ADMIN — VANCOMYCIN HYDROCHLORIDE 1500 MG: 1.5 INJECTION, POWDER, LYOPHILIZED, FOR SOLUTION INTRAVENOUS at 10:11

## 2022-11-25 RX ADMIN — PANTOPRAZOLE SODIUM 40 MG: 40 INJECTION, POWDER, FOR SOLUTION INTRAVENOUS at 10:11

## 2022-11-25 RX ADMIN — AMLODIPINE BESYLATE 10 MG: 10 TABLET ORAL at 10:11

## 2022-11-25 RX ADMIN — CARVEDILOL 12.5 MG: 12.5 TABLET, FILM COATED ORAL at 05:11

## 2022-11-25 RX ADMIN — HEPARIN SODIUM 12 UNITS/KG/HR: 10000 INJECTION, SOLUTION INTRAVENOUS at 08:11

## 2022-11-25 RX ADMIN — PANTOPRAZOLE SODIUM 40 MG: 40 INJECTION, POWDER, FOR SOLUTION INTRAVENOUS at 08:11

## 2022-11-25 RX ADMIN — HUMAN ALBUMIN MICROSPHERES AND PERFLUTREN 0.66 MG: 10; .22 INJECTION, SOLUTION INTRAVENOUS at 11:11

## 2022-11-25 RX ADMIN — CLOPIDOGREL BISULFATE 75 MG: 75 TABLET ORAL at 10:11

## 2022-11-25 NOTE — SUBJECTIVE & OBJECTIVE
Oncology Treatment Plan:   [No matching plan found]    Medications:  Continuous Infusions:   heparin (porcine) in D5W 12 Units/kg/hr (11/25/22 1254)     Scheduled Meds:   amLODIPine  10 mg Oral Daily    aspirin  81 mg Oral Daily    atorvastatin  40 mg Oral Daily    carvediloL  12.5 mg Oral BID WM    clopidogreL  75 mg Oral Daily    furosemide  40 mg Oral Daily    insulin detemir U-100  3 Units Subcutaneous QHS    isosorbide mononitrate  30 mg Oral Daily    pantoprazole  40 mg Intravenous BID     PRN Meds:dextrose 10%, dextrose 10%, glucagon (human recombinant), glucose, glucose, heparin (PORCINE), heparin (PORCINE), insulin aspart U-100, sodium chloride 0.9%, Pharmacy to dose Vancomycin consult **AND** vancomycin - pharmacy to dose     Review of Systems   Constitutional:  Negative for activity change, appetite change, chills, diaphoresis, fatigue, fever and unexpected weight change.   Eyes:  Negative for photophobia and visual disturbance.   Respiratory:  Positive for shortness of breath. Negative for cough.    Cardiovascular:  Positive for chest pain. Negative for leg swelling.   Gastrointestinal:  Negative for abdominal pain, diarrhea and nausea.   Musculoskeletal:  Negative for arthralgias and myalgias.   Neurological:  Negative for light-headedness and headaches.   Hematological:  Negative for adenopathy. Does not bruise/bleed easily.   Psychiatric/Behavioral:  Negative for confusion. The patient is not nervous/anxious.    Objective:     Vital Signs (Most Recent):  Temp: 98.4 °F (36.9 °C) (11/25/22 1105)  Pulse: 76 (11/25/22 1305)  Resp: (!) 53 (11/25/22 1305)  BP: (!) 151/72 (11/25/22 1305)  SpO2: 98 % (11/25/22 1305)   Vital Signs (24h Range):  Temp:  [98.4 °F (36.9 °C)-98.8 °F (37.1 °C)] 98.4 °F (36.9 °C)  Pulse:  [63-78] 76  Resp:  [17-53] 53  SpO2:  [95 %-100 %] 98 %  BP: (115-181)/() 151/72     Weight: 60.3 kg (133 lb)  Body mass index is 23.56 kg/m².  Body surface area is 1.64 meters  squared.      Intake/Output Summary (Last 24 hours) at 11/25/2022 1436  Last data filed at 11/25/2022 1205  Gross per 24 hour   Intake 796.51 ml   Output 1850 ml   Net -1053.49 ml       Physical Exam  Vitals and nursing note reviewed.   Constitutional:       Appearance: Normal appearance.   HENT:      Head: Normocephalic and atraumatic.      Mouth/Throat:      Mouth: Mucous membranes are moist.      Pharynx: Oropharynx is clear.   Eyes:      Extraocular Movements: Extraocular movements intact.      Conjunctiva/sclera: Conjunctivae normal.   Cardiovascular:      Rate and Rhythm: Normal rate and regular rhythm.      Pulses: Normal pulses.   Pulmonary:      Effort: Pulmonary effort is normal. No respiratory distress.   Abdominal:      General: There is no distension.      Palpations: Abdomen is soft.      Tenderness: There is no abdominal tenderness.   Musculoskeletal:      Right lower leg: No edema.      Left lower leg: No edema.   Skin:     General: Skin is warm and dry.      Findings: Bruising (LUE with associated swelling) present.   Neurological:      General: No focal deficit present.      Mental Status: She is alert and oriented to person, place, and time.   Psychiatric:         Mood and Affect: Mood normal.         Behavior: Behavior normal.       Significant Labs:   All pertinent labs from the last 24 hours have been reviewed.    Diagnostic Results:  I have reviewed all pertinent imaging results/findings within the past 24 hours.

## 2022-11-25 NOTE — NURSING TRANSFER
Nursing Transfer Note      11/25/2022     Reason patient is being transferred: Stepdown    Transfer From: 3076 To: 331A    Transfer via wheelchair    Transfer with cardiac monitoring    Transported by Shelly Aguirre RN    Medicines sent: N/A    Any special needs or follow-up needed: None     Chart send with patient: Yes    Notified: spouse      Upon arrival to floor: cardiac monitor applied, patient oriented to room, call bell in reach, and bed in lowest position. Pt ambulated from wheelchair to bed without difficulty. Nurse Faith at bedside, handoff report given, care transferred.

## 2022-11-25 NOTE — CARE UPDATE
Blood culture positive for GPC in 1/2 bottles  Afebrile, no leukocytosis  Likely contamination  Will repeat blood cultures x 2

## 2022-11-25 NOTE — ASSESSMENT & PLAN NOTE
Pt presented with elevated creatinine at 1.9. pt has variable creatinine over the past 9 months. Was lowest at 1.2. Most recent nephrology note in 8/15/22 says her baseline is 1.5. ERENDIRA is most likely due to HTN emergency. Pt was also fluid overloaded on arrival    Plan  Will diurese her with lasix 40mg  Daily CMPs  Pt's creatinine has continued to rise.   Urine studies ordered. Thought to be due to combination of HTN emergency and ischemic causes.

## 2022-11-25 NOTE — SUBJECTIVE & OBJECTIVE
Interval history: Pt has done well the last few days is not reporting worsening symptoms, has not noticed any bleeding, had one normal BM. Pt had worsening of her renal function.       ROS  Objective:     Vital Signs (Most Recent):  Temp: 98.4 °F (36.9 °C) (11/25/22 1105)  Pulse: 76 (11/25/22 1305)  Resp: (!) 53 (11/25/22 1305)  BP: (!) 151/72 (11/25/22 1305)  SpO2: 98 % (11/25/22 1305)   Vital Signs (24h Range):  Temp:  [98.4 °F (36.9 °C)-98.8 °F (37.1 °C)] 98.4 °F (36.9 °C)  Pulse:  [63-78] 76  Resp:  [17-53] 53  SpO2:  [95 %-100 %] 98 %  BP: (115-181)/() 151/72     Weight: 60.3 kg (133 lb)  Body mass index is 23.56 kg/m².    SpO2: 98 %  O2 Device (Oxygen Therapy): room air      Intake/Output Summary (Last 24 hours) at 11/25/2022 1402  Last data filed at 11/25/2022 1205  Gross per 24 hour   Intake 796.51 ml   Output 1850 ml   Net -1053.49 ml         Lines/Drains/Airways       Peripheral Intravenous Line  Duration                  Peripheral IV - Single Lumen 11/23/22 2218 22 G Right Wrist 1 day                    Physical Exam  Vitals reviewed.   Constitutional:       Appearance: Normal appearance. She is normal weight.   HENT:      Head: Normocephalic and atraumatic.      Mouth/Throat:      Comments: Pt wearing BiPAP did not exam  Eyes:      General: Lids are normal. No scleral icterus.     Extraocular Movements: Extraocular movements intact.      Pupils: Pupils are equal, round, and reactive to light.   Cardiovascular:      Rate and Rhythm: Normal rate and regular rhythm.      Pulses: Normal pulses.   Pulmonary:      Effort: Pulmonary effort is normal. No respiratory distress.      Breath sounds: No wheezing. Rales: mild rales.  Abdominal:      General: Abdomen is flat. There is no distension.      Palpations: Abdomen is soft.      Tenderness: There is no abdominal tenderness.   Musculoskeletal:      Right lower leg: No edema.      Left lower leg: No edema.      Comments: Pt was able to move all  extremities and feel when I was touching her.   Skin:     General: Skin is warm and dry.      Findings: No rash or wound.   Neurological:      Mental Status: She is alert and oriented to person, place, and time.      Cranial Nerves: No cranial nerve deficit.      Motor: No weakness.   Psychiatric:         Mood and Affect: Mood normal.         Behavior: Behavior is cooperative.         Thought Content: Thought content normal.         Judgment: Judgment normal.       Significant Labs: All pertinent lab results from the last 24 hours have been reviewed.    Significant Imaging:  review all imaging

## 2022-11-25 NOTE — HPI
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 Also has an ERENDIRA which continues to worsen. Has a baseline anemia of 9-11 which dropped to a low of 7.7 the day after admission. Of note, pt was started on a heparin drip for a left IJ DVT. Has had no signs of bleeding. As well reports that an artery was hit during IV placement and had left arm swelling which is now improving.

## 2022-11-25 NOTE — NURSING
Hgb trending down. 11.3 on admission and now 7.7.  MAICOL larger in diameter from 34cm to 34.5cm.   Site with significant bruising, edema, firm and warm to touch.  Creatinine also trending up.  Discussed with Dr. Mayer.   Venous ultrasound of LUE completed.

## 2022-11-25 NOTE — ASSESSMENT & PLAN NOTE
Pt has history of follicular lymphoma. Pt states she is in remission and follows up with oncology every 6 months.

## 2022-11-25 NOTE — CONSULTS
Addendum  Patient with ERENDIRA on CKD - creatinine as low as 1.2 in 02/2022, 1.7 in 07/2022    Pharmacokinetic Initial Assessment: IV Vancomycin    Assessment/Plan:    Initiate vancomycin 1500 mg IVPB x1 (24.6 mg/kg, 61 kg)  Check random level tomorrow with AM labs and re-dose if < 20    Please contact pharmacy at extension [phone] with any questions regarding this assessment.     Thank you for the consult,   Bart Khoury       Patient brief summary:  Keely Pizarro is a 77 y.o. female initiated on antimicrobial therapy with IV Vancomycin for treatment of suspected bacteremia    Drug Allergies:   Review of patient's allergies indicates:   Allergen Reactions    Tomato (solanum lycopersicum) Itching       Actual Body Weight:   61 kg    Renal Function:   Estimated Creatinine Clearance: 14.9 mL/min (A) (based on SCr of 2.7 mg/dL (H)).,     Dialysis Method (if applicable):  N/A    CBC (last 72 hours):  Recent Labs   Lab Result Units 11/23/22  1158 11/23/22  1852 11/24/22  0144 11/24/22  0921 11/24/22  1601 11/25/22  0210   WBC K/uL 6.20  --  6.87 6.16 5.39 5.14   Hemoglobin g/dL 11.3*  --  8.3* 8.8* 7.8* 7.7*   Hemoglobin A1C %  --  6.3*  6.3*  --   --   --   --    Hematocrit % 37.4  --  27.5* 28.7* 25.8* 25.1*   Platelets K/uL 219  --  204 219 196 195   Gran % % 62.9  --  80.9* 66.7 63.6 60.8   Lymph % % 28.2  --  12.2* 25.0 24.5 25.9   Mono % % 6.1  --  4.9 6.2 8.5 8.8   Eosinophil % % 2.1  --  1.3 1.5 2.8 3.7   Basophil % % 0.5  --  0.3 0.3 0.4 0.6   Differential Method  Automated  --  Automated Automated Automated Automated       Metabolic Panel (last 72 hours):  Recent Labs   Lab Result Units 11/23/22  1128 11/23/22  1158 11/24/22  0144 11/24/22  0903 11/25/22  0210   Sodium mmol/L  --  140 139 141 139   Potassium mmol/L  --  5.0 3.3* 3.8 3.8   Chloride mmol/L  --  111* 107 108 107   CO2 mmol/L  --  18* 22* 22* 23   Glucose mg/dL  --  142* 289* 147* 180*   Glucose, UA  Negative  --   --   --   --    BUN mg/dL   --  24* 27* 31* 39*   Creatinine mg/dL  --  1.9* 2.1* 2.5* 2.7*   Albumin g/dL  --  3.8 2.9* 3.1* 2.8*   Total Bilirubin mg/dL  --  0.6 0.6 0.6 0.4   Alkaline Phosphatase U/L  --  115 88 94 80   AST U/L  --  43* 20 17 14   ALT U/L  --  47* 31 29 24   Magnesium mg/dL  --   --  1.8  --  2.1   Phosphorus mg/dL  --   --  2.7  --   --        Drug levels (last 3 results):  No results for input(s): VANCOMYCINRA, VANCORANDOM, VANCOMYCINPE, VANCOPEAK, VANCOMYCINTR, VANCOTROUGH in the last 72 hours.    Microbiologic Results:  Microbiology Results (last 7 days)       Procedure Component Value Units Date/Time    Blood culture [[id number]] Collected: 11/25/22 0237    Order Status: Sent Specimen: Blood from Peripheral, Hand, Right Updated: 11/25/22 0242    Blood culture [[id number]] Collected: 11/25/22 0226    Order Status: Sent Specimen: Blood from Peripheral, Forearm, Left Updated: 11/25/22 0242    Blood culture [[id number]] Collected: 11/23/22 1852    Order Status: Completed Specimen: Blood from Peripheral, Antecubital, Left Updated: 11/24/22 2213     Blood Culture, Routine No Growth to date      No Growth to date    Blood culture [[id number]] Collected: 11/23/22 1852    Order Status: Completed Specimen: Blood from Peripheral, Hand, Left Updated: 11/24/22 2025     Blood Culture, Routine Gram stain laura bottle: Gram positive cocci in clusters resembling Staph      Results called to and read back by: Kyleigh Wall RN. 11/24/2022  20:25

## 2022-11-25 NOTE — ASSESSMENT & PLAN NOTE
Pt had an PIV placed in her L arm for her Nitro drip, this IV ended up infiltrating and began to swell before it was discovered. Pt is now having pain and redness in this area. There is worry for cellulitis     Plan  Pt placed on doxycycline for this. Later switched to vanc for positive BC and D/C doxy

## 2022-11-25 NOTE — CARE UPDATE
-LIJ occlussive thormbus, L arm swelling, pain, tenderness, heparin gtt started, very closely monitor H&H due to falling Hb, talked to staff and discussed    -of note, last BM was yesterday and per patient was of normal color, no hematuria or other obciouis source of bleeding present    -L arm swelling, redness and pain with tenderness and warmth, IV line infiltrated, it is acute cellulitis, doxy po 100 bid x 5 days ordered    -cons to Hem for anemia, peripheral smear, FOBT, retic count, LDH, Hapto ordered, aptt and fibrinogen, LFTs ok this am

## 2022-11-25 NOTE — ASSESSMENT & PLAN NOTE
It was discovered pt has a thrombosis in her LIJ. Pt is not having any symptoms besides some swelling    Plan  Pt placed on a low intensity heparin regiment due to concern for drop in hemoglobin. No overt signs of bleeding, still pending workup for cause of the drop in hemoglobin  -Will discontinue if pt begins to show signs of bleeding

## 2022-11-25 NOTE — CONSULTS
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 Also has an ERENDIRA which continues to worsen. Has a baseline anemia of 9-11 which dropped to a low of 7.7 the day after admission. Of note, pt was started on a heparin drip for a left IJ DVT. Has had no signs of bleeding. As well reports that an artery was hit during IV placement and had left arm swelling which is now improving.               Oncology Treatment Plan:   [No matching plan found]    Medications:  Continuous Infusions:   heparin (porcine) in D5W 12 Units/kg/hr (11/25/22 1254)     Scheduled Meds:   amLODIPine  10 mg Oral Daily    aspirin  81 mg Oral Daily    atorvastatin  40 mg Oral Daily    carvediloL  12.5 mg Oral BID WM    clopidogreL  75 mg Oral Daily    furosemide  40 mg Oral Daily    insulin detemir U-100  3 Units Subcutaneous QHS    isosorbide mononitrate  30 mg Oral Daily    pantoprazole  40 mg Intravenous BID     PRN Meds:dextrose 10%, dextrose 10%, glucagon (human recombinant), glucose, glucose, heparin (PORCINE), heparin (PORCINE), insulin aspart U-100, sodium chloride 0.9%, Pharmacy to dose Vancomycin consult **AND** vancomycin - pharmacy to dose     Review of Systems   Constitutional:  Negative for activity change, appetite change, chills, diaphoresis, fatigue, fever and unexpected weight change.   Eyes:  Negative for photophobia and visual disturbance.   Respiratory:  Positive for shortness of breath. Negative for cough.    Cardiovascular:  Positive for chest pain. Negative for leg  swelling.   Gastrointestinal:  Negative for abdominal pain, diarrhea and nausea.   Musculoskeletal:  Negative for arthralgias and myalgias.   Neurological:  Negative for light-headedness and headaches.   Hematological:  Negative for adenopathy. Does not bruise/bleed easily.   Psychiatric/Behavioral:  Negative for confusion. The patient is not nervous/anxious.    Objective:     Vital Signs (Most Recent):  Temp: 98.4 °F (36.9 °C) (11/25/22 1105)  Pulse: 76 (11/25/22 1305)  Resp: (!) 53 (11/25/22 1305)  BP: (!) 151/72 (11/25/22 1305)  SpO2: 98 % (11/25/22 1305)   Vital Signs (24h Range):  Temp:  [98.4 °F (36.9 °C)-98.8 °F (37.1 °C)] 98.4 °F (36.9 °C)  Pulse:  [63-78] 76  Resp:  [17-53] 53  SpO2:  [95 %-100 %] 98 %  BP: (115-181)/() 151/72     Weight: 60.3 kg (133 lb)  Body mass index is 23.56 kg/m².  Body surface area is 1.64 meters squared.      Intake/Output Summary (Last 24 hours) at 11/25/2022 1436  Last data filed at 11/25/2022 1205  Gross per 24 hour   Intake 796.51 ml   Output 1850 ml   Net -1053.49 ml       Physical Exam  Vitals and nursing note reviewed.   Constitutional:       Appearance: Normal appearance.   HENT:      Head: Normocephalic and atraumatic.      Mouth/Throat:      Mouth: Mucous membranes are moist.      Pharynx: Oropharynx is clear.   Eyes:      Extraocular Movements: Extraocular movements intact.      Conjunctiva/sclera: Conjunctivae normal.   Cardiovascular:      Rate and Rhythm: Normal rate and regular rhythm.      Pulses: Normal pulses.   Pulmonary:      Effort: Pulmonary effort is normal. No respiratory distress.   Abdominal:      General: There is no distension.      Palpations: Abdomen is soft.      Tenderness: There is no abdominal tenderness.   Musculoskeletal:      Right lower leg: No edema.      Left lower leg: No edema.   Skin:     General: Skin is warm and dry.      Findings: Bruising (LUE with associated swelling) present.   Neurological:      General: No focal deficit  present.      Mental Status: She is alert and oriented to person, place, and time.   Psychiatric:         Mood and Affect: Mood normal.         Behavior: Behavior normal.       Significant Labs:   All pertinent labs from the last 24 hours have been reviewed.    Diagnostic Results:  I have reviewed all pertinent imaging results/findings within the past 24 hours.    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 Has a baseline anemia of 9-11 which dropped to a low of 7.7 the day after admission. It is possible that pt had a MAHA with the hypertensive emergency however T bili was not elevated. Current labs not elevated for a hemolytic anemia however hypertensive emergency has resolved.  Of note, pt was started on a heparin drip for a left IJ DVT but has had no signs of bleeding. As well reports that an artery was hit during IV placement and had left arm swelling which is now improving. The acute blood loss in her left arm which now appears resolved and a potential brief MAHA may be the reason for the sudden worsening of anemia. The anemia now appears to be stable. Further workup has not identified other causes and her chronic anemia is likely due to CKD.    Recs:  -- Continue to monitor hgb daily  -- Continue to assess for sources of bleeding, especially if hgb further declines      Case discussed with Dr. Vargas  Hematology will sign off, please message with questions         Kole Collazo MD  Hematology/Oncology  [facility] - Cardiology Stepdown

## 2022-11-25 NOTE — PLAN OF CARE
[facility] - Cardiology Stepdown  Initial Discharge Assessment       Primary Care Provider: Albin Cazares MD    Admission Diagnosis: Shortness of breath [R06.02]  LBBB (left bundle branch block) [I44.7]  Acute pulmonary edema [J81.0]  Elevated troponin [R77.8]  NSTEMI (non-ST elevated myocardial infarction) [I21.4]  Hypertensive urgency [I16.0]  Hypertensive emergency [I16.1]    Admission Date: 11/23/2022  Expected Discharge Date:     Discharge Barriers Identified: None    Payor: HU[employer] MANAGED MEDICARE / Plan: HU[employer]NA MEDICARE PPO / Product Type: Medicare Advantage /     Extended Emergency Contact Information  Primary Emergency Contact: Noelle Ferrer   [place]  Mobile Phone: [phone]  Relation: Sister    Discharge Plan A: Home with family  Discharge Plan B: [facility] - [facility] [address]  [address] [address]  Phone: [phone] Fax: [phone]    [facility]  [address]  [place] [address]  Phone: [phone] Fax: [phone]      Initial Assessment (most recent)       Adult Discharge Assessment - 11/25/22 1519          Discharge Assessment    Assessment Type Discharge Planning Assessment     Confirmed/corrected address, phone number and insurance Yes     Confirmed Demographics Correct on Facesheet     Source of Information patient;health record     Communicated JOSLYN with patient/caregiver Date not available/Unable to determine     Reason For Admission shortness of breath     Lives With spouse     Do you expect to return to your current living situation? Yes     Do you have help at home or someone to help you manage your care at home? Yes     Who are your caregiver(s) and their phone number(s)?  S[place]as [phone]     Prior to hospitilization cognitive status: Alert/Oriented     Current cognitive status: Alert/Oriented     Walking or Climbing Stairs Difficulty none      Dressing/Bathing Difficulty none     Equipment Currently Used at Home walker, rolling   pt has a rolling walker but not currently using it    Readmission within 30 days? No     Patient currently being followed by outpatient case management? No     Do you currently have service(s) that help you manage your care at home? No     Do you take prescription medications? Yes     Do you have prescription coverage? Yes     Do you have any problems affording any of your prescribed medications? No     Is the patient taking medications as prescribed? yes     Who is going to help you get home at discharge? family     How do you get to doctors appointments? family or friend will provide     Are you on dialysis? No     Do you take coumadin? No     Discharge Plan A Home with family     Discharge Plan B Home Health     DME Needed Upon Discharge  none     Discharge Plan discussed with: Patient     Discharge Barriers Identified None                   SW met with pt at bedside to discuss discharge planning.  Per pt she lives with her  and is independent with ambulation and ADLs.  Per pt she has a rolling walker but is not currently using it.  Pt will have assistance from her  at discharge.  Family to transport pt home.  SW will continue to follow.    Shira Coy LMSW  [facility]  [id number]

## 2022-11-25 NOTE — NURSING
[facility] Pt AAOx4, VSS. Heparin gtt infusing @ 12units/kg/hr. No c/o SOB or CP at this time. Oriented to room, call light in reach, bed in lowest position. Instructed to call for assistance getting out of bed. Will continue to monitor.

## 2022-11-25 NOTE — ASSESSMENT & PLAN NOTE
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 Has a baseline anemia of 9-11 which dropped to a low of 7.7 the day after admission. It is possible that pt had a MAHA with the hypertensive emergency however T bili was not elevated. Current labs not elevated for a hemolytic anemia however hypertensive emergency has resolved.  Of note, pt was started on a heparin drip for a left IJ DVT but has had no signs of bleeding. As well reports that an artery was hit during IV placement and had left arm swelling which is now improving. The acute blood loss in her left arm which now appears resolved and a potential brief MAHA may be the reason for the sudden worsening of anemia. The anemia now appears to be stable. Further workup has not identified other causes and her chronic anemia is likely due to CKD.    Recs:  -- Continue to monitor hgb daily  -- Continue to assess for sources of bleeding, especially if hgb further declines

## 2022-11-25 NOTE — ASSESSMENT & PLAN NOTE
Pt presented in respiratory distress with sats at 92%. Chest x-ray showed pulmonary edema. Most likely due to HTN emergency. BNP was elevated at 1333    Plan   Pt placed on BiPAP at admission, given lasix 40mg IV,   Pt was able to be weaned off biPAP and is now on RA

## 2022-11-25 NOTE — NURSING
Plan of care discussed with patient.  Patient remains free of falls and trauma. Patient ambulating x standby assist, fall precautions in place. Patient has no complaints of pain. Discussed medications and care. Heparin gtt infusing @ 14 units/kg/hr; next aptt due at 2140. Patient has no questions at this time. Will continue to monitor.

## 2022-11-26 LAB
ALBUMIN SERPL BCP-MCNC: 2.9 G/DL (ref 3.5–5.2)
ALP SERPL-CCNC: 76 U/L (ref 55–135)
ALT SERPL W/O P-5'-P-CCNC: 21 U/L (ref 10–44)
ANION GAP SERPL CALC-SCNC: 10 MMOL/L (ref 8–16)
APTT BLDCRRT: 52.4 SEC (ref 21–32)
AST SERPL-CCNC: 14 U/L (ref 10–40)
BACTERIA BLD CULT: ABNORMAL
BASOPHILS # BLD AUTO: 0.03 K/UL (ref 0–0.2)
BASOPHILS NFR BLD: 0.5 % (ref 0–1.9)
BILIRUB SERPL-MCNC: 0.5 MG/DL (ref 0.1–1)
BUN SERPL-MCNC: 52 MG/DL (ref 8–23)
CALCIUM SERPL-MCNC: 9 MG/DL (ref 8.7–10.5)
CHLORIDE SERPL-SCNC: 109 MMOL/L (ref 95–110)
CO2 SERPL-SCNC: 24 MMOL/L (ref 23–29)
CREAT SERPL-MCNC: 3 MG/DL (ref 0.5–1.4)
DIFFERENTIAL METHOD: ABNORMAL
EOSINOPHIL # BLD AUTO: 0.3 K/UL (ref 0–0.5)
EOSINOPHIL NFR BLD: 5 % (ref 0–8)
ERYTHROCYTE [DISTWIDTH] IN BLOOD BY AUTOMATED COUNT: 13.6 % (ref 11.5–14.5)
EST. GFR  (NO RACE VARIABLE): 15.7 ML/MIN/1.73 M^2
GLUCOSE SERPL-MCNC: 164 MG/DL (ref 70–110)
HCT VFR BLD AUTO: 26.8 % (ref 37–48.5)
HGB BLD-MCNC: 8 G/DL (ref 12–16)
IMM GRANULOCYTES # BLD AUTO: 0.01 K/UL (ref 0–0.04)
IMM GRANULOCYTES NFR BLD AUTO: 0.2 % (ref 0–0.5)
LYMPHOCYTES # BLD AUTO: 1.9 K/UL (ref 1–4.8)
LYMPHOCYTES NFR BLD: 29.2 % (ref 18–48)
MCH RBC QN AUTO: 29.2 PG (ref 27–31)
MCHC RBC AUTO-ENTMCNC: 29.9 G/DL (ref 32–36)
MCV RBC AUTO: 98 FL (ref 82–98)
MONOCYTES # BLD AUTO: 0.6 K/UL (ref 0.3–1)
MONOCYTES NFR BLD: 9.9 % (ref 4–15)
NEUTROPHILS # BLD AUTO: 3.5 K/UL (ref 1.8–7.7)
NEUTROPHILS NFR BLD: 55.2 % (ref 38–73)
NRBC BLD-RTO: 0 /100 WBC
PLATELET # BLD AUTO: 213 K/UL (ref 150–450)
PMV BLD AUTO: 10.1 FL (ref 9.2–12.9)
POCT GLUCOSE: 191 MG/DL (ref 70–110)
POCT GLUCOSE: 192 MG/DL (ref 70–110)
POCT GLUCOSE: 242 MG/DL (ref 70–110)
POCT GLUCOSE: 278 MG/DL (ref 70–110)
POTASSIUM SERPL-SCNC: 3.8 MMOL/L (ref 3.5–5.1)
PROT SERPL-MCNC: 5.2 G/DL (ref 6–8.4)
RBC # BLD AUTO: 2.74 M/UL (ref 4–5.4)
SODIUM SERPL-SCNC: 143 MMOL/L (ref 136–145)
VANCOMYCIN SERPL-MCNC: 21.2 UG/ML
WBC # BLD AUTO: 6.37 K/UL (ref 3.9–12.7)

## 2022-11-26 PROCEDURE — 63600175 PHARM REV CODE 636 W HCPCS

## 2022-11-26 PROCEDURE — 94660 CPAP INITIATION&MGMT: CPT

## 2022-11-26 PROCEDURE — 99233 SBSQ HOSP IP/OBS HIGH 50: CPT | Mod: GC,,, | Performed by: INTERNAL MEDICINE

## 2022-11-26 PROCEDURE — 80053 COMPREHEN METABOLIC PANEL: CPT | Performed by: STUDENT IN AN ORGANIZED HEALTH CARE EDUCATION/TRAINING PROGRAM

## 2022-11-26 PROCEDURE — 36415 COLL VENOUS BLD VENIPUNCTURE: CPT | Performed by: INTERNAL MEDICINE

## 2022-11-26 PROCEDURE — 85025 COMPLETE CBC W/AUTO DIFF WBC: CPT | Performed by: STUDENT IN AN ORGANIZED HEALTH CARE EDUCATION/TRAINING PROGRAM

## 2022-11-26 PROCEDURE — 36415 COLL VENOUS BLD VENIPUNCTURE: CPT | Performed by: STUDENT IN AN ORGANIZED HEALTH CARE EDUCATION/TRAINING PROGRAM

## 2022-11-26 PROCEDURE — 99900035 HC TECH TIME PER 15 MIN (STAT)

## 2022-11-26 PROCEDURE — C9113 INJ PANTOPRAZOLE SODIUM, VIA: HCPCS

## 2022-11-26 PROCEDURE — 20600001 HC STEP DOWN PRIVATE ROOM

## 2022-11-26 PROCEDURE — 25000003 PHARM REV CODE 250: Performed by: STUDENT IN AN ORGANIZED HEALTH CARE EDUCATION/TRAINING PROGRAM

## 2022-11-26 PROCEDURE — 99233 PR SUBSEQUENT HOSPITAL CARE,LEVL III: ICD-10-PCS | Mod: GC,,, | Performed by: INTERNAL MEDICINE

## 2022-11-26 PROCEDURE — 80202 ASSAY OF VANCOMYCIN: CPT | Performed by: INTERNAL MEDICINE

## 2022-11-26 PROCEDURE — 85730 THROMBOPLASTIN TIME PARTIAL: CPT | Performed by: STUDENT IN AN ORGANIZED HEALTH CARE EDUCATION/TRAINING PROGRAM

## 2022-11-26 PROCEDURE — 97165 OT EVAL LOW COMPLEX 30 MIN: CPT

## 2022-11-26 PROCEDURE — 63600175 PHARM REV CODE 636 W HCPCS: Performed by: STUDENT IN AN ORGANIZED HEALTH CARE EDUCATION/TRAINING PROGRAM

## 2022-11-26 PROCEDURE — 94761 N-INVAS EAR/PLS OXIMETRY MLT: CPT

## 2022-11-26 PROCEDURE — 97535 SELF CARE MNGMENT TRAINING: CPT

## 2022-11-26 PROCEDURE — 97161 PT EVAL LOW COMPLEX 20 MIN: CPT

## 2022-11-26 RX ORDER — DOXYCYCLINE HYCLATE 100 MG
100 TABLET ORAL EVERY 12 HOURS
Status: DISCONTINUED | OUTPATIENT
Start: 2022-11-26 | End: 2022-11-30 | Stop reason: HOSPADM

## 2022-11-26 RX ADMIN — ISOSORBIDE MONONITRATE 30 MG: 30 TABLET, EXTENDED RELEASE ORAL at 08:11

## 2022-11-26 RX ADMIN — DOXYCYCLINE HYCLATE 100 MG: 100 TABLET, COATED ORAL at 08:11

## 2022-11-26 RX ADMIN — CARVEDILOL 12.5 MG: 12.5 TABLET, FILM COATED ORAL at 08:11

## 2022-11-26 RX ADMIN — CARVEDILOL 12.5 MG: 12.5 TABLET, FILM COATED ORAL at 05:11

## 2022-11-26 RX ADMIN — AMLODIPINE BESYLATE 10 MG: 10 TABLET ORAL at 08:11

## 2022-11-26 RX ADMIN — INSULIN ASPART 2 UNITS: 100 INJECTION, SOLUTION INTRAVENOUS; SUBCUTANEOUS at 05:11

## 2022-11-26 RX ADMIN — ASPIRIN 81 MG: 81 TABLET, COATED ORAL at 09:11

## 2022-11-26 RX ADMIN — ATORVASTATIN CALCIUM 40 MG: 40 TABLET, FILM COATED ORAL at 08:11

## 2022-11-26 RX ADMIN — HEPARIN SODIUM 14 UNITS/KG/HR: 10000 INJECTION, SOLUTION INTRAVENOUS at 10:11

## 2022-11-26 RX ADMIN — INSULIN ASPART 1 UNITS: 100 INJECTION, SOLUTION INTRAVENOUS; SUBCUTANEOUS at 08:11

## 2022-11-26 RX ADMIN — INSULIN DETEMIR 3 UNITS: 100 INJECTION, SOLUTION SUBCUTANEOUS at 08:11

## 2022-11-26 RX ADMIN — PANTOPRAZOLE SODIUM 40 MG: 40 INJECTION, POWDER, FOR SOLUTION INTRAVENOUS at 08:11

## 2022-11-26 RX ADMIN — FUROSEMIDE 40 MG: 40 TABLET ORAL at 08:11

## 2022-11-26 NOTE — PROGRESS NOTES
[facility] - Cardiology Stepdown  Cardiology  Progress Note    Patient Name: Keely Pizarro  MRN: [id number]  Admission Date: 11/23/2022  Hospital Length of Stay: 3 days  Code Status: Full Code   Attending Physician: Albin Heredia MD   Primary Care Physician: Albin Cazares MD  Expected Discharge Date: 11/28/2022  Principal Problem:Acute on chronic diastolic CHF (congestive heart failure), NYHA class 3    Subjective:     Hospital Course:   Admitted to CICU for HTN emergency and NSTEMI. Started on nitro gtt and ACS protocol. EKG with new LBBB. Trop peak at 0.3. HTN improved and eventually transitioned to oral medications. PET scan unremarkable. Course complicated by  ADHF and ERENDIRA on CKD. Started on diuresis, now felt to be euvolemic; however, Cr continues to trend up - likely ATN. DC pending improvement of kidney function and pt/ot recs    Interval History: no events overnight. Still making good urine; however, Cr still rising likely secondary to ATN, no evidence of volume overload on exam     Review of Systems   Constitutional: Positive for malaise/fatigue. Negative for chills and fever.   HENT:  Negative for congestion and sore throat.    Eyes:  Negative for blurred vision and visual disturbance.   Cardiovascular:  Negative for chest pain, leg swelling and orthopnea.   Respiratory:  Negative for cough, shortness of breath, sputum production and wheezing.    Hematologic/Lymphatic: Does not bruise/bleed easily.   Skin:  Negative for rash.   Musculoskeletal:  Negative for back pain and joint pain.   Gastrointestinal:  Negative for abdominal pain, constipation, diarrhea, nausea and vomiting.   Genitourinary:  Negative for dysuria and flank pain.   Neurological:  Negative for focal weakness and light-headedness.   Psychiatric/Behavioral:  Negative for altered mental status. The patient is not nervous/anxious.    Objective:     Vital Signs (Most Recent):  Temp: 98.4 °F (36.9 °C) (11/26/22  0740)  Pulse: 71 (11/26/22 0740)  Resp: 16 (11/26/22 0740)  BP: (!) 149/72 (11/26/22 0740)  SpO2: 98 % (11/26/22 0740)   Vital Signs (24h Range):  Temp:  [97.4 °F (36.3 °C)-98.7 °F (37.1 °C)] 98.4 °F (36.9 °C)  Pulse:  [65-78] 71  Resp:  [16-53] 16  SpO2:  [94 %-99 %] 98 %  BP: ()/(54-72) 149/72     Weight: 58.5 kg (128 lb 15.5 oz)  Body mass index is 22.85 kg/m².     SpO2: 98 %  O2 Device (Oxygen Therapy): room air      Intake/Output Summary (Last 24 hours) at 11/26/2022 1154  Last data filed at 11/26/2022 0845  Gross per 24 hour   Intake 500.58 ml   Output 1330 ml   Net -829.42 ml       Lines/Drains/Airways       Peripheral Intravenous Line  Duration                  Peripheral IV - Single Lumen 11/25/22 1930 22 G Right Forearm <1 day                    Physical Exam  Vitals reviewed.   Constitutional:       Appearance: Normal appearance.   HENT:      Head: Normocephalic and atraumatic.      Mouth/Throat:      Mouth: Mucous membranes are moist.   Eyes:      Conjunctiva/sclera: Conjunctivae normal.   Cardiovascular:      Rate and Rhythm: Normal rate and regular rhythm.      Pulses: Normal pulses.   Pulmonary:      Effort: Pulmonary effort is normal. No respiratory distress.   Abdominal:      General: Abdomen is flat. There is no distension.      Palpations: Abdomen is soft.   Musculoskeletal:      Cervical back: Normal range of motion.      Right lower leg: No edema.      Left lower leg: No edema.   Skin:     Capillary Refill: Capillary refill takes less than 2 seconds.      Findings: No rash.   Neurological:      Mental Status: She is alert and oriented to person, place, and time.   Psychiatric:         Mood and Affect: Mood normal.       Significant Labs: All pertinent lab results from the last 24 hours have been reviewed.        Assessment and Plan:     NSTEMI (non-ST elevated myocardial infarction)  Pt came in not complaining of chest pain, but had elevated troponin at 0.105 and EKG changes with new LBBB.  STEMI procedure not called due to the lack of chest pain for the last few days.    - likely type 2 NSTEMI  - PET negative  - continue asa, statin       ERENDIRA (acute kidney injury)  Pt presented with elevated creatinine at 1.9. pt has variable creatinine over the past 9 months. Was lowest at 1.2. Most recent nephrology note in 8/15/22 says her baseline is 1.5. ERENDIRA is most likely due to HTN emergency. Pt was also fluid overloaded on arrival     Thought to be due to combination of HTN emergency and ischemic causes.      Hypertensive emergency  Pt presented with SOB, ERENDIRA, EKG changes, and her  BP elevated in the 246/129    - continue oral meds       Acute deep vein thrombosis (DVT) of non-extremity vein  It was discovered pt has a thrombosis in her LIJ. Pt is not having any symptoms besides some swelling    Pt placed on a low intensity heparin regiment due to concern for drop in hemoglobin. No overt signs of bleeding, still pending workup for cause of the drop in hemoglobin  -Will discontinue if pt begins to show signs of bleeding    Cellulitis of left arm  Pt had an PIV placed in her L arm for her Nitro drip, this IV ended up infiltrating and began to swell before it was discovered. Pt is now having pain and redness in this area. There is worry for cellulitis      - doxycycline     Anemia  Pt presented with an NSTEMI. Placed on ACS protocol. Pt's CBC this morning had a drop in hemoglobin from 11.3 to 8.3 on 11/24. No active sites of bleeding noted. Pt has not had a BM.    - stable, unclear source     CHF (congestive heart failure)    PET scan results:  The myocardial perfusion images are normal without evidence of ischemia or scar.    The whole heart absolute myocardial perfusion values averaged 1.47 cc/min/g at rest, which is elevated; 1.42 cc/min/g at stress, which is mildly reduced; and CFR is 0.98 , which is severely reduced in part due to elevated resting flow.    Absolute flow measurements are consistent with  abnormal microvascular function or possibly caffeine intake..    CT attenuation images demonstrate no coronary calcifications and no aortic calcifications.    The gated perfusion images showed an ejection fraction of 56% at rest and 60% during stress. A normal ejection fraction is greater than 47%.    The wall motion is normal at rest and during stress.    The LV cavity size is normal at rest and stress.    The EKG portion of this study is uninterpretable.    There were no arrhythmias during stress.    The patient reported no chest pain during the stress test.    There are no prior studies for comparison    - now euvolemic, holding further diuresis           Follicular lymphoma grade i, lymph nodes of multiple sites  Pt has history of follicular lymphoma. Pt states she is in remission and follows up with oncology every 6 months.        VTE Risk Mitigation (From admission, onward)         Ordered     heparin 25,000 units in dextrose 5% 250 mL (100 units/mL) infusion LOW INTENSITY nomogram - [facility]  Continuous        Question Answer Comment   Heparin Infusion Adjustment (DO NOT MODIFY ANSWER) \\[email or web address]\epic\Images\Pharmacy\HeparinInfusions\heparin LOW INTENSITY nomogram for [facility] [id number]    Begin at (in units/kg/hr) 12        11/25/22 1207     heparin 25,000 units in dextrose 5% (100 units/ml) IV bolus from bag - ADDITIONAL PRN BOLUS - 60 units/kg  As needed (PRN)        Question:  Heparin Infusion Adjustment (DO NOT MODIFY ANSWER)  Answer:  \[email or web address]\epic\Images\Pharmacy\HeparinInfusions\heparin LOW INTENSITY nomogram for [facility] [id number].pdf    11/25/22 1207     heparin 25,000 units in dextrose 5% (100 units/ml) IV bolus from bag - ADDITIONAL PRN BOLUS - 30 units/kg  As needed (PRN)        Question:  Heparin Infusion Adjustment (DO NOT MODIFY ANSWER)  Answer:  \\[email or web address]\epic\Images\Pharmacy\HeparinInfusions\heparin LOW INTENSITY nomogram for [facility] [id number]    11/25/22 1207     IP VTE HIGH RISK PATIENT   Once         11/23/22 1512     Place sequential compression device  Until discontinued         11/23/22 1512                Alejandro Loveor, MD  Cardiology  [facility] - Cardiology Stepdown

## 2022-11-26 NOTE — SUBJECTIVE & OBJECTIVE
Interval History: no events overnight. Still making good urine; however, Cr still rising likely secondary to ATN, no evidence of volume overload on exam     Review of Systems   Constitutional: Positive for malaise/fatigue. Negative for chills and fever.   HENT:  Negative for congestion and sore throat.    Eyes:  Negative for blurred vision and visual disturbance.   Cardiovascular:  Negative for chest pain, leg swelling and orthopnea.   Respiratory:  Negative for cough, shortness of breath, sputum production and wheezing.    Hematologic/Lymphatic: Does not bruise/bleed easily.   Skin:  Negative for rash.   Musculoskeletal:  Negative for back pain and joint pain.   Gastrointestinal:  Negative for abdominal pain, constipation, diarrhea, nausea and vomiting.   Genitourinary:  Negative for dysuria and flank pain.   Neurological:  Negative for focal weakness and light-headedness.   Psychiatric/Behavioral:  Negative for altered mental status. The patient is not nervous/anxious.    Objective:     Vital Signs (Most Recent):  Temp: 98.4 °F (36.9 °C) (11/26/22 0740)  Pulse: 71 (11/26/22 0740)  Resp: 16 (11/26/22 0740)  BP: (!) 149/72 (11/26/22 0740)  SpO2: 98 % (11/26/22 0740)   Vital Signs (24h Range):  Temp:  [97.4 °F (36.3 °C)-98.7 °F (37.1 °C)] 98.4 °F (36.9 °C)  Pulse:  [65-78] 71  Resp:  [16-53] 16  SpO2:  [94 %-99 %] 98 %  BP: ()/(54-72) 149/72     Weight: 58.5 kg (128 lb 15.5 oz)  Body mass index is 22.85 kg/m².     SpO2: 98 %  O2 Device (Oxygen Therapy): room air      Intake/Output Summary (Last 24 hours) at 11/26/2022 1154  Last data filed at 11/26/2022 0845  Gross per 24 hour   Intake 500.58 ml   Output 1330 ml   Net -829.42 ml       Lines/Drains/Airways       Peripheral Intravenous Line  Duration                  Peripheral IV - Single Lumen 11/25/22 1930 22 G Right Forearm <1 day                    Physical Exam  Vitals reviewed.   Constitutional:       Appearance: Normal appearance.   HENT:      Head:  Normocephalic and atraumatic.      Mouth/Throat:      Mouth: Mucous membranes are moist.   Eyes:      Conjunctiva/sclera: Conjunctivae normal.   Cardiovascular:      Rate and Rhythm: Normal rate and regular rhythm.      Pulses: Normal pulses.   Pulmonary:      Effort: Pulmonary effort is normal. No respiratory distress.   Abdominal:      General: Abdomen is flat. There is no distension.      Palpations: Abdomen is soft.   Musculoskeletal:      Cervical back: Normal range of motion.      Right lower leg: No edema.      Left lower leg: No edema.   Skin:     Capillary Refill: Capillary refill takes less than 2 seconds.      Findings: No rash.   Neurological:      Mental Status: She is alert and oriented to person, place, and time.   Psychiatric:         Mood and Affect: Mood normal.       Significant Labs: All pertinent lab results from the last 24 hours have been reviewed.

## 2022-11-26 NOTE — PT/OT/SLP EVAL
Physical Therapy Evaluation/Discharge    Patient Name:  Keely Pizarro   MRN:  [id number]  Admit Date: 11/23/2022  Admitting Diagnosis:  Acute on chronic diastolic CHF (congestive heart failure), NYHA class 3  Length of Stay: 3 days  Recent Surgery: * No surgery found *      Recommendations:     Discharge Recommendations:  home   Discharge Equipment Recommendations: none   Barriers to discharge: None    Assessment:     Keely Pizarro is a 77 y.o. female admitted with a medical diagnosis of Acute on chronic diastolic CHF (congestive heart failure), NYHA class 3. PT evaluation complete and no goals established. Pt is functioning at high level and does not required acute care physical therapy services. Education provided to ambulate in hallway 3x/day with RN in order to maintain strength and endurance. Pt verbalized understanding. Please re-consult if functional mobility changes. Anticipate d/c to home; no needs.         Problem List: impaired cardiopulmonary response to activity  Rehab Prognosis: Good     Plan:     DC acute PT    Subjective   Communicated with RN prior to session.  Patient found HOB elevated upon PT entry to room, agreeable to evaluation. Keely Pizarro's alone during session.    Chief Complaint: Shortness of Breath (Onset Monday endorses cough)    Patient/Family Comments/goals: to get better  Pain/Comfort:  Pain Rating 1: 0/10  Pain Rating Post-Intervention 1: 0/10    Living Environment:  Living Environment: lives with spouse in [facility] with no FRED; WIS with BIB  Previous level of function: (I) with ADL and ambulation  Roles and Routines: caretaker to self, spouse and home  Equipment Used at home:   (Pt has a RW, but does not use. Occasionally uses BSC)  Assistance upon Discharge: family    Objective:   Patient found with: telemetry, peripheral IV     General Precautions: Standard, Cardiac fall   Orthopedic Precautions:N/A   Braces: N/A   Oxygen Device: Room Air  Vitals: BP  "(!) 124/58 (BP Location: Right arm, Patient Position: Lying)   Pulse 63   Temp 98 °F (36.7 °C) (Oral)   Resp 16   Ht 5' 3" (1.6 m)   Wt 58.5 kg (128 lb 15.5 oz)   SpO2 96%   BMI 22.85 kg/m²     Exams:  Cognition:   Alert and Cooperative  Ox4  Command following: Follows multistep  commands  Fluency: clear/fluent    RLE ROM: WFL  RLE Strength: WFL  LLE ROM: WFL  LLE Strength: WFL      Outcome Measures:  AM-PAC 6 CLICK MOBILITY  Turning over in bed (including adjusting bedclothes, sheets and blankets)?: 4  Sitting down on and standing up from a chair with arms (e.g., wheelchair, bedside commode, etc.): 4  Moving from lying on back to sitting on the side of the bed?: 4  Moving to and from a bed to a chair (including a wheelchair)?: 4  Need to walk in hospital room?: 4  Climbing 3-5 steps with a railing?: 4  Basic Mobility Total Score: 24     Functional Mobility:  Additional staff present: OT  Bed Mobility:  Supine to Sit: independence; HOB elevated  Scooting anteriorly to EOB to have both feet planted on floor: independence  Sit to Supine: independence; HOB flat    Sitting Balance at Edge of Bed:  Assistance Level Required: [place]    Transfers:   Sit <> Stand Transfer: independence with no assistive device from EOB      Gait:   Patient ambulated: 10ft + 200ft (standing ADLs at sink between trials)   Patient required: independent  Patient used: no assistive device  Gait Pattern observed: reciprocal gait  Gait Deviation(s): NA  Comments:   No LOB with horizontal head turns, change of speed, or change in direction   No SOB    Therapeutic Activities, Exercises, & Education:   Educated pt on PT role/POC  Educated pt on importance of OOB activity and daily ambulation   Pt verbalized understanding     Pt performed ADLs with OT standing at the sink     T/f to chair to increase tolerance to OOB activity and to create optimal positioning for lung expansion       Patient left up in chair with all lines intact, call " button in reach, and RN notified.    GOALS:   Multidisciplinary Problems       Physical Therapy Goals       Not on file              Multidisciplinary Problems (Resolved)          Problem: Physical Therapy    Goal Priority Disciplines Outcome Goal Variances Interventions   Physical Therapy Goal   (Resolved)     PT, PT/OT Met                         History:     Past Medical History:   Diagnosis Date    CVA (cerebral vascular accident) 2011    Diabetes mellitus     Hypertension     NSTEMI (non-ST elevated myocardial infarction) 11/23/2022    Renal manifestation of secondary diabetes mellitus     S/P ORIF (open reduction internal fixation) fracture     Uterine cancer 2012       Past Surgical History:   Procedure Laterality Date    COLONOSCOPY N/A 3/31/2017    Procedure: COLONOSCOPY;  Surgeon: Chip Pak MD;  Location: [facility] ENDO (4TH FLR);  Service: Endoscopy;  Laterality: N/A;    FRACTURE SURGERY      right arm    HYSTERECTOMY      INJECTION OF JOINT Right 10/8/2020    Procedure: INJECTION, JOINT RIGHT HIP;  Surgeon: Echo Hebert MD;  Location: [facility] PAIN T;  Service: Pain Management;  Laterality: Right;  INJECTION, JOINT RIGHT HIP    INJECTION OF JOINT Bilateral 8/23/2021    Procedure: INJECTION, JOINT, HIP;  Surgeon: Echo Hebert MD;  Location: [facility] PAIN MGT;  Service: Pain Management;  Laterality: Bilateral;    THORACENTESIS Left     TOTAL KNEE ARTHROPLASTY Left 2012       Time Tracking:     PT Received On: 11/26/22  PT Start Time: 0838     PT Stop Time: 0848  PT Total Time (min): 10 min     Billable Minutes: Evaluation 10

## 2022-11-26 NOTE — PROGRESS NOTES
VANCOMYCIN DOSING BY PHARMACY PROGRESS NOTE    Keely Pizarro is a 77 y.o. female who has been consulted for vancomycin dosing.    Pharmacy will sign off and discontinue to follow and monitor vancomycin. Please re-consult as needed. Thank you for allowing us to participate in this patient's care.     Queen Igor, PharmD     PGY-1 Pharmacy Practice Resident  [facility] [address] [place] [address]  [phone]. [phone]  [email or web address]

## 2022-11-26 NOTE — PLAN OF CARE
Problem: Occupational Therapy  Goal: Occupational Therapy Goal  Outcome: Met   OT eval completed; no goals established as pt is performing ADL safely and without A. SAMANTA Trejo

## 2022-11-26 NOTE — PT/OT/SLP EVAL
"Occupational Therapy   Co-Evaluation and Discharge Note    Name: Keely Pizarro  MRN: [id number]  Admitting Diagnosis:  Acute on chronic diastolic CHF (congestive heart failure), NYHA class 3   Recent Surgery: * No surgery found *      Recommendations:     Discharge Recommendations: home  Discharge Equipment Recommendations:  none  Barriers to discharge:  None    Assessment:     Keely Pizarro is a 77 y.o. female with a medical diagnosis of Acute on chronic diastolic CHF (congestive heart failure), NYHA class 3. At this time, patient is functioning at their prior level of function and does not require further acute OT services. Pt benefits from co-treatment with PT to accommodate pt's activity tolerance and progression with therapy.    Plan:     During this hospitalization, patient does not require further acute OT services.  Please re-consult if situation changes.    Plan of Care Reviewed with: patient    Subjective     Chief Complaint: "I'm ready for someone to take care of me."  Patient/Family Comments/goals: to go home    Occupational Profile:  Living Environment: lives with spouse in [facility] with no FRED; WIS with BIB  Previous level of function: (I) with ADL and ambulation  Roles and Routines: caretaker to self, spouse and home  Equipment Used at home:   (Pt has a RW, but does not use. Occasionally uses BSC)  Assistance upon Discharge: family    Pain/Comfort:  Pain Rating 1: 0/10  Pain Rating Post-Intervention 1: 0/10    Patients cultural, spiritual, Hinduism conflicts given the current situation: no    Objective:     Communicated with: RN prior to session.  Patient found supine with telemetry, peripheral IV upon OT entry to room.    General Precautions: Standard, fall   Orthopedic Precautions:    Braces:    Respiratory Status: Room air     Occupational Performance:    Bed Mobility:    Independent    Functional Mobility/Transfers:  Patient completed Sit <> Stand Transfer with independence  with  " no assistive device   Functional Mobility: Independent without AD    Activities of Daily Living:  Feeding:  independence    Grooming: independence    Upper Body Dressing: independence    Lower Body Dressing: independence    Toileting: independence simulated    Cognitive/Visual Perceptual:  Oriented to: Person, Place, Time and Situation  Follows Commands/attention: Follows multistep  commands  Communication: clear/fluent  Memory:  No Deficits noted  Safety awareness/insight to disability: intact  Coping skills/emotional control: Appropriate to situation    Physical Exam:  Postural examination/scapula alignment:    -       No postural abnormalities identified  Sensation:    -       Intact  Upper Extremity Range of Motion:     -       Right Upper Extremity: WFL  -       Left Upper Extremity: WFL  Upper Extremity Strength:    -       Right Upper Extremity: WFL  -       Left Upper Extremity: WFL   Strength:    -       Right Upper Extremity: WFL  -       Left Upper Extremity: WFL  Fine Motor Coordination:    -       Intact  Gross motor coordination:   WFL    AMPAC 6 Click ADL:  AMPAC Total Score: 24    Treatment & Education:  Pt ed on OT POC  Pt ed on continued OOB and ADL    Patient left up in chair with all lines intact, call button in reach, and RN notified    GOALS:   Multidisciplinary Problems       Occupational Therapy Goals       Not on file              Multidisciplinary Problems (Resolved)          Problem: Occupational Therapy    Goal Priority Disciplines Outcome Interventions   Occupational Therapy Goal   (Resolved)     OT, PT/OT Met                        History:     Past Medical History:   Diagnosis Date    CVA (cerebral vascular accident) 2011    Diabetes mellitus     Hypertension     NSTEMI (non-ST elevated myocardial infarction) 11/23/2022    Renal manifestation of secondary diabetes mellitus     S/P ORIF (open reduction internal fixation) fracture     Uterine cancer 2012         Past Surgical  History:   Procedure Laterality Date    COLONOSCOPY N/A 3/31/2017    Procedure: COLONOSCOPY;  Surgeon: Chip Pak MD;  Location: [facility] ([address] [place] [address]);  Service: Endoscopy;  Laterality: N/A;    FRACTURE SURGERY      right arm    HYSTERECTOMY      INJECTION OF JOINT Right 10/8/2020    Procedure: INJECTION, JOINT RIGHT HIP;  Surgeon: Echo Hebert MD;  Location: [facility] PAIN [facility]GT;  Service: Pain Management;  Laterality: Right;  INJECTION, JOINT RIGHT HIP    INJECTION OF JOINT Bilateral 8/23/2021    Procedure: INJECTION, JOINT, HIP;  Surgeon: Echo Hebert MD;  Location: [facility] PAIN T;  Service: Pain Management;  Laterality: Bilateral;    THORACENTESIS Left     TOTAL KNEE ARTHROPLASTY Left 2012       Time Tracking:     OT Date of Treatment: 11/26/22  OT Start Time: 0828  OT Stop Time: 0844  OT Total Time (min): 16 min    Billable Minutes:Evaluation 8  Self Care/Home Management 8    11/26/2022

## 2022-11-26 NOTE — NURSING
Plan of care discussed with patient.  Patient remains free of falls and trauma. Patient ambulating x standby assist, fall precautions in place. Patient has no complaints of pain. Discussed medications and care. Heparin gtt infusing 14 units/kg/hr. Patient has no questions at this time. Will continue to monitor.

## 2022-11-27 LAB
ABO + RH BLD: NORMAL
ALBUMIN SERPL BCP-MCNC: 2.8 G/DL (ref 3.5–5.2)
ALP SERPL-CCNC: 74 U/L (ref 55–135)
ALT SERPL W/O P-5'-P-CCNC: 21 U/L (ref 10–44)
ANION GAP SERPL CALC-SCNC: 11 MMOL/L (ref 8–16)
APTT BLDCRRT: 58.1 SEC (ref 21–32)
AST SERPL-CCNC: 14 U/L (ref 10–40)
BASOPHILS # BLD AUTO: 0.02 K/UL (ref 0–0.2)
BASOPHILS # BLD AUTO: 0.04 K/UL (ref 0–0.2)
BASOPHILS NFR BLD: 0.4 % (ref 0–1.9)
BASOPHILS NFR BLD: 0.6 % (ref 0–1.9)
BILIRUB SERPL-MCNC: 0.4 MG/DL (ref 0.1–1)
BLD GP AB SCN CELLS X3 SERPL QL: NORMAL
BLD PROD TYP BPU: NORMAL
BLOOD UNIT EXPIRATION DATE: NORMAL
BLOOD UNIT TYPE CODE: 5100
BLOOD UNIT TYPE: NORMAL
BUN SERPL-MCNC: 50 MG/DL (ref 8–23)
CALCIUM SERPL-MCNC: 8.9 MG/DL (ref 8.7–10.5)
CHLORIDE SERPL-SCNC: 108 MMOL/L (ref 95–110)
CO2 SERPL-SCNC: 22 MMOL/L (ref 23–29)
CODING SYSTEM: NORMAL
CREAT SERPL-MCNC: 2.9 MG/DL (ref 0.5–1.4)
DIFFERENTIAL METHOD: ABNORMAL
DIFFERENTIAL METHOD: ABNORMAL
DISPENSE STATUS: NORMAL
EOSINOPHIL # BLD AUTO: 0.2 K/UL (ref 0–0.5)
EOSINOPHIL # BLD AUTO: 0.3 K/UL (ref 0–0.5)
EOSINOPHIL NFR BLD: 4 % (ref 0–8)
EOSINOPHIL NFR BLD: 4.3 % (ref 0–8)
ERYTHROCYTE [DISTWIDTH] IN BLOOD BY AUTOMATED COUNT: 13.5 % (ref 11.5–14.5)
ERYTHROCYTE [DISTWIDTH] IN BLOOD BY AUTOMATED COUNT: 13.5 % (ref 11.5–14.5)
EST. GFR  (NO RACE VARIABLE): 16.4 ML/MIN/1.73 M^2
GLUCOSE SERPL-MCNC: 162 MG/DL (ref 70–110)
HCT VFR BLD AUTO: 23.1 % (ref 37–48.5)
HCT VFR BLD AUTO: 26.3 % (ref 37–48.5)
HGB BLD-MCNC: 6.8 G/DL (ref 12–16)
HGB BLD-MCNC: 8.1 G/DL (ref 12–16)
IMM GRANULOCYTES # BLD AUTO: 0.02 K/UL (ref 0–0.04)
IMM GRANULOCYTES # BLD AUTO: 0.04 K/UL (ref 0–0.04)
IMM GRANULOCYTES NFR BLD AUTO: 0.4 % (ref 0–0.5)
IMM GRANULOCYTES NFR BLD AUTO: 0.6 % (ref 0–0.5)
LYMPHOCYTES # BLD AUTO: 1.6 K/UL (ref 1–4.8)
LYMPHOCYTES # BLD AUTO: 1.9 K/UL (ref 1–4.8)
LYMPHOCYTES NFR BLD: 27.7 % (ref 18–48)
LYMPHOCYTES NFR BLD: 32.8 % (ref 18–48)
MCH RBC QN AUTO: 28.9 PG (ref 27–31)
MCH RBC QN AUTO: 29.9 PG (ref 27–31)
MCHC RBC AUTO-ENTMCNC: 29.4 G/DL (ref 32–36)
MCHC RBC AUTO-ENTMCNC: 30.8 G/DL (ref 32–36)
MCV RBC AUTO: 97 FL (ref 82–98)
MCV RBC AUTO: 98 FL (ref 82–98)
MONOCYTES # BLD AUTO: 0.5 K/UL (ref 0.3–1)
MONOCYTES # BLD AUTO: 0.6 K/UL (ref 0.3–1)
MONOCYTES NFR BLD: 10.6 % (ref 4–15)
MONOCYTES NFR BLD: 8.8 % (ref 4–15)
NEUTROPHILS # BLD AUTO: 2.5 K/UL (ref 1.8–7.7)
NEUTROPHILS # BLD AUTO: 3.9 K/UL (ref 1.8–7.7)
NEUTROPHILS NFR BLD: 51.5 % (ref 38–73)
NEUTROPHILS NFR BLD: 58.3 % (ref 38–73)
NRBC BLD-RTO: 0 /100 WBC
NRBC BLD-RTO: 0 /100 WBC
PLATELET # BLD AUTO: 199 K/UL (ref 150–450)
PLATELET # BLD AUTO: 234 K/UL (ref 150–450)
PMV BLD AUTO: 10.2 FL (ref 9.2–12.9)
PMV BLD AUTO: 10.3 FL (ref 9.2–12.9)
POCT GLUCOSE: 217 MG/DL (ref 70–110)
POCT GLUCOSE: 301 MG/DL (ref 70–110)
POTASSIUM SERPL-SCNC: 4 MMOL/L (ref 3.5–5.1)
PROT SERPL-MCNC: 5 G/DL (ref 6–8.4)
RBC # BLD AUTO: 2.35 M/UL (ref 4–5.4)
RBC # BLD AUTO: 2.71 M/UL (ref 4–5.4)
SODIUM SERPL-SCNC: 141 MMOL/L (ref 136–145)
TRANS ERYTHROCYTES VOL PATIENT: NORMAL ML
WBC # BLD AUTO: 4.91 K/UL (ref 3.9–12.7)
WBC # BLD AUTO: 6.68 K/UL (ref 3.9–12.7)

## 2022-11-27 PROCEDURE — P9021 RED BLOOD CELLS UNIT: HCPCS

## 2022-11-27 PROCEDURE — 80053 COMPREHEN METABOLIC PANEL: CPT | Performed by: STUDENT IN AN ORGANIZED HEALTH CARE EDUCATION/TRAINING PROGRAM

## 2022-11-27 PROCEDURE — 25000003 PHARM REV CODE 250: Performed by: STUDENT IN AN ORGANIZED HEALTH CARE EDUCATION/TRAINING PROGRAM

## 2022-11-27 PROCEDURE — 20600001 HC STEP DOWN PRIVATE ROOM

## 2022-11-27 PROCEDURE — 36415 COLL VENOUS BLD VENIPUNCTURE: CPT | Performed by: STUDENT IN AN ORGANIZED HEALTH CARE EDUCATION/TRAINING PROGRAM

## 2022-11-27 PROCEDURE — 25000003 PHARM REV CODE 250

## 2022-11-27 PROCEDURE — 36415 COLL VENOUS BLD VENIPUNCTURE: CPT

## 2022-11-27 PROCEDURE — 86901 BLOOD TYPING SEROLOGIC RH(D): CPT

## 2022-11-27 PROCEDURE — 85730 THROMBOPLASTIN TIME PARTIAL: CPT | Performed by: STUDENT IN AN ORGANIZED HEALTH CARE EDUCATION/TRAINING PROGRAM

## 2022-11-27 PROCEDURE — 63600175 PHARM REV CODE 636 W HCPCS: Performed by: STUDENT IN AN ORGANIZED HEALTH CARE EDUCATION/TRAINING PROGRAM

## 2022-11-27 PROCEDURE — 94761 N-INVAS EAR/PLS OXIMETRY MLT: CPT

## 2022-11-27 PROCEDURE — C9113 INJ PANTOPRAZOLE SODIUM, VIA: HCPCS

## 2022-11-27 PROCEDURE — 36430 TRANSFUSION BLD/BLD COMPNT: CPT

## 2022-11-27 PROCEDURE — 99233 SBSQ HOSP IP/OBS HIGH 50: CPT | Mod: GC,,, | Performed by: INTERNAL MEDICINE

## 2022-11-27 PROCEDURE — 86920 COMPATIBILITY TEST SPIN: CPT

## 2022-11-27 PROCEDURE — 85025 COMPLETE CBC W/AUTO DIFF WBC: CPT | Performed by: STUDENT IN AN ORGANIZED HEALTH CARE EDUCATION/TRAINING PROGRAM

## 2022-11-27 PROCEDURE — 63600175 PHARM REV CODE 636 W HCPCS

## 2022-11-27 PROCEDURE — 85025 COMPLETE CBC W/AUTO DIFF WBC: CPT | Mod: 91

## 2022-11-27 PROCEDURE — 99233 PR SUBSEQUENT HOSPITAL CARE,LEVL III: ICD-10-PCS | Mod: GC,,, | Performed by: INTERNAL MEDICINE

## 2022-11-27 RX ORDER — ACETAMINOPHEN 325 MG/1
650 TABLET ORAL EVERY 6 HOURS PRN
Status: DISCONTINUED | OUTPATIENT
Start: 2022-11-27 | End: 2022-11-30 | Stop reason: HOSPADM

## 2022-11-27 RX ORDER — DIPHENHYDRAMINE HCL 25 MG
25 CAPSULE ORAL EVERY 6 HOURS PRN
Status: DISCONTINUED | OUTPATIENT
Start: 2022-11-27 | End: 2022-11-30 | Stop reason: HOSPADM

## 2022-11-27 RX ORDER — HYDROCODONE BITARTRATE AND ACETAMINOPHEN 500; 5 MG/1; MG/1
TABLET ORAL
Status: DISCONTINUED | OUTPATIENT
Start: 2022-11-27 | End: 2022-11-30 | Stop reason: HOSPADM

## 2022-11-27 RX ADMIN — DOXYCYCLINE HYCLATE 100 MG: 100 TABLET, COATED ORAL at 11:11

## 2022-11-27 RX ADMIN — DOXYCYCLINE HYCLATE 100 MG: 100 TABLET, COATED ORAL at 09:11

## 2022-11-27 RX ADMIN — CARVEDILOL 12.5 MG: 12.5 TABLET, FILM COATED ORAL at 07:11

## 2022-11-27 RX ADMIN — HEPARIN SODIUM 14 UNITS/KG/HR: 10000 INJECTION, SOLUTION INTRAVENOUS at 09:11

## 2022-11-27 RX ADMIN — ATORVASTATIN CALCIUM 40 MG: 40 TABLET, FILM COATED ORAL at 11:11

## 2022-11-27 RX ADMIN — AMLODIPINE BESYLATE 10 MG: 10 TABLET ORAL at 11:11

## 2022-11-27 RX ADMIN — ASPIRIN 81 MG: 81 TABLET, COATED ORAL at 11:11

## 2022-11-27 RX ADMIN — INSULIN DETEMIR 3 UNITS: 100 INJECTION, SOLUTION SUBCUTANEOUS at 09:11

## 2022-11-27 RX ADMIN — PANTOPRAZOLE SODIUM 40 MG: 40 INJECTION, POWDER, FOR SOLUTION INTRAVENOUS at 11:11

## 2022-11-27 RX ADMIN — FUROSEMIDE 40 MG: 40 TABLET ORAL at 11:11

## 2022-11-27 RX ADMIN — PANTOPRAZOLE SODIUM 40 MG: 40 INJECTION, POWDER, FOR SOLUTION INTRAVENOUS at 09:11

## 2022-11-27 RX ADMIN — CARVEDILOL 12.5 MG: 12.5 TABLET, FILM COATED ORAL at 05:11

## 2022-11-27 RX ADMIN — INSULIN ASPART 4 UNITS: 100 INJECTION, SOLUTION INTRAVENOUS; SUBCUTANEOUS at 12:11

## 2022-11-27 RX ADMIN — INSULIN ASPART 1 UNITS: 100 INJECTION, SOLUTION INTRAVENOUS; SUBCUTANEOUS at 09:11

## 2022-11-27 RX ADMIN — ISOSORBIDE MONONITRATE 30 MG: 30 TABLET, EXTENDED RELEASE ORAL at 11:11

## 2022-11-27 NOTE — ASSESSMENT & PLAN NOTE
Pt presented with elevated creatinine at 1.9. pt has variable creatinine over the past 9 months. Was lowest at 1.2. Most recent nephrology note in 8/15/22 says her baseline is 1.5. ERENDIRA is most likely due to HTN emergency. Pt was also fluid overloaded on arrival    Plan  Will diurese her with lasix 40mg  Daily CMPs  Pt's creatinine has leveled off but is still elevated. Pt is producing urine  Urine studies ordered and thought to be ATN from combination of HTN emergency and ischemic causes.

## 2022-11-27 NOTE — PLAN OF CARE
Pt remained free of injuries, falls, and trauma. VSS. No complaints of pain. Glucose monitored tonight, 278. SSI administered. Continuous Heparin infusing at prescribed rate. APTT being monitored. MAICOL elevated to reduce swelling, Doxycyline continued. Plan of care reviewed w/ pt. Pt verbalized understanding.     Problem: Adult Inpatient Plan of Care  Goal: Plan of Care Review  Outcome: Ongoing, Progressing  Flowsheets (Taken 11/27/2022 0213)  Plan of Care Reviewed With: patient     Problem: Diabetes Comorbidity  Goal: Blood Glucose Level Within Targeted Range  Intervention: Monitor and Manage Glycemia  Flowsheets (Taken 11/27/2022 0213)  Glycemic Management: blood glucose monitored     Problem: Renal Function Impairment (Acute Kidney Injury/Impairment)  Goal: Effective Renal Function  Intervention: Monitor and Support Renal Function  Flowsheets (Taken 11/27/2022 0213)  Medication Review/Management: medications reviewed     Problem: Fall Injury Risk  Goal: Absence of Fall and Fall-Related Injury  Intervention: Identify and Manage Contributors  Flowsheets (Taken 11/27/2022 0213)  Self-Care Promotion: independence encouraged  Medication Review/Management: medications reviewed  Intervention: Promote Injury-Free Environment  Flowsheets (Taken 11/27/2022 0213)  Safety Promotion/Fall Prevention:   assistive device/personal item within reach   Fall Risk reviewed with patient/family   Fall Risk signage in place   side rails raised x 2   Supervised toileting - stay within arms reach   instructed to call staff for mobility   nonskid shoes/socks when out of bed   lighting adjusted   medications reviewed

## 2022-11-27 NOTE — SUBJECTIVE & OBJECTIVE
Interval History: no events overnight. Still making good urine; however, Cr still rising likely secondary to ATN, no evidence of volume overload on exam. Pt's hemoglobin dropped to 6.8, transfusing one unit. No active sights of bleeding     Review of Systems   Constitutional: Positive for malaise/fatigue. Negative for chills and fever.   HENT:  Negative for congestion and sore throat.    Eyes:  Negative for blurred vision and visual disturbance.   Cardiovascular:  Negative for chest pain, leg swelling and orthopnea.   Respiratory:  Negative for cough, shortness of breath, sputum production and wheezing.    Hematologic/Lymphatic: Does not bruise/bleed easily.   Skin:  Negative for rash.   Musculoskeletal:  Negative for back pain and joint pain.   Gastrointestinal:  Negative for abdominal pain, constipation, diarrhea, nausea and vomiting.   Genitourinary:  Negative for dysuria and flank pain.   Neurological:  Negative for focal weakness and light-headedness.   Psychiatric/Behavioral:  Negative for altered mental status. The patient is not nervous/anxious.    Objective:     Vital Signs (Most Recent):  Temp: 98.2 °F (36.8 °C) (11/27/22 0400)  Pulse: 68 (11/27/22 0751)  Resp: 18 (11/27/22 0400)  BP: (!) 145/69 (11/27/22 0400)  SpO2: 96 % (11/27/22 0400)   Vital Signs (24h Range):  Temp:  [98 °F (36.7 °C)-98.7 °F (37.1 °C)] 98.2 °F (36.8 °C)  Pulse:  [63-86] 68  Resp:  [16-18] 18  SpO2:  [95 %-98 %] 96 %  BP: (124-145)/(58-69) 145/69     Weight: 58.4 kg (128 lb 12 oz)  Body mass index is 22.81 kg/m².     SpO2: 96 %  O2 Device (Oxygen Therapy): room air      Intake/Output Summary (Last 24 hours) at 11/27/2022 0911  Last data filed at 11/26/2022 2157  Gross per 24 hour   Intake 600 ml   Output 300 ml   Net 300 ml         Lines/Drains/Airways       Peripheral Intravenous Line  Duration                  Peripheral IV - Single Lumen 11/25/22 1930 22 G Right Forearm 1 day                    Physical Exam  Vitals reviewed.    Constitutional:       General: She is awake.      Appearance: Normal appearance.   HENT:      Head: Normocephalic and atraumatic.      Mouth/Throat:      Mouth: Mucous membranes are moist.   Eyes:      General: No scleral icterus.     Conjunctiva/sclera: Conjunctivae normal.   Cardiovascular:      Rate and Rhythm: Normal rate and regular rhythm.      Pulses: Normal pulses.   Pulmonary:      Effort: Pulmonary effort is normal. No respiratory distress.   Abdominal:      General: Abdomen is flat. There is no distension.      Palpations: Abdomen is soft.      Tenderness: There is no abdominal tenderness.   Musculoskeletal:      Cervical back: Normal range of motion.      Right lower leg: No edema.      Left lower leg: No edema.   Skin:     General: Skin is warm and dry.      Capillary Refill: Capillary refill takes less than 2 seconds.      Findings: No rash.   Neurological:      Mental Status: She is alert and oriented to person, place, and time.   Psychiatric:         Mood and Affect: Mood normal.         Behavior: Behavior is cooperative.         Thought Content: Thought content normal.       Significant Labs: All pertinent lab results from the last 24 hours have been reviewed.

## 2022-11-27 NOTE — ASSESSMENT & PLAN NOTE
Pt presented with SOB, ERENDIRA, EKG changes, and her  BP elevated in the 246/129    Plan  Pt placed on nitro drip. Pt's BP responded and pt has been in the lower range. Nitro drip D/C.  Placed on lasix 40mg IV  Given her home dose amlodipine 10mg   Pt on isosorbide mononitrate and BP has stayed stable in 130s-140s

## 2022-11-27 NOTE — ASSESSMENT & PLAN NOTE
Pt had an PIV placed in her L arm for her Nitro drip, this IV ended up infiltrating and began to swell before it was discovered. Pt is now having pain and redness in this area. There is worry for cellulitis     Plan  Pt on doxycycline. Pt had one positive blood culture, thought to be due to contaminate

## 2022-11-27 NOTE — PROGRESS NOTES
[facility] - Cardiology Stepdown  Cardiology  Progress Note    Patient Name: Keely Pizarro  MRN: [id number]  Admission Date: 11/23/2022  Hospital Length of Stay: 4 days  Code Status: Full Code   Attending Physician: Albin Heredia MD   Primary Care Physician: Albin Cazares MD  Expected Discharge Date: 11/30/2022  Principal Problem:Acute on chronic diastolic CHF (congestive heart failure), NYHA class 3    Subjective:     Hospital Course:   Pt is a 77 year old female that presented to the hospital in HTN emergency with elevated troponin,  respiratory distress, and kidney failure. Pt did not have any ST elevation, but did have a new LBBB. Pt was not having any chest pain on presentation. ACS protocol was initiated. Pt was placed on a nitro drip and BiPAP due respiratory distress. Pt's BP has responded to the nitro drip and it has been stopped since late on 11/23.      Interval History: no events overnight. Still making good urine; however, Cr still rising likely secondary to ATN, no evidence of volume overload on exam. Pt's hemoglobin dropped to 6.8, transfusing one unit. No active sights of bleeding     Review of Systems   Constitutional: Positive for malaise/fatigue. Negative for chills and fever.   HENT:  Negative for congestion and sore throat.    Eyes:  Negative for blurred vision and visual disturbance.   Cardiovascular:  Negative for chest pain, leg swelling and orthopnea.   Respiratory:  Negative for cough, shortness of breath, sputum production and wheezing.    Hematologic/Lymphatic: Does not bruise/bleed easily.   Skin:  Negative for rash.   Musculoskeletal:  Negative for back pain and joint pain.   Gastrointestinal:  Negative for abdominal pain, constipation, diarrhea, nausea and vomiting.   Genitourinary:  Negative for dysuria and flank pain.   Neurological:  Negative for focal weakness and light-headedness.   Psychiatric/Behavioral:  Negative for altered mental status. The patient is  not nervous/anxious.    Objective:     Vital Signs (Most Recent):  Temp: 98.2 °F (36.8 °C) (11/27/22 0400)  Pulse: 68 (11/27/22 0751)  Resp: 18 (11/27/22 0400)  BP: (!) 145/69 (11/27/22 0400)  SpO2: 96 % (11/27/22 0400)   Vital Signs (24h Range):  Temp:  [98 °F (36.7 °C)-98.7 °F (37.1 °C)] 98.2 °F (36.8 °C)  Pulse:  [63-86] 68  Resp:  [16-18] 18  SpO2:  [95 %-98 %] 96 %  BP: (124-145)/(58-69) 145/69     Weight: 58.4 kg (128 lb 12 oz)  Body mass index is 22.81 kg/m².     SpO2: 96 %  O2 Device (Oxygen Therapy): room air      Intake/Output Summary (Last 24 hours) at 11/27/2022 0911  Last data filed at 11/26/2022 2157  Gross per 24 hour   Intake 600 ml   Output 300 ml   Net 300 ml         Lines/Drains/Airways       Peripheral Intravenous Line  Duration                  Peripheral IV - Single Lumen 11/25/22 1930 22 G Right Forearm 1 day                    Physical Exam  Vitals reviewed.   Constitutional:       General: She is awake.      Appearance: Normal appearance.   HENT:      Head: Normocephalic and atraumatic.      Mouth/Throat:      Mouth: Mucous membranes are moist.   Eyes:      General: No scleral icterus.     Conjunctiva/sclera: Conjunctivae normal.   Cardiovascular:      Rate and Rhythm: Normal rate and regular rhythm.      Pulses: Normal pulses.   Pulmonary:      Effort: Pulmonary effort is normal. No respiratory distress.   Abdominal:      General: Abdomen is flat. There is no distension.      Palpations: Abdomen is soft.      Tenderness: There is no abdominal tenderness.   Musculoskeletal:      Cervical back: Normal range of motion.      Right lower leg: No edema.      Left lower leg: No edema.   Skin:     General: Skin is warm and dry.      Capillary Refill: Capillary refill takes less than 2 seconds.      Findings: No rash.   Neurological:      Mental Status: She is alert and oriented to person, place, and time.   Psychiatric:         Mood and Affect: Mood normal.         Behavior: Behavior is  cooperative.         Thought Content: Thought content normal.       Significant Labs: All pertinent lab results from the last 24 hours have been reviewed.        Assessment and Plan:       Acute deep vein thrombosis (DVT) of non-extremity vein  It was discovered pt has a thrombosis in her LIJ. Pt is not having any symptoms besides some swelling    Plan  Pt placed on a low intensity heparin regiment due to concern for drop in hemoglobin. No overt signs of bleeding. May transition pt to elliquis  -Will discontinue if pt begins to show signs of bleeding    Cellulitis of left arm  Pt had an PIV placed in her L arm for her Nitro drip, this IV ended up infiltrating and began to swell before it was discovered. Pt is now having pain and redness in this area. There is worry for cellulitis     Plan  Pt on doxycycline. Pt had one positive blood culture, thought to be due to contaminate    Anemia  Pt presented with an NSTEMI. Placed on ACS protocol. Pt's CBC this morning had a drop in hemoglobin from 11.3 to 8.3 on 11/24. No active sites of bleeding noted. Pt has not had a BM.    CBC q8hrs  Pt's hemoglobin dropped from 11.3 to 8.3. No signs of bleeding noticed with this patient. Pt had no bloody bowel movements while she has been here.  Have not noticed any signs of bleeding  DIC workup negative, iron studies ordered. Hematology consult placed, appreciated recs. They contributed to infiltrated IV.  Pt hemoglobin dropped to 6.8 and pt required a unit of blood.    CHF (congestive heart failure)  Patient is fluid overloaded , but is improving with diuretics.    PET scan results:  The myocardial perfusion images are normal without evidence of ischemia or scar.    The whole heart absolute myocardial perfusion values averaged 1.47 cc/min/g at rest, which is elevated; 1.42 cc/min/g at stress, which is mildly reduced; and CFR is 0.98 , which is severely reduced in part due to elevated resting flow.    Absolute flow measurements are  consistent with abnormal microvascular function or possibly caffeine intake..    CT attenuation images demonstrate no coronary calcifications and no aortic calcifications.    The gated perfusion images showed an ejection fraction of 56% at rest and 60% during stress. A normal ejection fraction is greater than 47%.    The wall motion is normal at rest and during stress.    The LV cavity size is normal at rest and stress.    The EKG portion of this study is uninterpretable.    There were no arrhythmias during stress.    The patient reported no chest pain during the stress test.    There are no prior studies for comparison    Plan  PET ECHO was done, and did not show any overt abnormalities or areas of stenosis        Acute respiratory failure  Pt presented in respiratory distress with sats at 92%. Chest x-ray showed pulmonary edema. Most likely due to HTN emergency. BNP was elevated at 1333    Plan   Pt placed on BiPAP at admission, given lasix 40mg IV,   Pt was able to be weaned off biPAP and is now on RA    Resolved    ERENDIRA (acute kidney injury)  Pt presented with elevated creatinine at 1.9. pt has variable creatinine over the past 9 months. Was lowest at 1.2. Most recent nephrology note in 8/15/22 says her baseline is 1.5. ERENDIRA is most likely due to HTN emergency. Pt was also fluid overloaded on arrival    Plan  Will diurese her with lasix 40mg  Daily CMPs  Pt's creatinine has leveled off but is still elevated. Pt is producing urine  Urine studies ordered and thought to be ATN from combination of HTN emergency and ischemic causes.    NSTEMI (non-ST elevated myocardial infarction)  Pt came in not complaining of chest pain, but had elevated troponin at 0.105 and EKG changes with new LBBB. STEMI procedure not called due to the lack of chest pain for the last few days.    Plan  Began ACS protocol   Placed on heparin, plavix, aspirin  ECHO and patient's troponin peaked.  Daily CBC, CMP, Mg, Phos,  APTT    Hypertensive emergency  Pt presented with SOB, ERENDIRA, EKG changes, and her  BP elevated in the 246/129    Plan  Pt placed on nitro drip. Pt's BP responded and pt has been in the lower range. Nitro drip D/C.  Placed on lasix 40mg IV  Given her home dose amlodipine 10mg   Pt on isosorbide mononitrate and BP has stayed stable in 130s-140s    Follicular lymphoma grade i, lymph nodes of multiple sites  Pt has history of follicular lymphoma. Pt states she is in remission and follows up with oncology every 6 months.        VTE Risk Mitigation (From admission, onward)         Ordered     heparin 25,000 units in dextrose 5% 250 mL (100 units/mL) infusion LOW INTENSITY nomogram - [facility]  Continuous        Question Answer Comment   Heparin Infusion Adjustment (DO NOT MODIFY ANSWER) \\[email or web address]\epic\Images\Pharmacy\HeparinInfusions\heparin LOW INTENSITY nomogram for [facility] [id number].pdf    Begin at (in units/kg/hr) 12        11/25/22 1207     heparin 25,000 units in dextrose 5% (100 units/ml) IV bolus from bag - ADDITIONAL PRN BOLUS - 60 units/kg  As needed (PRN)        Question:  Heparin Infusion Adjustment (DO NOT MODIFY ANSWER)  Answer:  \\[email or web address]\epic\Images\Pharmacy\HeparinInfusions\heparin LOW INTENSITY nomogram for [facility] [id number].pdf    11/25/22 1207     heparin 25,000 units in dextrose 5% (100 units/ml) IV bolus from bag - ADDITIONAL PRN BOLUS - 30 units/kg  As needed (PRN)        Question:  Heparin Infusion Adjustment (DO NOT MODIFY ANSWER)  Answer:  \\[email or web address]\epic\Images\Pharmacy\HeparinInfusions\heparin LOW INTENSITY nomogram for [facility] [id number].pdf    11/25/22 1207     IP VTE HIGH RISK PATIENT  Once         11/23/22 1512     Place sequential compression device  Until discontinued         11/23/22 1512                Omar Richards DO  Cardiology  [facility] - Cardiology Stepdown

## 2022-11-27 NOTE — ASSESSMENT & PLAN NOTE
It was discovered pt has a thrombosis in her LIJ. Pt is not having any symptoms besides some swelling    Plan  Pt placed on a low intensity heparin regiment due to concern for drop in hemoglobin. No overt signs of bleeding. May transition pt to elliquis  -Will discontinue if pt begins to show signs of bleeding

## 2022-11-27 NOTE — RESPIRATORY THERAPY
RAPID RESPONSE RESPIRATORY CHART CHECK       Chart check completed. Please call [phone] for further concerns or assistance.

## 2022-11-27 NOTE — ASSESSMENT & PLAN NOTE
Pt presented with an NSTEMI. Placed on ACS protocol. Pt's CBC this morning had a drop in hemoglobin from 11.3 to 8.3 on 11/24. No active sites of bleeding noted. Pt has not had a BM.    CBC q8hrs  Pt's hemoglobin dropped from 11.3 to 8.3. No signs of bleeding noticed with this patient. Pt had no bloody bowel movements while she has been here.  Have not noticed any signs of bleeding  DIC workup negative, iron studies ordered. Hematology consult placed, appreciated recs. They contributed to infiltrated IV.  Pt hemoglobin dropped to 6.8 and pt required a unit of blood.

## 2022-11-27 NOTE — ASSESSMENT & PLAN NOTE
Pt presented in respiratory distress with sats at 92%. Chest x-ray showed pulmonary edema. Most likely due to HTN emergency. BNP was elevated at 1333    Plan   Pt placed on BiPAP at admission, given lasix 40mg IV,   Pt was able to be weaned off biPAP and is now on RA    Resolved

## 2022-11-27 NOTE — ASSESSMENT & PLAN NOTE
Pt came in not complaining of chest pain, but had elevated troponin at 0.105 and EKG changes with new LBBB. STEMI procedure not called due to the lack of chest pain for the last few days.    Plan  Began ACS protocol   Placed on heparin, plavix, aspirin  ECHO and patient's troponin peaked.  Daily CBC, CMP, Mg, Phos, APTT

## 2022-11-28 PROBLEM — D62 ACUTE BLOOD LOSS ANEMIA: Status: ACTIVE | Noted: 2022-11-28

## 2022-11-28 PROBLEM — L03.90 CELLULITIS: Status: ACTIVE | Noted: 2022-11-28

## 2022-11-28 LAB
ALBUMIN SERPL BCP-MCNC: 3.1 G/DL (ref 3.5–5.2)
ALP SERPL-CCNC: 83 U/L (ref 55–135)
ALT SERPL W/O P-5'-P-CCNC: 18 U/L (ref 10–44)
ANION GAP SERPL CALC-SCNC: 13 MMOL/L (ref 8–16)
APTT BLDCRRT: 62.5 SEC (ref 21–32)
AST SERPL-CCNC: 15 U/L (ref 10–40)
BACTERIA BLD CULT: NORMAL
BASOPHILS # BLD AUTO: 0.04 K/UL (ref 0–0.2)
BASOPHILS NFR BLD: 0.6 % (ref 0–1.9)
BILIRUB SERPL-MCNC: 0.8 MG/DL (ref 0.1–1)
BUN SERPL-MCNC: 56 MG/DL (ref 8–23)
CALCIUM SERPL-MCNC: 9.3 MG/DL (ref 8.7–10.5)
CHLORIDE SERPL-SCNC: 109 MMOL/L (ref 95–110)
CO2 SERPL-SCNC: 23 MMOL/L (ref 23–29)
CREAT SERPL-MCNC: 2.8 MG/DL (ref 0.5–1.4)
DIFFERENTIAL METHOD: ABNORMAL
EOSINOPHIL # BLD AUTO: 0.3 K/UL (ref 0–0.5)
EOSINOPHIL NFR BLD: 4.3 % (ref 0–8)
ERYTHROCYTE [DISTWIDTH] IN BLOOD BY AUTOMATED COUNT: 13.6 % (ref 11.5–14.5)
EST. GFR  (NO RACE VARIABLE): 17.1 ML/MIN/1.73 M^2
GLUCOSE SERPL-MCNC: 168 MG/DL (ref 70–110)
HCT VFR BLD AUTO: 28.1 % (ref 37–48.5)
HGB BLD-MCNC: 8.9 G/DL (ref 12–16)
IMM GRANULOCYTES # BLD AUTO: 0.04 K/UL (ref 0–0.04)
IMM GRANULOCYTES NFR BLD AUTO: 0.6 % (ref 0–0.5)
LYMPHOCYTES # BLD AUTO: 1.4 K/UL (ref 1–4.8)
LYMPHOCYTES NFR BLD: 22.8 % (ref 18–48)
MCH RBC QN AUTO: 29.8 PG (ref 27–31)
MCHC RBC AUTO-ENTMCNC: 31.7 G/DL (ref 32–36)
MCV RBC AUTO: 94 FL (ref 82–98)
MONOCYTES # BLD AUTO: 0.6 K/UL (ref 0.3–1)
MONOCYTES NFR BLD: 10.2 % (ref 4–15)
NEUTROPHILS # BLD AUTO: 3.8 K/UL (ref 1.8–7.7)
NEUTROPHILS NFR BLD: 61.5 % (ref 38–73)
NRBC BLD-RTO: 0 /100 WBC
PLATELET # BLD AUTO: 224 K/UL (ref 150–450)
PMV BLD AUTO: 10 FL (ref 9.2–12.9)
POCT GLUCOSE: 179 MG/DL (ref 70–110)
POCT GLUCOSE: 223 MG/DL (ref 70–110)
POCT GLUCOSE: 241 MG/DL (ref 70–110)
POTASSIUM SERPL-SCNC: 3.9 MMOL/L (ref 3.5–5.1)
PROT SERPL-MCNC: 5.5 G/DL (ref 6–8.4)
RBC # BLD AUTO: 2.99 M/UL (ref 4–5.4)
SODIUM SERPL-SCNC: 145 MMOL/L (ref 136–145)
WBC # BLD AUTO: 6.26 K/UL (ref 3.9–12.7)

## 2022-11-28 PROCEDURE — 25000003 PHARM REV CODE 250: Performed by: STUDENT IN AN ORGANIZED HEALTH CARE EDUCATION/TRAINING PROGRAM

## 2022-11-28 PROCEDURE — 36415 COLL VENOUS BLD VENIPUNCTURE: CPT | Performed by: STUDENT IN AN ORGANIZED HEALTH CARE EDUCATION/TRAINING PROGRAM

## 2022-11-28 PROCEDURE — C9113 INJ PANTOPRAZOLE SODIUM, VIA: HCPCS

## 2022-11-28 PROCEDURE — 99900035 HC TECH TIME PER 15 MIN (STAT)

## 2022-11-28 PROCEDURE — 80053 COMPREHEN METABOLIC PANEL: CPT | Performed by: STUDENT IN AN ORGANIZED HEALTH CARE EDUCATION/TRAINING PROGRAM

## 2022-11-28 PROCEDURE — 99232 SBSQ HOSP IP/OBS MODERATE 35: CPT | Mod: ,,, | Performed by: INTERNAL MEDICINE

## 2022-11-28 PROCEDURE — 20600001 HC STEP DOWN PRIVATE ROOM

## 2022-11-28 PROCEDURE — 99232 PR SUBSEQUENT HOSPITAL CARE,LEVL II: ICD-10-PCS | Mod: ,,, | Performed by: INTERNAL MEDICINE

## 2022-11-28 PROCEDURE — 94660 CPAP INITIATION&MGMT: CPT

## 2022-11-28 PROCEDURE — 85730 THROMBOPLASTIN TIME PARTIAL: CPT | Performed by: STUDENT IN AN ORGANIZED HEALTH CARE EDUCATION/TRAINING PROGRAM

## 2022-11-28 PROCEDURE — 25000003 PHARM REV CODE 250

## 2022-11-28 PROCEDURE — 63600175 PHARM REV CODE 636 W HCPCS

## 2022-11-28 PROCEDURE — 85025 COMPLETE CBC W/AUTO DIFF WBC: CPT | Performed by: STUDENT IN AN ORGANIZED HEALTH CARE EDUCATION/TRAINING PROGRAM

## 2022-11-28 RX ADMIN — ASPIRIN 81 MG: 81 TABLET, COATED ORAL at 09:11

## 2022-11-28 RX ADMIN — AMLODIPINE BESYLATE 10 MG: 10 TABLET ORAL at 09:11

## 2022-11-28 RX ADMIN — ISOSORBIDE MONONITRATE 30 MG: 30 TABLET, EXTENDED RELEASE ORAL at 09:11

## 2022-11-28 RX ADMIN — ATORVASTATIN CALCIUM 40 MG: 40 TABLET, FILM COATED ORAL at 09:11

## 2022-11-28 RX ADMIN — CARVEDILOL 12.5 MG: 12.5 TABLET, FILM COATED ORAL at 04:11

## 2022-11-28 RX ADMIN — FUROSEMIDE 40 MG: 40 TABLET ORAL at 09:11

## 2022-11-28 RX ADMIN — PANTOPRAZOLE SODIUM 40 MG: 40 INJECTION, POWDER, FOR SOLUTION INTRAVENOUS at 08:11

## 2022-11-28 RX ADMIN — DOXYCYCLINE HYCLATE 100 MG: 100 TABLET, COATED ORAL at 09:11

## 2022-11-28 RX ADMIN — INSULIN ASPART 1 UNITS: 100 INJECTION, SOLUTION INTRAVENOUS; SUBCUTANEOUS at 08:11

## 2022-11-28 RX ADMIN — DOXYCYCLINE HYCLATE 100 MG: 100 TABLET, COATED ORAL at 08:11

## 2022-11-28 RX ADMIN — INSULIN ASPART 2 UNITS: 100 INJECTION, SOLUTION INTRAVENOUS; SUBCUTANEOUS at 01:11

## 2022-11-28 RX ADMIN — INSULIN DETEMIR 3 UNITS: 100 INJECTION, SOLUTION SUBCUTANEOUS at 08:11

## 2022-11-28 RX ADMIN — PANTOPRAZOLE SODIUM 40 MG: 40 INJECTION, POWDER, FOR SOLUTION INTRAVENOUS at 09:11

## 2022-11-28 RX ADMIN — CARVEDILOL 12.5 MG: 12.5 TABLET, FILM COATED ORAL at 09:11

## 2022-11-28 RX ADMIN — INSULIN ASPART 2 UNITS: 100 INJECTION, SOLUTION INTRAVENOUS; SUBCUTANEOUS at 04:11

## 2022-11-28 NOTE — ASSESSMENT & PLAN NOTE
.Patient is fluid overloaded , but is improving with diuretics.     PET scan results:  The myocardial perfusion images are normal without evidence of ischemia or scar.    The whole heart absolute myocardial perfusion values averaged 1.47 cc/min/g at rest, which is elevated; 1.42 cc/min/g at stress, which is mildly reduced; and CFR is 0.98 , which is severely reduced in part due to elevated resting flow.    Absolute flow measurements are consistent with abnormal microvascular function or possibly caffeine intake..    CT attenuation images demonstrate no coronary calcifications and no aortic calcifications.    The gated perfusion images showed an ejection fraction of 56% at rest and 60% during stress. A normal ejection fraction is greater than 47%.    The wall motion is normal at rest and during stress.    The LV cavity size is normal at rest and stress.    The EKG portion of this study is uninterpretable.    There were no arrhythmias during stress.    The patient reported no chest pain during the stress test.    There are no prior studies for comparison     Plan  PET ECHO was done, and did not show any overt abnormalities or areas of stenosis

## 2022-11-28 NOTE — PROGRESS NOTES
[facility] - Cardiology [facility] Medicine  Progress Note    Patient Name: Keely Pizarro  MRN: [id number]  Patient Class: IP- Inpatient   Admission Date: 11/23/2022  Length of Stay: 5 days  Attending Physician: Albin Heredia MD  Primary Care Provider: Albin Cazaers MD        Subjective:     Principal Problem:Acute on chronic diastolic CHF (congestive heart failure), NYHA class 3        HPI:  No notes on file    Overview/Hospital Course:  No notes on file    Interval History: New transfer from cardiology    Review of Systems   Constitutional: Negative.    HENT: Negative.     Eyes: Negative.    Respiratory: Negative.     Cardiovascular: Negative.    Gastrointestinal: Negative.    Endocrine: Negative.    Genitourinary: Negative.    Allergic/Immunologic: Negative.    Neurological: Negative.    Objective:     Vital Signs (Most Recent):  Temp: 97.7 °F (36.5 °C) (11/28/22 0721)  Pulse: 75 (11/28/22 0721)  Resp: 18 (11/28/22 0721)  BP: (!) 156/75 (11/28/22 0721)  SpO2: 97 % (11/28/22 0721)   Vital Signs (24h Range):  Temp:  [97.1 °F (36.2 °C)-98.5 °F (36.9 °C)] 97.7 °F (36.5 °C)  Pulse:  [61-87] 75  Resp:  [16-20] 18  SpO2:  [93 %-99 %] 97 %  BP: (130-170)/(63-75) 156/75     Weight: 58.4 kg (128 lb 12 oz)  Body mass index is 22.81 kg/m².    Intake/Output Summary (Last 24 hours) at 11/28/2022 0934  Last data filed at 11/28/2022 0625  Gross per 24 hour   Intake 581.67 ml   Output 600 ml   Net -18.33 ml      Physical Exam  Constitutional:       Appearance: Normal appearance.   HENT:      Nose: Nose normal.   Eyes:      Pupils: Pupils are equal, round, and reactive to light.   Cardiovascular:      Rate and Rhythm: Normal rate.   Pulmonary:      Effort: Pulmonary effort is normal.   Abdominal:      General: Abdomen is flat.   Skin:     General: Skin is warm.      Capillary Refill: Capillary refill takes less than 2 seconds.   Neurological:      Mental Status: She is alert.       Significant Labs:  All pertinent labs within the past 24 hours have been reviewed.  CBC:   Recent Labs   Lab 11/27/22  0237 11/27/22  1417 11/28/22  0556   WBC 4.91 6.68 6.26   HGB 6.8* 8.1* 8.9*   HCT 23.1* 26.3* 28.1*    234 224       Significant Imaging: I have reviewed all pertinent imaging results/findings within the past 24 hours.      Assessment/Plan:      Acute blood loss anemia  Pt presented with an NSTEMI. Placed on ACS protocol. Pt's CBC this morning had a drop in hemoglobin from 11.3 to 8.3 on 11/24. No active sites of bleeding noted. Pt has not had a BM.     CBC q8hrs  Pt's hemoglobin dropped from 11.3 to 8.3. No signs of bleeding noticed with this patient. Pt had no bloody bowel movements while she has been here.  Have not noticed any signs of bleeding  DIC workup negative, iron studies ordered. Hematology consult placed, appreciated recs. They contributed to infiltrated IV.  Pt hemoglobin dropped to 6.8 and pt required a unit of blood.         Acute deep vein thrombosis (DVT) of non-extremity vein  It was discovered pt has a thrombosis in her LIJ. Pt is not having any symptoms besides some swelling     Plan  Pt placed on a low intensity heparin regiment due to concern for drop in hemoglobin. No overt signs of bleeding. May transition pt to elliquis  -Will discontinue if pt begins to show signs of bleeding      Cellulitis of left arm  Pt had an PIV placed in her L arm for her Nitro drip, this IV ended up infiltrating and began to swell before it was discovered. Pt is now having pain and redness in this area. There is worry for cellulitis      Plan  Pt on doxycycline. Pt had one positive blood culture, thought to be due to contaminate      CHF (congestive heart failure), NYHA class IV    .Patient is fluid overloaded , but is improving with diuretics.     PET scan results:  The myocardial perfusion images are normal without evidence of ischemia or scar.    The whole heart absolute myocardial perfusion values  averaged 1.47 cc/min/g at rest, which is elevated; 1.42 cc/min/g at stress, which is mildly reduced; and CFR is 0.98 , which is severely reduced in part due to elevated resting flow.    Absolute flow measurements are consistent with abnormal microvascular function or possibly caffeine intake..    CT attenuation images demonstrate no coronary calcifications and no aortic calcifications.    The gated perfusion images showed an ejection fraction of 56% at rest and 60% during stress. A normal ejection fraction is greater than 47%.    The wall motion is normal at rest and during stress.    The LV cavity size is normal at rest and stress.    The EKG portion of this study is uninterpretable.    There were no arrhythmias during stress.    The patient reported no chest pain during the stress test.    There are no prior studies for comparison     Plan  PET ECHO was done, and did not show any overt abnormalities or areas of stenosis      NSTEMI (non-ST elevated myocardial infarction)  Pt came in not complaining of chest pain, but had elevated troponin at 0.105 and EKG changes with new LBBB. STEMI procedure not called due to the lack of chest pain for the last few days.     Plan  Began ACS protocol   Placed on heparin, plavix, aspirin  ECHO and patient's troponin peaked.  Daily CBC, CMP, Mg, Phos, APTT         Hypertensive emergency  Pt presented with SOB, ERENDIRA, EKG changes, and her  BP elevated in the 246/129     Plan  Pt placed on nitro drip. Pt's BP responded and pt has been in the lower range. Nitro drip D/C.  Placed on lasix 40mg IV  Given her home dose amlodipine 10mg   Pt on isosorbide mononitrate and BP has stayed stable in 130s-140s      VTE Risk Mitigation (From admission, onward)         Ordered     heparin 25,000 units in dextrose 5% 250 mL (100 units/mL) infusion LOW INTENSITY nomogram - [facility]  Continuous        Question Answer Comment   Heparin Infusion Adjustment (DO NOT MODIFY ANSWER)  \\[email or web address]\epic\Images\Pharmacy\HeparinInfusions\heparin LOW INTENSITY nomogram for [facility] [id number].pdf    Begin at (in units/kg/hr) 12        11/25/22 1207     heparin 25,000 units in dextrose 5% (100 units/ml) IV bolus from bag - ADDITIONAL PRN BOLUS - 60 units/kg  As needed (PRN)        Question:  Heparin Infusion Adjustment (DO NOT MODIFY ANSWER)  Answer:  \\[email or web address]\epic\Images\Pharmacy\HeparinInfusions\heparin LOW INTENSITY nomogram for [facility] [id number].pdf    11/25/22 1207     heparin 25,000 units in dextrose 5% (100 units/ml) IV bolus from bag - ADDITIONAL PRN BOLUS - 30 units/kg  As needed (PRN)        Question:  Heparin Infusion Adjustment (DO NOT MODIFY ANSWER)  Answer:  \\[email or web address]\epic\Images\Pharmacy\HeparinInfusions\heparin LOW INTENSITY nomogram for [facility] [id number].pdf    11/25/22 1207     IP VTE HIGH RISK PATIENT  Once         11/23/22 1512     Place sequential compression device  Until discontinued         11/23/22 1512                Discharge Planning   JOSLYN: 11/30/2022     Code Status: Full Code   Is the patient medically ready for discharge?: No    Reason for patient still in hospital (select all that apply): Patient unstable  Discharge Plan A: Home with family                  Parker Ulrich MD  Department of Hospital Medicine   [facility] - Cardiology Stepdown

## 2022-11-28 NOTE — SUBJECTIVE & OBJECTIVE
Interval History: New transfer from cardiology    Review of Systems   Constitutional: Negative.    HENT: Negative.     Eyes: Negative.    Respiratory: Negative.     Cardiovascular: Negative.    Gastrointestinal: Negative.    Endocrine: Negative.    Genitourinary: Negative.    Allergic/Immunologic: Negative.    Neurological: Negative.    Objective:     Vital Signs (Most Recent):  Temp: 97.7 °F (36.5 °C) (11/28/22 0721)  Pulse: 75 (11/28/22 0721)  Resp: 18 (11/28/22 0721)  BP: (!) 156/75 (11/28/22 0721)  SpO2: 97 % (11/28/22 0721)   Vital Signs (24h Range):  Temp:  [97.1 °F (36.2 °C)-98.5 °F (36.9 °C)] 97.7 °F (36.5 °C)  Pulse:  [61-87] 75  Resp:  [16-20] 18  SpO2:  [93 %-99 %] 97 %  BP: (130-170)/(63-75) 156/75     Weight: 58.4 kg (128 lb 12 oz)  Body mass index is 22.81 kg/m².    Intake/Output Summary (Last 24 hours) at 11/28/2022 0934  Last data filed at 11/28/2022 0625  Gross per 24 hour   Intake 581.67 ml   Output 600 ml   Net -18.33 ml      Physical Exam  Constitutional:       Appearance: Normal appearance.   HENT:      Nose: Nose normal.   Eyes:      Pupils: Pupils are equal, round, and reactive to light.   Cardiovascular:      Rate and Rhythm: Normal rate.   Pulmonary:      Effort: Pulmonary effort is normal.   Abdominal:      General: Abdomen is flat.   Skin:     General: Skin is warm.      Capillary Refill: Capillary refill takes less than 2 seconds.   Neurological:      Mental Status: She is alert.       Significant Labs: All pertinent labs within the past 24 hours have been reviewed.  CBC:   Recent Labs   Lab 11/27/22  0237 11/27/22  1417 11/28/22  0556   WBC 4.91 6.68 6.26   HGB 6.8* 8.1* 8.9*   HCT 23.1* 26.3* 28.1*    234 224       Significant Imaging: I have reviewed all pertinent imaging results/findings within the past 24 hours.

## 2022-11-28 NOTE — PLAN OF CARE
Pt remained free of injuries, falls, and trauma. VSS. No complaints of pain. Glucose monitored tonight, 217. SSI administered. Continuous Heparin infusing at prescribed rate. APTT being monitored daily. Hemoglobin resulted as 6.8. Pt received 1 unit PRBC. Pt denies noticing any bleeding. Fall precautions maintained.  Plan of care reviewed w/ pt. Pt verbalized understanding.      Problem: Adult Inpatient Plan of Care  Goal: Plan of Care Review  Outcome: Ongoing, Progressing  Flowsheets (Taken 11/28/2022 0410)  Plan of Care Reviewed With: patient     Problem: Diabetes Comorbidity  Goal: Blood Glucose Level Within Targeted Range  Intervention: Monitor and Manage Glycemia  Flowsheets (Taken 11/28/2022 0410)  Glycemic Management: blood glucose monitored     Problem: Fall Injury Risk  Goal: Absence of Fall and Fall-Related Injury  Intervention: Identify and Manage Contributors  Flowsheets (Taken 11/28/2022 0410)  Medication Review/Management: medications reviewed

## 2022-11-29 LAB
ALBUMIN SERPL BCP-MCNC: 3.3 G/DL (ref 3.5–5.2)
ALP SERPL-CCNC: 83 U/L (ref 55–135)
ALT SERPL W/O P-5'-P-CCNC: 19 U/L (ref 10–44)
ANION GAP SERPL CALC-SCNC: 13 MMOL/L (ref 8–16)
APTT BLDCRRT: 64.6 SEC (ref 21–32)
AST SERPL-CCNC: 16 U/L (ref 10–40)
BASOPHILS # BLD AUTO: 0.03 K/UL (ref 0–0.2)
BASOPHILS NFR BLD: 0.5 % (ref 0–1.9)
BILIRUB SERPL-MCNC: 1 MG/DL (ref 0.1–1)
BUN SERPL-MCNC: 55 MG/DL (ref 8–23)
CALCIUM SERPL-MCNC: 9.6 MG/DL (ref 8.7–10.5)
CHLORIDE SERPL-SCNC: 105 MMOL/L (ref 95–110)
CO2 SERPL-SCNC: 24 MMOL/L (ref 23–29)
CREAT SERPL-MCNC: 2.6 MG/DL (ref 0.5–1.4)
DIFFERENTIAL METHOD: ABNORMAL
EOSINOPHIL # BLD AUTO: 0.3 K/UL (ref 0–0.5)
EOSINOPHIL NFR BLD: 4.2 % (ref 0–8)
ERYTHROCYTE [DISTWIDTH] IN BLOOD BY AUTOMATED COUNT: 13.4 % (ref 11.5–14.5)
EST. GFR  (NO RACE VARIABLE): 18.7 ML/MIN/1.73 M^2
GLUCOSE SERPL-MCNC: 157 MG/DL (ref 70–110)
HCT VFR BLD AUTO: 31.9 % (ref 37–48.5)
HGB BLD-MCNC: 10.1 G/DL (ref 12–16)
IMM GRANULOCYTES # BLD AUTO: 0.03 K/UL (ref 0–0.04)
IMM GRANULOCYTES NFR BLD AUTO: 0.5 % (ref 0–0.5)
LYMPHOCYTES # BLD AUTO: 1.6 K/UL (ref 1–4.8)
LYMPHOCYTES NFR BLD: 27.8 % (ref 18–48)
MCH RBC QN AUTO: 29.8 PG (ref 27–31)
MCHC RBC AUTO-ENTMCNC: 31.7 G/DL (ref 32–36)
MCV RBC AUTO: 94 FL (ref 82–98)
MONOCYTES # BLD AUTO: 0.7 K/UL (ref 0.3–1)
MONOCYTES NFR BLD: 11.5 % (ref 4–15)
NEUTROPHILS # BLD AUTO: 3.3 K/UL (ref 1.8–7.7)
NEUTROPHILS NFR BLD: 55.5 % (ref 38–73)
NRBC BLD-RTO: 0 /100 WBC
PLATELET # BLD AUTO: 230 K/UL (ref 150–450)
PMV BLD AUTO: 10.5 FL (ref 9.2–12.9)
POCT GLUCOSE: 160 MG/DL (ref 70–110)
POCT GLUCOSE: 205 MG/DL (ref 70–110)
POCT GLUCOSE: 209 MG/DL (ref 70–110)
POCT GLUCOSE: 217 MG/DL (ref 70–110)
POCT GLUCOSE: 231 MG/DL (ref 70–110)
POTASSIUM SERPL-SCNC: 3.5 MMOL/L (ref 3.5–5.1)
PROT SERPL-MCNC: 5.8 G/DL (ref 6–8.4)
RBC # BLD AUTO: 3.39 M/UL (ref 4–5.4)
SODIUM SERPL-SCNC: 142 MMOL/L (ref 136–145)
WBC # BLD AUTO: 5.89 K/UL (ref 3.9–12.7)

## 2022-11-29 PROCEDURE — 99232 PR SUBSEQUENT HOSPITAL CARE,LEVL II: ICD-10-PCS | Mod: ,,, | Performed by: INTERNAL MEDICINE

## 2022-11-29 PROCEDURE — 80053 COMPREHEN METABOLIC PANEL: CPT | Performed by: STUDENT IN AN ORGANIZED HEALTH CARE EDUCATION/TRAINING PROGRAM

## 2022-11-29 PROCEDURE — 25000003 PHARM REV CODE 250: Performed by: STUDENT IN AN ORGANIZED HEALTH CARE EDUCATION/TRAINING PROGRAM

## 2022-11-29 PROCEDURE — 85730 THROMBOPLASTIN TIME PARTIAL: CPT | Performed by: STUDENT IN AN ORGANIZED HEALTH CARE EDUCATION/TRAINING PROGRAM

## 2022-11-29 PROCEDURE — 63600175 PHARM REV CODE 636 W HCPCS

## 2022-11-29 PROCEDURE — 25000003 PHARM REV CODE 250: Performed by: INTERNAL MEDICINE

## 2022-11-29 PROCEDURE — 20600001 HC STEP DOWN PRIVATE ROOM

## 2022-11-29 PROCEDURE — 85025 COMPLETE CBC W/AUTO DIFF WBC: CPT | Performed by: STUDENT IN AN ORGANIZED HEALTH CARE EDUCATION/TRAINING PROGRAM

## 2022-11-29 PROCEDURE — C9113 INJ PANTOPRAZOLE SODIUM, VIA: HCPCS

## 2022-11-29 PROCEDURE — 25000003 PHARM REV CODE 250

## 2022-11-29 PROCEDURE — 36415 COLL VENOUS BLD VENIPUNCTURE: CPT | Performed by: STUDENT IN AN ORGANIZED HEALTH CARE EDUCATION/TRAINING PROGRAM

## 2022-11-29 PROCEDURE — 63600175 PHARM REV CODE 636 W HCPCS: Performed by: STUDENT IN AN ORGANIZED HEALTH CARE EDUCATION/TRAINING PROGRAM

## 2022-11-29 PROCEDURE — 99232 SBSQ HOSP IP/OBS MODERATE 35: CPT | Mod: ,,, | Performed by: INTERNAL MEDICINE

## 2022-11-29 RX ADMIN — DOXYCYCLINE HYCLATE 100 MG: 100 TABLET, COATED ORAL at 09:11

## 2022-11-29 RX ADMIN — CARVEDILOL 12.5 MG: 12.5 TABLET, FILM COATED ORAL at 07:11

## 2022-11-29 RX ADMIN — APIXABAN 5 MG: 5 TABLET, FILM COATED ORAL at 09:11

## 2022-11-29 RX ADMIN — PANTOPRAZOLE SODIUM 40 MG: 40 INJECTION, POWDER, FOR SOLUTION INTRAVENOUS at 08:11

## 2022-11-29 RX ADMIN — INSULIN ASPART 2 UNITS: 100 INJECTION, SOLUTION INTRAVENOUS; SUBCUTANEOUS at 08:11

## 2022-11-29 RX ADMIN — INSULIN ASPART 2 UNITS: 100 INJECTION, SOLUTION INTRAVENOUS; SUBCUTANEOUS at 05:11

## 2022-11-29 RX ADMIN — ASPIRIN 81 MG: 81 TABLET, COATED ORAL at 09:11

## 2022-11-29 RX ADMIN — INSULIN ASPART 2 UNITS: 100 INJECTION, SOLUTION INTRAVENOUS; SUBCUTANEOUS at 12:11

## 2022-11-29 RX ADMIN — HEPARIN SODIUM 14 UNITS/KG/HR: 10000 INJECTION, SOLUTION INTRAVENOUS at 07:11

## 2022-11-29 RX ADMIN — CARVEDILOL 12.5 MG: 12.5 TABLET, FILM COATED ORAL at 05:11

## 2022-11-29 RX ADMIN — FUROSEMIDE 40 MG: 40 TABLET ORAL at 09:11

## 2022-11-29 RX ADMIN — DOXYCYCLINE HYCLATE 100 MG: 100 TABLET, COATED ORAL at 08:11

## 2022-11-29 RX ADMIN — AMLODIPINE BESYLATE 10 MG: 10 TABLET ORAL at 09:11

## 2022-11-29 RX ADMIN — PANTOPRAZOLE SODIUM 40 MG: 40 INJECTION, POWDER, FOR SOLUTION INTRAVENOUS at 09:11

## 2022-11-29 RX ADMIN — ATORVASTATIN CALCIUM 40 MG: 40 TABLET, FILM COATED ORAL at 09:11

## 2022-11-29 RX ADMIN — APIXABAN 5 MG: 5 TABLET, FILM COATED ORAL at 08:11

## 2022-11-29 RX ADMIN — INSULIN DETEMIR 3 UNITS: 100 INJECTION, SOLUTION SUBCUTANEOUS at 08:11

## 2022-11-29 RX ADMIN — ISOSORBIDE MONONITRATE 30 MG: 30 TABLET, EXTENDED RELEASE ORAL at 09:11

## 2022-11-29 NOTE — PROGRESS NOTES
[facility] - Cardiology [facility] Medicine  Progress Note    Patient Name: Keely Pizarro  MRN: [id number]  Patient Class: IP- Inpatient   Admission Date: 11/23/2022  Length of Stay: 6 days  Attending Physician: Parker Ulrich MD  Primary Care Provider: Albin Cazares MD        Subjective:     Principal Problem:Acute on chronic diastolic CHF (congestive heart failure), NYHA class 3        HPI:  No notes on file    Overview/Hospital Course:  No notes on file    Interval History: She wants to go home, will dc heparin - start elequis, Have PT eval.    Review of Systems   Constitutional: Negative.    Eyes: Negative.    Respiratory: Negative.     Cardiovascular: Negative.    Gastrointestinal: Negative.    Endocrine: Negative.    Genitourinary: Negative.    Allergic/Immunologic: Negative.    Neurological: Negative.    Objective:     Vital Signs (Most Recent):  Temp: 98.5 °F (36.9 °C) (11/29/22 0752)  Pulse: 76 (11/29/22 0752)  Resp: 18 (11/29/22 0752)  BP: 139/75 (11/29/22 0752)  SpO2: 97 % (11/29/22 0752) Vital Signs (24h Range):  Temp:  [97.1 °F (36.2 °C)-98.5 °F (36.9 °C)] 98.5 °F (36.9 °C)  Pulse:  [60-82] 76  Resp:  [17-18] 18  SpO2:  [97 %-100 %] 97 %  BP: (117-182)/(58-82) 139/75     Weight: 56.3 kg (124 lb 1.9 oz)  Body mass index is 21.99 kg/m².    Intake/Output Summary (Last 24 hours) at 11/29/2022 0939  Last data filed at 11/29/2022 0709  Gross per 24 hour   Intake 600 ml   Output 400 ml   Net 200 ml      Physical Exam  Constitutional:       Appearance: Normal appearance.   HENT:      Head: Normocephalic.      Nose: Nose normal.      Mouth/Throat:      Mouth: Mucous membranes are moist.   Cardiovascular:      Rate and Rhythm: Normal rate.   Pulmonary:      Effort: Pulmonary effort is normal.   Abdominal:      General: Abdomen is flat.   Musculoskeletal:         General: Normal range of motion.   Skin:     General: Skin is warm.   Neurological:      Mental Status: She is alert.        Significant Labs: All pertinent labs within the past 24 hours have been reviewed.  CBC:   Recent Labs   Lab 11/27/22  1417 11/28/22  0556 11/29/22  0531   WBC 6.68 6.26 5.89   HGB 8.1* 8.9* 10.1*   HCT 26.3* 28.1* 31.9*    224 230       Significant Imaging: I have reviewed all pertinent imaging results/findings within the past 24 hours.      Assessment/Plan:      Acute blood loss anemia  Pt presented with an NSTEMI. Placed on ACS protocol. Pt's CBC this morning had a drop in hemoglobin from 11.3 to 8.3 on 11/24. No active sites of bleeding noted. Pt has not had a BM.     CBC q8hrs  Pt's hemoglobin dropped from 11.3 to 8.3. No signs of bleeding noticed with this patient. Pt had no bloody bowel movements while she has been here.  Have not noticed any signs of bleeding  DIC workup negative, iron studies ordered. Hematology consult placed, appreciated recs. They contributed to infiltrated IV.  Pt hemoglobin dropped to 6.8 and pt required a unit of blood.         Acute deep vein thrombosis (DVT) of non-extremity vein  It was discovered pt has a thrombosis in her LIJ. Pt is not having any symptoms besides some swelling     Plan  Pt placed on a low intensity heparin regiment due to concern for drop in hemoglobin. No overt signs of bleeding. May transition pt to elliquis  -Will discontinue if pt begins to show signs of bleeding      Cellulitis of left arm  Pt had an PIV placed in her L arm for her Nitro drip, this IV ended up infiltrating and began to swell before it was discovered. Pt is now having pain and redness in this area. There is worry for cellulitis      Plan  Pt on doxycycline. Pt had one positive blood culture, thought to be due to contaminate      CHF (congestive heart failure), NYHA class IV    .Patient is fluid overloaded , but is improving with diuretics.     PET scan results:  The myocardial perfusion images are normal without evidence of ischemia or scar.    The whole heart absolute  myocardial perfusion values averaged 1.47 cc/min/g at rest, which is elevated; 1.42 cc/min/g at stress, which is mildly reduced; and CFR is 0.98 , which is severely reduced in part due to elevated resting flow.    Absolute flow measurements are consistent with abnormal microvascular function or possibly caffeine intake..    CT attenuation images demonstrate no coronary calcifications and no aortic calcifications.    The gated perfusion images showed an ejection fraction of 56% at rest and 60% during stress. A normal ejection fraction is greater than 47%.    The wall motion is normal at rest and during stress.    The LV cavity size is normal at rest and stress.    The EKG portion of this study is uninterpretable.    There were no arrhythmias during stress.    The patient reported no chest pain during the stress test.    There are no prior studies for comparison     Plan  PET ECHO was done, and did not show any overt abnormalities or areas of stenosis      NSTEMI (non-ST elevated myocardial infarction)  Pt came in not complaining of chest pain, but had elevated troponin at 0.105 and EKG changes with new LBBB. STEMI procedure not called due to the lack of chest pain for the last few days.     Plan  Began ACS protocol   Placed on heparin, plavix, aspirin  ECHO and patient's troponin peaked.  Daily CBC, CMP, Mg, Phos, APTT         Hypertensive emergency  Pt presented with SOB, ERENDIRA, EKG changes, and her  BP elevated in the 246/129     Plan  Pt placed on nitro drip. Pt's BP responded and pt has been in the lower range. Nitro drip D/C.  Placed on lasix 40mg IV  Given her home dose amlodipine 10mg   Pt on isosorbide mononitrate and BP has stayed stable in 130s-140s      VTE Risk Mitigation (From admission, onward)         Ordered     apixaban tablet 5 mg  2 times daily         11/29/22 0915     IP VTE HIGH RISK PATIENT  Once         11/23/22 1512     Place sequential compression device  Until discontinued          11/23/22 1512                Discharge Planning   JOSLYN: 11/30/2022     Code Status: Full Code   Is the patient medically ready for discharge?: No    Reason for patient still in hospital (select all that apply): Patient unstable  Discharge Plan A: Home with family                  Parker Ulrich MD  Department of Hospital Medicine   [facility] - Cardiology Stepdown

## 2022-11-29 NOTE — SUBJECTIVE & OBJECTIVE
Interval History: She wants to go home, will dc heparin - start elequis, Have PT eval.    Review of Systems   Constitutional: Negative.    Eyes: Negative.    Respiratory: Negative.     Cardiovascular: Negative.    Gastrointestinal: Negative.    Endocrine: Negative.    Genitourinary: Negative.    Allergic/Immunologic: Negative.    Neurological: Negative.    Objective:     Vital Signs (Most Recent):  Temp: 98.5 °F (36.9 °C) (11/29/22 0752)  Pulse: 76 (11/29/22 0752)  Resp: 18 (11/29/22 0752)  BP: 139/75 (11/29/22 0752)  SpO2: 97 % (11/29/22 0752) Vital Signs (24h Range):  Temp:  [97.1 °F (36.2 °C)-98.5 °F (36.9 °C)] 98.5 °F (36.9 °C)  Pulse:  [60-82] 76  Resp:  [17-18] 18  SpO2:  [97 %-100 %] 97 %  BP: (117-182)/(58-82) 139/75     Weight: 56.3 kg (124 lb 1.9 oz)  Body mass index is 21.99 kg/m².    Intake/Output Summary (Last 24 hours) at 11/29/2022 0939  Last data filed at 11/29/2022 0709  Gross per 24 hour   Intake 600 ml   Output 400 ml   Net 200 ml      Physical Exam  Constitutional:       Appearance: Normal appearance.   HENT:      Head: Normocephalic.      Nose: Nose normal.      Mouth/Throat:      Mouth: Mucous membranes are moist.   Cardiovascular:      Rate and Rhythm: Normal rate.   Pulmonary:      Effort: Pulmonary effort is normal.   Abdominal:      General: Abdomen is flat.   Musculoskeletal:         General: Normal range of motion.   Skin:     General: Skin is warm.   Neurological:      Mental Status: She is alert.       Significant Labs: All pertinent labs within the past 24 hours have been reviewed.  CBC:   Recent Labs   Lab 11/27/22  1417 11/28/22  0556 11/29/22  0531   WBC 6.68 6.26 5.89   HGB 8.1* 8.9* 10.1*   HCT 26.3* 28.1* 31.9*    224 230       Significant Imaging: I have reviewed all pertinent imaging results/findings within the past 24 hours.

## 2022-11-29 NOTE — SIGNIFICANT EVENT
Notified by respiratory therapy - patient declining nightly BIPAP - hasn't worn since 11/25 evening    Will discontinue scheduled order for now         Tiago Adorno M.D.  Attending Physician  [facility] Medicine Dept.  Pager: [phone]  Spectralink -[phone]

## 2022-11-30 VITALS
SYSTOLIC BLOOD PRESSURE: 165 MMHG | RESPIRATION RATE: 20 BRPM | WEIGHT: 124.13 LBS | OXYGEN SATURATION: 100 % | HEIGHT: 63 IN | HEART RATE: 71 BPM | DIASTOLIC BLOOD PRESSURE: 79 MMHG | TEMPERATURE: 98 F | BODY MASS INDEX: 21.99 KG/M2

## 2022-11-30 LAB
ALBUMIN SERPL BCP-MCNC: 3.3 G/DL (ref 3.5–5.2)
ALP SERPL-CCNC: 81 U/L (ref 55–135)
ALT SERPL W/O P-5'-P-CCNC: 18 U/L (ref 10–44)
ANION GAP SERPL CALC-SCNC: 12 MMOL/L (ref 8–16)
AST SERPL-CCNC: 15 U/L (ref 10–40)
BACTERIA BLD CULT: NORMAL
BACTERIA BLD CULT: NORMAL
BILIRUB SERPL-MCNC: 1 MG/DL (ref 0.1–1)
BUN SERPL-MCNC: 57 MG/DL (ref 8–23)
CALCIUM SERPL-MCNC: 9.2 MG/DL (ref 8.7–10.5)
CHLORIDE SERPL-SCNC: 107 MMOL/L (ref 95–110)
CO2 SERPL-SCNC: 23 MMOL/L (ref 23–29)
CREAT SERPL-MCNC: 2.7 MG/DL (ref 0.5–1.4)
EST. GFR  (NO RACE VARIABLE): 17.8 ML/MIN/1.73 M^2
GLUCOSE SERPL-MCNC: 162 MG/DL (ref 70–110)
POTASSIUM SERPL-SCNC: 3.3 MMOL/L (ref 3.5–5.1)
PROT SERPL-MCNC: 5.5 G/DL (ref 6–8.4)
SODIUM SERPL-SCNC: 142 MMOL/L (ref 136–145)

## 2022-11-30 PROCEDURE — 1111F PR DISCHARGE MEDS RECONCILED W/ CURRENT OUTPATIENT MED LIST: ICD-10-PCS | Mod: CPTII,,, | Performed by: INTERNAL MEDICINE

## 2022-11-30 PROCEDURE — 80053 COMPREHEN METABOLIC PANEL: CPT | Performed by: STUDENT IN AN ORGANIZED HEALTH CARE EDUCATION/TRAINING PROGRAM

## 2022-11-30 PROCEDURE — 25000003 PHARM REV CODE 250: Performed by: STUDENT IN AN ORGANIZED HEALTH CARE EDUCATION/TRAINING PROGRAM

## 2022-11-30 PROCEDURE — 63600175 PHARM REV CODE 636 W HCPCS

## 2022-11-30 PROCEDURE — 99238 HOSP IP/OBS DSCHRG MGMT 30/<: CPT | Mod: ,,, | Performed by: INTERNAL MEDICINE

## 2022-11-30 PROCEDURE — 36415 COLL VENOUS BLD VENIPUNCTURE: CPT | Performed by: STUDENT IN AN ORGANIZED HEALTH CARE EDUCATION/TRAINING PROGRAM

## 2022-11-30 PROCEDURE — 99238 PR HOSPITAL DISCHARGE DAY,<30 MIN: ICD-10-PCS | Mod: ,,, | Performed by: INTERNAL MEDICINE

## 2022-11-30 PROCEDURE — 25000003 PHARM REV CODE 250: Performed by: INTERNAL MEDICINE

## 2022-11-30 PROCEDURE — 1111F DSCHRG MED/CURRENT MED MERGE: CPT | Mod: CPTII,,, | Performed by: INTERNAL MEDICINE

## 2022-11-30 PROCEDURE — C9113 INJ PANTOPRAZOLE SODIUM, VIA: HCPCS

## 2022-11-30 PROCEDURE — 25000003 PHARM REV CODE 250

## 2022-11-30 RX ORDER — CYPROHEPTADINE HYDROCHLORIDE 4 MG/1
4 TABLET ORAL 2 TIMES DAILY
Qty: 30 TABLET | Refills: 2 | Status: ON HOLD | OUTPATIENT
Start: 2022-11-30 | End: 2023-01-06 | Stop reason: HOSPADM

## 2022-11-30 RX ORDER — SODIUM BICARBONATE 650 MG/1
650 TABLET ORAL 2 TIMES DAILY
Qty: 30 TABLET | Refills: 2 | Status: SHIPPED | OUTPATIENT
Start: 2022-11-30

## 2022-11-30 RX ORDER — ATORVASTATIN CALCIUM 40 MG/1
40 TABLET, FILM COATED ORAL DAILY
Qty: 90 TABLET | Refills: 3 | Status: SHIPPED | OUTPATIENT
Start: 2022-12-01 | End: 2023-12-01

## 2022-11-30 RX ORDER — DOXYCYCLINE HYCLATE 100 MG
100 TABLET ORAL EVERY 12 HOURS
Qty: 8 TABLET | Refills: 0 | Status: SHIPPED | OUTPATIENT
Start: 2022-11-30 | End: 2022-12-04

## 2022-11-30 RX ORDER — PANTOPRAZOLE SODIUM 40 MG/1
40 TABLET, DELAYED RELEASE ORAL DAILY
Qty: 30 TABLET | Refills: 11 | Status: SHIPPED | OUTPATIENT
Start: 2022-11-30 | End: 2023-11-30

## 2022-11-30 RX ORDER — ISOSORBIDE MONONITRATE 30 MG/1
30 TABLET, EXTENDED RELEASE ORAL DAILY
Qty: 30 TABLET | Refills: 11 | Status: SHIPPED | OUTPATIENT
Start: 2022-12-01 | End: 2023-12-01

## 2022-11-30 RX ORDER — FUROSEMIDE 40 MG/1
40 TABLET ORAL DAILY
Qty: 30 TABLET | Refills: 11 | Status: SHIPPED | OUTPATIENT
Start: 2022-12-01 | End: 2022-12-27 | Stop reason: SDUPTHER

## 2022-11-30 RX ADMIN — ISOSORBIDE MONONITRATE 30 MG: 30 TABLET, EXTENDED RELEASE ORAL at 09:11

## 2022-11-30 RX ADMIN — FUROSEMIDE 40 MG: 40 TABLET ORAL at 09:11

## 2022-11-30 RX ADMIN — AMLODIPINE BESYLATE 10 MG: 10 TABLET ORAL at 09:11

## 2022-11-30 RX ADMIN — CARVEDILOL 12.5 MG: 12.5 TABLET, FILM COATED ORAL at 09:11

## 2022-11-30 RX ADMIN — DOXYCYCLINE HYCLATE 100 MG: 100 TABLET, COATED ORAL at 09:11

## 2022-11-30 RX ADMIN — PANTOPRAZOLE SODIUM 40 MG: 40 INJECTION, POWDER, FOR SOLUTION INTRAVENOUS at 09:11

## 2022-11-30 RX ADMIN — APIXABAN 5 MG: 5 TABLET, FILM COATED ORAL at 09:11

## 2022-11-30 RX ADMIN — ATORVASTATIN CALCIUM 40 MG: 40 TABLET, FILM COATED ORAL at 09:11

## 2022-11-30 RX ADMIN — ASPIRIN 81 MG: 81 TABLET, COATED ORAL at 09:11

## 2022-11-30 NOTE — PLAN OF CARE
[facility] - Cardiology Stepdown  Discharge Final Note    Primary Care Provider: Albin Cazares MD    Expected Discharge Date: 11/30/2022    Final Discharge Note (most recent)       Final Note - 11/30/22 1214          Final Note    Assessment Type Final Discharge Note     Anticipated Discharge Disposition Home or Self Care     Hospital Resources/Appts/Education Provided Appointments scheduled and added to AVS                   Shira Coy LMSW  [facility]  [id number]      Important Message from Medicare  Important Message from Medicare regarding Discharge Appeal Rights: Given to patient/caregiver, Explained to patient/caregiver, Signed/date by patient/caregiver     Date IMM was signed: 11/30/22  Time IMM was signed: 1149      Future Appointments   Date Time Provider Department Center   12/6/2022  9:15 AM SPECIMEN, [facility] SPECLAB [facility]   12/6/2022  9:45 AM LAB, [facility] LABDR[facility]   12/7/2022 10:00 AM Vishal Anne MD [facility]   1/23/2023 12:00 PM NOM LAB BMT [facility] LABBMT [facility]   1/23/2023  1:00 PM Jazmin Rodríguez MD [facility] HC BMT [facility]

## 2022-11-30 NOTE — PLAN OF CARE
"APPOINTMENT:     Patient has been scheduled for a [facility] Follow Up with Vishal Anne MD on Wednesday Dec 7, 2022 at 10:00 AM.    Please arrive approximately 15 minutes before your scheduled appointment time and ensure that you have a valid government issued ID and your insurance card.     Internal Medicine - [facility], 8th Floor, [address]   Please park in [address] and use [place] elevators.    [facility] - Internal Medicine  [address] [place] [address] [id number] [phone]    CHW met with patient/family at bedside. Patient experience rounding completed and reviewed the following.     Do you know your discharge plan? Yes     If yes, what is the plan? "I'm going home"    If you are discharging home, do you have help at home? "Yes my "    Do you think you will need help at home at discharge? "No I don't need any help. I do everything for myself"    Have you discussed your needs and preferences with your SW/CM? Yes     Assigned SW/CM notified of any patient/family needs or concerns. Of note, no needs expressed by patient.    IMM:    CHW delivered IMM to patient / family at bedside with questions answered. Signed @7137      Jase Dayana  Community Health Worker(CHW)  Case Management  Ext. [phone]  "

## 2022-12-01 ENCOUNTER — PATIENT OUTREACH (OUTPATIENT)
Dept: ADMINISTRATIVE | Facility: CLINIC | Age: 77
End: 2022-12-01
Payer: MEDICARE

## 2022-12-05 NOTE — HOSPITAL COURSE
Pt is a 77 year old female with PMH of HTN, DM2, and CKD3 that presented to the ED with SOB for the last 3 days that has progressively worsened and elevated BP. Pt stated it was worse with exertion.  Pt also stated she felt like she had a chest cold or mucus in her chest, but never coughed any phlegm up. Pt in the ED had elevated BP at 246/129. Pt had chest pain two days ago, but was not experiencing any now. Pt's EKG in the ED showed a new LBBB, but with no active chest pain, code STEMI was not called. Pt's troponin was elevated at 0.105 and BNP 1,333. Pt placed on a Nitro drip and BP began to improve. Pt placed on BiPAP due to decreased sats and increased respiratory rate. Pt admitted to the CCU for Cardiac workup and ACS protocol.    Acute blood loss anemia  Pt presented with an NSTEMI. Placed on ACS protocol. Pt's CBC this morning had a drop in hemoglobin from 11.3 to 8.3 on 11/24. No active sites of bleeding noted.           Acute deep vein thrombosis (DVT) of non-extremity vein  It was discovered pt has a thrombosis in her LIJ. Pt is not having any symptoms besides some swelling     Pt placed on a low intensity heparin regiment due to concern for drop in hemoglobin. No overt signs of bleeding. Transitioned to elliquis          Cellulitis of left arm  Pt had an PIV placed in her L arm for her Nitro drip, this IV ended up infiltrating and began to swell before it was discovered. Pt is now having pain and redness in this area. There is worry for cellulitis      Pt is th c/w doxycycline. Pt had one positive blood culture, thought to be due to contaminate        CHF (congestive heart failure), NYHA class IV     .Patient was fluid overloaded , but imp[rovedwith diuretics.     PET scan results:  The myocardial perfusion images are normal without evidence of ischemia or scar.    PET ECHO was done, and did not show any overt abnormalities or areas of stenosis        NSTEMI (non-ST elevated myocardial infarction)  Pt  came in not complaining of chest pain, but had elevated troponin at 0.105 and EKG changes with new LBBB. STEMI procedure not called due to the lack of chest pain for the last few days.          Hypertensive emergency  Pt presented with SOB, ERENDIRA, EKG changes, and her  BP elevated in the 246/129       Pt placed on nitro drip. Pt's BP responded and pt has been in the lower range. Nitro drip D/C.  Placed on lasix 40mg IV  Given her home dose amlodipine 10mg   Pt on isosorbide mononitrate and BP has stayed stable in 130s-140s

## 2022-12-05 NOTE — DISCHARGE SUMMARY
[facility] Medicine  Discharge Summary      Patient Name: Keely Pizarro  MRN: [id number]  ANOOP: [id number]  Patient Class: IP- Inpatient  Admission Date: 11/23/2022  Hospital Length of Stay: 7 days  Discharge Date and Time: 11/30/2022 12:05 PM  Attending Physician: No att. providers found   Discharging Provider: Parker Ulrich MD  Primary Care Provider: Albin Cazares MD  [facility] Medicine Team: [facility] Parker Ulrich MD  Primary Care Team: [facility]    HPI:   No notes on file    * No surgery found *      Hospital Course:   Pt is a 77 year old female with PMH of HTN, DM2, and CKD3 that presented to the ED with SOB for the last 3 days that has progressively worsened and elevated BP. Pt stated it was worse with exertion.  Pt also stated she felt like she had a chest cold or mucus in her chest, but never coughed any phlegm up. Pt in the ED had elevated BP at 246/129. Pt had chest pain two days ago, but was not experiencing any now. Pt's EKG in the ED showed a new LBBB, but with no active chest pain, code STEMI was not called. Pt's troponin was elevated at 0.105 and BNP 1,333. Pt placed on a Nitro drip and BP began to improve. Pt placed on BiPAP due to decreased sats and increased respiratory rate. Pt admitted to the CCU for Cardiac workup and ACS protocol.    Acute blood loss anemia  Pt presented with an NSTEMI. Placed on ACS protocol. Pt's CBC this morning had a drop in hemoglobin from 11.3 to 8.3 on 11/24. No active sites of bleeding noted.           Acute deep vein thrombosis (DVT) of non-extremity vein  It was discovered pt has a thrombosis in her LIJ. Pt is not having any symptoms besides some swelling     Pt placed on a low intensity heparin regiment due to concern for drop in hemoglobin. No overt signs of bleeding. Transitioned to elliquis          Cellulitis of left arm  Pt had an PIV placed in her L arm for her Nitro drip, this IV ended up  infiltrating and began to swell before it was discovered. Pt is now having pain and redness in this area. There is worry for cellulitis      Pt is th c/w doxycycline. Pt had one positive blood culture, thought to be due to contaminate        CHF (congestive heart failure), NYHA class IV     .Patient was fluid overloaded , but imp[rovedwith diuretics.     PET scan results:  The myocardial perfusion images are normal without evidence of ischemia or scar.    PET ECHO was done, and did not show any overt abnormalities or areas of stenosis        NSTEMI (non-ST elevated myocardial infarction)  Pt came in not complaining of chest pain, but had elevated troponin at 0.105 and EKG changes with new LBBB. STEMI procedure not called due to the lack of chest pain for the last few days.          Hypertensive emergency  Pt presented with SOB, ERENDIRA, EKG changes, and her  BP elevated in the 246/129       Pt placed on nitro drip. Pt's BP responded and pt has been in the lower range. Nitro drip D/C.  Placed on lasix 40mg IV  Given her home dose amlodipine 10mg   Pt on isosorbide mononitrate and BP has stayed stable in 130s-140s       Goals of Care Treatment Preferences:  Code Status: Full Code      Consults:   Consults (From admission, onward)        Status Ordering Provider     Inpatient consult to Hematology/Oncology  Once        Provider:  (Not yet assigned)    Completed GRAY LEE     Inpatient consult to Cardiology  Once        Provider:  (Not yet assigned)    Completed TAYO TORRES new Assessment & Plan notes have been filed under this hospital service since the last note was generated.  Service: Hospital Medicine    Final Active Diagnoses:    Diagnosis Date Noted POA    PRINCIPAL PROBLEM:  Acute on chronic diastolic CHF (congestive heart failure), NYHA class 3 [I50.33] 11/25/2022 Yes    Cellulitis [L03.90] 11/28/2022 Yes    Acute blood loss anemia [D62] 11/28/2022 Yes    Cellulitis of left arm  [L03.114] 11/25/2022 No    Acute deep vein thrombosis (DVT) of non-extremity vein [I82.90] 11/25/2022 No    CHF (congestive heart failure), NYHA class IV [I50.9] 11/24/2022 Yes    Anemia [D64.9] 11/24/2022 No    Hypertensive emergency [I16.1] 11/23/2022 Yes    NSTEMI (non-ST elevated myocardial infarction) [I21.4] 11/23/2022 Yes    ERENDIRA (acute kidney injury) [N17.9] 11/23/2022 Yes    Acute respiratory failure [J96.00] 11/23/2022 Yes    Follicular lymphoma grade i, lymph nodes of multiple sites [C82.08] 09/01/2017 Yes      Problems Resolved During this Admission:       Discharged Condition: good    Disposition: Home or Self Care    Follow Up:    Patient Instructions:   No discharge procedures on file.    Significant Diagnostic Studies: Labs: BMP: No results for input(s): GLU, NA, K, CL, CO2, BUN, CREATININE, CALCIUM, MG in the last 48 hours.    Pending Diagnostic Studies:     None         Medications:  Reconciled Home Medications:      Medication List      START taking these medications    atorvastatin 40 MG tablet  Commonly known as: LIPITOR  Take 1 tablet (40 mg total) by mouth once daily.     ELIQUIS 5 mg Tab  Generic drug: apixaban  Take 1 tablet (5 mg total) by mouth 2 (two) times daily.     furosemide 40 MG tablet  Commonly known as: LASIX  Take 1 tablet (40 mg total) by mouth once daily.     isosorbide mononitrate 30 MG 24 hr tablet  Commonly known as: IMDUR  Take 1 tablet (30 mg total) by mouth once daily.     pantoprazole 40 MG tablet  Commonly known as: PROTONIX  Take 1 tablet (40 mg total) by mouth once daily.        CHANGE how you take these medications    cyproheptadine 4 mg tablet  Commonly known as: PERIACTIN  Take 1 tablet (4 mg total) by mouth 2 (two) times a day.  What changed: when to take this        CONTINUE taking these medications    * ACCU-CHEK LUTHER PLUS TEST STRP Strp  Generic drug: blood sugar diagnostic  To check BG 4 times daily, to use with insurance preferred meter     * blood  "sugar diagnostic Strp  1 each by Misc.(Non-Drug; Combo Route) route 3 (three) times daily.     amLODIPine 10 MG tablet  Commonly known as: NORVASC  Take 1 tablet (10 mg total) by mouth once daily.     aspirin 81 MG EC tablet  Commonly known as: ECOTRIN  Take 81 mg by mouth once daily.     BD ALCOHOL SWABS Padm  Generic drug: alcohol swabs     BD ULTRA-FINE BRIGITTE PEN NEEDLE 32 gauge x 5/32" Ndle  Generic drug: pen needle, diabetic  To use with Insulin pens     blood-glucose meter Misc  Use as directed     carvediloL 6.25 MG tablet  Commonly known as: COREG  Take 1 tablet (6.25 mg total) by mouth 2 (two) times daily with meals.     * lancets Misc  1 each by Misc.(Non-Drug; Combo Route) route 3 (three) times daily.     * ACCU-CHEK SOFTCLIX LANCETS Misc  Generic drug: lancets  To check blood glucose 4 times daily, to use with insurance preferred meter     methocarbamoL 500 MG Tab  Commonly known as: ROBAXIN  Take 500 mg by mouth 2 (two) times daily as needed (muscle spasms).     sodium bicarbonate 650 MG tablet  Take 1 tablet (650 mg total) by mouth 2 (two) times daily.     TRADJENTA 5 mg Tab tablet  Generic drug: linaGLIPtin  Take 1 tablet (5 mg total) by mouth once daily.     vitamin D 1000 units Tab  Commonly known as: VITAMIN D3  Take 1 tablet (1,000 Units total) by mouth once daily.         * This list has 4 medication(s) that are the same as other medications prescribed for you. Read the directions carefully, and ask your doctor or other care provider to review them with you.            ASK your doctor about these medications    doxycycline 100 MG tablet  Commonly known as: VIBRA-TABS  Take 1 tablet (100 mg total) by mouth every 12 (twelve) hours. for 4 days  Ask about: Should I take this medication?            Indwelling Lines/Drains at time of discharge:   Lines/Drains/Airways     None                 Time spent on the discharge of patient: 15 minutes         Parker Ulrich MD  Department of Hospital " Medicine  [facility]

## 2022-12-06 ENCOUNTER — TELEPHONE (OUTPATIENT)
Dept: INTERNAL MEDICINE | Facility: CLINIC | Age: 77
End: 2022-12-06
Payer: MEDICARE

## 2022-12-06 ENCOUNTER — LAB VISIT (OUTPATIENT)
Dept: LAB | Facility: OTHER | Age: 77
End: 2022-12-06
Attending: FAMILY MEDICINE
Payer: MEDICARE

## 2022-12-06 DIAGNOSIS — E11.9 TYPE 2 DIABETES MELLITUS WITHOUT COMPLICATION: ICD-10-CM

## 2022-12-06 LAB
CHOLEST SERPL-MCNC: 114 MG/DL (ref 120–199)
CHOLEST/HDLC SERPL: 1.9 {RATIO} (ref 2–5)
ESTIMATED AVG GLUCOSE: 134 MG/DL (ref 68–131)
HBA1C MFR BLD: 6.3 % (ref 4–5.6)
HDLC SERPL-MCNC: 60 MG/DL (ref 40–75)
HDLC SERPL: 52.6 % (ref 20–50)
LDLC SERPL CALC-MCNC: 43.4 MG/DL (ref 63–159)
NONHDLC SERPL-MCNC: 54 MG/DL
TRIGL SERPL-MCNC: 53 MG/DL (ref 30–150)

## 2022-12-06 PROCEDURE — 80061 LIPID PANEL: CPT | Performed by: FAMILY MEDICINE

## 2022-12-06 PROCEDURE — 83036 HEMOGLOBIN GLYCOSYLATED A1C: CPT | Performed by: FAMILY MEDICINE

## 2022-12-06 PROCEDURE — 36415 COLL VENOUS BLD VENIPUNCTURE: CPT | Performed by: FAMILY MEDICINE

## 2022-12-06 NOTE — TELEPHONE ENCOUNTER
----- Message from Albin Cazares MD sent at 12/6/2022  1:29 PM CST -----  Blood work looks good as we expected.  No changes in medications.  Followup as scheduled.

## 2022-12-06 NOTE — TELEPHONE ENCOUNTER
Called and spoke to Ms. Pizarro. The listed message from Dr. Cazares was given to her    Albin MALIN. MD GINGER Cazares Staff  Blood work looks good as we expected.  No changes in medications.  Followup as scheduled.

## 2022-12-07 ENCOUNTER — OFFICE VISIT (OUTPATIENT)
Dept: INTERNAL MEDICINE | Facility: CLINIC | Age: 77
End: 2022-12-07
Attending: INTERNAL MEDICINE
Payer: MEDICARE

## 2022-12-07 ENCOUNTER — IMMUNIZATION (OUTPATIENT)
Dept: PHARMACY | Facility: CLINIC | Age: 77
End: 2022-12-07
Payer: MEDICARE

## 2022-12-07 VITALS
HEART RATE: 99 BPM | OXYGEN SATURATION: 98 % | BODY MASS INDEX: 21.99 KG/M2 | WEIGHT: 124.13 LBS | SYSTOLIC BLOOD PRESSURE: 130 MMHG | DIASTOLIC BLOOD PRESSURE: 68 MMHG | HEIGHT: 63 IN

## 2022-12-07 DIAGNOSIS — I50.33 ACUTE ON CHRONIC DIASTOLIC HEART FAILURE: ICD-10-CM

## 2022-12-07 DIAGNOSIS — T45.1X5A ANEMIA ASSOCIATED WITH CHEMOTHERAPY: ICD-10-CM

## 2022-12-07 DIAGNOSIS — I10 ESSENTIAL HYPERTENSION: ICD-10-CM

## 2022-12-07 DIAGNOSIS — D64.81 ANEMIA ASSOCIATED WITH CHEMOTHERAPY: ICD-10-CM

## 2022-12-07 DIAGNOSIS — N18.32 STAGE 3B CHRONIC KIDNEY DISEASE: ICD-10-CM

## 2022-12-07 DIAGNOSIS — I10 HYPERTENSION, UNSPECIFIED TYPE: ICD-10-CM

## 2022-12-07 DIAGNOSIS — I50.33 ACUTE ON CHRONIC DIASTOLIC CONGESTIVE HEART FAILURE, NYHA CLASS 4: ICD-10-CM

## 2022-12-07 DIAGNOSIS — C82.08 FOLLICULAR LYMPHOMA GRADE I, LYMPH NODES OF MULTIPLE SITES: ICD-10-CM

## 2022-12-07 DIAGNOSIS — I21.4 NSTEMI (NON-ST ELEVATED MYOCARDIAL INFARCTION): Primary | ICD-10-CM

## 2022-12-07 DIAGNOSIS — I44.7 LBBB (LEFT BUNDLE BRANCH BLOCK): ICD-10-CM

## 2022-12-07 PROCEDURE — 1160F PR REVIEW ALL MEDS BY PRESCRIBER/CLIN PHARMACIST DOCUMENTED: ICD-10-PCS | Mod: CPTII,S$GLB,, | Performed by: INTERNAL MEDICINE

## 2022-12-07 PROCEDURE — 99496 TRANSITIONAL CARE MANAGE SERVICE 7 DAY DISCHARGE: ICD-10-PCS | Mod: S$GLB,,, | Performed by: INTERNAL MEDICINE

## 2022-12-07 PROCEDURE — 3288F FALL RISK ASSESSMENT DOCD: CPT | Mod: CPTII,S$GLB,, | Performed by: INTERNAL MEDICINE

## 2022-12-07 PROCEDURE — 99999 PR PBB SHADOW E&M-EST. PATIENT-LVL III: CPT | Mod: PBBFAC,,, | Performed by: INTERNAL MEDICINE

## 2022-12-07 PROCEDURE — 3066F NEPHROPATHY DOC TX: CPT | Mod: CPTII,S$GLB,, | Performed by: INTERNAL MEDICINE

## 2022-12-07 PROCEDURE — 3061F NEG MICROALBUMINURIA REV: CPT | Mod: CPTII,S$GLB,, | Performed by: INTERNAL MEDICINE

## 2022-12-07 PROCEDURE — 4010F ACE/ARB THERAPY RXD/TAKEN: CPT | Mod: CPTII,S$GLB,, | Performed by: INTERNAL MEDICINE

## 2022-12-07 PROCEDURE — 3288F PR FALLS RISK ASSESSMENT DOCUMENTED: ICD-10-PCS | Mod: CPTII,S$GLB,, | Performed by: INTERNAL MEDICINE

## 2022-12-07 PROCEDURE — 3044F PR MOST RECENT HEMOGLOBIN A1C LEVEL <7.0%: ICD-10-PCS | Mod: CPTII,S$GLB,, | Performed by: INTERNAL MEDICINE

## 2022-12-07 PROCEDURE — 1101F PR PT FALLS ASSESS DOC 0-1 FALLS W/OUT INJ PAST YR: ICD-10-PCS | Mod: CPTII,S$GLB,, | Performed by: INTERNAL MEDICINE

## 2022-12-07 PROCEDURE — 1159F PR MEDICATION LIST DOCUMENTED IN MEDICAL RECORD: ICD-10-PCS | Mod: CPTII,S$GLB,, | Performed by: INTERNAL MEDICINE

## 2022-12-07 PROCEDURE — 3075F PR MOST RECENT SYSTOLIC BLOOD PRESS GE 130-139MM HG: ICD-10-PCS | Mod: CPTII,S$GLB,, | Performed by: INTERNAL MEDICINE

## 2022-12-07 PROCEDURE — 3061F PR NEG MICROALBUMINURIA RESULT DOCUMENTED/REVIEW: ICD-10-PCS | Mod: CPTII,S$GLB,, | Performed by: INTERNAL MEDICINE

## 2022-12-07 PROCEDURE — 3044F HG A1C LEVEL LT 7.0%: CPT | Mod: CPTII,S$GLB,, | Performed by: INTERNAL MEDICINE

## 2022-12-07 PROCEDURE — 99999 PR PBB SHADOW E&M-EST. PATIENT-LVL III: ICD-10-PCS | Mod: PBBFAC,,, | Performed by: INTERNAL MEDICINE

## 2022-12-07 PROCEDURE — 3078F PR MOST RECENT DIASTOLIC BLOOD PRESSURE < 80 MM HG: ICD-10-PCS | Mod: CPTII,S$GLB,, | Performed by: INTERNAL MEDICINE

## 2022-12-07 PROCEDURE — 1159F MED LIST DOCD IN RCRD: CPT | Mod: CPTII,S$GLB,, | Performed by: INTERNAL MEDICINE

## 2022-12-07 PROCEDURE — 99496 TRANSJ CARE MGMT HIGH F2F 7D: CPT | Mod: S$GLB,,, | Performed by: INTERNAL MEDICINE

## 2022-12-07 PROCEDURE — 3075F SYST BP GE 130 - 139MM HG: CPT | Mod: CPTII,S$GLB,, | Performed by: INTERNAL MEDICINE

## 2022-12-07 PROCEDURE — 3066F PR DOCUMENTATION OF TREATMENT FOR NEPHROPATHY: ICD-10-PCS | Mod: CPTII,S$GLB,, | Performed by: INTERNAL MEDICINE

## 2022-12-07 PROCEDURE — 1126F AMNT PAIN NOTED NONE PRSNT: CPT | Mod: CPTII,S$GLB,, | Performed by: INTERNAL MEDICINE

## 2022-12-07 PROCEDURE — 4010F PR ACE/ARB THEARPY RXD/TAKEN: ICD-10-PCS | Mod: CPTII,S$GLB,, | Performed by: INTERNAL MEDICINE

## 2022-12-07 PROCEDURE — 3078F DIAST BP <80 MM HG: CPT | Mod: CPTII,S$GLB,, | Performed by: INTERNAL MEDICINE

## 2022-12-07 PROCEDURE — 1160F RVW MEDS BY RX/DR IN RCRD: CPT | Mod: CPTII,S$GLB,, | Performed by: INTERNAL MEDICINE

## 2022-12-07 PROCEDURE — 1101F PT FALLS ASSESS-DOCD LE1/YR: CPT | Mod: CPTII,S$GLB,, | Performed by: INTERNAL MEDICINE

## 2022-12-07 PROCEDURE — 1126F PR PAIN SEVERITY QUANTIFIED, NO PAIN PRESENT: ICD-10-PCS | Mod: CPTII,S$GLB,, | Performed by: INTERNAL MEDICINE

## 2022-12-07 NOTE — PROGRESS NOTES
Subjective:       Patient ID: Keely Pizarro is a 77 y.o. female.    Chief Complaint: Hospital Follow Up    Here for hospital f/u    Recently admitted for HULL and orthopnea and founds to have HTN emergency and NSTEMI with LBBB. Her baseline anemia worsened from admit and was transfused. No s/s of bleeding. Baseline anemia and hx of follicular lymphoma. She is on eluiqus. Denies unexplained weight loss, dysphagia or sensation of things sticking in throat, BRBPR, melena, night sweats.    Acute deep vein thrombosis (DVT) of non-extremity vein      It was discovered pt has a thrombosis in her LIJ. Pt is not having any symptoms besides some swelling. Pt placed on a low intensity heparin regiment due to concern for drop in hemoglobin. No overt signs of bleeding. Transitioned to elliquis. She has bruising from phlebotomy but no other bruising or bleeding.     She responded to nitro drip, her home amlodipine and imdur. She admits to missing her medication for no reason several times a month            Review of Systems   Constitutional:  Negative for chills, fatigue, fever and unexpected weight change.   HENT:  Negative for ear pain, hearing loss, postnasal drip, tinnitus, trouble swallowing and voice change.    Respiratory:  Negative for cough, chest tightness, shortness of breath and wheezing.    Cardiovascular:  Negative for chest pain, palpitations and leg swelling.   Gastrointestinal:  Negative for abdominal pain, blood in stool, diarrhea, nausea and vomiting.   Endocrine: Negative for polydipsia, polyphagia and polyuria.   Genitourinary:  Negative for difficulty urinating, dysuria, hematuria and vaginal bleeding.   Skin:  Negative for rash.   Allergic/Immunologic: Negative for food allergies.   Neurological:  Negative for dizziness, numbness and headaches.   Hematological:  Does not bruise/bleed easily.   Psychiatric/Behavioral:  The patient is not nervous/anxious.      Objective:      Vitals:    12/07/22  "1007   BP: 130/68   BP Location: Right arm   Pulse: 99   SpO2: 98%   Weight: 56.3 kg (124 lb 1.9 oz)   Height: 5' 3" (1.6 m)      Physical Exam  Vitals and nursing note reviewed.   Constitutional:       General: She is not in acute distress.     Appearance: Normal appearance. She is well-developed.   HENT:      Head: Normocephalic and atraumatic.      Mouth/Throat:      Pharynx: No oropharyngeal exudate.   Eyes:      General: No scleral icterus.     Conjunctiva/sclera: Conjunctivae normal.      Pupils: Pupils are equal, round, and reactive to light.   Neck:      Thyroid: No thyromegaly.   Cardiovascular:      Rate and Rhythm: Normal rate and regular rhythm.      Heart sounds: Normal heart sounds. No murmur heard.  Pulmonary:      Effort: Pulmonary effort is normal.      Breath sounds: Normal breath sounds. No wheezing or rales.   Abdominal:      General: There is no distension.   Musculoskeletal:         General: No tenderness.   Lymphadenopathy:      Cervical: No cervical adenopathy.   Skin:     General: Skin is warm and dry.   Neurological:      Mental Status: She is alert and oriented to person, place, and time.   Psychiatric:         Behavior: Behavior normal.       Assessment:       1. NSTEMI (non-ST elevated myocardial infarction)    2. LBBB (left bundle branch block)    3. Anemia associated with chemotherapy    4. Stage 3b chronic kidney disease    5. Hypertension, unspecified type    6. Acute on chronic diastolic heart failure    7. Acute on chronic diastolic congestive heart failure, NYHA class 4    8. Follicular lymphoma grade i, lymph nodes of multiple sites    9. Essential hypertension          Plan:       Keely was seen today for hospital follow up.    Diagnoses and all orders for this visit:    NSTEMI (non-ST elevated myocardial infarction)  -     Ambulatory referral/consult to Cardiology; Future    LBBB (left bundle branch block)  -     Ambulatory referral/consult to Cardiology; Future    Anemia " associated with chemotherapy   F/u heme. No s/s of bleed but a good drop. She is due for colonoscopy but will need to have cardiac evaluation prior to proceeding with colonoscopy +/- EGD  -     CBC Auto Differential; Future    Stage 3b chronic kidney disease    Hypertension, unspecified type   Stressed adherence of and complications related to untreated HTN.    Acute on chronic diastolic heart failure   Discussed medication and dietary adherence. Discussed monitoring daily weights in conjunction with leg edema and orthopnea/HULL symptoms. If morning weight increases >6 lbs in one day or >3lbs for 3 consecutive days patient is to double diuretic (furosemide, torsemide, bumetanide) for 2-3 days or until weight decreases or at least stabilizes. Contact office if taking increased dose of diuretic for greater than a 5-7 days.  -     B-TYPE NATRIURETIC PEPTIDE; Future    Acute on chronic diastolic congestive heart failure, NYHA class 4    Follicular lymphoma grade i, lymph nodes of multiple sites   F/u heme. No        Transitional Care Note    Family and/or Caretaker present at visit?  No.  Diagnostic tests reviewed/disposition: No diagnosic tests pending after this hospitalization.  Disease/illness education: Heart failure and medication adherence for HTN  Home health/community services discussion/referrals: Patient does not have home health established from hospital visit.  They do not need home health.  If needed, we will set up home health for the patient.   Establishment or re-establishment of referral orders for community resources: No other necessary community resources.   Discussion with other health care providers: No discussion with other health care providers necessary at this time.           Vishal Farley MD  Internal Medicine-[facility]        Side effects of medication(s) were discussed in detail and patient voiced understanding.  Patient will call back for any issues or complications.

## 2022-12-27 ENCOUNTER — OFFICE VISIT (OUTPATIENT)
Dept: CARDIOLOGY | Facility: CLINIC | Age: 77
End: 2022-12-27
Attending: INTERNAL MEDICINE
Payer: MEDICARE

## 2022-12-27 ENCOUNTER — HOSPITAL ENCOUNTER (OUTPATIENT)
Dept: CARDIOLOGY | Facility: OTHER | Age: 77
Discharge: HOME | End: 2022-12-27
Attending: INTERNAL MEDICINE
Payer: MEDICARE

## 2022-12-27 VITALS
HEART RATE: 78 BPM | DIASTOLIC BLOOD PRESSURE: 64 MMHG | SYSTOLIC BLOOD PRESSURE: 120 MMHG | OXYGEN SATURATION: 98 % | WEIGHT: 132 LBS | BODY MASS INDEX: 23.38 KG/M2

## 2022-12-27 DIAGNOSIS — D50.9 IRON DEFICIENCY ANEMIA, UNSPECIFIED IRON DEFICIENCY ANEMIA TYPE: ICD-10-CM

## 2022-12-27 DIAGNOSIS — N18.32 STAGE 3B CHRONIC KIDNEY DISEASE: ICD-10-CM

## 2022-12-27 DIAGNOSIS — I21.4 NSTEMI (NON-ST ELEVATED MYOCARDIAL INFARCTION): ICD-10-CM

## 2022-12-27 DIAGNOSIS — I44.7 LBBB (LEFT BUNDLE BRANCH BLOCK): ICD-10-CM

## 2022-12-27 DIAGNOSIS — I72.1 PSEUDOANEURYSM OF BRACHIAL ARTERY: ICD-10-CM

## 2022-12-27 DIAGNOSIS — I50.31 ACUTE DIASTOLIC HEART FAILURE: Primary | ICD-10-CM

## 2022-12-27 PROCEDURE — 1111F DSCHRG MED/CURRENT MED MERGE: CPT | Mod: CPTII,S$GLB,, | Performed by: INTERNAL MEDICINE

## 2022-12-27 PROCEDURE — 99215 PR OFFICE/OUTPT VISIT, EST, LEVL V, 40-54 MIN: ICD-10-PCS | Mod: S$GLB,,, | Performed by: INTERNAL MEDICINE

## 2022-12-27 PROCEDURE — 99999 PR PBB SHADOW E&M-EST. PATIENT-LVL III: CPT | Mod: PBBFAC,,, | Performed by: INTERNAL MEDICINE

## 2022-12-27 PROCEDURE — 1160F RVW MEDS BY RX/DR IN RCRD: CPT | Mod: CPTII,S$GLB,, | Performed by: INTERNAL MEDICINE

## 2022-12-27 PROCEDURE — 3044F HG A1C LEVEL LT 7.0%: CPT | Mod: CPTII,S$GLB,, | Performed by: INTERNAL MEDICINE

## 2022-12-27 PROCEDURE — 1126F AMNT PAIN NOTED NONE PRSNT: CPT | Mod: CPTII,S$GLB,, | Performed by: INTERNAL MEDICINE

## 2022-12-27 PROCEDURE — 3066F NEPHROPATHY DOC TX: CPT | Mod: CPTII,S$GLB,, | Performed by: INTERNAL MEDICINE

## 2022-12-27 PROCEDURE — 3074F SYST BP LT 130 MM HG: CPT | Mod: CPTII,S$GLB,, | Performed by: INTERNAL MEDICINE

## 2022-12-27 PROCEDURE — 3078F PR MOST RECENT DIASTOLIC BLOOD PRESSURE < 80 MM HG: ICD-10-PCS | Mod: CPTII,S$GLB,, | Performed by: INTERNAL MEDICINE

## 2022-12-27 PROCEDURE — 1126F PR PAIN SEVERITY QUANTIFIED, NO PAIN PRESENT: ICD-10-PCS | Mod: CPTII,S$GLB,, | Performed by: INTERNAL MEDICINE

## 2022-12-27 PROCEDURE — 1101F PT FALLS ASSESS-DOCD LE1/YR: CPT | Mod: CPTII,S$GLB,, | Performed by: INTERNAL MEDICINE

## 2022-12-27 PROCEDURE — 1159F MED LIST DOCD IN RCRD: CPT | Mod: CPTII,S$GLB,, | Performed by: INTERNAL MEDICINE

## 2022-12-27 PROCEDURE — 3044F PR MOST RECENT HEMOGLOBIN A1C LEVEL <7.0%: ICD-10-PCS | Mod: CPTII,S$GLB,, | Performed by: INTERNAL MEDICINE

## 2022-12-27 PROCEDURE — 3066F PR DOCUMENTATION OF TREATMENT FOR NEPHROPATHY: ICD-10-PCS | Mod: CPTII,S$GLB,, | Performed by: INTERNAL MEDICINE

## 2022-12-27 PROCEDURE — 3061F PR NEG MICROALBUMINURIA RESULT DOCUMENTED/REVIEW: ICD-10-PCS | Mod: CPTII,S$GLB,, | Performed by: INTERNAL MEDICINE

## 2022-12-27 PROCEDURE — 93931 CV US DOPPLER ARTERIAL ARM LEFT (CUPID ONLY): ICD-10-PCS | Mod: 26,LT,, | Performed by: INTERNAL MEDICINE

## 2022-12-27 PROCEDURE — 99999 PR PBB SHADOW E&M-EST. PATIENT-LVL III: ICD-10-PCS | Mod: PBBFAC,,, | Performed by: INTERNAL MEDICINE

## 2022-12-27 PROCEDURE — 3061F NEG MICROALBUMINURIA REV: CPT | Mod: CPTII,S$GLB,, | Performed by: INTERNAL MEDICINE

## 2022-12-27 PROCEDURE — 93931 UPPER EXTREMITY STUDY: CPT | Mod: LT

## 2022-12-27 PROCEDURE — 4010F ACE/ARB THERAPY RXD/TAKEN: CPT | Mod: CPTII,S$GLB,, | Performed by: INTERNAL MEDICINE

## 2022-12-27 PROCEDURE — 3288F FALL RISK ASSESSMENT DOCD: CPT | Mod: CPTII,S$GLB,, | Performed by: INTERNAL MEDICINE

## 2022-12-27 PROCEDURE — 4010F PR ACE/ARB THEARPY RXD/TAKEN: ICD-10-PCS | Mod: CPTII,S$GLB,, | Performed by: INTERNAL MEDICINE

## 2022-12-27 PROCEDURE — 93931 UPPER EXTREMITY STUDY: CPT | Mod: 26,LT,, | Performed by: INTERNAL MEDICINE

## 2022-12-27 PROCEDURE — 1159F PR MEDICATION LIST DOCUMENTED IN MEDICAL RECORD: ICD-10-PCS | Mod: CPTII,S$GLB,, | Performed by: INTERNAL MEDICINE

## 2022-12-27 PROCEDURE — 1101F PR PT FALLS ASSESS DOC 0-1 FALLS W/OUT INJ PAST YR: ICD-10-PCS | Mod: CPTII,S$GLB,, | Performed by: INTERNAL MEDICINE

## 2022-12-27 PROCEDURE — 3074F PR MOST RECENT SYSTOLIC BLOOD PRESSURE < 130 MM HG: ICD-10-PCS | Mod: CPTII,S$GLB,, | Performed by: INTERNAL MEDICINE

## 2022-12-27 PROCEDURE — 1111F PR DISCHARGE MEDS RECONCILED W/ CURRENT OUTPATIENT MED LIST: ICD-10-PCS | Mod: CPTII,S$GLB,, | Performed by: INTERNAL MEDICINE

## 2022-12-27 PROCEDURE — 3288F PR FALLS RISK ASSESSMENT DOCUMENTED: ICD-10-PCS | Mod: CPTII,S$GLB,, | Performed by: INTERNAL MEDICINE

## 2022-12-27 PROCEDURE — 1160F PR REVIEW ALL MEDS BY PRESCRIBER/CLIN PHARMACIST DOCUMENTED: ICD-10-PCS | Mod: CPTII,S$GLB,, | Performed by: INTERNAL MEDICINE

## 2022-12-27 PROCEDURE — 99215 OFFICE O/P EST HI 40 MIN: CPT | Mod: S$GLB,,, | Performed by: INTERNAL MEDICINE

## 2022-12-27 PROCEDURE — 3078F DIAST BP <80 MM HG: CPT | Mod: CPTII,S$GLB,, | Performed by: INTERNAL MEDICINE

## 2022-12-27 RX ORDER — FUROSEMIDE 40 MG/1
40 TABLET ORAL DAILY
Qty: 90 TABLET | Refills: 3 | Status: SHIPPED | OUTPATIENT
Start: 2022-12-27

## 2022-12-27 RX ORDER — CARVEDILOL 6.25 MG/1
6.25 TABLET ORAL 2 TIMES DAILY WITH MEALS
Qty: 180 TABLET | Refills: 3 | Status: SHIPPED | OUTPATIENT
Start: 2022-12-27

## 2022-12-27 NOTE — PROGRESS NOTES
"[facility]    Chief Complaint  Chief Complaint   Patient presents with    Congestive Heart Failure       HPI:    Patient is referred for cardiac follow-up after hospitalization at [facility] last month.  She had presented with sudden onset of dyspnea.  She was found to be in acute heart failure with uncontrolled hypertension.  Small rise in her cardiac enzymes.  A PET stress was negative for ischemia.    She has been feeling well.  Doing well with her medications.  Blood pressure has been controlled.  No problems with volume overload.  No orthostasis.  No exertional dyspnea or chest discomfort.  No paroxysmal nocturnal dyspnea or orthopnea.      She is concerned about some persistent swelling of left upper arm.  She states that it began in the hospital.  Review of those records suggest it was felt to be a hematoma related to inadvertent stick of an artery during IV access.  She tells me the swelling is not improving.  In fact may be getting slightly larger.  Denies any weakness or sensory deficits of her left hand.    Medications  Current Outpatient Medications   Medication Sig Dispense Refill    amLODIPine (NORVASC) 10 MG tablet Take 1 tablet (10 mg total) by mouth once daily. 90 tablet 3    atorvastatin (LIPITOR) 40 MG tablet Take 1 tablet (40 mg total) by mouth once daily. 90 tablet 3    sodium bicarbonate 650 MG tablet Take 1 tablet (650 mg total) by mouth 2 (two) times daily. 30 tablet 2    apixaban (ELIQUIS) 5 mg Tab Take 1 tablet (5 mg total) by mouth 2 (two) times daily. 60 tablet 2    aspirin (ECOTRIN) 81 MG EC tablet Take 81 mg by mouth once daily.      BD ALCOHOL SWABS PadM       BD ULTRA-FINE BRIGITTE PEN NEEDLE 32 gauge x 5/32" Ndle To use with Insulin pens (Patient not taking: Reported on 12/1/2022) 100 each 3    blood sugar diagnostic Strp To check BG 4 times daily, to use with insurance preferred meter (Patient not taking: Reported on 12/1/2022) 120 strip 11    blood sugar diagnostic " Strp 1 each by Misc.(Non-Drug; Combo Route) route 3 (three) times daily. (Patient not taking: Reported on 12/1/2022) 300 each 3    blood-glucose meter Misc Use as directed (Patient not taking: Reported on 12/1/2022) 1 each 0    carvediloL (COREG) 6.25 MG tablet Take 1 tablet (6.25 mg total) by mouth 2 (two) times daily with meals. 180 tablet 3    cyproheptadine (PERIACTIN) 4 mg tablet Take 1 tablet (4 mg total) by mouth 2 (two) times a day. 30 tablet 2    furosemide (LASIX) 40 MG tablet Take 1 tablet (40 mg total) by mouth once daily. 90 tablet 3    isosorbide mononitrate (IMDUR) 30 MG 24 hr tablet Take 1 tablet (30 mg total) by mouth once daily. 30 tablet 11    lancets Misc 1 each by Misc.(Non-Drug; Combo Route) route 3 (three) times daily. (Patient not taking: Reported on 12/1/2022) 300 each 3    lancets Misc To check blood glucose 4 times daily, to use with insurance preferred meter (Patient not taking: Reported on 12/1/2022) 120 each 11    linaGLIPtin (TRADJENTA) 5 mg Tab tablet Take 1 tablet (5 mg total) by mouth once daily. 90 tablet 3    methocarbamoL (ROBAXIN) 500 MG Tab Take 500 mg by mouth 2 (two) times daily as needed (muscle spasms).      pantoprazole (PROTONIX) 40 MG tablet Take 1 tablet (40 mg total) by mouth once daily. 30 tablet 11    vitamin D (VITAMIN D3) 1000 units Tab Take 1 tablet (1,000 Units total) by mouth once daily. 90 tablet 0     Current Facility-Administered Medications   Medication Dose Route Frequency Provider Last Rate Last Admin    betamethasone acetate-betamethasone sodium phosphate injection 3 mg  3 mg Intramuscular 1 time in Clinic/LIONEL Hebert MD        bupivacaine (PF) 0.25% (2.5 mg/ml) injection 25 mg  10 mL Intramuscular 1 time in Clinic/HOD Echo Hebert MD            History  Past Medical History:   Diagnosis Date    CVA (cerebral vascular accident) 2011    Diabetes mellitus     Hypertension     NSTEMI (non-ST elevated myocardial infarction) 11/23/2022    Renal  manifestation of secondary diabetes mellitus     S/P ORIF (open reduction internal fixation) fracture     Uterine cancer      Past Surgical History:   Procedure Laterality Date    COLONOSCOPY N/A 3/31/2017    Procedure: COLONOSCOPY;  Surgeon: Chip Pak MD;  Location: [facility] ENDO (4TH FLR);  Service: Endoscopy;  Laterality: N/A;    FRACTURE SURGERY      right arm    HYSTERECTOMY      INJECTION OF JOINT Right 10/8/2020    Procedure: INJECTION, JOINT RIGHT HIP;  Surgeon: Echo Hebert MD;  Location: [facility] PAIN MGT;  Service: Pain Management;  Laterality: Right;  INJECTION, JOINT RIGHT HIP    INJECTION OF JOINT Bilateral 2021    Procedure: INJECTION, JOINT, HIP;  Surgeon: Echo Hebert MD;  Location: [facility] PAIN MGT;  Service: Pain Management;  Laterality: Bilateral;    THORACENTESIS Left     TOTAL KNEE ARTHROPLASTY Left      Social History     Socioeconomic History    Marital status:    Tobacco Use    Smoking status: Former     Packs/day: 1.00     Years: 10.00     Pack years: 10.00     Types: Cigarettes     Quit date: 2010     Years since quittin.4    Smokeless tobacco: Never   Substance and Sexual Activity    Alcohol use: No    Drug use: No    Sexual activity: Yes     Partners: Male   Social History Narrative    ** Merged History Encounter **          Family History   Problem Relation Age of Onset    Cancer Brother 67        colon    Aneurysm Mother     Stroke Father     Cancer Sister         pancreatic    No Known Problems Daughter     No Known Problems Son     Cancer Brother         colon    Hypertension Brother     Liver disease Sister     Alcohol abuse Sister         Allergies  Review of patient's allergies indicates:   Allergen Reactions    Tomato (solanum lycopersicum) Itching       Review of Systems   Review of Systems   Constitutional: Negative for malaise/fatigue, weight gain and weight loss.   Eyes:  Negative for visual disturbance.   Cardiovascular:  Negative for chest pain,  claudication, cyanosis, dyspnea on exertion, irregular heartbeat, leg swelling, near-syncope, orthopnea, palpitations, paroxysmal nocturnal dyspnea and syncope.   Respiratory:  Negative for cough, hemoptysis, shortness of breath, sleep disturbances due to breathing and wheezing.    Hematologic/Lymphatic: Negative for bleeding problem. Does not bruise/bleed easily.   Skin:  Negative for poor wound healing.   Musculoskeletal:  Negative for muscle cramps and myalgias.   Gastrointestinal:  Negative for abdominal pain, anorexia, diarrhea, heartburn, hematemesis, hematochezia, melena, nausea and vomiting.   Genitourinary:  Negative for hematuria and nocturia.   Neurological:  Negative for excessive daytime sleepiness, dizziness, focal weakness, light-headedness and weakness.     Physical Exam  Vitals:    12/27/22 0928   BP: 120/64   Pulse: 78     Wt Readings from Last 1 Encounters:   12/27/22 59.9 kg (132 lb)     Physical Exam  Vitals and nursing note reviewed.   Constitutional:       General: She is not in acute distress.     Appearance: She is not toxic-appearing or diaphoretic.   HENT:      Head: Normocephalic and atraumatic.      Mouth/Throat:      Lips: Pink.      Mouth: Mucous membranes are moist.   Eyes:      General: No scleral icterus.     Conjunctiva/sclera: Conjunctivae normal.   Neck:      Thyroid: No thyromegaly.      Vascular: No carotid bruit, hepatojugular reflux or JVD.      Trachea: Trachea normal.   Cardiovascular:      Rate and Rhythm: Normal rate and regular rhythm. No extrasystoles are present.     Chest Wall: PMI is not displaced.      Pulses:           Carotid pulses are 2+ on the right side and 2+ on the left side.       Radial pulses are 2+ on the right side and 2+ on the left side.        Dorsalis pedis pulses are 2+ on the right side and 2+ on the left side.        Posterior tibial pulses are 2+ on the right side and 2+ on the left side.      Heart sounds: S1 normal and S2 normal. No murmur  heard.    No friction rub. No S3 or S4 sounds.   Pulmonary:      Effort: Pulmonary effort is normal. No accessory muscle usage or respiratory distress.      Breath sounds: Normal breath sounds and air entry. No decreased breath sounds, wheezing, rhonchi or rales.   Abdominal:      General: Bowel sounds are normal. There is no distension or abdominal bruit.      Palpations: Abdomen is soft. There is no hepatomegaly, splenomegaly or pulsatile mass.      Tenderness: There is no abdominal tenderness.   Musculoskeletal:         General: No tenderness or deformity.        Arms:       Right lower leg: No edema.      Left lower leg: No edema.   Skin:     General: Skin is warm and dry.      Capillary Refill: Capillary refill takes less than 2 seconds.      Coloration: Skin is not cyanotic or pale.      Nails: There is no clubbing.   Neurological:      General: No focal deficit present.      Mental Status: She is alert and oriented to person, place, and time.   Psychiatric:         Attention and Perception: Attention normal.         Mood and Affect: Mood normal.         Speech: Speech normal.         Behavior: Behavior normal. Behavior is cooperative.       Labs  Lab Visit on 12/06/2022   Component Date Value Ref Range Status    Hemoglobin A1C 12/06/2022 6.3 (H)  4.0 - 5.6 % Final    Comment: ADA Screening Guidelines:  5.7-6.4%  Consistent with prediabetes  >or=6.5%  Consistent with diabetes    High levels of fetal hemoglobin interfere with the HbA1C  assay. Heterozygous hemoglobin variants (HbS, HgC, etc)do  not significantly interfere with this assay.   However, presence of multiple variants may affect accuracy.      Estimated Avg Glucose 12/06/2022 134 (H)  68 - 131 mg/dL Final    Cholesterol 12/06/2022 114 (L)  120 - 199 mg/dL Final    Comment: The National Cholesterol Education Program (NCEP) has set the  following guidelines (reference ranges) for Cholesterol:  Optimal.....................<200 mg/dL  Borderline  High.............200-239 mg/dL  High........................> or = 240 mg/dL      Triglycerides 12/06/2022 53  30 - 150 mg/dL Final    Comment: The National Cholesterol Education Program (NCEP) has set the  following guidelines (reference values) for triglycerides:  Normal......................<150 mg/dL  Borderline High.............150-199 mg/dL  High........................200-499 mg/dL      HDL 12/06/2022 60  40 - 75 mg/dL Final    Comment: The National Cholesterol Education Program (NCEP) has set the  following guidelines (reference values) for HDL Cholesterol:  Low...............<40 mg/dL  Optimal...........>60 mg/dL      LDL Cholesterol 12/06/2022 43.4 (L)  63.0 - 159.0 mg/dL Final    Comment: The National Cholesterol Education Program (NCEP) has set the  following guidelines (reference values) for LDL Cholesterol:  Optimal.......................<130 mg/dL  Borderline High...............130-159 mg/dL  High..........................160-189 mg/dL  Very High.....................>190 mg/dL      HDL/Cholesterol Ratio 12/06/2022 52.6 (H)  20.0 - 50.0 % Final    Total Cholesterol/HDL Ratio 12/06/2022 1.9 (L)  2.0 - 5.0 Final    Non-HDL Cholesterol 12/06/2022 54  mg/dL Final    Comment: Risk category and Non-HDL cholesterol goals:  Coronary heart disease (CHD)or equivalent (10-year risk of CHD >20%):  Non-HDL cholesterol goal     <130 mg/dL  Two or more CHD risk factors and 10-year risk of CHD <= 20%:  Non-HDL cholesterol goal     <160 mg/dL  0 to 1 CHD risk factor:  Non-HDL cholesterol goal     <190 mg/dL     Lab Visit on 12/06/2022   Component Date Value Ref Range Status    Microalbumin, Urine 12/06/2022 5.0  ug/mL Final    Creatinine, Urine 12/06/2022 57.8  15.0 - 325.0 mg/dL Final    Microalb/Creat Ratio 12/06/2022 8.7  0.0 - 30.0 ug/mg Final   No results displayed because visit has over 200 results.      Lab Visit on 08/15/2022   Component Date Value Ref Range Status    C1 Esterase Inhibitor 08/15/2022 41 (H)  21  - 39 mg/dL Final    Comment: A normal C1 esterase inhibitor protein level does not  rule out the possibility of a functional C1 esterase  inhibitor deficiency. Consider further testing of C1  esterase inhibitor functional activity, if clinically  indicated.  Test Performed at:  [facility], [address] [place]  [address]     MICHELINE Araiza MD      C1 Esterase Inhibitor Function 08/15/2022 >100  % Final    Comment: Reference Range:  > OR = 68  NORMAL  41-67  EQUIVOCAL  < OR = 40  ABNORMAL    Less than 40% of the reference functional activity indicates  a likely diagnosis of hereditary angioedema or acquired C1  inhibitor deficiency.    For additional information, please refer to  http://education.YoungCurrent/faq/FAQ54  (This link is being provided for informational/educational  purposes only.)  Test Performed at:  [facility] [id number] [place]  [address]     DORIS Quiroga MD, PhD, [facility]      Sodium 08/15/2022 142  136 - 145 mmol/L Final    Potassium 08/15/2022 4.6  3.5 - 5.1 mmol/L Final    Chloride 08/15/2022 111 (H)  95 - 110 mmol/L Final    CO2 08/15/2022 20 (L)  23 - 29 mmol/L Final    Glucose 08/15/2022 128 (H)  70 - 110 mg/dL Final    BUN 08/15/2022 44 (H)  8 - 23 mg/dL Final    Creatinine 08/15/2022 2.0 (H)  0.5 - 1.4 mg/dL Final    Calcium 08/15/2022 9.3  8.7 - 10.5 mg/dL Final    Total Protein 08/15/2022 6.0  6.0 - 8.4 g/dL Final    Albumin 08/15/2022 3.7  3.5 - 5.2 g/dL Final    Total Bilirubin 08/15/2022 0.4  0.1 - 1.0 mg/dL Final    Comment: For infants and newborns, interpretation of results should be based  on gestational age, weight and in agreement with clinical  observations.    Premature Infant recommended reference ranges:  Up to 24 hours.............<8.0 mg/dL  Up to 48 hours............<12.0 mg/dL  3-5 days..................<15.0 mg/dL  6-29 days.................<15.0 mg/dL      Alkaline  Phosphatase 08/15/2022 96  55 - 135 U/L Final    AST 08/15/2022 14  10 - 40 U/L Final    ALT 08/15/2022 17  10 - 44 U/L Final    Anion Gap 08/15/2022 11  8 - 16 mmol/L Final    eGFR 08/15/2022 26 (A)  >60 mL/min/1.73 m^2 Final    PTH, Intact 08/15/2022 184.2 (H)  9.0 - 77.0 pg/mL Final    Iron 08/15/2022 76  30 - 160 ug/dL Final    Transferrin 08/15/2022 306  200 - 375 mg/dL Final    TIBC 08/15/2022 453 (H)  250 - 450 ug/dL Final    Saturated Iron 08/15/2022 17 (L)  20 - 50 % Final    Ferritin 08/15/2022 165  20.0 - 300.0 ng/mL Final    Uric Acid 08/15/2022 6.6 (H)  2.4 - 5.7 mg/dL Final    Tryptase 08/15/2022 12.4 (H)  <11.5 ng/mL Final    Comment: Test Performed by:  [facility] [place]  [address] [place] [address]  : Brad Vergara M.D. Ph.D.; CLIA# [id number]      Complement (C-4) 08/15/2022 41  11 - 44 mg/dL Final   Lab Visit on 08/15/2022   Component Date Value Ref Range Status    Specimen UA 08/15/2022 Urine, Clean Urine, Clean Catch   Final    Color, UA 08/15/2022 Yellow  Yellow, Straw, Katherine Final    Appearance, UA 08/15/2022 Cloudy (A)  Clear Final    pH, UA 08/15/2022 6.0  5.0 - 8.0 Final    Specific Gravity, UA 08/15/2022 1.010  1.005 - 1.030 Final    Protein, UA 08/15/2022 Negative  Negative Final    Comment: Recommend a 24 hour urine protein or a urine   protein/creatinine ratio if globulin induced proteinuria is  clinically suspected.      Glucose, UA 08/15/2022 Negative  Negative Final    Ketones, UA 08/15/2022 Negative  Negative Final    Bilirubin (UA) 08/15/2022 Negative  Negative Final    Occult Blood UA 08/15/2022 Negative  Negative Final    Nitrite, UA 08/15/2022 Negative  Negative Final    Urobilinogen, UA 08/15/2022 Negative  <2.0 EU/dL Final    Leukocytes, UA 08/15/2022 Trace (A)  Negative Final    Protein, Urine Random 08/15/2022 <7  0 - 15 mg/dL Final    Creatinine, Urine 08/15/2022 48.9  15.0 - 325.0 mg/dL Final    Prot/Creat  Ratio, Urine 08/15/2022 Unable to calculate  0.00 - 0.20 Final    RBC, UA 08/15/2022 2  0 - 4 /hpf Final    WBC, UA 08/15/2022 6 (H)  0 - 5 /hpf Final    Bacteria 08/15/2022 Few (A)  None-Occ /hpf Final    Squam Epithel, UA 08/15/2022 18  /hpf Final    Microscopic Comment 08/15/2022 SEE COMMENT   Final    Comment: Other formed elements not mentioned in the report are not   present in the microscopic examination.      Lab Visit on 07/05/2022   Component Date Value Ref Range Status    WBC 07/05/2022 5.27  3.90 - 12.70 K/uL Final    RBC 07/05/2022 3.93 (L)  4.00 - 5.40 M/uL Final    Hemoglobin 07/05/2022 10.9 (L)  12.0 - 16.0 g/dL Final    Hematocrit 07/05/2022 35.4 (L)  37.0 - 48.5 % Final    MCV 07/05/2022 90  82 - 98 fL Final    MCH 07/05/2022 27.7  27.0 - 31.0 pg Final    MCHC 07/05/2022 30.8 (L)  32.0 - 36.0 g/dL Final    RDW 07/05/2022 13.1  11.5 - 14.5 % Final    Platelets 07/05/2022 202  150 - 450 K/uL Final    MPV 07/05/2022 9.6  9.2 - 12.9 fL Final    Immature Granulocytes 07/05/2022 0.2  0.0 - 0.5 % Final    Gran # (ANC) 07/05/2022 2.7  1.8 - 7.7 K/uL Final    Immature Grans (Abs) 07/05/2022 0.01  0.00 - 0.04 K/uL Final    Comment: Mild elevation in immature granulocytes is non specific and   can be seen in a variety of conditions including stress response,   acute inflammation, trauma and pregnancy. Correlation with other   laboratory and clinical findings is essential.      Lymph # 07/05/2022 1.8  1.0 - 4.8 K/uL Final    Mono # 07/05/2022 0.4  0.3 - 1.0 K/uL Final    Eos # 07/05/2022 0.3  0.0 - 0.5 K/uL Final    Baso # 07/05/2022 0.03  0.00 - 0.20 K/uL Final    nRBC 07/05/2022 0  0 /100 WBC Final    Gran % 07/05/2022 52.0  38.0 - 73.0 % Final    Lymph % 07/05/2022 34.9  18.0 - 48.0 % Final    Mono % 07/05/2022 7.6  4.0 - 15.0 % Final    Eosinophil % 07/05/2022 4.7  0.0 - 8.0 % Final    Basophil % 07/05/2022 0.6  0.0 - 1.9 % Final    Differential Method 07/05/2022 Automated   Final    LD 07/05/2022 215  110  - 260 U/L Final    Results are increased in hemolyzed samples.    Sodium 07/05/2022 139  136 - 145 mmol/L Final    Potassium 07/05/2022 4.8  3.5 - 5.1 mmol/L Final    Chloride 07/05/2022 107  95 - 110 mmol/L Final    CO2 07/05/2022 24  23 - 29 mmol/L Final    Glucose 07/05/2022 130 (H)  70 - 110 mg/dL Final    BUN 07/05/2022 36 (H)  8 - 23 mg/dL Final    Creatinine 07/05/2022 1.7 (H)  0.5 - 1.4 mg/dL Final    Calcium 07/05/2022 9.6  8.7 - 10.5 mg/dL Final    Total Protein 07/05/2022 6.1  6.0 - 8.4 g/dL Final    Albumin 07/05/2022 3.6  3.5 - 5.2 g/dL Final    Total Bilirubin 07/05/2022 0.5  0.1 - 1.0 mg/dL Final    Comment: For infants and newborns, interpretation of results should be based  on gestational age, weight and in agreement with clinical  observations.    Premature Infant recommended reference ranges:  Up to 24 hours.............<8.0 mg/dL  Up to 48 hours............<12.0 mg/dL  3-5 days..................<15.0 mg/dL  6-29 days.................<15.0 mg/dL      Alkaline Phosphatase 07/05/2022 85  55 - 135 U/L Final    AST 07/05/2022 17  10 - 40 U/L Final    ALT 07/05/2022 19  10 - 44 U/L Final    Anion Gap 07/05/2022 8  8 - 16 mmol/L Final    eGFR if  07/05/2022 33.5 (A)  >60 mL/min/1.73 m^2 Final    eGFR if non African American 07/05/2022 29.1 (A)  >60 mL/min/1.73 m^2 Final    Comment: Calculation used to obtain the estimated glomerular filtration  rate (eGFR) is the CKD-EPI equation.          Imaging  No results found.    Assessment  1. NSTEMI (non-ST elevated myocardial infarction)  Demand ischemia.  - Ambulatory referral/consult to Cardiology    2. LBBB (left bundle branch block)  - Ambulatory referral/consult to Cardiology    3. Acute diastolic heart failure  Compensated.    4. Pseudoaneurysm of brachial artery  Being considered.  Appears to be more than just a hematoma.  - CV Ultrasound doppler arterial arm left; Future    5. Stage 3b chronic kidney disease  Noted    6. Iron deficiency  anemia, unspecified iron deficiency anemia type  Noted      Plan and Discussion    Continue same blood pressure and heart failure regimen for now.  Will send down for an urgent ultrasound of her brachial artery to assess for pseudoaneurysm.  If present, will need vascular surgery evaluation.  Will also need to stop anticoagulation.    The ASCVD Risk score (Viki DK, et al., 2019) failed to calculate for the following reasons:    The patient has a prior MI or stroke diagnosis     Follow Up  Follow up in about 4 weeks (around 1/24/2023).      Joe Sutherland MD

## 2022-12-28 ENCOUNTER — TELEPHONE (OUTPATIENT)
Dept: VASCULAR SURGERY | Facility: CLINIC | Age: 77
End: 2022-12-28
Payer: MEDICARE

## 2022-12-28 DIAGNOSIS — I72.1 PSEUDOANEURYSM OF BRACHIAL ARTERY: Primary | ICD-10-CM

## 2022-12-28 NOTE — TELEPHONE ENCOUNTER
Called Ms Ocampo to schedule a clinic appointment with Dr ROSA ISELA Saldivar from referral, no answer left message with a call back number [phone].

## 2022-12-30 ENCOUNTER — INITIAL CONSULT (OUTPATIENT)
Dept: VASCULAR SURGERY | Facility: CLINIC | Age: 77
End: 2022-12-30
Attending: SURGERY
Payer: MEDICARE

## 2022-12-30 ENCOUNTER — TELEPHONE (OUTPATIENT)
Dept: CARDIOLOGY | Facility: CLINIC | Age: 77
End: 2022-12-30
Payer: MEDICARE

## 2022-12-30 VITALS
RESPIRATION RATE: 18 BRPM | HEART RATE: 87 BPM | TEMPERATURE: 98 F | SYSTOLIC BLOOD PRESSURE: 145 MMHG | HEIGHT: 63 IN | BODY MASS INDEX: 23.48 KG/M2 | WEIGHT: 132.5 LBS | DIASTOLIC BLOOD PRESSURE: 67 MMHG

## 2022-12-30 DIAGNOSIS — I72.1 PSEUDOANEURYSM OF BRACHIAL ARTERY: Primary | ICD-10-CM

## 2022-12-30 DIAGNOSIS — T81.719A: Primary | ICD-10-CM

## 2022-12-30 DIAGNOSIS — I72.1 PSEUDOANEURYSM OF BRACHIAL ARTERY: ICD-10-CM

## 2022-12-30 DIAGNOSIS — Z01.818 PRE-OPERATIVE EXAMINATION: Primary | ICD-10-CM

## 2022-12-30 PROCEDURE — 99204 OFFICE O/P NEW MOD 45 MIN: CPT | Mod: S$GLB,,, | Performed by: SURGERY

## 2022-12-30 PROCEDURE — 99999 PR PBB SHADOW E&M-EST. PATIENT-LVL IV: ICD-10-PCS | Mod: PBBFAC,,, | Performed by: SURGERY

## 2022-12-30 PROCEDURE — 1111F PR DISCHARGE MEDS RECONCILED W/ CURRENT OUTPATIENT MED LIST: ICD-10-PCS | Mod: CPTII,S$GLB,, | Performed by: SURGERY

## 2022-12-30 PROCEDURE — 1111F DSCHRG MED/CURRENT MED MERGE: CPT | Mod: CPTII,S$GLB,, | Performed by: SURGERY

## 2022-12-30 PROCEDURE — 99999 PR PBB SHADOW E&M-EST. PATIENT-LVL IV: CPT | Mod: PBBFAC,,, | Performed by: SURGERY

## 2022-12-30 PROCEDURE — 99204 PR OFFICE/OUTPT VISIT, NEW, LEVL IV, 45-59 MIN: ICD-10-PCS | Mod: S$GLB,,, | Performed by: SURGERY

## 2022-12-30 RX ORDER — SODIUM CHLORIDE 9 MG/ML
INJECTION, SOLUTION INTRAVENOUS CONTINUOUS
Status: CANCELLED | OUTPATIENT
Start: 2022-12-30

## 2022-12-30 RX ORDER — MUPIROCIN 20 MG/G
OINTMENT TOPICAL
Status: CANCELLED | OUTPATIENT
Start: 2022-12-30

## 2022-12-30 RX ORDER — TRAMADOL HYDROCHLORIDE 50 MG/1
50 TABLET ORAL EVERY 6 HOURS PRN
Qty: 40 TABLET | Refills: 0 | Status: SHIPPED | OUTPATIENT
Start: 2022-12-30

## 2022-12-30 NOTE — H&P (VIEW-ONLY)
VASCULAR SURGERY NOTE    Patient ID: Keely Pizarro is a 77 y.o. female.    I. HISTORY     Chief Complaint: left arm brachial pseudoaneurysm    HPI: Keely Pizarro is a 77 y.o. female who is here today for new patient initial appointment. 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 She was hospitalized with hypertensive emergency with shortness of breath in late November. She was discharged on 11/30/22. She had firm swelling of her left arm at an IV site which apparently occurred on 11/24-11/25. She was diagnosed with cellulitis and her swelling was treated with antibiotics. To add insult to injury she had a left arm venous ultrasound performed and had incidental finding of LIJ non-occlusive DVT (which appears chronic) and was then treated with anticoagulation.  She had outpatient follow up with  cardiology Dr. Sutherland recognized that swelling in her left medial arm was pulsatile and wisely ordered an arterial ultrasound which revealed large left brachial artery pseudoaneurysm. She was referred to my clinic for evaluation. She denies any pain, weakness, or numbness in her hand or forearm. She does report ongoing pain at the site which has been present since she was discharged from the hospital.     ALLERGIES: NKDA    FAMILY HISTORY: no h/o AAA or peripheral vascular dz    MEDICATIONS: reviewed in EMR     Past Medical History:   Diagnosis Date    CVA (cerebral vascular accident) 2011    Diabetes mellitus     Hypertension     NSTEMI (non-ST elevated myocardial infarction) 11/23/2022    Renal manifestation of secondary diabetes mellitus     S/P ORIF (open reduction internal fixation) fracture     Uterine cancer 2012        Past Surgical History:   Procedure Laterality Date    COLONOSCOPY N/A 3/31/2017    Procedure: COLONOSCOPY;  Surgeon:  Chip Pak MD;  Location: [facility] ENDO (4TH FLR);  Service: Endoscopy;  Laterality: N/A;    FRACTURE SURGERY      right arm    HYSTERECTOMY      INJECTION OF JOINT Right 10/8/2020    Procedure: INJECTION, JOINT RIGHT HIP;  Surgeon: Echo Hebert MD;  [place] [facility] [place] [place]  Service: Pain Management;  Laterality: Right;  INJECTION, JOINT RIGHT HIP    INJECTION OF JOINT Bilateral 2021    Procedure: INJECTION, JOINT, HIP;  Surgeon: Echo Hebert MD;  Location: [facility] PAIN T;  Service: Pain Management;  Laterality: Bilateral;    THORACENTESIS Left     TOTAL KNEE ARTHROPLASTY Left        Social History     Occupational History    Not on file   Tobacco Use    Smoking status: Former     Packs/day: 1.00     Years: 10.00     Pack years: 10.00     Types: Cigarettes     Quit date: 2010     Years since quittin.4    Smokeless tobacco: Never   Substance and Sexual Activity    Alcohol use: No    Drug use: No    Sexual activity: Yes     Partners: Male         Review of Systems   Constitutional: Negative for weight loss.   HENT:  Negative for ear pain and nosebleeds.    Eyes:  Negative for discharge and pain.   Cardiovascular:  Negative for chest pain and palpitations.   Respiratory:  Negative for cough, shortness of breath and wheezing.    Endocrine: Negative for cold intolerance, heat intolerance and polyphagia.   Hematologic/Lymphatic: Negative for adenopathy. Does not bruise/bleed easily.   Skin:  Negative for itching and rash.   Musculoskeletal:  Negative for joint swelling and muscle cramps.   Gastrointestinal:  Negative for abdominal pain, diarrhea, nausea and vomiting.   Genitourinary:  Negative for dysuria and flank pain.   Neurological:  Negative for numbness and seizures.       II. PHYSICAL EXAM     Physical Exam  Constitutional:       General: She is not in acute distress.     Appearance: Normal appearance. She is not ill-appearing or diaphoretic.   HENT:      Head: Normocephalic and atraumatic.  "  Eyes:      General: No scleral icterus.        Right eye: No discharge.         Left eye: No discharge.      Extraocular Movements: Extraocular movements intact.      Conjunctiva/sclera: Conjunctivae normal.   Cardiovascular:      Rate and Rhythm: Normal rate and regular rhythm.      Comments: 2+ radial pulses bilaterally, palpable pulsatile mass on left medial arm mid-humerus consistent with PSA of brachial artery, no overlying skin changes  Pulmonary:      Effort: Pulmonary effort is normal. No respiratory distress.   Musculoskeletal:         General: Normal range of motion.      Cervical back: Normal range of motion and neck supple. No rigidity.   Skin:     General: Skin is warm and dry.      Coloration: Skin is not jaundiced or pale.      Findings: No erythema or rash.      Comments: No blister or ecchymosis on left arm, skin is healthy   Neurological:      General: No focal deficit present.      Mental Status: She is alert and oriented to person, place, and time.      Comments: 5/5  strength bilaterally, ulnar, median, and radial nerve function is fully intact based on physical exam, no sensory deficits in left hand   Psychiatric:         Mood and Affect: Mood normal.         Behavior: Behavior normal.         III. ASSESSMENT & PLAN (MEDICAL DECISION MAKING)       Imaging Results: (I have personally reviewed all images and provided interpretation below)  Left arm arterial duplex ultrasound: brachial artery pseudoaneurysm is apparent with classic clay-yang sign    Left arm venous u/s: Hyperechoic non-occlusive LIJ DVT consistent with chronic DVT. All other veins appear patent    Assessment/Diagnosis and Plan:    1. Complication of artery following procedure, not elsewhere classified, initial encounter    2. Pseudoaneurysm of brachial artery        77 y.o. female with left brachial artery pseudoaneurysm which has been present since 11/25/22.  This was a complication of "IV" placement into the brachial " artery and subsequent anticoagulation.  Will dc her Eliquis since her IJ DVT appears chronic and this is likely contributing to the growth of her pseudoaneurysm.I discussed the diagnosis, pathophysiology and treatment with the patient who expressed understanding and agreed to the below treatment plan. Explained that if she has any numbness, weakness, or hand pain between now and her surgery date that she should go immediately to the ER for emergent surgical treatment.     -Eliquis discontinued due to presence of  large brachial artery pseudoaneurysm. Additionally her LIJ DVT is non-occlusive and appears chronic on ultrasound  -Rx for tramadol 7 day supply for ongoing pain associated with PSA  -Plan open left brachial artery repair under tourniquet vs endovascular balloon occlusion of her brachial artery    ULYSSES Saldivar II, MD, [facility]  Vascular Surgery  [facility]

## 2022-12-30 NOTE — PROGRESS NOTES
VASCULAR SURGERY NOTE    Patient ID: Keely Pizarro is a 77 y.o. female.    I. HISTORY     Chief Complaint: left arm brachial pseudoaneurysm    HPI: Keely Pizarro is a 77 y.o. female who is here today for new patient initial appointment. 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 She was hospitalized with hypertensive emergency with shortness of breath in late November. She was discharged on 11/30/22. She had firm swelling of her left arm at an IV site which apparently occurred on 11/24-11/25. She was diagnosed with cellulitis and her swelling was treated with antibiotics. To add insult to injury she had a left arm venous ultrasound performed and had incidental finding of LIJ non-occlusive DVT (which appears chronic) and was then treated with anticoagulation.  She had outpatient follow up with  cardiology Dr. Sutherland recognized that swelling in her left medial arm was pulsatile and wisely ordered an arterial ultrasound which revealed large left brachial artery pseudoaneurysm. She was referred to my clinic for evaluation. She denies any pain, weakness, or numbness in her hand or forearm. She does report ongoing pain at the site which has been present since she was discharged from the hospital.     ALLERGIES: NKDA    FAMILY HISTORY: no h/o AAA or peripheral vascular dz    MEDICATIONS: reviewed in EMR     Past Medical History:   Diagnosis Date    CVA (cerebral vascular accident) 2011    Diabetes mellitus     Hypertension     NSTEMI (non-ST elevated myocardial infarction) 11/23/2022    Renal manifestation of secondary diabetes mellitus     S/P ORIF (open reduction internal fixation) fracture     Uterine cancer 2012        Past Surgical History:   Procedure Laterality Date    COLONOSCOPY N/A 3/31/2017    Procedure: COLONOSCOPY;  Surgeon:  Chip Pak MD;  Location: [facility] ENDO (4TH FLR);  Service: Endoscopy;  Laterality: N/A;    FRACTURE SURGERY      right arm    HYSTERECTOMY      INJECTION OF JOINT Right 10/8/2020    Procedure: INJECTION, JOINT RIGHT HIP;  Surgeon: Echo Hebert MD;  Location: [facility] [place] [place] [facility] [place];  Service: Pain Management;  Laterality: Right;  INJECTION, JOINT RIGHT HIP    INJECTION OF JOINT Bilateral 2021    Procedure: INJECTION, JOINT, HIP;  Surgeon: Echo Hebert MD;  Location: [facility] [place] PAIN T;  Service: Pain Management;  Laterality: Bilateral;    THORACENTESIS Left     TOTAL KNEE ARTHROPLASTY Left        Social History     Occupational History    Not on file   Tobacco Use    Smoking status: Former     Packs/day: 1.00     Years: 10.00     Pack years: 10.00     Types: Cigarettes     Quit date: 2010     Years since quittin.4    Smokeless tobacco: Never   Substance and Sexual Activity    Alcohol use: No    Drug use: No    Sexual activity: Yes     Partners: Male         Review of Systems   Constitutional: Negative for weight loss.   HENT:  Negative for ear pain and nosebleeds.    Eyes:  Negative for discharge and pain.   Cardiovascular:  Negative for chest pain and palpitations.   Respiratory:  Negative for cough, shortness of breath and wheezing.    Endocrine: Negative for cold intolerance, heat intolerance and polyphagia.   Hematologic/Lymphatic: Negative for adenopathy. Does not bruise/bleed easily.   Skin:  Negative for itching and rash.   Musculoskeletal:  Negative for joint swelling and muscle cramps.   Gastrointestinal:  Negative for abdominal pain, diarrhea, nausea and vomiting.   Genitourinary:  Negative for dysuria and flank pain.   Neurological:  Negative for numbness and seizures.       II. PHYSICAL EXAM     Physical Exam  Constitutional:       General: She is not in acute distress.     Appearance: Normal appearance. She is not ill-appearing or diaphoretic.   HENT:      Head: Normocephalic and atraumatic.  "  Eyes:      General: No scleral icterus.        Right eye: No discharge.         Left eye: No discharge.      Extraocular Movements: Extraocular movements intact.      Conjunctiva/sclera: Conjunctivae normal.   Cardiovascular:      Rate and Rhythm: Normal rate and regular rhythm.      Comments: 2+ radial pulses bilaterally, palpable pulsatile mass on left medial arm mid-humerus consistent with PSA of brachial artery, no overlying skin changes  Pulmonary:      Effort: Pulmonary effort is normal. No respiratory distress.   Musculoskeletal:         General: Normal range of motion.      Cervical back: Normal range of motion and neck supple. No rigidity.   Skin:     General: Skin is warm and dry.      Coloration: Skin is not jaundiced or pale.      Findings: No erythema or rash.      Comments: No blister or ecchymosis on left arm, skin is healthy   Neurological:      General: No focal deficit present.      Mental Status: She is alert and oriented to person, place, and time.      Comments: 5/5  strength bilaterally, ulnar, median, and radial nerve function is fully intact based on physical exam, no sensory deficits in left hand   Psychiatric:         Mood and Affect: Mood normal.         Behavior: Behavior normal.         III. ASSESSMENT & PLAN (MEDICAL DECISION MAKING)       Imaging Results: (I have personally reviewed all images and provided interpretation below)  Left arm arterial duplex ultrasound: brachial artery pseudoaneurysm is apparent with classic clay-yang sign    Left arm venous u/s: Hyperechoic non-occlusive LIJ DVT consistent with chronic DVT. All other veins appear patent    Assessment/Diagnosis and Plan:    1. Complication of artery following procedure, not elsewhere classified, initial encounter    2. Pseudoaneurysm of brachial artery        77 y.o. female with left brachial artery pseudoaneurysm which has been present since 11/25/22.  This was a complication of "IV" placement into the brachial " artery and subsequent anticoagulation.  Will dc her Eliquis since her IJ DVT appears chronic and this is likely contributing to the growth of her pseudoaneurysm.I discussed the diagnosis, pathophysiology and treatment with the patient who expressed understanding and agreed to the below treatment plan. Explained that if she has any numbness, weakness, or hand pain between now and her surgery date that she should go immediately to the ER for emergent surgical treatment.     -Eliquis discontinued due to presence of  large brachial artery pseudoaneurysm. Additionally her LIJ DVT is non-occlusive and appears chronic on ultrasound  -Rx for tramadol 7 day supply for ongoing pain associated with PSA  -Plan open left brachial artery repair under tourniquet vs endovascular balloon occlusion of her brachial artery    ULYSSES Saldivar II, MD, [facility]  Vascular Surgery  [facility]

## 2023-01-04 ENCOUNTER — ANESTHESIA EVENT (OUTPATIENT)
Dept: SURGERY | Facility: HOSPITAL | Age: 78
End: 2023-01-04
Payer: MEDICARE

## 2023-01-04 ENCOUNTER — TELEPHONE (OUTPATIENT)
Dept: VASCULAR SURGERY | Facility: CLINIC | Age: 78
End: 2023-01-04
Payer: MEDICARE

## 2023-01-04 NOTE — PRE-PROCEDURE INSTRUCTIONS

## 2023-01-04 NOTE — TELEPHONE ENCOUNTER
Spoke with Ms Tika(sister), time of arrival 0950am 2nd floor DOSC for Ms Moira surgery on 1/5/2023 confirmed.

## 2023-01-04 NOTE — TELEPHONE ENCOUNTER
Called patient to give time of arrival for surgery on 1/5/2023 no answer, unable to leave message voicemall boxes full.

## 2023-01-04 NOTE — TELEPHONE ENCOUNTER
----- Message from Anuradha Fall sent at 1/4/2023 12:18 PM CST -----  Regarding: arrival time  Contact: [phone]  Pt is returning a missed call in regards to her arrival time for surgery on tomorrow. Please call to discuss further.

## 2023-01-05 ENCOUNTER — ANESTHESIA (OUTPATIENT)
Dept: SURGERY | Facility: HOSPITAL | Age: 78
End: 2023-01-05
Payer: MEDICARE

## 2023-01-05 ENCOUNTER — HOSPITAL ENCOUNTER (OUTPATIENT)
Facility: HOSPITAL | Age: 78
Discharge: HOME | End: 2023-01-06
Attending: SURGERY | Admitting: SURGERY
Payer: MEDICARE

## 2023-01-05 DIAGNOSIS — I72.1 PSEUDOANEURYSM OF BRACHIAL ARTERY: Primary | ICD-10-CM

## 2023-01-05 LAB
POCT GLUCOSE: 156 MG/DL (ref 70–110)
POCT GLUCOSE: 157 MG/DL (ref 70–110)
POCT GLUCOSE: 177 MG/DL (ref 70–110)

## 2023-01-05 PROCEDURE — 25000003 PHARM REV CODE 250: Performed by: STUDENT IN AN ORGANIZED HEALTH CARE EDUCATION/TRAINING PROGRAM

## 2023-01-05 PROCEDURE — 25000003 PHARM REV CODE 250

## 2023-01-05 PROCEDURE — 25000003 PHARM REV CODE 250: Performed by: ANESTHESIOLOGY

## 2023-01-05 PROCEDURE — 82962 GLUCOSE BLOOD TEST: CPT | Performed by: SURGERY

## 2023-01-05 PROCEDURE — 37000008 HC ANESTHESIA 1ST 15 MINUTES: Performed by: SURGERY

## 2023-01-05 PROCEDURE — 36000706: Performed by: SURGERY

## 2023-01-05 PROCEDURE — D9220A PRA ANESTHESIA: ICD-10-PCS | Mod: CRNA,,, | Performed by: NURSE ANESTHETIST, CERTIFIED REGISTERED

## 2023-01-05 PROCEDURE — D9220A PRA ANESTHESIA: ICD-10-PCS | Mod: ANES,,, | Performed by: ANESTHESIOLOGY

## 2023-01-05 PROCEDURE — 27201423 OPTIME MED/SURG SUP & DEVICES STERILE SUPPLY: Performed by: SURGERY

## 2023-01-05 PROCEDURE — 25000003 PHARM REV CODE 250: Performed by: NURSE ANESTHETIST, CERTIFIED REGISTERED

## 2023-01-05 PROCEDURE — C1887 CATHETER, GUIDING: HCPCS | Performed by: SURGERY

## 2023-01-05 PROCEDURE — 35045 PR REANEURYSM/GRFT INS,RADIAL/ULNAR: ICD-10-PCS | Mod: LT,,, | Performed by: SURGERY

## 2023-01-05 PROCEDURE — 63600175 PHARM REV CODE 636 W HCPCS: Performed by: NURSE ANESTHETIST, CERTIFIED REGISTERED

## 2023-01-05 PROCEDURE — C1894 INTRO/SHEATH, NON-LASER: HCPCS | Performed by: SURGERY

## 2023-01-05 PROCEDURE — 37000009 HC ANESTHESIA EA ADD 15 MINS: Performed by: SURGERY

## 2023-01-05 PROCEDURE — 63600175 PHARM REV CODE 636 W HCPCS: Performed by: ANESTHESIOLOGY

## 2023-01-05 PROCEDURE — 35045 REPAIR DEFECT OF ARM ARTERY: CPT | Mod: LT,,, | Performed by: SURGERY

## 2023-01-05 PROCEDURE — 27200671 HC STIMUCATH NEEDLE/ CATHETER: Performed by: ANESTHESIOLOGY

## 2023-01-05 PROCEDURE — D9220A PRA ANESTHESIA: Mod: ANES,,, | Performed by: ANESTHESIOLOGY

## 2023-01-05 PROCEDURE — 25500020 PHARM REV CODE 255: Performed by: SURGERY

## 2023-01-05 PROCEDURE — 76942 ECHO GUIDE FOR BIOPSY: CPT | Performed by: STUDENT IN AN ORGANIZED HEALTH CARE EDUCATION/TRAINING PROGRAM

## 2023-01-05 PROCEDURE — 94761 N-INVAS EAR/PLS OXIMETRY MLT: CPT

## 2023-01-05 PROCEDURE — 25000003 PHARM REV CODE 250: Performed by: SURGERY

## 2023-01-05 PROCEDURE — C1725 CATH, TRANSLUMIN NON-LASER: HCPCS | Performed by: SURGERY

## 2023-01-05 PROCEDURE — 71000016 HC POSTOP RECOV ADDL HR: Performed by: SURGERY

## 2023-01-05 PROCEDURE — 36000707: Performed by: SURGERY

## 2023-01-05 PROCEDURE — D9220A PRA ANESTHESIA: Mod: CRNA,,, | Performed by: NURSE ANESTHETIST, CERTIFIED REGISTERED

## 2023-01-05 PROCEDURE — 63600175 PHARM REV CODE 636 W HCPCS: Performed by: SURGERY

## 2023-01-05 PROCEDURE — 64415 NJX AA&/STRD BRCH PLXS IMG: CPT | Performed by: STUDENT IN AN ORGANIZED HEALTH CARE EDUCATION/TRAINING PROGRAM

## 2023-01-05 PROCEDURE — 71000015 HC POSTOP RECOV 1ST HR: Performed by: SURGERY

## 2023-01-05 PROCEDURE — C1769 GUIDE WIRE: HCPCS | Performed by: SURGERY

## 2023-01-05 PROCEDURE — 71000044 HC DOSC ROUTINE RECOVERY FIRST HOUR: Performed by: SURGERY

## 2023-01-05 RX ORDER — ATORVASTATIN CALCIUM 20 MG/1
40 TABLET, FILM COATED ORAL DAILY
Status: DISCONTINUED | OUTPATIENT
Start: 2023-01-05 | End: 2023-01-06 | Stop reason: HOSPADM

## 2023-01-05 RX ORDER — MIDAZOLAM HYDROCHLORIDE 1 MG/ML
.5-4 INJECTION INTRAMUSCULAR; INTRAVENOUS
Status: DISCONTINUED | OUTPATIENT
Start: 2023-01-05 | End: 2023-01-05 | Stop reason: HOSPADM

## 2023-01-05 RX ORDER — HYDROCODONE BITARTRATE AND ACETAMINOPHEN 5; 325 MG/1; MG/1
1 TABLET ORAL EVERY 4 HOURS PRN
Status: DISCONTINUED | OUTPATIENT
Start: 2023-01-05 | End: 2023-01-06 | Stop reason: HOSPADM

## 2023-01-05 RX ORDER — ASPIRIN 81 MG/1
81 TABLET ORAL DAILY
Status: DISCONTINUED | OUTPATIENT
Start: 2023-01-05 | End: 2023-01-06 | Stop reason: HOSPADM

## 2023-01-05 RX ORDER — IBUPROFEN 200 MG
16 TABLET ORAL
Status: DISCONTINUED | OUTPATIENT
Start: 2023-01-05 | End: 2023-01-06 | Stop reason: HOSPADM

## 2023-01-05 RX ORDER — PROTAMINE SULFATE 10 MG/ML
INJECTION, SOLUTION INTRAVENOUS
Status: DISCONTINUED | OUTPATIENT
Start: 2023-01-05 | End: 2023-01-05

## 2023-01-05 RX ORDER — PHENYLEPHRINE HYDROCHLORIDE 10 MG/ML
INJECTION INTRAVENOUS
Status: DISCONTINUED | OUTPATIENT
Start: 2023-01-05 | End: 2023-01-05

## 2023-01-05 RX ORDER — GLUCAGON 1 MG
1 KIT INJECTION
Status: DISCONTINUED | OUTPATIENT
Start: 2023-01-05 | End: 2023-01-06 | Stop reason: HOSPADM

## 2023-01-05 RX ORDER — HYDROMORPHONE HYDROCHLORIDE 1 MG/ML
0.2 INJECTION, SOLUTION INTRAMUSCULAR; INTRAVENOUS; SUBCUTANEOUS EVERY 5 MIN PRN
Status: DISCONTINUED | OUTPATIENT
Start: 2023-01-05 | End: 2023-01-05 | Stop reason: HOSPADM

## 2023-01-05 RX ORDER — VERAPAMIL HYDROCHLORIDE 2.5 MG/ML
INJECTION, SOLUTION INTRAVENOUS
Status: DISCONTINUED | OUTPATIENT
Start: 2023-01-05 | End: 2023-01-05 | Stop reason: HOSPADM

## 2023-01-05 RX ORDER — INSULIN ASPART 100 [IU]/ML
1-10 INJECTION, SOLUTION INTRAVENOUS; SUBCUTANEOUS
Status: DISCONTINUED | OUTPATIENT
Start: 2023-01-05 | End: 2023-01-06 | Stop reason: HOSPADM

## 2023-01-05 RX ORDER — FENTANYL CITRATE 50 UG/ML
INJECTION, SOLUTION INTRAMUSCULAR; INTRAVENOUS
Status: DISCONTINUED | OUTPATIENT
Start: 2023-01-05 | End: 2023-01-05

## 2023-01-05 RX ORDER — ONDANSETRON 2 MG/ML
4 INJECTION INTRAMUSCULAR; INTRAVENOUS ONCE AS NEEDED
Status: DISCONTINUED | OUTPATIENT
Start: 2023-01-05 | End: 2023-01-05 | Stop reason: HOSPADM

## 2023-01-05 RX ORDER — IBUPROFEN 200 MG
24 TABLET ORAL
Status: DISCONTINUED | OUTPATIENT
Start: 2023-01-05 | End: 2023-01-06 | Stop reason: HOSPADM

## 2023-01-05 RX ORDER — LIDOCAINE HYDROCHLORIDE 20 MG/ML
INJECTION, SOLUTION EPIDURAL; INFILTRATION; INTRACAUDAL; PERINEURAL
Status: COMPLETED | OUTPATIENT
Start: 2023-01-05 | End: 2023-01-05

## 2023-01-05 RX ORDER — IODIXANOL 320 MG/ML
INJECTION, SOLUTION INTRAVASCULAR
Status: DISCONTINUED | OUTPATIENT
Start: 2023-01-05 | End: 2023-01-05 | Stop reason: HOSPADM

## 2023-01-05 RX ORDER — HEPARIN SODIUM 1000 [USP'U]/ML
INJECTION, SOLUTION INTRAVENOUS; SUBCUTANEOUS
Status: DISCONTINUED | OUTPATIENT
Start: 2023-01-05 | End: 2023-01-05 | Stop reason: HOSPADM

## 2023-01-05 RX ORDER — PROPOFOL 10 MG/ML
VIAL (ML) INTRAVENOUS
Status: DISCONTINUED | OUTPATIENT
Start: 2023-01-05 | End: 2023-01-05

## 2023-01-05 RX ORDER — ISOSORBIDE MONONITRATE 30 MG/1
30 TABLET, EXTENDED RELEASE ORAL DAILY
Status: DISCONTINUED | OUTPATIENT
Start: 2023-01-05 | End: 2023-01-06 | Stop reason: HOSPADM

## 2023-01-05 RX ORDER — CYPROHEPTADINE HYDROCHLORIDE 4 MG/1
4 TABLET ORAL 2 TIMES DAILY
Status: DISCONTINUED | OUTPATIENT
Start: 2023-01-05 | End: 2023-01-06 | Stop reason: HOSPADM

## 2023-01-05 RX ORDER — SODIUM CHLORIDE 9 MG/ML
INJECTION, SOLUTION INTRAVENOUS CONTINUOUS
Status: DISCONTINUED | OUTPATIENT
Start: 2023-01-05 | End: 2023-01-06 | Stop reason: HOSPADM

## 2023-01-05 RX ORDER — MUPIROCIN 20 MG/G
OINTMENT TOPICAL
Status: DISCONTINUED | OUTPATIENT
Start: 2023-01-05 | End: 2023-01-05 | Stop reason: HOSPADM

## 2023-01-05 RX ORDER — HEPARIN SODIUM 1000 [USP'U]/ML
INJECTION, SOLUTION INTRAVENOUS; SUBCUTANEOUS
Status: DISCONTINUED | OUTPATIENT
Start: 2023-01-05 | End: 2023-01-05

## 2023-01-05 RX ORDER — FUROSEMIDE 20 MG/1
40 TABLET ORAL DAILY
Status: DISCONTINUED | OUTPATIENT
Start: 2023-01-06 | End: 2023-01-06 | Stop reason: HOSPADM

## 2023-01-05 RX ORDER — PROPOFOL 10 MG/ML
VIAL (ML) INTRAVENOUS CONTINUOUS PRN
Status: DISCONTINUED | OUTPATIENT
Start: 2023-01-05 | End: 2023-01-05

## 2023-01-05 RX ORDER — MIDAZOLAM HYDROCHLORIDE 1 MG/ML
INJECTION, SOLUTION INTRAMUSCULAR; INTRAVENOUS
Status: DISCONTINUED | OUTPATIENT
Start: 2023-01-05 | End: 2023-01-05

## 2023-01-05 RX ORDER — FENTANYL CITRATE 50 UG/ML
25-200 INJECTION, SOLUTION INTRAMUSCULAR; INTRAVENOUS
Status: DISCONTINUED | OUTPATIENT
Start: 2023-01-05 | End: 2023-01-05 | Stop reason: HOSPADM

## 2023-01-05 RX ORDER — ACETAMINOPHEN 325 MG/1
650 TABLET ORAL EVERY 4 HOURS PRN
Status: DISCONTINUED | OUTPATIENT
Start: 2023-01-05 | End: 2023-01-06 | Stop reason: HOSPADM

## 2023-01-05 RX ORDER — CARVEDILOL 6.25 MG/1
6.25 TABLET ORAL 2 TIMES DAILY WITH MEALS
Status: DISCONTINUED | OUTPATIENT
Start: 2023-01-05 | End: 2023-01-06 | Stop reason: HOSPADM

## 2023-01-05 RX ORDER — PANTOPRAZOLE SODIUM 40 MG/1
40 TABLET, DELAYED RELEASE ORAL DAILY
Status: DISCONTINUED | OUTPATIENT
Start: 2023-01-05 | End: 2023-01-06 | Stop reason: HOSPADM

## 2023-01-05 RX ORDER — AMLODIPINE BESYLATE 10 MG/1
10 TABLET ORAL DAILY
Status: DISCONTINUED | OUTPATIENT
Start: 2023-01-05 | End: 2023-01-06 | Stop reason: HOSPADM

## 2023-01-05 RX ORDER — NITROGLYCERIN 5 MG/ML
INJECTION, SOLUTION INTRAVENOUS
Status: DISCONTINUED | OUTPATIENT
Start: 2023-01-05 | End: 2023-01-05 | Stop reason: HOSPADM

## 2023-01-05 RX ORDER — DROPERIDOL 2.5 MG/ML
0.62 INJECTION, SOLUTION INTRAMUSCULAR; INTRAVENOUS ONCE AS NEEDED
Status: DISCONTINUED | OUTPATIENT
Start: 2023-01-05 | End: 2023-01-05 | Stop reason: HOSPADM

## 2023-01-05 RX ADMIN — HYDROMORPHONE HYDROCHLORIDE 0.2 MG: 1 INJECTION, SOLUTION INTRAMUSCULAR; INTRAVENOUS; SUBCUTANEOUS at 03:01

## 2023-01-05 RX ADMIN — PANTOPRAZOLE SODIUM 40 MG: 40 TABLET, DELAYED RELEASE ORAL at 05:01

## 2023-01-05 RX ADMIN — PROPOFOL 20 MG: 10 INJECTION, EMULSION INTRAVENOUS at 12:01

## 2023-01-05 RX ADMIN — MIDAZOLAM HYDROCHLORIDE 0.5 MG: 1 INJECTION, SOLUTION INTRAMUSCULAR; INTRAVENOUS at 01:01

## 2023-01-05 RX ADMIN — SODIUM CHLORIDE 1 MCG/KG/MIN: 9 INJECTION, SOLUTION INTRAVENOUS at 01:01

## 2023-01-05 RX ADMIN — FENTANYL CITRATE 25 MCG: 50 INJECTION, SOLUTION INTRAMUSCULAR; INTRAVENOUS at 02:01

## 2023-01-05 RX ADMIN — HEPARIN SODIUM 4000 UNITS: 1000 INJECTION, SOLUTION INTRAVENOUS; SUBCUTANEOUS at 12:01

## 2023-01-05 RX ADMIN — SODIUM CHLORIDE: 9 INJECTION, SOLUTION INTRAVENOUS at 10:01

## 2023-01-05 RX ADMIN — ATORVASTATIN CALCIUM 40 MG: 20 TABLET, FILM COATED ORAL at 05:01

## 2023-01-05 RX ADMIN — CYPROHEPTADINE HYDROCHLORIDE 4 MG: 4 TABLET ORAL at 09:01

## 2023-01-05 RX ADMIN — ASPIRIN 81 MG: 81 TABLET, COATED ORAL at 05:01

## 2023-01-05 RX ADMIN — PHENYLEPHRINE HYDROCHLORIDE 200 MCG: 10 INJECTION INTRAVENOUS at 12:01

## 2023-01-05 RX ADMIN — PROTAMINE SULFATE 10 MG: 10 INJECTION, SOLUTION INTRAVENOUS at 01:01

## 2023-01-05 RX ADMIN — LIDOCAINE HYDROCHLORIDE 30 ML: 20 INJECTION, SOLUTION INTRAVENOUS at 12:01

## 2023-01-05 RX ADMIN — MIDAZOLAM HYDROCHLORIDE 1 MG: 1 INJECTION, SOLUTION INTRAMUSCULAR; INTRAVENOUS at 12:01

## 2023-01-05 RX ADMIN — HYDROCODONE BITARTRATE AND ACETAMINOPHEN 1 TABLET: 5; 325 TABLET ORAL at 09:01

## 2023-01-05 RX ADMIN — LIDOCAINE HYDROCHLORIDE 10 ML: 20 INJECTION, SOLUTION INTRAVENOUS at 12:01

## 2023-01-05 RX ADMIN — FENTANYL CITRATE 25 MCG: 50 INJECTION, SOLUTION INTRAMUSCULAR; INTRAVENOUS at 12:01

## 2023-01-05 RX ADMIN — Medication 100 MCG/KG/MIN: at 12:01

## 2023-01-05 RX ADMIN — AMLODIPINE BESYLATE 10 MG: 10 TABLET ORAL at 05:01

## 2023-01-05 RX ADMIN — PROTAMINE SULFATE 20 MG: 10 INJECTION, SOLUTION INTRAVENOUS at 01:01

## 2023-01-05 RX ADMIN — MIDAZOLAM HYDROCHLORIDE 0.5 MG: 1 INJECTION, SOLUTION INTRAMUSCULAR; INTRAVENOUS at 12:01

## 2023-01-05 RX ADMIN — DEXTROSE 2 G: 50 INJECTION, SOLUTION INTRAVENOUS at 12:01

## 2023-01-05 RX ADMIN — MUPIROCIN: 20 OINTMENT TOPICAL at 10:01

## 2023-01-05 RX ADMIN — FENTANYL CITRATE 50 MCG: 50 INJECTION, SOLUTION INTRAMUSCULAR; INTRAVENOUS at 02:01

## 2023-01-05 NOTE — BRIEF OP NOTE
[facility] - Surgery ([facility])  Brief Operative Note    SUMMARY     Surgery Date: 1/5/2023     Surgeon(s) and Role:     * ULYSSES Saldivar II, MD - Primary     * Kelvin Thorpe MD - Fellow    Assisting Surgeon: None    Pre-op Diagnosis:  Pseudoaneurysm of brachial artery [I72.1]    Post-op Diagnosis:  Pseudoaneurysm of radial artery, high radial artery bifurcation      Procedure:  Ultrasound guided access to left radial artery  Left upper extremity angiogram  Radial artery pseudoaneurysm repair      Anesthesia: Regional    Operative Findings: large collection of old hematoma evacuated. Pseudoaneurysm identified on angiogram, endovascular balloon (4x60mm Ultraverse) used to control while dissection carried out. Branch off radial artery with bleeding into pseudoaneurysm capsule, brach ligated with cessation of bleeding.     Estimated Blood Loss: 40 cc         Specimens:   Specimen (24h ago, onward)      None            [id number]

## 2023-01-05 NOTE — NURSING TRANSFER
Nursing Transfer Note      1/5/2023     Reason patient is being transferred: Jose Angel    Transfer To: PACU 19  From [facility] 36    Transfer via stretcher    Transfer with     Transported by Riddhi NAGY and Siobhan THRASHER    Medicines sent: na    Any special needs or follow-up needed:     Chart send with patient: Yes    Notified:  Kole NAGY spouse, sibling    Patient reassessed at: 01/05/2023 @1740    Upon arrival to floor: cardiac monitor applied, patient oriented to room, call bell in reach, and bed in lowest position

## 2023-01-05 NOTE — ANESTHESIA PROCEDURE NOTES
Left Supraclavicular Single Shot Injection    Patient location during procedure: pre-op    Reason for block: primary anesthetic    Diagnosis: Renal failure (L AVF)   Start time: 1/5/2023 12:08 PM  Timeout: 1/5/2023 12:08 PM   End time: 1/5/2023 12:18 PM    Staffing  Authorizing Provider: Ry Ruiz MD  Performing Provider: Nikolay Magaña MD    Preanesthetic Checklist  Completed: patient identified, IV checked, site marked, risks and benefits discussed, surgical consent, monitors and equipment checked, pre-op evaluation and timeout performed  Peripheral Block  Patient position: supine  Prep: ChloraPrep  Patient monitoring: heart rate, cardiac monitor, continuous pulse ox, continuous capnometry and frequent blood pressure checks  Block type: supraclavicular and intercostobrachial  Laterality: left  Injection technique: single shot  Needle  Needle type: Stimuplex   Needle gauge: 22 G  Needle length: 2 in  Needle localization: anatomical landmarks and ultrasound guidance   -ultrasound image captured on disc.  Assessment  Injection assessment: negative aspiration, negative parasthesia and local visualized surrounding nerve  Paresthesia pain: none  Heart rate change: no  Slow fractionated injection: yes  Pain Tolerance: comfortable throughout block and no complaints  Medications:    Medications: lidocaine (PF) 20 mg/mL (2%) injection - Perineural   10 mL - 1/5/2023 12:18:00 PM   30 mL - 1/5/2023 12:17:00 PM    Additional Notes  L supraclavicular single shot block performed under ultrasound guidance.  Administered 30cc of 2% lidocaine with epi.  Negative aspiration and visualization of local spread confirmed with ultrasound.   Intercostal brachial field block performed with 10cc of 2% lidocaine for additional coverage.    VSS. Patient tolerated well. No immediate complications.  CRNA present and monitored throughout procedure. See anesthesia flowsheet.

## 2023-01-05 NOTE — TRANSFER OF CARE
"Anesthesia Transfer of Care Note    Patient: Keely Pizarro    Procedure(s) Performed: Procedure(s) (LRB):  REPAIR, PSEUDOANEURYSM (Left)  ANGIOGRAM, UPPER EXTREMITY (Left)    Patient location: [facility]    Anesthesia Type: MAC    Transport from OR: Transported from OR on room air with adequate spontaneous ventilation    Post pain: adequate analgesia    Post assessment: no apparent anesthetic complications    Post vital signs: stable    Level of consciousness: awake, alert and oriented    Nausea/Vomiting: no nausea/vomiting    Complications: none    Transfer of care protocol was followed      Last vitals:   Visit Vitals  BP (!) 177/77 (BP Location: Right arm, Patient Position: Lying)   Pulse 61   Temp 36.9 °C (98.5 °F) (Oral)   Resp 18   Ht 5' 3" (1.6 m)   Wt 59.9 kg (132 lb)   SpO2 98%   Breastfeeding No   BMI 23.38 kg/m²     "

## 2023-01-05 NOTE — ANESTHESIA PREPROCEDURE EVALUATION
01/05/2023  Keely Pizarro is a 77 y.o., female.      Pre-op Assessment          Review of Systems  Anesthesia Hx:  No problems with previous Anesthesia    Cardiovascular:   Hypertension Past MI CAD    Functional Capacity Can you climb two flights of stairs? ==> Yes    Pulmonary:   Denies Asthma.  Denies Sleep Apnea.    Renal/:   Chronic Renal Disease, CKD    Hepatic/GI:   Denies PUD. Denies GERD. Denies Liver Disease.    Neurological:   Denies CVA. Denies Seizures.    Endocrine:   Diabetes Denies Hypothyroidism.        Physical Exam  General: Alert    Airway:  Mallampati: II / II  Mouth Opening: Normal  TM Distance: Normal  Tongue: Normal  Neck ROM: Normal ROM    Dental:  Intact        Anesthesia Plan  Type of Anesthesia, risks & benefits discussed:    Anesthesia Type: Gen ETT, Regional, Gen Natural Airway, MAC  Intra-op Monitoring Plan: Standard ASA Monitors  Post Op Pain Control Plan: multimodal analgesia and IV/PO Opioids PRN  Induction:  IV  Airway Plan: Direct  Informed Consent: Informed consent signed with the Patient and all parties understand the risks and agree with anesthesia plan.  All questions answered.   ASA Score: 3    Ready For Surgery From Anesthesia Perspective.     .

## 2023-01-06 VITALS
OXYGEN SATURATION: 92 % | HEART RATE: 69 BPM | BODY MASS INDEX: 23.39 KG/M2 | TEMPERATURE: 100 F | RESPIRATION RATE: 18 BRPM | DIASTOLIC BLOOD PRESSURE: 59 MMHG | WEIGHT: 132 LBS | HEIGHT: 63 IN | SYSTOLIC BLOOD PRESSURE: 108 MMHG

## 2023-01-06 PROBLEM — I72.1 PSEUDOANEURYSM OF BRACHIAL ARTERY: Status: ACTIVE | Noted: 2023-01-06

## 2023-01-06 LAB
POCT GLUCOSE: 119 MG/DL (ref 70–110)
POCT GLUCOSE: 162 MG/DL (ref 70–110)

## 2023-01-06 PROCEDURE — 25000003 PHARM REV CODE 250: Performed by: STUDENT IN AN ORGANIZED HEALTH CARE EDUCATION/TRAINING PROGRAM

## 2023-01-06 RX ORDER — OXYCODONE HYDROCHLORIDE 5 MG/1
5 TABLET ORAL EVERY 4 HOURS PRN
Qty: 40 TABLET | Refills: 0 | Status: SHIPPED | OUTPATIENT
Start: 2023-01-06

## 2023-01-06 RX ADMIN — CARVEDILOL 6.25 MG: 6.25 TABLET, FILM COATED ORAL at 08:01

## 2023-01-06 RX ADMIN — ATORVASTATIN CALCIUM 40 MG: 20 TABLET, FILM COATED ORAL at 08:01

## 2023-01-06 RX ADMIN — FUROSEMIDE 40 MG: 20 TABLET ORAL at 08:01

## 2023-01-06 RX ADMIN — HYDROCODONE BITARTRATE AND ACETAMINOPHEN 1 TABLET: 5; 325 TABLET ORAL at 08:01

## 2023-01-06 RX ADMIN — AMLODIPINE BESYLATE 10 MG: 10 TABLET ORAL at 08:01

## 2023-01-06 RX ADMIN — ISOSORBIDE MONONITRATE 30 MG: 30 TABLET, EXTENDED RELEASE ORAL at 08:01

## 2023-01-06 RX ADMIN — ACETAMINOPHEN 650 MG: 325 TABLET ORAL at 08:01

## 2023-01-06 RX ADMIN — CYPROHEPTADINE HYDROCHLORIDE 4 MG: 4 TABLET ORAL at 08:01

## 2023-01-06 RX ADMIN — PANTOPRAZOLE SODIUM 40 MG: 40 TABLET, DELAYED RELEASE ORAL at 08:01

## 2023-01-06 RX ADMIN — ASPIRIN 81 MG: 81 TABLET, COATED ORAL at 08:01

## 2023-01-06 NOTE — SUBJECTIVE & OBJECTIVE
Medications:  Continuous Infusions:   sodium chloride 0.9% Stopped (01/05/23 1427)     Scheduled Meds:   amLODIPine  10 mg Oral Daily    aspirin  81 mg Oral Daily    atorvastatin  40 mg Oral Daily    carvediloL  6.25 mg Oral BID WM    cyproheptadine  4 mg Oral BID    furosemide  40 mg Oral Daily    isosorbide mononitrate  30 mg Oral Daily    pantoprazole  40 mg Oral Daily     PRN Meds:acetaminophen, dextrose 10%, dextrose 10%, glucagon (human recombinant), glucose, glucose, HYDROcodone-acetaminophen, insulin aspart U-100     Objective:     Vital Signs (Most Recent):  Temp: 97.2 °F (36.2 °C) (01/06/23 0754)  Pulse: 77 (01/06/23 0754)  Resp: 18 (01/06/23 0754)  BP: (!) 160/74 (01/06/23 0754)  SpO2: 95 % (01/06/23 0754)   Vital Signs (24h Range):  Temp:  [97.2 °F (36.2 °C)-99.4 °F (37.4 °C)] 97.2 °F (36.2 °C)  Pulse:  [61-85] 77  Resp:  [13-22] 18  SpO2:  [95 %-100 %] 95 %  BP: (126-177)/(63-79) 160/74         Physical Exam  Constitutional:       Appearance: Normal appearance.   HENT:      Head: Normocephalic and atraumatic.      Nose: Nose normal.      Mouth/Throat:      Mouth: Mucous membranes are moist.   Eyes:      Pupils: Pupils are equal, round, and reactive to light.   Cardiovascular:      Rate and Rhythm: Normal rate.      Comments: L radial pulse 2+  Pulmonary:      Effort: Pulmonary effort is normal.      Breath sounds: Normal breath sounds.   Abdominal:      General: Abdomen is flat.   Musculoskeletal:         General: Normal range of motion.      Cervical back: Normal range of motion and neck supple.   Skin:     General: Skin is warm and dry.      Capillary Refill: Capillary refill takes less than 2 seconds.      Comments: Left arm and hand wrapped in ace wrap   Neurological:      General: No focal deficit present.      Mental Status: She is oriented to person, place, and time. Mental status is at baseline.      Sensory: No sensory deficit.      Motor: No weakness.   Psychiatric:         Mood and Affect:  Mood normal.       Significant Labs:  CBC: No results for input(s): WBC, RBC, HGB, HCT, PLT, MCV, MCH, MCHC in the last 48 hours.  CMP: No results for input(s): GLU, CALCIUM, ALBUMIN, PROT, NA, K, CO2, CL, BUN, CREATININE, ALKPHOS, ALT, AST, BILITOT in the last 48 hours.    Significant Diagnostics:  I have reviewed all pertinent imaging results/findings within the past 24 hours.

## 2023-01-06 NOTE — PLAN OF CARE
[facility] - Intensive Care ([facility]-16)  Discharge Assessment    Primary Care Provider: Albin Cazares MD     Discharge Assessment (most recent)       BRIEF DISCHARGE ASSESSMENT - 01/06/23 0826          Discharge Planning    Assessment Type Discharge Planning Brief Assessment (P)      Resource/Environmental Concerns none (P)      Support Systems Spouse/significant other (P)      Equipment Currently Used at Home none (P)      Current Living Arrangements home (P)      Patient/Family Anticipates Transition to home (P)      Patient/Family Anticipated Services at Transition none (P)      DME Needed Upon Discharge  none (P)      Discharge Plan A Home with family (P)      Discharge Plan B Home (P)                        Pt independent prior to admission will dc home with spouse

## 2023-01-06 NOTE — PLAN OF CARE
Problem: Fluid and Electrolyte Imbalance (Acute Kidney Injury/Impairment)  Goal: Fluid and Electrolyte Balance  Outcome: Met     Problem: Renal Function Impairment (Acute Kidney Injury/Impairment)  Goal: Effective Renal Function  Outcome: Met     Problem: Diabetes Comorbidity  Goal: Blood Glucose Level Within Targeted Range  Outcome: Met   Pt being discharged home per MD orders.  VSS, pt afebrile and no c/o pain at this time.  Reviewed discharge instructions, follow-up's and meds with pt, VU. PIV removed, gauze/tape placed to site, CDI.  All personal belongings packed and with pt at bedside. Ride to be provided by a family member. w/c transport upon discharge.

## 2023-01-06 NOTE — NURSING
Nurses Note -- 4 Eyes      1/5/2023   11:44 PM      Skin assessed during: Admit      [x] No Pressure Injuries Present    []Prevention Measures Documented      [] Yes- Altered Skin Integrity Present or Discovered   [] LDA Added if Not in Epic (Describe Wound)   [] New Altered Skin Integrity was Present on Admit and Documented in LDA   [] Wound Image Taken    Wound Care Consulted? No    Attending Nurse:  Rigoberto Smith RN     Second RN/Staff Member:  Leta Quezada

## 2023-01-06 NOTE — DISCHARGE INSTRUCTIONS
VASCULAR SURGERY DISCHARGE INSTRUCTIONS    Woundcare:  - Take your incision dressing off 2 days after your surgery and gently rinse your incision with soap and water daily. Pad the incision dry afterward  - take a full shower daily beginning 2 days after the surgery. Allow soap and water to run over your incision. Pad the incision dry afterward  - When resting or sleeping, try to keep your arm elevated to shoulder level on pillows to reduce swelling  - If you notice clear drainage from your incision, you can apply dry gauze daily and secure in place with tape or gentle elastic wrap    Activity:  - Avoid prolonged exertion of the affected arm  - Avoid keeping your arm down below your chest for prolonged periods of time (this could lead to increased swelling)  - No heavy lifting with the affected arm  - Sleep with your arm elevated on pillows at night to reduce swelling  -- No swimming in pools, Jelastic, Epunchiti etc. for 6 weeks after your surgery    Diet:  -Resume your pre-operative home diet    Follow up:  -Refer to follow up instructions     Call Vascular Surgery Office at [phone] if you experience:  -Increased redness, warmth, tenderness, or draining pus at your incision(s)  -Worsening fevers, chills, nausea/vomiting  -Pain, weakness, coldness, or numbness in your hand  -Uncontrolled pain  -Your call will be returned within 24 hours and further instructions will be provided    Go to ER/Urgent Care if you experience:  -Worsening shortness of breath or chest pain

## 2023-01-06 NOTE — ANESTHESIA POSTPROCEDURE EVALUATION
Anesthesia Post Evaluation    Patient: Keely Pizarro    Procedure(s) Performed: Procedure(s) (LRB):  REPAIR, PSEUDOANEURYSM (Left)  ANGIOGRAM, UPPER EXTREMITY (Left)    Final Anesthesia Type: regional      Patient location during evaluation: PACU  Patient participation: Yes- Able to Participate  Level of consciousness: awake and alert  Post-procedure vital signs: reviewed and stable  Pain management: adequate  Airway patency: patent    PONV status at discharge: No PONV  Anesthetic complications: no      Cardiovascular status: blood pressure returned to baseline and stable  Respiratory status: unassisted and nasal cannula  Hydration status: euvolemic  Follow-up not needed.          Vitals Value Taken Time   /70 01/05/23 1732   Temp 36.5 °C (97.7 °F) 01/05/23 1424   Pulse 79 01/05/23 1738   Resp 23 01/05/23 1738   SpO2 99 % 01/05/23 1738   Vitals shown include unvalidated device data.      No case tracking events are documented in the log.      Pain/Kaushal Score: Pain Rating Prior to Med Admin: 7 (1/5/2023  9:32 PM)  Kaushal Score: 10 (1/5/2023  3:45 PM)

## 2023-01-06 NOTE — ASSESSMENT & PLAN NOTE
Ms. 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  She is now day 1 s/p pseudoaneurysm of radial artery, high radial artery bifurcation.    -Encourage ambulation  -Arm wrapped in ace wrap, okay to unwrap tomorrow  -Oxycodone prn for pain control  -Apply burn net at home to control swelling  -Follow up with Janna in 3 weeks for wound check

## 2023-01-06 NOTE — HOSPITAL COURSE
For details of hospital stay, please refer to daily progress notes. Briefly, this is a 77 y.o. female presented on 1/5/23 for planned surgical intervention for pseudoaneurysm of brachial artery on the left. Patient was taken to OR and underwent radial artery pseudoaneurysm repair. Post-operatively patient was admitted to the floor and had an uncomplicated hospital recovery.    On the day of discharge, the patient was ambulating without difficulty, voiding spontaneously, was tolerating a diet without nausea or vomiting, and pain was well controlled on PO pain medications. Discharge instructions were explained to the patient and appropriate follow-up was arranged.

## 2023-01-06 NOTE — DISCHARGE SUMMARY
[facility] - Intensive Care ([place] [address])  Vascular Surgery  Discharge Summary      Patient Name: Keely Pizarro  MRN: [id number]  Admission Date: 1/5/2023  Hospital Length of Stay: 0 days  Discharge Date and Time:  01/06/2023 8:21 AM  Attending Physician: ULYSSES Saldivar II, MD   Discharging Provider: Juanita Max NP  Primary Care Provider: Albin Cazares MD    HPI:   No notes on file    Procedure(s) (LRB):  REPAIR, PSEUDOANEURYSM (Left)  ANGIOGRAM, UPPER EXTREMITY (Left)     Hospital Course: For details of hospital stay, please refer to daily progress notes. Briefly, this is a 77 y.o. female presented on 1/5/23 for planned surgical intervention for pseudoaneurysm of brachial artery on the left. Patient was taken to OR and underwent radial artery pseudoaneurysm repair. Post-operatively patient was admitted to the floor and had an uncomplicated hospital recovery.    On the day of discharge, the patient was ambulating without difficulty, voiding spontaneously, was tolerating a diet without nausea or vomiting, and pain was well controlled on PO pain medications. Discharge instructions were explained to the patient and appropriate follow-up was arranged.       Goals of Care Treatment Preferences:  Code Status: Full Code      Consults:     Significant Diagnostic Studies: Labs: CMP No results for input(s): NA, K, CL, CO2, GLU, BUN, CREATININE, CALCIUM, PROT, ALBUMIN, BILITOT, ALKPHOS, AST, ALT, ANIONGAP, ESTGFRAFRICA, EGFRNONAA in the last 48 hours. and CBC No results for input(s): WBC, HGB, HCT, PLT in the last 48 hours.      Physical Exam  Constitutional:       General: She is not in acute distress.     Appearance: Normal appearance. She is not ill-appearing or diaphoretic.   HENT:      Head: Normocephalic and atraumatic.   Eyes:      General: No scleral icterus.        Right eye: No discharge.         Left eye: No discharge.      Extraocular Movements: Extraocular movements intact.       Conjunctiva/sclera: Conjunctivae normal.   Cardiovascular:      Rate and Rhythm: Normal rate and regular rhythm.      Comments: 2+ radial pulses bilaterally  Pulmonary:      Effort: Pulmonary effort is normal. No respiratory distress.   Musculoskeletal:         General: Normal range of motion.      Cervical back: Normal range of motion and neck supple. No rigidity.   Skin:     General: Skin is warm and dry.      Coloration: Skin is not jaundiced or pale.      Findings: No erythema or rash.      Comments: Incision covered with ace wrap    Neurological:      General: No focal deficit present.      Mental Status: She is alert and oriented to person, place, and time.      Comments: 5/5  strength bilaterally, ulnar, median, and radial nerve function is fully intact based on physical exam, no sensory deficits in left hand   Psychiatric:         Mood and Affect: Mood normal.         Behavior: Behavior normal.      Pending Diagnostic Studies:       None          Final Active Diagnoses:    Diagnosis Date Noted POA    PRINCIPAL PROBLEM:  Pseudoaneurysm of brachial artery [I72.1] 01/06/2023 Unknown      Problems Resolved During this Admission:      Discharged Condition: good    Disposition: Home or Self Care    Follow Up:   Follow-up Information       ULYSSES Saldivar II, MD Follow up in 3 week(s).    Specialty: Vascular Surgery  Why: For wound re-check, For suture removal  Contact information:  [address] [place] [address] [phone]                             Patient Instructions:      Diet Cardiac     Diet diabetic     Notify your health care provider if you experience any of the following:  temperature >100.4     Notify your health care provider if you experience any of the following:  persistent nausea and vomiting or diarrhea     Notify your health care provider if you experience any of the following:  severe uncontrolled pain     Notify your health care provider if you experience any of the  following:  redness, tenderness, or signs of infection (pain, swelling, redness, odor or green/yellow discharge around incision site)     Notify your health care provider if you experience any of the following:     Notify your health care provider if you experience any of the following:  increased confusion or weakness     Notify your health care provider if you experience any of the following:  persistent dizziness, light-headedness, or visual disturbances     Notify your health care provider if you experience any of the following:  worsening rash     Notify your health care provider if you experience any of the following:  severe persistent headache     Notify your health care provider if you experience any of the following:  difficulty breathing or increased cough     Change dressing (specify)   Order Comments: Remove ace wrap tomorrow.  Apply gauze and tape over incision site.  Keep arm covered in burn net to reduce swelling.     Activity as tolerated     Medications:  Reconciled Home Medications:      Medication List        CONTINUE taking these medications      amLODIPine 10 MG tablet  Commonly known as: NORVASC  Take 1 tablet (10 mg total) by mouth once daily.     aspirin 81 MG EC tablet  Commonly known as: ECOTRIN  Take 81 mg by mouth once daily.     atorvastatin 40 MG tablet  Commonly known as: LIPITOR  Take 1 tablet (40 mg total) by mouth once daily.     BD ALCOHOL SWABS Padm  Generic drug: alcohol swabs     carvediloL 6.25 MG tablet  Commonly known as: COREG  Take 1 tablet (6.25 mg total) by mouth 2 (two) times daily with meals.     furosemide 40 MG tablet  Commonly known as: LASIX  Take 1 tablet (40 mg total) by mouth once daily.     isosorbide mononitrate 30 MG 24 hr tablet  Commonly known as: IMDUR  Take 1 tablet (30 mg total) by mouth once daily.     methocarbamoL 500 MG Tab  Commonly known as: ROBAXIN  Take 500 mg by mouth 2 (two) times daily as needed (muscle spasms).     pantoprazole 40 MG  "tablet  Commonly known as: PROTONIX  Take 1 tablet (40 mg total) by mouth once daily.     sodium bicarbonate 650 MG tablet  Take 1 tablet (650 mg total) by mouth 2 (two) times daily.     TRADJENTA 5 mg Tab tablet  Generic drug: linaGLIPtin  Take 1 tablet (5 mg total) by mouth once daily.     traMADoL 50 mg tablet  Commonly known as: ULTRAM  Take 1 tablet (50 mg total) by mouth every 6 (six) hours as needed for Pain.     vitamin D 1000 units Tab  Commonly known as: VITAMIN D3  Take 1 tablet (1,000 Units total) by mouth once daily.            STOP taking these medications      ACCU-CHEK LUTHER PLUS TEST STRP Strp  Generic drug: blood sugar diagnostic     BD ULTRA-FINE BRIGITTE PEN NEEDLE 32 gauge x 5/32" Ndle  Generic drug: pen needle, diabetic     blood-glucose meter Misc     cyproheptadine 4 mg tablet  Commonly known as: PERIACTIN     lancets Misc            ASK your doctor about these medications      ACCU-CHEK SOFTCLIX LANCETS Misc  Generic drug: lancets  To check blood glucose 4 times daily, to use with insurance preferred meter     blood sugar diagnostic Strp  1 each by Misc.(Non-Drug; Combo Route) route 3 (three) times daily.              Juanita Max NP  Vascular Surgery  [facility] - Intensive Care ([place] [address])  "

## 2023-01-08 NOTE — OP NOTE
"Vascular Surgery Op Note    Date of Operation/Procedure: 1/5/22    Pre-operative Diagnosis: left arm brachial artery pseudoaneurysm    Post-operative Diagnosis: left arm radial artery branch pseudoaneurysm    Anesthesia: regional/MAC    Operation/Procedure Performed:   Left arm angiogram  Open repair of left radial artery branch pseudoaneurysm    Attending Surgeon: ULYSSES Saldivar II, MD    Resident/Fellow: Kelvin Guadarrama MD PGY6    Indications:    77 y.o. female with left brachial artery pseudoaneurysm which has been present since 11/25/22.  This was a complication of "IV" placement into the brachial artery and subsequent anticoagulation.  Will dc her Eliquis since her IJ DVT appears chronic and this is likely contributing to the growth of her pseudoaneurysm.I discussed the diagnosis, pathophysiology and treatment with the patient who expressed understanding and agreed to the below treatment plan.    Procedure in Detail: The patient was taken to the OR in supine position. Regional anesthesia was administered by the anesthesia team in the form of left arm block. Appropriate sedation was administered. The left arm was prepped and draped in normal sterile fashion. A time out was performed.    We began by obtaining ultrasound-guided percutaneous access to the left radial artery with a micropuncture access kit then advanced a 4-5F slender sheath over the wire into the artery. We then advanced a glide wire followed by 4F glide catheter into the proximal arm and performed left arm angiography through the catheter. Angiography showed high take off of the radial artery. The brachial and radial arteries appeared widely patent without evidence of stenosis. There was a pseudoaneurysm visualized from the left radial artery. We then replaced the wire and advanced a 4x60mm balloon over the wire across the origin of the pseudoaneurysm. We then administered 5000 units of IV heparin for systemic anticoagulation.     We then " waited 3 minutes and inflated the balloon across the origin of the pseudoaneurysm. We then made a 8cm longitudinal incision over the upper arm at the area of swelling at the side of the pseudoaneurysm. The incision was deepened with electrocautery and blunt dissection. Hematoma was evacuated. The balloon could be palpated within the radial artery. We eventually encountered an area of weak pulsatile bleeding at the base of the wound after copious hematoma was evacuated. We continued to dissect the radial artery circumferentially within the incision proximally and distally and encircled the vessel with vessel loops. There was a 2mm branch from the artery coursing laterally towards the area of bleeding. We placed the vessel loops on tension and then deflated and retracted the balloon over the wire. We then let down on the proximal vessel loop and found that the area of bleeding was not coming from the radial artery but in fact was off of the 2mm branch in the middle of the incision. We retightened the vessel loop but there was still slow bleeding from the area. We placed a 6-0 prolene figure of 8 suture at the area of bleeding for temporary control of bleeding. We then dissected out the branch and identified the defect in the artery and placed medium hemoclips on branch proximal and distal to the area of bleeding. Tension was released from the vessel loops and there was no ongoing bleeding. The balloon was exchanged over a glide wire for a 4F glide cath and then we performed repeat angiography through the catheter. Repeat angiogram showed no evidence of pseudoaneurysm. There was spasm of the radial artery but both the radial and brachial arteries were patent. We then removed the catheter and sheath and held 15min of manual pressure at the radial artery access site. We thoroughly irrigated the incision with saline. We resected the pseudoaneurysm capsule with electrocautery and sharp dissection. Thrombin gelfoam was  added to the wound to assist with hemostasis. The incision was again thoroughly irrigated with saline. Areas of bleeding were controlled with electrocautery. The muscle was reapproximated with interrupted 2-0 vicryl. The deep dermis was reapproximated with interrupted 3-0 vicryl. The skin was closed with interrupted 3-0 nylon sutures. The incision was dressed with 4x4 gauze. The radial artery access site dressed with 4x4 gauze and tegaderm. ACE wrap was applied from the hand to the upper arm for compression. The patient tolerated the procedure well and was transported to the PACU for recovery.    Estimated Blood loss: 100ml    Contrast volume: 10ml    Complications: none    ULYSSES Saldivar II, MD, VI  Vascular Surgery  [facility]

## 2023-01-20 ENCOUNTER — OFFICE VISIT (OUTPATIENT)
Dept: VASCULAR SURGERY | Facility: CLINIC | Age: 78
End: 2023-01-20
Attending: SURGERY
Payer: MEDICARE

## 2023-01-20 VITALS
HEIGHT: 63 IN | SYSTOLIC BLOOD PRESSURE: 150 MMHG | DIASTOLIC BLOOD PRESSURE: 77 MMHG | WEIGHT: 130.06 LBS | TEMPERATURE: 98 F | BODY MASS INDEX: 23.04 KG/M2 | HEART RATE: 91 BPM

## 2023-01-20 DIAGNOSIS — I72.1 PSEUDOANEURYSM OF ARTERY OF UPPER EXTREMITY: Primary | ICD-10-CM

## 2023-01-20 PROCEDURE — 1101F PR PT FALLS ASSESS DOC 0-1 FALLS W/OUT INJ PAST YR: ICD-10-PCS | Mod: CPTII,S$GLB,, | Performed by: SURGERY

## 2023-01-20 PROCEDURE — 3077F PR MOST RECENT SYSTOLIC BLOOD PRESSURE >= 140 MM HG: ICD-10-PCS | Mod: CPTII,S$GLB,, | Performed by: SURGERY

## 2023-01-20 PROCEDURE — 3288F PR FALLS RISK ASSESSMENT DOCUMENTED: ICD-10-PCS | Mod: CPTII,S$GLB,, | Performed by: SURGERY

## 2023-01-20 PROCEDURE — 99999 PR PBB SHADOW E&M-EST. PATIENT-LVL III: ICD-10-PCS | Mod: PBBFAC,,, | Performed by: SURGERY

## 2023-01-20 PROCEDURE — 1126F PR PAIN SEVERITY QUANTIFIED, NO PAIN PRESENT: ICD-10-PCS | Mod: CPTII,S$GLB,, | Performed by: SURGERY

## 2023-01-20 PROCEDURE — 3078F DIAST BP <80 MM HG: CPT | Mod: CPTII,S$GLB,, | Performed by: SURGERY

## 2023-01-20 PROCEDURE — 99024 PR POST-OP FOLLOW-UP VISIT: ICD-10-PCS | Mod: S$GLB,,, | Performed by: SURGERY

## 2023-01-20 PROCEDURE — 1126F AMNT PAIN NOTED NONE PRSNT: CPT | Mod: CPTII,S$GLB,, | Performed by: SURGERY

## 2023-01-20 PROCEDURE — 1101F PT FALLS ASSESS-DOCD LE1/YR: CPT | Mod: CPTII,S$GLB,, | Performed by: SURGERY

## 2023-01-20 PROCEDURE — 3288F FALL RISK ASSESSMENT DOCD: CPT | Mod: CPTII,S$GLB,, | Performed by: SURGERY

## 2023-01-20 PROCEDURE — 3077F SYST BP >= 140 MM HG: CPT | Mod: CPTII,S$GLB,, | Performed by: SURGERY

## 2023-01-20 PROCEDURE — 3078F PR MOST RECENT DIASTOLIC BLOOD PRESSURE < 80 MM HG: ICD-10-PCS | Mod: CPTII,S$GLB,, | Performed by: SURGERY

## 2023-01-20 PROCEDURE — 99024 POSTOP FOLLOW-UP VISIT: CPT | Mod: S$GLB,,, | Performed by: SURGERY

## 2023-01-20 PROCEDURE — 99999 PR PBB SHADOW E&M-EST. PATIENT-LVL III: CPT | Mod: PBBFAC,,, | Performed by: SURGERY

## 2023-01-23 ENCOUNTER — OFFICE VISIT (OUTPATIENT)
Dept: HEMATOLOGY/ONCOLOGY | Facility: CLINIC | Age: 78
End: 2023-01-23
Payer: MEDICARE

## 2023-01-23 ENCOUNTER — LAB VISIT (OUTPATIENT)
Dept: LAB | Facility: HOSPITAL | Age: 78
End: 2023-01-23
Attending: INTERNAL MEDICINE
Payer: MEDICARE

## 2023-01-23 ENCOUNTER — PES CALL (OUTPATIENT)
Dept: ADMINISTRATIVE | Facility: OTHER | Age: 78
End: 2023-01-23
Payer: MEDICARE

## 2023-01-23 VITALS
SYSTOLIC BLOOD PRESSURE: 158 MMHG | DIASTOLIC BLOOD PRESSURE: 93 MMHG | BODY MASS INDEX: 23.83 KG/M2 | RESPIRATION RATE: 16 BRPM | OXYGEN SATURATION: 99 % | HEIGHT: 63 IN | HEART RATE: 75 BPM | WEIGHT: 134.5 LBS

## 2023-01-23 DIAGNOSIS — C82.08 FOLLICULAR LYMPHOMA GRADE I, LYMPH NODES OF MULTIPLE SITES: Primary | ICD-10-CM

## 2023-01-23 DIAGNOSIS — C82.08 FOLLICULAR LYMPHOMA GRADE I, LYMPH NODES OF MULTIPLE SITES: ICD-10-CM

## 2023-01-23 LAB
ALBUMIN SERPL BCP-MCNC: 3 G/DL (ref 3.5–5.2)
ALP SERPL-CCNC: 100 U/L (ref 55–135)
ALT SERPL W/O P-5'-P-CCNC: 15 U/L (ref 10–44)
ANION GAP SERPL CALC-SCNC: 11 MMOL/L (ref 8–16)
AST SERPL-CCNC: 14 U/L (ref 10–40)
BASOPHILS # BLD AUTO: 0.03 K/UL (ref 0–0.2)
BASOPHILS NFR BLD: 0.5 % (ref 0–1.9)
BILIRUB SERPL-MCNC: 0.6 MG/DL (ref 0.1–1)
BUN SERPL-MCNC: 24 MG/DL (ref 8–23)
CALCIUM SERPL-MCNC: 9.3 MG/DL (ref 8.7–10.5)
CHLORIDE SERPL-SCNC: 110 MMOL/L (ref 95–110)
CO2 SERPL-SCNC: 21 MMOL/L (ref 23–29)
CREAT SERPL-MCNC: 1.5 MG/DL (ref 0.5–1.4)
DIFFERENTIAL METHOD: ABNORMAL
EOSINOPHIL # BLD AUTO: 0.3 K/UL (ref 0–0.5)
EOSINOPHIL NFR BLD: 4.7 % (ref 0–8)
ERYTHROCYTE [DISTWIDTH] IN BLOOD BY AUTOMATED COUNT: 13.2 % (ref 11.5–14.5)
EST. GFR  (NO RACE VARIABLE): 36.1 ML/MIN/1.73 M^2
GLUCOSE SERPL-MCNC: 131 MG/DL (ref 70–110)
HCT VFR BLD AUTO: 28 % (ref 37–48.5)
HGB BLD-MCNC: 8.6 G/DL (ref 12–16)
IMM GRANULOCYTES # BLD AUTO: 0.02 K/UL (ref 0–0.04)
IMM GRANULOCYTES NFR BLD AUTO: 0.3 % (ref 0–0.5)
LDH SERPL L TO P-CCNC: 254 U/L (ref 110–260)
LYMPHOCYTES # BLD AUTO: 0.8 K/UL (ref 1–4.8)
LYMPHOCYTES NFR BLD: 12.5 % (ref 18–48)
MCH RBC QN AUTO: 28.9 PG (ref 27–31)
MCHC RBC AUTO-ENTMCNC: 30.7 G/DL (ref 32–36)
MCV RBC AUTO: 94 FL (ref 82–98)
MONOCYTES # BLD AUTO: 0.5 K/UL (ref 0.3–1)
MONOCYTES NFR BLD: 7.7 % (ref 4–15)
NEUTROPHILS # BLD AUTO: 4.8 K/UL (ref 1.8–7.7)
NEUTROPHILS NFR BLD: 74.3 % (ref 38–73)
NRBC BLD-RTO: 0 /100 WBC
PLATELET # BLD AUTO: 308 K/UL (ref 150–450)
PMV BLD AUTO: 9.1 FL (ref 9.2–12.9)
POTASSIUM SERPL-SCNC: 4.3 MMOL/L (ref 3.5–5.1)
PROT SERPL-MCNC: 5.9 G/DL (ref 6–8.4)
RBC # BLD AUTO: 2.98 M/UL (ref 4–5.4)
SODIUM SERPL-SCNC: 142 MMOL/L (ref 136–145)
WBC # BLD AUTO: 6.4 K/UL (ref 3.9–12.7)

## 2023-01-23 PROCEDURE — 3288F PR FALLS RISK ASSESSMENT DOCUMENTED: ICD-10-PCS | Mod: CPTII,S$GLB,, | Performed by: INTERNAL MEDICINE

## 2023-01-23 PROCEDURE — 1126F PR PAIN SEVERITY QUANTIFIED, NO PAIN PRESENT: ICD-10-PCS | Mod: CPTII,S$GLB,, | Performed by: INTERNAL MEDICINE

## 2023-01-23 PROCEDURE — 3080F PR MOST RECENT DIASTOLIC BLOOD PRESSURE >= 90 MM HG: ICD-10-PCS | Mod: CPTII,S$GLB,, | Performed by: INTERNAL MEDICINE

## 2023-01-23 PROCEDURE — 3080F DIAST BP >= 90 MM HG: CPT | Mod: CPTII,S$GLB,, | Performed by: INTERNAL MEDICINE

## 2023-01-23 PROCEDURE — 99999 PR PBB SHADOW E&M-EST. PATIENT-LVL III: CPT | Mod: PBBFAC,,, | Performed by: INTERNAL MEDICINE

## 2023-01-23 PROCEDURE — 99214 PR OFFICE/OUTPT VISIT, EST, LEVL IV, 30-39 MIN: ICD-10-PCS | Mod: S$GLB,,, | Performed by: INTERNAL MEDICINE

## 2023-01-23 PROCEDURE — 1101F PT FALLS ASSESS-DOCD LE1/YR: CPT | Mod: CPTII,S$GLB,, | Performed by: INTERNAL MEDICINE

## 2023-01-23 PROCEDURE — 80053 COMPREHEN METABOLIC PANEL: CPT | Performed by: INTERNAL MEDICINE

## 2023-01-23 PROCEDURE — 3077F PR MOST RECENT SYSTOLIC BLOOD PRESSURE >= 140 MM HG: ICD-10-PCS | Mod: CPTII,S$GLB,, | Performed by: INTERNAL MEDICINE

## 2023-01-23 PROCEDURE — 3077F SYST BP >= 140 MM HG: CPT | Mod: CPTII,S$GLB,, | Performed by: INTERNAL MEDICINE

## 2023-01-23 PROCEDURE — 85025 COMPLETE CBC W/AUTO DIFF WBC: CPT | Performed by: INTERNAL MEDICINE

## 2023-01-23 PROCEDURE — 36415 COLL VENOUS BLD VENIPUNCTURE: CPT | Performed by: INTERNAL MEDICINE

## 2023-01-23 PROCEDURE — 83615 LACTATE (LD) (LDH) ENZYME: CPT | Performed by: INTERNAL MEDICINE

## 2023-01-23 PROCEDURE — 99999 PR PBB SHADOW E&M-EST. PATIENT-LVL III: ICD-10-PCS | Mod: PBBFAC,,, | Performed by: INTERNAL MEDICINE

## 2023-01-23 PROCEDURE — 1159F MED LIST DOCD IN RCRD: CPT | Mod: CPTII,S$GLB,, | Performed by: INTERNAL MEDICINE

## 2023-01-23 PROCEDURE — 1101F PR PT FALLS ASSESS DOC 0-1 FALLS W/OUT INJ PAST YR: ICD-10-PCS | Mod: CPTII,S$GLB,, | Performed by: INTERNAL MEDICINE

## 2023-01-23 PROCEDURE — 99214 OFFICE O/P EST MOD 30 MIN: CPT | Mod: S$GLB,,, | Performed by: INTERNAL MEDICINE

## 2023-01-23 PROCEDURE — 1159F PR MEDICATION LIST DOCUMENTED IN MEDICAL RECORD: ICD-10-PCS | Mod: CPTII,S$GLB,, | Performed by: INTERNAL MEDICINE

## 2023-01-23 PROCEDURE — 1126F AMNT PAIN NOTED NONE PRSNT: CPT | Mod: CPTII,S$GLB,, | Performed by: INTERNAL MEDICINE

## 2023-01-23 PROCEDURE — 3288F FALL RISK ASSESSMENT DOCD: CPT | Mod: CPTII,S$GLB,, | Performed by: INTERNAL MEDICINE

## 2023-01-23 NOTE — PROGRESS NOTES
Route Chart for Scheduling    BMT Chart Routing      Follow up with physician 6 months.   Follow up with [facility]    Provider visit type    Infusion scheduling note    Injection scheduling note    Labs CBC, CMP and LDH   Lab interval:     Imaging    Pharmacy appointment    Other referrals               Subjective:       Patient ID: Keely Pizarro is a 77 y.o. female.    Chief Complaint: No chief complaint on file.    HPI   Onc Hx:  Keely presents with her  for evaluation of newly diagnosed grade 1-2 follicular lymphoma diagnosed by excisional biopsy of a right cervical lymph node 8/1/2017.  Pathologic diagnosis was received by [facility].  Keely reports feeling fatigue since March 2017. At the time she had an infected tooth and developed cervical lymphadenopathy. She started loosing weight, total quantity to date is unknown. Appetite remains normal and she denies night sweats. She then developed shortness of breath worse with activity and left lower lung pleural effusion was noted. The received thoracentesis twice with negative pleural fluid flow cytometry. Cytology was positive for lymphocytes, but was not diagnostic for follicular lymphoma. CT imaging demonstrates diffuse, bilateral submandibular, cervical, axillary, supraclavicular, and intrathoracic lymphadenopathy.     Keely has a history of uterine cancer in 2012 treated with radiation and chemotherapy. She had a port-a-cath at that time for treatment. She has insulin dependent diabetes mellitus with poorly controlled blood sugar, reporting both high and lows. She has hypertension, and blood pressure was elevated at intake today.      Follow-up Visit 9/1/17  PET imaging shows stage 4 follicular lymphoma, large left side pleural effusion     Follow-up visit 3/15/18  Return visit to review results of post-treatment staging scan that shows complete remission after 6 cycles of BR chemotherapy. She continues to have weight loss- 7lbs since  last visit with waxing/waning appetite.     Follow-up Visit 11/9/18   Interval follow-up for follicular lymphoma. Currently on maintenance Rituxan after BR for 6 cycles with complete response.    Follow-up Visit 7/7/2021  Interval follow-up for follicular lymphoma after  Rituxan after BR for 6 cycles with CR and Cycle 12 maintenance (every 2 months) Hycela finished July 2020.  CBC stable, CMP and LDH pending  Interval thyroid biopsy benign   has appointments today to assess new mass on abdomen    Follow-up Visit 12/2/2021  Interval follow-up for [place] after Rituxan BR and 2 years of maintenance Rituxan completed July 2020  CBC, CMP, and LDH stable  Notable acute interval even is hemicolectomy and appendectomy last month in [place] due to ischemic bowel.  Was unable to walk after surgery due to extended bed rest. Ambulating independently in clinic today.  Some trouble with step on exam table, but reports she is able to get up 4 steps with handrail at home.  No falls.    Follow-up Visit 7/7/2022  CBC, CMP, and LDH stable  Interval events = uncontrolled hypertension, severe colitis with hypokalemia, acute on chronic anemia, and CKD  CT imaging of abdomen 2/2022 = colitis of large intestine, no lymphadenopathy  Mammogram  5/2022 = Birads 1     Follow-up Visit 1/23/2023  CBC, CMP and LDH overall stable  Acute on chronic anemia in setting of aneursym repair 1 weeks ago, also during NSTEMI in November 2022. Still has healing hematoma at vascular surgery site.  Creatinine improved from 2.5 to 1.5      Review of Systems   Constitutional: Negative.    HENT: Negative.     Eyes: Negative.    Respiratory: Negative.     Cardiovascular: Negative.    Gastrointestinal: Negative.    Endocrine: Negative.    Genitourinary: Negative.    Musculoskeletal: Negative.    Integumentary:  Negative.   Allergic/Immunologic: Negative for environmental allergies, food allergies and immunocompromised state.   Neurological: Negative.     Hematological:  Negative for adenopathy. Does not bruise/bleed easily.   Psychiatric/Behavioral: Negative.         Objective:      Physical Exam  Vitals and nursing note reviewed.   Constitutional:       Appearance: She is well-developed.   HENT:      Head: Normocephalic and atraumatic.      Right Ear: External ear normal.      Left Ear: External ear normal.      Nose: Nose normal.      Mouth/Throat:      Pharynx: No oropharyngeal exudate.   Eyes:      General: No scleral icterus.     Conjunctiva/sclera: Conjunctivae normal.      Pupils: Pupils are equal, round, and reactive to light.   Neck:      Thyroid: No thyromegaly.      Vascular: No JVD.      Trachea: No tracheal deviation.   Cardiovascular:      Rate and Rhythm: Normal rate and regular rhythm.      Heart sounds: Normal heart sounds. No murmur heard.  Pulmonary:      Effort: Pulmonary effort is normal. No respiratory distress.      Breath sounds: Normal breath sounds.   Abdominal:      General: Bowel sounds are normal. There is no distension.      Palpations: Abdomen is soft. There is no mass.      Tenderness: There is no abdominal tenderness. There is no guarding or rebound.   Musculoskeletal:         General: Normal range of motion.      Cervical back: Normal range of motion and neck supple.   Lymphadenopathy:      Cervical: No cervical adenopathy.      Upper Body:      Right upper body: No supraclavicular adenopathy.      Left upper body: No supraclavicular adenopathy.      Lower Body: No right inguinal adenopathy. No left inguinal adenopathy.   Skin:     General: Skin is warm and dry.      Findings: No rash.      Nails: There is no clubbing.   Neurological:      Mental Status: She is alert and oriented to person, place, and time.      Cranial Nerves: No cranial nerve deficit.   Psychiatric:         Behavior: Behavior normal.         Thought Content: Thought content normal.         Judgment: Judgment normal.       Assessment:       No diagnosis found.     Plan:       Now on observation after completing BR induction and 2 years Rituxan maintenance    Return visit in 6 months with CBC, CMP and LDH    A total of 20 minutes was spent in pre-visit chart review, personal interpretation of labs and imaging, and medication review. Total visit time 30 minutes, >50 % counseling.

## 2023-01-31 NOTE — PROGRESS NOTES
VASCULAR SURGERY CLINIC NOTE    HPI:  Pt returns after repair of left arm radial artery branch pseudoaneurysm. Her pain has resolved and her incision has healed without complications.     Physical Exam:  Incision CDI, no erythema, no drainage, sutures in place  2+ radial pulse  Normal L upper extremity sensation  5/5 strength in left hand with no neurological deficit    A/P:  78yo F with resolved left arm radial artery PSA. Sutures removed in clinic.     -RTC RICHARD Saldivar II, MD, RPVI  Vascular Surgery  [facility]

## 2023-02-22 NOTE — ADDENDUM NOTE
Addendum  created 02/22/23 1510 by Nikolay Magaña MD    Clinical Note Signed, Diagnosis association updated, Intraprocedure Blocks edited, SmartForm saved

## 2023-03-06 PROBLEM — J96.00 ACUTE RESPIRATORY FAILURE: Status: RESOLVED | Noted: 2022-11-23 | Resolved: 2023-03-06

## 2023-03-06 PROBLEM — N17.9 AKI (ACUTE KIDNEY INJURY): Status: RESOLVED | Noted: 2022-11-23 | Resolved: 2023-03-06

## 2023-03-06 PROBLEM — I21.4 NSTEMI (NON-ST ELEVATED MYOCARDIAL INFARCTION): Status: RESOLVED | Noted: 2022-11-23 | Resolved: 2023-03-06

## 2023-07-05 ENCOUNTER — TELEPHONE (OUTPATIENT)
Dept: HEMATOLOGY/ONCOLOGY | Facility: CLINIC | Age: 78
End: 2023-07-05
Payer: MEDICARE

## 2023-07-05 NOTE — TELEPHONE ENCOUNTER
[place] N[facility] called pt. We will reach out to pt tomorrow to schedule sooner apt. Pt appreciative of call.

## 2023-07-05 NOTE — TELEPHONE ENCOUNTER
----- Message from Nathan Maldonado sent at 7/5/2023  1:56 PM CDT -----  Type:  Sooner Apoointment Request    Caller is requesting a sooner appointment.  Caller declined first available appointment listed below.  Caller will not accept being placed on the waitlist and is requesting a message be sent to doctor.  Name of Caller:pt  When is the first available appointment?10/05  Symptoms:annual check up  Would the patient rather a call back or a response via [facility]? Call  Best Call Back Number:[phone]  Additional Information: pt states she would like to see sometime this month

## 2023-07-25 ENCOUNTER — OFFICE VISIT (OUTPATIENT)
Dept: HEMATOLOGY/ONCOLOGY | Facility: CLINIC | Age: 78
End: 2023-07-25
Payer: MEDICARE

## 2023-07-25 ENCOUNTER — LAB VISIT (OUTPATIENT)
Dept: LAB | Facility: HOSPITAL | Age: 78
End: 2023-07-25
Payer: MEDICARE

## 2023-07-25 VITALS
WEIGHT: 135.13 LBS | HEART RATE: 80 BPM | HEIGHT: 63 IN | SYSTOLIC BLOOD PRESSURE: 161 MMHG | DIASTOLIC BLOOD PRESSURE: 71 MMHG | TEMPERATURE: 98 F | OXYGEN SATURATION: 100 % | BODY MASS INDEX: 23.94 KG/M2

## 2023-07-25 DIAGNOSIS — C82.08 FOLLICULAR LYMPHOMA GRADE I, LYMPH NODES OF MULTIPLE SITES: Primary | ICD-10-CM

## 2023-07-25 DIAGNOSIS — C82.08 FOLLICULAR LYMPHOMA GRADE I, LYMPH NODES OF MULTIPLE SITES: ICD-10-CM

## 2023-07-25 LAB
ALBUMIN SERPL BCP-MCNC: 3.3 G/DL (ref 3.5–5.2)
ALP SERPL-CCNC: 96 U/L (ref 55–135)
ALT SERPL W/O P-5'-P-CCNC: 14 U/L (ref 10–44)
ANION GAP SERPL CALC-SCNC: 10 MMOL/L (ref 8–16)
AST SERPL-CCNC: 14 U/L (ref 10–40)
BASOPHILS # BLD AUTO: 0.05 K/UL (ref 0–0.2)
BASOPHILS NFR BLD: 0.7 % (ref 0–1.9)
BILIRUB SERPL-MCNC: 0.5 MG/DL (ref 0.1–1)
BUN SERPL-MCNC: 30 MG/DL (ref 8–23)
CALCIUM SERPL-MCNC: 9 MG/DL (ref 8.7–10.5)
CHLORIDE SERPL-SCNC: 115 MMOL/L (ref 95–110)
CO2 SERPL-SCNC: 16 MMOL/L (ref 23–29)
CREAT SERPL-MCNC: 1.7 MG/DL (ref 0.5–1.4)
DIFFERENTIAL METHOD: ABNORMAL
EOSINOPHIL # BLD AUTO: 0.4 K/UL (ref 0–0.5)
EOSINOPHIL NFR BLD: 4.8 % (ref 0–8)
ERYTHROCYTE [DISTWIDTH] IN BLOOD BY AUTOMATED COUNT: 13.5 % (ref 11.5–14.5)
EST. GFR  (NO RACE VARIABLE): 30.9 ML/MIN/1.73 M^2
GLUCOSE SERPL-MCNC: 144 MG/DL (ref 70–110)
HCT VFR BLD AUTO: 34.8 % (ref 37–48.5)
HGB BLD-MCNC: 10.6 G/DL (ref 12–16)
IMM GRANULOCYTES # BLD AUTO: 0.02 K/UL (ref 0–0.04)
IMM GRANULOCYTES NFR BLD AUTO: 0.3 % (ref 0–0.5)
LDH SERPL L TO P-CCNC: 209 U/L (ref 110–260)
LYMPHOCYTES # BLD AUTO: 3 K/UL (ref 1–4.8)
LYMPHOCYTES NFR BLD: 40.8 % (ref 18–48)
MCH RBC QN AUTO: 29.1 PG (ref 27–31)
MCHC RBC AUTO-ENTMCNC: 30.5 G/DL (ref 32–36)
MCV RBC AUTO: 96 FL (ref 82–98)
MONOCYTES # BLD AUTO: 0.5 K/UL (ref 0.3–1)
MONOCYTES NFR BLD: 7.4 % (ref 4–15)
NEUTROPHILS # BLD AUTO: 3.3 K/UL (ref 1.8–7.7)
NEUTROPHILS NFR BLD: 46 % (ref 38–73)
NRBC BLD-RTO: 0 /100 WBC
PLATELET # BLD AUTO: 214 K/UL (ref 150–450)
PMV BLD AUTO: 9.4 FL (ref 9.2–12.9)
POTASSIUM SERPL-SCNC: 4.8 MMOL/L (ref 3.5–5.1)
PROT SERPL-MCNC: 5.7 G/DL (ref 6–8.4)
RBC # BLD AUTO: 3.64 M/UL (ref 4–5.4)
SODIUM SERPL-SCNC: 141 MMOL/L (ref 136–145)
WBC # BLD AUTO: 7.26 K/UL (ref 3.9–12.7)

## 2023-07-25 PROCEDURE — 1126F PR PAIN SEVERITY QUANTIFIED, NO PAIN PRESENT: ICD-10-PCS | Mod: CPTII,S$GLB,, | Performed by: INTERNAL MEDICINE

## 2023-07-25 PROCEDURE — 80053 COMPREHEN METABOLIC PANEL: CPT | Performed by: INTERNAL MEDICINE

## 2023-07-25 PROCEDURE — 1159F MED LIST DOCD IN RCRD: CPT | Mod: CPTII,S$GLB,, | Performed by: INTERNAL MEDICINE

## 2023-07-25 PROCEDURE — 99214 OFFICE O/P EST MOD 30 MIN: CPT | Mod: S$GLB,,, | Performed by: INTERNAL MEDICINE

## 2023-07-25 PROCEDURE — 99999 PR PBB SHADOW E&M-EST. PATIENT-LVL IV: ICD-10-PCS | Mod: PBBFAC,,, | Performed by: INTERNAL MEDICINE

## 2023-07-25 PROCEDURE — 83615 LACTATE (LD) (LDH) ENZYME: CPT | Performed by: INTERNAL MEDICINE

## 2023-07-25 PROCEDURE — 3288F FALL RISK ASSESSMENT DOCD: CPT | Mod: CPTII,S$GLB,, | Performed by: INTERNAL MEDICINE

## 2023-07-25 PROCEDURE — 99999 PR PBB SHADOW E&M-EST. PATIENT-LVL IV: CPT | Mod: PBBFAC,,, | Performed by: INTERNAL MEDICINE

## 2023-07-25 PROCEDURE — 1101F PT FALLS ASSESS-DOCD LE1/YR: CPT | Mod: CPTII,S$GLB,, | Performed by: INTERNAL MEDICINE

## 2023-07-25 PROCEDURE — 1126F AMNT PAIN NOTED NONE PRSNT: CPT | Mod: CPTII,S$GLB,, | Performed by: INTERNAL MEDICINE

## 2023-07-25 PROCEDURE — 1160F PR REVIEW ALL MEDS BY PRESCRIBER/CLIN PHARMACIST DOCUMENTED: ICD-10-PCS | Mod: CPTII,S$GLB,, | Performed by: INTERNAL MEDICINE

## 2023-07-25 PROCEDURE — 1160F RVW MEDS BY RX/DR IN RCRD: CPT | Mod: CPTII,S$GLB,, | Performed by: INTERNAL MEDICINE

## 2023-07-25 PROCEDURE — 85025 COMPLETE CBC W/AUTO DIFF WBC: CPT | Performed by: INTERNAL MEDICINE

## 2023-07-25 PROCEDURE — 1101F PR PT FALLS ASSESS DOC 0-1 FALLS W/OUT INJ PAST YR: ICD-10-PCS | Mod: CPTII,S$GLB,, | Performed by: INTERNAL MEDICINE

## 2023-07-25 PROCEDURE — 3078F DIAST BP <80 MM HG: CPT | Mod: CPTII,S$GLB,, | Performed by: INTERNAL MEDICINE

## 2023-07-25 PROCEDURE — 3077F SYST BP >= 140 MM HG: CPT | Mod: CPTII,S$GLB,, | Performed by: INTERNAL MEDICINE

## 2023-07-25 PROCEDURE — 3078F PR MOST RECENT DIASTOLIC BLOOD PRESSURE < 80 MM HG: ICD-10-PCS | Mod: CPTII,S$GLB,, | Performed by: INTERNAL MEDICINE

## 2023-07-25 PROCEDURE — 36415 COLL VENOUS BLD VENIPUNCTURE: CPT | Performed by: INTERNAL MEDICINE

## 2023-07-25 PROCEDURE — 1159F PR MEDICATION LIST DOCUMENTED IN MEDICAL RECORD: ICD-10-PCS | Mod: CPTII,S$GLB,, | Performed by: INTERNAL MEDICINE

## 2023-07-25 PROCEDURE — 99214 PR OFFICE/OUTPT VISIT, EST, LEVL IV, 30-39 MIN: ICD-10-PCS | Mod: S$GLB,,, | Performed by: INTERNAL MEDICINE

## 2023-07-25 PROCEDURE — 3077F PR MOST RECENT SYSTOLIC BLOOD PRESSURE >= 140 MM HG: ICD-10-PCS | Mod: CPTII,S$GLB,, | Performed by: INTERNAL MEDICINE

## 2023-07-25 PROCEDURE — 3288F PR FALLS RISK ASSESSMENT DOCUMENTED: ICD-10-PCS | Mod: CPTII,S$GLB,, | Performed by: INTERNAL MEDICINE

## 2023-07-25 RX ORDER — APIXABAN 5 MG/1
5 TABLET, FILM COATED ORAL 2 TIMES DAILY
COMMUNITY
Start: 2023-04-18

## 2023-07-25 NOTE — PROGRESS NOTES
Route Chart for Scheduling  BMT Route Chart for Scheduling           Subjective:       Patient ID: Keely Pizarro is a 78 y.o. female.    Chief Complaint: Headache    Headache      Onc Hx:  Keely presents with her  for evaluation of newly diagnosed grade 1-2 follicular lymphoma diagnosed by excisional biopsy of a right cervical lymph node 8/1/2017.  Pathologic diagnosis was received by [facility].  Keely reports feeling fatigue since March 2017. At the time she had an infected tooth and developed cervical lymphadenopathy. She started loosing weight, total quantity to date is unknown. Appetite remains normal and she denies night sweats. She then developed shortness of breath worse with activity and left lower lung pleural effusion was noted. The received thoracentesis twice with negative pleural fluid flow cytometry. Cytology was positive for lymphocytes, but was not diagnostic for follicular lymphoma. CT imaging demonstrates diffuse, bilateral submandibular, cervical, axillary, supraclavicular, and intrathoracic lymphadenopathy.     Keely has a history of uterine cancer in 2012 treated with radiation and chemotherapy. She had a port-a-cath at that time for treatment. She has insulin dependent diabetes mellitus with poorly controlled blood sugar, reporting both high and lows. She has hypertension, and blood pressure was elevated at intake today.      Follow-up Visit 9/1/17  PET imaging shows stage 4 follicular lymphoma, large left side pleural effusion     Follow-up visit 3/15/18  Return visit to review results of post-treatment staging scan that shows complete remission after 6 cycles of BR chemotherapy. She continues to have weight loss- 7lbs since last visit with waxing/waning appetite.     Follow-up Visit 11/9/18   Interval follow-up for follicular lymphoma. Currently on maintenance Rituxan after BR for 6 cycles with complete response.    Follow-up Visit 7/7/2021  Interval follow-up for  follicular lymphoma after  Rituxan after BR for 6 cycles with CR and Cycle 12 maintenance (every 2 months) Hycela finished July 2020.  CBC stable, CMP and LDH pending  Interval thyroid biopsy benign   has appointments today to assess new mass on abdomen    Follow-up Visit 12/2/2021  Interval follow-up for FL after Rituxan BR and 2 years of maintenance Rituxan completed July 2020  CBC, CMP, and LDH stable  Notable acute interval even is hemicolectomy and appendectomy last month in [place] due to ischemic bowel.  Was unable to walk after surgery due to extended bed rest. Ambulating independently in clinic today.  Some trouble with step on exam table, but reports she is able to get up 4 steps with handrail at home.  No falls.    Follow-up Visit 7/7/2022  CBC, CMP, and LDH stable  Interval events = uncontrolled hypertension, severe colitis with hypokalemia, acute on chronic anemia, and CKD  CT imaging of abdomen 2/2022 = colitis of large intestine, no lymphadenopathy  Mammogram  5/2022 = Birads 1     Follow-up Visit 1/23/2023  CBC, CMP and LDH overall stable  Acute on chronic anemia in setting of aneursym repair 1 weeks ago, also during NSTEMI in November 2022. Still has healing hematoma at vascular surgery site.  Creatinine improved from 2.5 to 1.5    Follow-up Visit 7/25/2023  No acute interval events  Labs stable. Hgb Improved. Cr Stable.  No B symptoms  This will be last visit. In process of moving to [place].      Review of Systems   Constitutional: Negative.    HENT: Negative.     Eyes: Negative.    Respiratory: Negative.     Cardiovascular: Negative.    Gastrointestinal: Negative.    Endocrine: Negative.    Genitourinary: Negative.    Musculoskeletal: Negative.    Integumentary:  Negative.   Allergic/Immunologic: Negative for environmental allergies, food allergies and immunocompromised state.   Neurological:  Positive for headaches.   Hematological:  Negative for adenopathy. Does not bruise/bleed easily.    Psychiatric/Behavioral: Negative.         Objective:      Physical Exam  Vitals and nursing note reviewed.   Constitutional:       Appearance: She is well-developed.   HENT:      Head: Normocephalic and atraumatic.      Right Ear: External ear normal.      Left Ear: External ear normal.      Nose: Nose normal.      Mouth/Throat:      Pharynx: No oropharyngeal exudate.   Eyes:      General: No scleral icterus.     Conjunctiva/sclera: Conjunctivae normal.      Pupils: Pupils are equal, round, and reactive to light.   Neck:      Thyroid: No thyromegaly.      Vascular: No JVD.      Trachea: No tracheal deviation.   Cardiovascular:      Rate and Rhythm: Normal rate and regular rhythm.      Heart sounds: Normal heart sounds. No murmur heard.  Pulmonary:      Effort: Pulmonary effort is normal. No respiratory distress.      Breath sounds: Normal breath sounds.   Abdominal:      General: Bowel sounds are normal. There is no distension.      Palpations: Abdomen is soft. There is no mass.      Tenderness: There is no abdominal tenderness. There is no guarding or rebound.   Musculoskeletal:         General: Normal range of motion.      Cervical back: Normal range of motion and neck supple.   Lymphadenopathy:      Cervical: No cervical adenopathy.      Upper Body:      Right upper body: No supraclavicular adenopathy.      Left upper body: No supraclavicular adenopathy.      Lower Body: No right inguinal adenopathy. No left inguinal adenopathy.   Skin:     General: Skin is warm and dry.      Findings: No rash.      Nails: There is no clubbing.   Neurological:      Mental Status: She is alert and oriented to person, place, and time.      Cranial Nerves: No cranial nerve deficit.   Psychiatric:         Behavior: Behavior normal.         Thought Content: Thought content normal.         Judgment: Judgment normal.       Assessment:       No diagnosis found.    Plan:       Now on observation after completing BR induction and 2 years  Rituxan maintenance    No return. Patient moving to [place].    A total of 20 minutes was spent in pre-visit chart review, personal interpretation of labs and imaging, and medication review. Total visit time 30 minutes, >50 % counseling.

## 2023-09-26 NOTE — PROGRESS NOTES
[facility] - Cardiac Intensive Care  Cardiology  Progress Note    Patient Name: Keely Pizarro  MRN: [id number]  Admission Date: 11/23/2022  Hospital Length of Stay: 2 days  Code Status: Full Code   Attending Physician: Albin Heredia MD   Primary Care Physician: Albin Cazares MD  Expected Discharge Date:   Principal Problem:Acute on chronic diastolic CHF (congestive heart failure), NYHA class 3    Subjective:     Hospital Course:   Pt is a 77 year old female that presented to the hospital in HTN emergency with elevated troponin,  respiratory distress, and kidney failure. Pt did not have any ST elevation, but did have a new LBBB. Pt was not having any chest pain on presentation. ACS protocol was initiated. Pt was placed on a nitro drip and BiPAP due respiratory distress. Pt's BP has responded to the nitro drip and it has been stopped since late on 11/23.      Interval history: Pt has done well the last few days is not reporting worsening symptoms, has not noticed any bleeding, had one normal BM. Pt had worsening of her renal function.       ROS  Objective:     Vital Signs (Most Recent):  Temp: 98.4 °F (36.9 °C) (11/25/22 1105)  Pulse: 76 (11/25/22 1305)  Resp: (!) 53 (11/25/22 1305)  BP: (!) 151/72 (11/25/22 1305)  SpO2: 98 % (11/25/22 1305)   Vital Signs (24h Range):  Temp:  [98.4 °F (36.9 °C)-98.8 °F (37.1 °C)] 98.4 °F (36.9 °C)  Pulse:  [63-78] 76  Resp:  [17-53] 53  SpO2:  [95 %-100 %] 98 %  BP: (115-181)/() 151/72     Weight: 60.3 kg (133 lb)  Body mass index is 23.56 kg/m².    SpO2: 98 %  O2 Device (Oxygen Therapy): room air      Intake/Output Summary (Last 24 hours) at 11/25/2022 1402  Last data filed at 11/25/2022 1205  Gross per 24 hour   Intake 796.51 ml   Output 1850 ml   Net -1053.49 ml         Lines/Drains/Airways       Peripheral Intravenous Line  Duration                  Peripheral IV - Single Lumen 11/23/22 2218 22 G Right Wrist 1 day                    Physical  Exam  Vitals reviewed.   Constitutional:       Appearance: Normal appearance. She is normal weight.   HENT:      Head: Normocephalic and atraumatic.      Mouth/Throat:      Comments: Pt wearing BiPAP did not exam  Eyes:      General: Lids are normal. No scleral icterus.     Extraocular Movements: Extraocular movements intact.      Pupils: Pupils are equal, round, and reactive to light.   Cardiovascular:      Rate and Rhythm: Normal rate and regular rhythm.      Pulses: Normal pulses.   Pulmonary:      Effort: Pulmonary effort is normal. No respiratory distress.      Breath sounds: No wheezing. Rales: mild rales.  Abdominal:      General: Abdomen is flat. There is no distension.      Palpations: Abdomen is soft.      Tenderness: There is no abdominal tenderness.   Musculoskeletal:      Right lower leg: No edema.      Left lower leg: No edema.      Comments: Pt was able to move all extremities and feel when I was touching her.   Skin:     General: Skin is warm and dry.      Findings: No rash or wound.   Neurological:      Mental Status: She is alert and oriented to person, place, and time.      Cranial Nerves: No cranial nerve deficit.      Motor: No weakness.   Psychiatric:         Mood and Affect: Mood normal.         Behavior: Behavior is cooperative.         Thought Content: Thought content normal.         Judgment: Judgment normal.       Significant Labs: All pertinent lab results from the last 24 hours have been reviewed.    Significant Imaging:  review all imaging    Assessment and Plan:     Acute deep vein thrombosis (DVT) of non-extremity vein  It was discovered pt has a thrombosis in her [facility]. Pt is not having any symptoms besides some swelling    Plan  Pt placed on a low intensity heparin regiment due to concern for drop in hemoglobin. No overt signs of bleeding, still pending workup for cause of the drop in hemoglobin  -Will discontinue if pt begins to show signs of bleeding    Cellulitis of left arm  Pt  had an PIV placed in her L arm for her Nitro drip, this IV ended up infiltrating and began to swell before it was discovered. Pt is now having pain and redness in this area. There is worry for cellulitis     Plan  Pt placed on doxycycline for this. Later switched to vanc for positive BC and D/C doxy    Anemia  Pt presented with an NSTEMI. Placed on ACS protocol. Pt's CBC this morning had a drop in hemoglobin from 11.3 to 8.3 on 11/24. No active sites of bleeding noted. Pt has not had a BM.    CBC q8hrs  Pt's hemoglobin dropped from 11.3 to 8.3. No signs of bleeding noticed with this patient. Pt had no bloody bowel movements while she has been here.  Have not noticed any signs of bleeding  DIC workup negative, iron studies ordered. Hematology consult placed.    CHF (congestive heart failure)  Patient is fluid overloaded , but is improving with diuretics.    PET scan results:  The myocardial perfusion images are normal without evidence of ischemia or scar.    The whole heart absolute myocardial perfusion values averaged 1.47 cc/min/g at rest, which is elevated; 1.42 cc/min/g at stress, which is mildly reduced; and CFR is 0.98 , which is severely reduced in part due to elevated resting flow.    Absolute flow measurements are consistent with abnormal microvascular function or possibly caffeine intake..    CT attenuation images demonstrate no coronary calcifications and no aortic calcifications.    The gated perfusion images showed an ejection fraction of 56% at rest and 60% during stress. A normal ejection fraction is greater than 47%.    The wall motion is normal at rest and during stress.    The LV cavity size is normal at rest and stress.    The EKG portion of this study is uninterpretable.    There were no arrhythmias during stress.    The patient reported no chest pain during the stress test.    There are no prior studies for comparison    Plan  PET ECHO was done, and did not show any overt abnormalities  or areas of stenosis        Acute respiratory failure  Pt presented in respiratory distress with sats at 92%. Chest x-ray showed pulmonary edema. Most likely due to HTN emergency. BNP was elevated at 1333    Plan   Pt placed on BiPAP at admission, given lasix 40mg IV,   Pt was able to be weaned off biPAP and is now on RA      ERENDIRA (acute kidney injury)  Pt presented with elevated creatinine at 1.9. pt has variable creatinine over the past 9 months. Was lowest at 1.2. Most recent nephrology note in 8/15/22 says her baseline is 1.5. ERENDIRA is most likely due to HTN emergency. Pt was also fluid overloaded on arrival    Plan  Will diurese her with lasix 40mg  Daily CMPs  Pt's creatinine has continued to rise.   Urine studies ordered. Thought to be due to combination of HTN emergency and ischemic causes.    NSTEMI (non-ST elevated myocardial infarction)  Pt came in not complaining of chest pain, but had elevated troponin at 0.105 and EKG changes with new LBBB. STEMI procedure not called due to the lack of chest pain for the last few days.    Plan  Began ACS protocol   Placed on heparin, plavix, aspirin  Plan for ECHO and patient's troponin peaked.  Daily CBC, CMP, Mg, Phos, APTT    Hypertensive emergency  Pt presented with SOB, ERENDIRA, EKG changes, and her  BP elevated in the 246/129    Plan  Pt placed on nitro drip. Pt's BP responded and pt has been in the lower range. Nitro drip D/C. Holding her home antihypertensives until BP stabilizes  Placed on lasix 40mg IV  Given her home dose amlodipine 10mg   Pt started isosorbide mononitrate due to her BP started to become elevated.    Follicular lymphoma grade i, lymph nodes of multiple sites  Pt has history of follicular lymphoma. Pt states she is in remission and follows up with oncology every 6 months.        VTE Risk Mitigation (From admission, onward)         Ordered     heparin 25,000 units in dextrose 5% 250 mL (100 units/mL) infusion LOW INTENSITY nomogram - [facility]  Continuous         Question Answer Comment   Heparin Infusion Adjustment (DO NOT MODIFY ANSWER) [email or web address]\[email or web address]\Images\Pharmacy\HeparinInfusions\heparin LOW INTENSITY nomogram for [facility] [id number]    Begin at (in units/kg/hr) 12        11/25/22 1207     heparin 25,000 units in dextrose 5% (100 units/ml) IV bolus from bag - ADDITIONAL PRN BOLUS - 60 units/kg  As needed (PRN)        Question:  Heparin Infusion Adjustment (DO NOT MODIFY ANSWER)  Answer:  [email or web address]\Images\Pharmacy\HeparinInfusions\heparin LOW INTENSITY nomogram for [facility] [id number]    11/25/22 1207     heparin 25,000 units in dextrose 5% (100 units/ml) IV bolus from bag - ADDITIONAL PRN BOLUS - 30 units/kg  As needed (PRN)        Question:  Heparin Infusion Adjustment (DO NOT MODIFY ANSWER)  Answer:  [email or web address]\Images\Pharmacy\HeparinInfusions\heparin LOW INTENSITY nomogram for [facility] [id number]    11/25/22 1207     IP VTE HIGH RISK PATIENT  Once         11/23/22 1512     Place sequential compression device  Until discontinued         11/23/22 1512                Omar Richards DO  Cardiology  [facility] - Cardiac [facility]ensive [facility]   Plastic Surgeon Procedure Text (D): After obtaining clear surgical margins the patient was sent to plastics for surgical repair.  The patient understands they will receive post-surgical care and follow-up from the referring physician's office. Subsequent Stages Histo Method Verbiage: Using a similar technique to that described above, a thin layer of tissue was removed from all areas where tumor was visible on the previous stage.  The tissue was again oriented, mapped, dyed, and processed as above.

## 2023-09-27 DIAGNOSIS — Z78.0 MENOPAUSE: ICD-10-CM

## 2025-02-24 ENCOUNTER — PATIENT OUTREACH (OUTPATIENT)
Dept: ADMINISTRATIVE | Facility: HOSPITAL | Age: 80
End: 2025-02-24
Payer: MEDICARE

## (undated) DEVICE — DRAPE THYROID WITH ARMBOARD

## (undated) DEVICE — ELECTRODE REM PLYHSV RETURN 9

## (undated) DEVICE — SUT COATED VICRYL 4/0 27IN

## (undated) DEVICE — SUT MCRYL PLUS 4-0 PS2 27IN

## (undated) DEVICE — COVER LIGHT HANDLE 80/CA

## (undated) DEVICE — INFLATOR ENCORE

## (undated) DEVICE — APPLICATOR CHLORAPREP ORN 26ML

## (undated) DEVICE — SUT VICRYL PLUS 3-0 SH 18IN

## (undated) DEVICE — SET DECANTER MEDICHOICE

## (undated) DEVICE — DRESSING LEUKOPLAST FLEX 1X3IN

## (undated) DEVICE — PACK AV VASCULAR ACCESS OMC

## (undated) DEVICE — BLADE SURG #15 CARBON STEEL

## (undated) DEVICE — SYR ONLY LUER LOCK 20CC

## (undated) DEVICE — SPONGE DERMACEA 4X4IN 12PLY

## (undated) DEVICE — DRESSING TELFA N ADH 3X8

## (undated) DEVICE — GLIDESHEATH SLENDER SS 5FR10CM

## (undated) DEVICE — BANDAGE MATRIX HK LOOP 6IN 5YD

## (undated) DEVICE — DRESSING TEGADERM 2 3/8 X 2.75

## (undated) DEVICE — DRAPE TIBURON 36X104X123IN

## (undated) DEVICE — SUT MONOCRYL 4-0

## (undated) DEVICE — SUT SILK 2-0 SH 18IN BLACK

## (undated) DEVICE — SUT MONOCRYL 4-0 PS-2

## (undated) DEVICE — LOOP VESSEL YELLOW MAXI

## (undated) DEVICE — GOWN SURGICAL X-LARGE

## (undated) DEVICE — DRESSING TELFA STRL 4X3 LF

## (undated) DEVICE — SUT 4-0 12-18IN SILK BLACK

## (undated) DEVICE — DRESSING TRANS 2X2 TEGADERM

## (undated) DEVICE — TRAY MINOR GEN SURG

## (undated) DEVICE — SUT MONOCRYL 3-0 SH U/D

## (undated) DEVICE — BAND TR WITH INFLATOR

## (undated) DEVICE — SUT VICRYL 3-0 27 SH

## (undated) DEVICE — SUT 2-0 12-18IN SILK

## (undated) DEVICE — Device

## (undated) DEVICE — TOWEL OR DISP STRL BLUE 4/PK

## (undated) DEVICE — GUIDEWIRE STF .035X180CM ANG

## (undated) DEVICE — SUT 3-0 12-18IN SILK

## (undated) DEVICE — SUT CTD VICRYL 3-0 UND BR

## (undated) DEVICE — CATH STRAIGHT GLIDE